# Patient Record
Sex: FEMALE | Race: WHITE | NOT HISPANIC OR LATINO | Employment: OTHER | URBAN - METROPOLITAN AREA
[De-identification: names, ages, dates, MRNs, and addresses within clinical notes are randomized per-mention and may not be internally consistent; named-entity substitution may affect disease eponyms.]

---

## 2017-11-15 ENCOUNTER — OFFICE VISIT (OUTPATIENT)
Dept: URGENT CARE | Facility: CLINIC | Age: 72
End: 2017-11-15
Payer: MEDICARE

## 2017-11-15 VITALS
TEMPERATURE: 99 F | HEIGHT: 63 IN | RESPIRATION RATE: 18 BRPM | SYSTOLIC BLOOD PRESSURE: 174 MMHG | WEIGHT: 190 LBS | HEART RATE: 74 BPM | OXYGEN SATURATION: 96 % | BODY MASS INDEX: 33.66 KG/M2 | DIASTOLIC BLOOD PRESSURE: 89 MMHG

## 2017-11-15 DIAGNOSIS — H00.025 HORDEOLUM INTERNUM OF LEFT LOWER EYELID: Primary | ICD-10-CM

## 2017-11-15 PROCEDURE — 99203 OFFICE O/P NEW LOW 30 MIN: CPT | Mod: S$GLB,,, | Performed by: PHYSICIAN ASSISTANT

## 2017-11-15 RX ORDER — NEOMYCIN SULFATE, POLYMYXIN B SULFATE AND DEXAMETHASONE 3.5; 10000; 1 MG/ML; [USP'U]/ML; MG/ML
1 SUSPENSION/ DROPS OPHTHALMIC EVERY 6 HOURS
Qty: 5 ML | Refills: 0 | Status: SHIPPED | OUTPATIENT
Start: 2017-11-15 | End: 2017-11-20

## 2017-11-15 RX ORDER — HYDROCHLOROTHIAZIDE 25 MG/1
25 TABLET ORAL DAILY
COMMUNITY
End: 2021-01-04

## 2017-11-15 RX ORDER — AMLODIPINE BESYLATE 5 MG/1
5 TABLET ORAL DAILY
COMMUNITY
End: 2019-12-07 | Stop reason: ALTCHOICE

## 2017-11-15 RX ORDER — CHOLECALCIFEROL (VITAMIN D3) 25 MCG
1000 TABLET ORAL DAILY
COMMUNITY

## 2017-11-15 RX ORDER — CALCIUM CARBONATE 600 MG
600 TABLET ORAL ONCE
COMMUNITY

## 2017-11-15 RX ORDER — ASPIRIN 81 MG/1
81 TABLET ORAL EVERY OTHER DAY
COMMUNITY

## 2017-11-15 RX ORDER — NEOMYCIN SULFATE, POLYMYXIN B SULFATE AND DEXAMETHASONE 3.5; 10000; 1 MG/ML; [USP'U]/ML; MG/ML
1 SUSPENSION/ DROPS OPHTHALMIC EVERY 6 HOURS
Qty: 5 ML | Refills: 0 | Status: SHIPPED | OUTPATIENT
Start: 2017-11-15 | End: 2017-11-15 | Stop reason: SDUPTHER

## 2017-11-15 RX ORDER — LISINOPRIL 20 MG/1
20 TABLET ORAL DAILY
COMMUNITY
End: 2019-12-07 | Stop reason: ALTCHOICE

## 2017-11-15 NOTE — PATIENT INSTRUCTIONS
Sty (or Stye)  A sty is an infection of the oil gland of the eyelid. It may develop into a small pocket of pus (abscess). This can cause pain, redness, and swelling. In early stages, styes are treated with antibiotic cream, eye drops, or warm packs (small towels soaked in warm water). More severe cases may need to be opened and drained by a health care provider.  Home care  · Eye drops or ointment are usually prescribed to treat the infection. Use these as directed.   ¨ Artificial tears may also be used to lubricate the eye and make it more comfortable. These may be purchased without a prescription.   ¨ Talk to your health care provider before using any over-the-counter treatment for a sty.  · Apply a warm, damp towel to the affected eye for at least 5 minutes, 3 to 4 times a day for a week. Warm compresses open the pores and speed the healing. If the compresses are too hot, they may burn your eyelid.  · Sometimes the sty will drain with this treatment alone. If this happens, continue the antibiotic until all the redness and swelling are gone.  · Wash your hands before and after touching the infected eye to avoid spreading the infection.  · Do not squeeze or try to puncture the sty.  Follow-up care  Follow up with your health care provider, or as advised.   When to seek medical advice  Call your health care provider right away if you have:  · Increase in swelling or redness around the eyelid after 48 to 72 hours  · Increase in eye pain or the eyelid blisters  · Increase in warmth--the eyelid feels hot  · Drainage of blood or thick pus from the sty  · Blister on the eyelid  · Inability to open the eyelid due to swelling  · Fever  ¨ 1 degree above your normal temperature lasting for 24 to 48 hours, or  ¨ Whatever your health care provider told you to report based on your medical condition  · Vision changes  · Headache or stiff neck  · Recurrence of the sty  Date Last Reviewed: 6/14/2015  © 9526-3449 [employer]. [address] [place] [address], [place] [address]. All rights reserved. This information is not intended as a substitute for professional medical care. Always follow your healthcare professional's instructions.      Please follow up with your Primary care provider within 2-5 days if your signs and symptoms have not resolved or worsen.     If your condition worsens or fails to improve we recommend that you receive another evaluation at the emergency room immediately or contact your primary medical clinic to discuss your concerns.   You must understand that you have received an Urgent Care treatment only and that you may be released before all of your medical problems are known or treated. You, the patient, will arrange for follow up care as instructed.

## 2017-11-15 NOTE — PROGRESS NOTES
"Subjective:       Patient ID: Melani Holloway is a 72 y.o. female.    Vitals:  height is 5' 3" (1.6 m) and weight is 86.2 kg (190 lb). Her oral temperature is 98.7 °F (37.1 °C). Her blood pressure is 174/89 (abnormal) and her pulse is 74. Her respiration is 18 and oxygen saturation is 96%.     Chief Complaint: Eye Problem    Eye Problem    The left eye is affected. This is a new problem. The current episode started in the past 7 days. The problem occurs constantly. The problem has been gradually worsening. There was no injury mechanism. The pain is at a severity of 0/10. The patient is experiencing no pain. There is no known exposure to pink eye. She does not wear contacts. Associated symptoms include eye redness and itching. Pertinent negatives include no blurred vision, fever, nausea, photophobia or vomiting. She has tried water for the symptoms. The treatment provided no relief.     Review of Systems   Constitution: Negative for chills and fever.   HENT: Negative for congestion.    Eyes: Positive for itching and redness. Negative for blurred vision, pain and photophobia.   Gastrointestinal: Negative for nausea and vomiting.   Neurological: Negative for headaches.       Objective:      Physical Exam   Constitutional: She is oriented to person, place, and time. She appears well-developed and well-nourished.   HENT:   Head: Normocephalic and atraumatic.   Right Ear: External ear normal.   Left Ear: External ear normal.   Nose: Nose normal.   Mouth/Throat: Oropharynx is clear and moist.   Eyes: Conjunctivae, EOM and lids are normal. Pupils are equal, round, and reactive to light. Right eye exhibits no chemosis, no discharge, no exudate and no hordeolum. No foreign body present in the right eye. Left eye exhibits discharge, exudate and hordeolum. Left eye exhibits no chemosis. No foreign body present in the left eye.       Neck: Trachea normal, full passive range of motion without pain and phonation normal. Neck " supple.   Musculoskeletal: Normal range of motion.   Neurological: She is alert and oriented to person, place, and time.   Skin: Skin is warm, dry and intact.   Psychiatric: She has a normal mood and affect. Her speech is normal and behavior is normal. Judgment and thought content normal. Cognition and memory are normal.   Nursing note and vitals reviewed.      Assessment:       1. Hordeolum internum of left lower eyelid        Plan:         Hordeolum internum of left lower eyelid  -     neomycin-polymyxin-dexamethasone (MAXITROL) 3.5mg/mL-10,000 unit/mL-0.1 % DrpS; Place 1 drop into the left eye every 6 (six) hours.  Dispense: 5 mL; Refill: 0        Sty (or Stye)  A sty is an infection of the oil gland of the eyelid. It may develop into a small pocket of pus (abscess). This can cause pain, redness, and swelling. In early stages, styes are treated with antibiotic cream, eye drops, or warm packs (small towels soaked in warm water). More severe cases may need to be opened and drained by a health care provider.  Home care  · Eye drops or ointment are usually prescribed to treat the infection. Use these as directed.   ¨ Artificial tears may also be used to lubricate the eye and make it more comfortable. These may be purchased without a prescription.   ¨ Talk to your health care provider before using any over-the-counter treatment for a sty.  · Apply a warm, damp towel to the affected eye for at least 5 minutes, 3 to 4 times a day for a week. Warm compresses open the pores and speed the healing. If the compresses are too hot, they may burn your eyelid.  · Sometimes the sty will drain with this treatment alone. If this happens, continue the antibiotic until all the redness and swelling are gone.  · Wash your hands before and after touching the infected eye to avoid spreading the infection.  · Do not squeeze or try to puncture the sty.  Follow-up care  Follow up with your health care provider, or as advised.   When to seek  medical advice  Call your health care provider right away if you have:  · Increase in swelling or redness around the eyelid after 48 to 72 hours  · Increase in eye pain or the eyelid blisters  · Increase in warmth--the eyelid feels hot  · Drainage of blood or thick pus from the sty  · Blister on the eyelid  · Inability to open the eyelid due to swelling  · Fever  ¨ 1 degree above your normal temperature lasting for 24 to 48 hours, or  ¨ Whatever your health care provider told you to report based on your medical condition  · Vision changes  · Headache or stiff neck  · Recurrence of the sty  Date Last Reviewed: 6/14/2015  © 9580-0579 [employer]. [address] [place] [address], [place] [address]. All rights reserved. This information is not intended as a substitute for professional medical care. Always follow your healthcare professional's instructions.      Please follow up with your Primary care provider within 2-5 days if your signs and symptoms have not resolved or worsen.     If your condition worsens or fails to improve we recommend that you receive another evaluation at the emergency room immediately or contact your primary medical clinic to discuss your concerns.   You must understand that you have received an Urgent Care treatment only and that you may be released before all of your medical problems are known or treated. You, the patient, will arrange for follow up care as instructed.

## 2019-01-03 ENCOUNTER — OFFICE VISIT (OUTPATIENT)
Dept: URGENT CARE | Facility: CLINIC | Age: 74
End: 2019-01-03
Payer: MEDICARE

## 2019-01-03 VITALS
OXYGEN SATURATION: 94 % | WEIGHT: 190 LBS | DIASTOLIC BLOOD PRESSURE: 84 MMHG | HEIGHT: 63 IN | SYSTOLIC BLOOD PRESSURE: 168 MMHG | BODY MASS INDEX: 33.66 KG/M2 | HEART RATE: 92 BPM | RESPIRATION RATE: 18 BRPM | TEMPERATURE: 101 F

## 2019-01-03 DIAGNOSIS — J40 BRONCHITIS: Primary | ICD-10-CM

## 2019-01-03 DIAGNOSIS — R06.2 WHEEZING: ICD-10-CM

## 2019-01-03 DIAGNOSIS — R50.9 FEVER, UNSPECIFIED FEVER CAUSE: ICD-10-CM

## 2019-01-03 PROCEDURE — 99214 OFFICE O/P EST MOD 30 MIN: CPT | Mod: 25,S$GLB,, | Performed by: PHYSICIAN ASSISTANT

## 2019-01-03 PROCEDURE — 94640 PR INHAL RX, AIRWAY OBST/DX SPUTUM INDUCT: ICD-10-PCS | Mod: 59,S$GLB,, | Performed by: PHYSICIAN ASSISTANT

## 2019-01-03 PROCEDURE — 99214 PR OFFICE/OUTPT VISIT, EST, LEVL IV, 30-39 MIN: ICD-10-PCS | Mod: 25,S$GLB,, | Performed by: PHYSICIAN ASSISTANT

## 2019-01-03 PROCEDURE — 94640 AIRWAY INHALATION TREATMENT: CPT | Mod: 59,S$GLB,, | Performed by: PHYSICIAN ASSISTANT

## 2019-01-03 RX ORDER — PREDNISONE 20 MG/1
40 TABLET ORAL DAILY
Qty: 10 TABLET | Refills: 0 | Status: SHIPPED | OUTPATIENT
Start: 2019-01-03 | End: 2019-01-08

## 2019-01-03 RX ORDER — ALBUTEROL SULFATE 0.83 MG/ML
2.5 SOLUTION RESPIRATORY (INHALATION)
Status: COMPLETED | OUTPATIENT
Start: 2019-01-03 | End: 2019-01-03

## 2019-01-03 RX ORDER — ALBUTEROL SULFATE 90 UG/1
1-2 AEROSOL, METERED RESPIRATORY (INHALATION) EVERY 4 HOURS PRN
Qty: 1 INHALER | Refills: 1 | Status: SHIPPED | OUTPATIENT
Start: 2019-01-03 | End: 2020-09-28

## 2019-01-03 RX ORDER — ACETAMINOPHEN 500 MG
1000 TABLET ORAL
Status: COMPLETED | OUTPATIENT
Start: 2019-01-03 | End: 2019-01-03

## 2019-01-03 RX ORDER — IPRATROPIUM BROMIDE 0.5 MG/2.5ML
0.5 SOLUTION RESPIRATORY (INHALATION)
Status: COMPLETED | OUTPATIENT
Start: 2019-01-03 | End: 2019-01-03

## 2019-01-03 RX ADMIN — IPRATROPIUM BROMIDE 0.5 MG: 0.5 SOLUTION RESPIRATORY (INHALATION) at 07:01

## 2019-01-03 RX ADMIN — ALBUTEROL SULFATE 2.5 MG: 0.83 SOLUTION RESPIRATORY (INHALATION) at 07:01

## 2019-01-03 RX ADMIN — Medication 1000 MG: at 07:01

## 2019-01-04 NOTE — PATIENT INSTRUCTIONS
- Rest.    - Drink plenty of fluids.    - Tylenol or Ibuprofen as directed as needed for fever/pain.    - Take over-the-counter claritin, zyrtec, allegra, or xyzal as directed.  You may use a decongestant form (D) of this medication if you DO NOT have a history of hypertension or heart disease.   - Follow up with your PCP or specialty clinic as directed in the next 1-2 weeks if not improved or as needed.  You can call [phone] to schedule an appointment with the appropriate provider.    - Go to the ED if your symptoms worsen.  - You must understand that you have received an Urgent Care treatment only and that you may be released before all of your medical problems are known or treated.   - You, the patient, will arrange for follow up care as instructed.   - If your condition worsens or fails to improve we recommend that you receive another evaluation at the ER immediately or contact your PCP to discuss your concerns or return here.       Bronchitis with Wheezing (Viral or Bacterial: Adult)    Bronchitis is an infection of the air passages. It often occurs during a cold and is usually caused by a virus. Symptoms include cough with mucus (phlegm) and low-grade fever. This illness is contagious during the first few days and is spread through the air by coughing and sneezing, or by direct contact (touching the sick person and then touching your own eyes, nose, or mouth).  If there is a lot of inflammation, air flow is restricted. The air passages may also go into spasm, especially if you have asthma. This causes wheezing and difficulty breathing even in people who do not have asthma.  Bronchitis usually lasts 7 to 14 days. The wheezing should improve with treatment during the first week. An inhaler is often prescribed to relax the air passages and stop wheezing. Antibiotics will be prescribed if your doctor thinks there is also a secondary bacterial infection.  Home care  · If symptoms are severe, rest at home for  the first 2 to 3 days. When you go back to your usual activities, don't let yourself get too tired.  · Do not smoke. Also avoid being exposed to secondhand smoke.  · You may use over-the-counter medicine to control fever or pain, unless another medicine was prescribed. Note: If you have chronic liver or kidney disease or have ever had a stomach ulcer or gastrointestinal bleeding, talk with your healthcare provider before using these medicines. Also talk to your provider if you are taking medicine to prevent blood clots.) Aspirin should never be given to anyone younger than 18 years of age who is ill with a viral infection or fever. It may cause severe liver or brain damage.  · Your appetite may be poor, so a light diet is fine. Avoid dehydration by drinking 6 to 8 glasses of fluids per day (such as water, soft drinks, sports drinks, juices, tea, or soup). Extra fluids will help loosen secretions in the nose and lungs.  · Over-the-counter cough, cold, and sore-throat medicines will not shorten the length of the illness, but they may be helpful to reduce symptoms. (Note: Do not use decongestants if you have high blood pressure.)  · If you were given an inhaler, use it exactly as directed. If you need to use it more often than prescribed, your condition may be worsening. If this happens, contact your healthcare provider.  · If prescribed, finish all antibiotic medicine, even if you are feeling better after only a few days.  Follow-up care  Follow up with your healthcare provider, or as advised. If you had an X-ray or ECG (electrocardiogram), a specialist will review it. You will be notified of any new findings that may affect your care.  Note: If you are age 65 or older, or if you have a chronic lung disease or condition that affects your immune system, or you smoke, talk to your healthcare provider about having a pneumococcal vaccinations and a yearly influenza vaccination (flu shot).  When to seek medical  advice  Call your healthcare provider right away if any of these occur:  · Fever of 100.4°F (38°C) or higher  · Coughing up increasing amounts of colored sputum  · Weakness, drowsiness, headache, facial pain, ear pain, or a stiff neck  Call 911, or get immediate medical care  Contact emergency services right away if any of these occur.  · Coughing up blood  · Worsening weakness, drowsiness, headache, or stiff neck  · Increased wheezing not helped with medication, shortness of breath, or pain with breathing  Date Last Reviewed: 9/13/2015 © 2000-2017 [employer]. [address] [place] [address]. All rights reserved. This information is not intended as a substitute for professional medical care. Always follow your healthcare professional's instructions.        Bronchitis, Viral (Adult)    You have a viral bronchitis. Bronchitis is inflammation and swelling of the lining of the lungs. This is often caused by an infection. Symptoms include a dry, hacking cough that is worse at night. The cough may bring up yellow-green mucus. You may also feel short of breath or wheeze. Other symptoms may include tiredness, chest discomfort, and chills.  Bronchitis that is caused by a virus is not treated with antibiotics. Instead, medicines may be given to help relieve symptoms. Symptoms can last up to 2 weeks, although the cough may last much longer.  This illness is contagious during the first few days and is spread through the air by coughing and sneezing, or by direct contact (touching the sick person and then touching your own eyes, nose, or mouth).  Most viral illnesses resolve within 10 to 14 days with rest and simple home remedies, although they may sometimes last for several weeks.  Home care  · If symptoms are severe, rest at home for the first 2 to 3 days. When you go back to your usual activities, don't let yourself get too tired.  · Do not smoke. Also avoid being exposed to secondhand smoke.  · You  may use over-the-counter medicine to control fever or pain, unless another pain medicine was prescribed. (Note: If you have chronic liver or kidney disease or have ever had a stomach ulcer or gastrointestinal bleeding, talk with your healthcare provider before using these medicines. Also talk to your provider if you are taking medicine to prevent blood clots.) Aspirin should never be given to anyone younger than 18 years of age who is ill with a viral infection or fever. It may cause severe liver or brain damage.  · Your appetite may be poor, so a light diet is fine. Avoid dehydration by drinking 6 to 8 glasses of fluids per day (such as water, soft drinks, sports drinks, juices, tea, or soup). Extra fluids will help loosen secretions in the nose and lungs.  · Over-the-counter cough, cold, and sore-throat medicines will not shorten the length of the illness, but they may help to reduce symptoms. (Note: Do not use decongestants if you have high blood pressure.)  Follow-up care  Follow up with your healthcare provider, or as advised. If you had an X-ray or ECG (electrocardiogram), a specialist will review it. You will be notified of any new findings that may affect your care.  Note: If you are age 65 or older, or if you have a chronic lung disease or condition that affects your immune system, or you smoke, talk to your healthcare provider about having pneumococcal vaccinations and a yearly influenza vaccination (flu shot).  When to seek medical advice  Call your healthcare provider right away if any of these occur:  · Fever of 100.4°F (38°C) or higher  · Coughing up increased amounts of colored sputum  · Weakness, drowsiness, headache, facial pain, ear pain, or a stiff neck  Call 911, or get immediate medical care  Contact emergency services right away if any of these occur:  · Coughing up blood  · Worsening weakness, drowsiness, headache, or stiff neck  · Trouble breathing, wheezing, or pain with breathing  Date Last  Reviewed: 9/13/2015  © 5177-0610 [employer]. [address] [place] [address], [place] [address]. All rights reserved. This information is not intended as a substitute for professional medical care. Always follow your healthcare professional's instructions.

## 2019-01-04 NOTE — PROGRESS NOTES
"Subjective:       Patient ID: Melani Holloway is a 73 y.o. female.    Vitals:  height is 5' 3" (1.6 m) and weight is 86.2 kg (190 lb). Her oral temperature is 101 °F (38.3 °C) (abnormal). Her blood pressure is 168/84 (abnormal) and her pulse is 92. Her respiration is 18 and oxygen saturation is 94 (abnormal)%.     Chief Complaint: URI    This is a 73 y.o. female who presents today with a chief complaint of URI which started on Tuesday.  Patient states she started having SOB today.  Pt has been taking Gillian Accokeek Day Tabs without any relief.         URI    This is a new problem. The current episode started in the past 7 days (Tuesday). The problem has been gradually worsening. The maximum temperature recorded prior to her arrival was 101 - 101.9 F. Associated symptoms include coughing. Pertinent negatives include no congestion, ear pain, nausea, rash, sinus pain, sore throat, vomiting or wheezing. Treatments tried: Gillian Selzter Day. The treatment provided no relief.       Constitution: Positive for fever. Negative for chills, sweating and fatigue.   HENT: Positive for postnasal drip. Negative for ear pain, congestion, sinus pain, sinus pressure, sore throat and voice change.    Neck: Negative for painful lymph nodes.   Eyes: Negative for eye redness.   Respiratory: Positive for cough and shortness of breath. Negative for chest tightness, sputum production, bloody sputum, COPD, stridor, wheezing and asthma.    Gastrointestinal: Negative for nausea and vomiting.   Musculoskeletal: Negative for muscle ache.   Skin: Negative for rash and erythema.   Allergic/Immunologic: Negative for seasonal allergies and asthma.   Hematologic/Lymphatic: Negative for swollen lymph nodes.       Objective:      Physical Exam   Constitutional: She is oriented to person, place, and time. She appears well-developed and well-nourished.   HENT:   Head: Normocephalic and atraumatic.   Right Ear: Hearing, tympanic membrane, external ear and " ear canal normal.   Left Ear: Hearing, tympanic membrane, external ear and ear canal normal.   Nose: Nose normal. Right sinus exhibits no maxillary sinus tenderness and no frontal sinus tenderness. Left sinus exhibits no maxillary sinus tenderness and no frontal sinus tenderness.   Mouth/Throat: Uvula is midline and oropharynx is clear and moist.   Eyes: Conjunctivae are normal.   Neck: Normal range of motion. Neck supple. No thyromegaly present.   Cardiovascular: Normal rate and regular rhythm. Exam reveals no gallop and no friction rub.   No murmur heard.  Pulmonary/Chest: Effort normal. She has wheezes (inspiratory and expiratory, all lung fields). She has no rales.   Musculoskeletal: Normal range of motion.   Lymphadenopathy:     She has no cervical adenopathy.   Neurological: She is alert and oriented to person, place, and time.   Skin: Skin is warm and dry. No rash noted. No erythema.   Psychiatric: She has a normal mood and affect. Her behavior is normal. Judgment and thought content normal.   Nursing note and vitals reviewed.      8:12 PM - patient does feel improved after the breathing treatment.  She does sound better with no wheezing noted. Her oxygen saturation has come up to 94%.    Assessment:       1. Bronchitis    2. Wheezing    3. Fever, unspecified fever cause        Plan:         Bronchitis  -     predniSONE (DELTASONE) 20 MG tablet; Take 2 tablets (40 mg total) by mouth once daily. for 5 days  Dispense: 10 tablet; Refill: 0  -     albuterol (PROVENTIL/VENTOLIN HFA) 90 mcg/actuation inhaler; Inhale 1-2 puffs into the lungs every 4 (four) hours as needed for Wheezing.  Dispense: 1 Inhaler; Refill: 1    Wheezing  -     albuterol nebulizer solution 2.5 mg  -     ipratropium 0.02 % nebulizer solution 0.5 mg    Fever, unspecified fever cause  -     acetaminophen tablet 1,000 mg        Melani was seen today for uri.    Diagnoses and all orders for this visit:    Bronchitis  -     predniSONE (DELTASONE)  20 MG tablet; Take 2 tablets (40 mg total) by mouth once daily. for 5 days  -     albuterol (PROVENTIL/VENTOLIN HFA) 90 mcg/actuation inhaler; Inhale 1-2 puffs into the lungs every 4 (four) hours as needed for Wheezing.    Wheezing  -     albuterol nebulizer solution 2.5 mg  -     ipratropium 0.02 % nebulizer solution 0.5 mg    Fever, unspecified fever cause  -     acetaminophen tablet 1,000 mg      Patient Instructions   - Rest.    - Drink plenty of fluids.    - Tylenol or Ibuprofen as directed as needed for fever/pain.    - Take over-the-counter claritin, zyrtec, allegra, or xyzal as directed.  You may use a decongestant form (D) of this medication if you DO NOT have a history of hypertension or heart disease.   - Follow up with your PCP or specialty clinic as directed in the next 1-2 weeks if not improved or as needed.  You can call [phone] to schedule an appointment with the appropriate provider.    - Go to the ED if your symptoms worsen.  - You must understand that you have received an Urgent Care treatment only and that you may be released before all of your medical problems are known or treated.   - You, the patient, will arrange for follow up care as instructed.   - If your condition worsens or fails to improve we recommend that you receive another evaluation at the ER immediately or contact your PCP to discuss your concerns or return here.       Bronchitis with Wheezing (Viral or Bacterial: Adult)    Bronchitis is an infection of the air passages. It often occurs during a cold and is usually caused by a virus. Symptoms include cough with mucus (phlegm) and low-grade fever. This illness is contagious during the first few days and is spread through the air by coughing and sneezing, or by direct contact (touching the sick person and then touching your own eyes, nose, or mouth).  If there is a lot of inflammation, air flow is restricted. The air passages may also go into spasm, especially if you have  asthma. This causes wheezing and difficulty breathing even in people who do not have asthma.  Bronchitis usually lasts 7 to 14 days. The wheezing should improve with treatment during the first week. An inhaler is often prescribed to relax the air passages and stop wheezing. Antibiotics will be prescribed if your doctor thinks there is also a secondary bacterial infection.  Home care  · If symptoms are severe, rest at home for the first 2 to 3 days. When you go back to your usual activities, don't let yourself get too tired.  · Do not smoke. Also avoid being exposed to secondhand smoke.  · You may use over-the-counter medicine to control fever or pain, unless another medicine was prescribed. Note: If you have chronic liver or kidney disease or have ever had a stomach ulcer or gastrointestinal bleeding, talk with your healthcare provider before using these medicines. Also talk to your provider if you are taking medicine to prevent blood clots.) Aspirin should never be given to anyone younger than 18 years of age who is ill with a viral infection or fever. It may cause severe liver or brain damage.  · Your appetite may be poor, so a light diet is fine. Avoid dehydration by drinking 6 to 8 glasses of fluids per day (such as water, soft drinks, sports drinks, juices, tea, or soup). Extra fluids will help loosen secretions in the nose and lungs.  · Over-the-counter cough, cold, and sore-throat medicines will not shorten the length of the illness, but they may be helpful to reduce symptoms. (Note: Do not use decongestants if you have high blood pressure.)  · If you were given an inhaler, use it exactly as directed. If you need to use it more often than prescribed, your condition may be worsening. If this happens, contact your healthcare provider.  · If prescribed, finish all antibiotic medicine, even if you are feeling better after only a few days.  Follow-up care  Follow up with your healthcare provider, or as advised. If  you had an X-ray or ECG (electrocardiogram), a specialist will review it. You will be notified of any new findings that may affect your care.  Note: If you are age 65 or older, or if you have a chronic lung disease or condition that affects your immune system, or you smoke, talk to your healthcare provider about having a pneumococcal vaccinations and a yearly influenza vaccination (flu shot).  When to seek medical advice  Call your healthcare provider right away if any of these occur:  · Fever of 100.4°F (38°C) or higher  · Coughing up increasing amounts of colored sputum  · Weakness, drowsiness, headache, facial pain, ear pain, or a stiff neck  Call 911, or get immediate medical care  Contact emergency services right away if any of these occur.  · Coughing up blood  · Worsening weakness, drowsiness, headache, or stiff neck  · Increased wheezing not helped with medication, shortness of breath, or pain with breathing  Date Last Reviewed: 9/13/2015  © 1042-0018 [employer]. [address] [place] [address], [place] [address]. All rights reserved. This information is not intended as a substitute for professional medical care. Always follow your healthcare professional's instructions.        Bronchitis, Viral (Adult)    You have a viral bronchitis. Bronchitis is inflammation and swelling of the lining of the lungs. This is often caused by an infection. Symptoms include a dry, hacking cough that is worse at night. The cough may bring up yellow-green mucus. You may also feel short of breath or wheeze. Other symptoms may include tiredness, chest discomfort, and chills.  Bronchitis that is caused by a virus is not treated with antibiotics. Instead, medicines may be given to help relieve symptoms. Symptoms can last up to 2 weeks, although the cough may last much longer.  This illness is contagious during the first few days and is spread through the air by coughing and sneezing, or by direct contact (touching the sick  person and then touching your own eyes, nose, or mouth).  Most viral illnesses resolve within 10 to 14 days with rest and simple home remedies, although they may sometimes last for several weeks.  Home care  · If symptoms are severe, rest at home for the first 2 to 3 days. When you go back to your usual activities, don't let yourself get too tired.  · Do not smoke. Also avoid being exposed to secondhand smoke.  · You may use over-the-counter medicine to control fever or pain, unless another pain medicine was prescribed. (Note: If you have chronic liver or kidney disease or have ever had a stomach ulcer or gastrointestinal bleeding, talk with your healthcare provider before using these medicines. Also talk to your provider if you are taking medicine to prevent blood clots.) Aspirin should never be given to anyone younger than 18 years of age who is ill with a viral infection or fever. It may cause severe liver or brain damage.  · Your appetite may be poor, so a light diet is fine. Avoid dehydration by drinking 6 to 8 glasses of fluids per day (such as water, soft drinks, sports drinks, juices, tea, or soup). Extra fluids will help loosen secretions in the nose and lungs.  · Over-the-counter cough, cold, and sore-throat medicines will not shorten the length of the illness, but they may help to reduce symptoms. (Note: Do not use decongestants if you have high blood pressure.)  Follow-up care  Follow up with your healthcare provider, or as advised. If you had an X-ray or ECG (electrocardiogram), a specialist will review it. You will be notified of any new findings that may affect your care.  Note: If you are age 65 or older, or if you have a chronic lung disease or condition that affects your immune system, or you smoke, talk to your healthcare provider about having pneumococcal vaccinations and a yearly influenza vaccination (flu shot).  When to seek medical advice  Call your healthcare provider right away if any of  these occur:  · Fever of 100.4°F (38°C) or higher  · Coughing up increased amounts of colored sputum  · Weakness, drowsiness, headache, facial pain, ear pain, or a stiff neck  Call 911, or get immediate medical care  Contact emergency services right away if any of these occur:  · Coughing up blood  · Worsening weakness, drowsiness, headache, or stiff neck  · Trouble breathing, wheezing, or pain with breathing  Date Last Reviewed: 9/13/2015 © 2000-2017 [employer]. [address] [place] [address]. All rights reserved. This information is not intended as a substitute for professional medical care. Always follow your healthcare professional's instructions.

## 2019-10-11 DIAGNOSIS — Z12.31 VISIT FOR SCREENING MAMMOGRAM: Primary | ICD-10-CM

## 2019-10-22 ENCOUNTER — HOSPITAL ENCOUNTER (OUTPATIENT)
Dept: RADIOLOGY | Facility: HOSPITAL | Age: 74
Discharge: HOME | End: 2019-10-22
Attending: FAMILY MEDICINE
Payer: MEDICARE

## 2019-10-22 DIAGNOSIS — Z12.31 VISIT FOR SCREENING MAMMOGRAM: ICD-10-CM

## 2019-10-22 PROCEDURE — 77063 BREAST TOMOSYNTHESIS BI: CPT | Mod: 26,,, | Performed by: RADIOLOGY

## 2019-10-22 PROCEDURE — 77067 MAMMO DIGITAL SCREENING BILAT WITH TOMOSYNTHESIS_CAD: ICD-10-PCS | Mod: 26,,, | Performed by: RADIOLOGY

## 2019-10-22 PROCEDURE — 77067 SCR MAMMO BI INCL CAD: CPT | Mod: 26,,, | Performed by: RADIOLOGY

## 2019-10-22 PROCEDURE — 77063 MAMMO DIGITAL SCREENING BILAT WITH TOMOSYNTHESIS_CAD: ICD-10-PCS | Mod: 26,,, | Performed by: RADIOLOGY

## 2019-10-22 PROCEDURE — 77067 SCR MAMMO BI INCL CAD: CPT | Mod: TC

## 2019-12-07 ENCOUNTER — OFFICE VISIT (OUTPATIENT)
Dept: URGENT CARE | Facility: CLINIC | Age: 74
End: 2019-12-07
Payer: MEDICARE

## 2019-12-07 VITALS
TEMPERATURE: 98 F | SYSTOLIC BLOOD PRESSURE: 154 MMHG | HEIGHT: 63 IN | HEART RATE: 89 BPM | DIASTOLIC BLOOD PRESSURE: 82 MMHG | WEIGHT: 190 LBS | BODY MASS INDEX: 33.66 KG/M2 | OXYGEN SATURATION: 96 % | RESPIRATION RATE: 18 BRPM

## 2019-12-07 DIAGNOSIS — R05.9 COUGH: ICD-10-CM

## 2019-12-07 DIAGNOSIS — J01.90 ACUTE BACTERIAL SINUSITIS: Primary | ICD-10-CM

## 2019-12-07 DIAGNOSIS — R09.82 POST-NASAL DRIP: ICD-10-CM

## 2019-12-07 DIAGNOSIS — B96.89 ACUTE BACTERIAL SINUSITIS: Primary | ICD-10-CM

## 2019-12-07 PROCEDURE — 99214 OFFICE O/P EST MOD 30 MIN: CPT | Mod: S$GLB,,, | Performed by: NURSE PRACTITIONER

## 2019-12-07 PROCEDURE — 99214 PR OFFICE/OUTPT VISIT, EST, LEVL IV, 30-39 MIN: ICD-10-PCS | Mod: S$GLB,,, | Performed by: NURSE PRACTITIONER

## 2019-12-07 RX ORDER — AMOXICILLIN AND CLAVULANATE POTASSIUM 875; 125 MG/1; MG/1
1 TABLET, FILM COATED ORAL 2 TIMES DAILY
Qty: 14 TABLET | Refills: 0 | Status: SHIPPED | OUTPATIENT
Start: 2019-12-07 | End: 2019-12-14

## 2019-12-07 RX ORDER — AMLODIPINE BESYLATE 10 MG/1
1 TABLET ORAL DAILY
COMMUNITY
End: 2020-11-23

## 2019-12-07 RX ORDER — LISINOPRIL 40 MG/1
1 TABLET ORAL DAILY
COMMUNITY
End: 2020-11-23

## 2019-12-07 RX ORDER — FLUTICASONE PROPIONATE 50 MCG
1 SPRAY, SUSPENSION (ML) NASAL DAILY
Qty: 1 BOTTLE | Refills: 0 | Status: SHIPPED | OUTPATIENT
Start: 2019-12-07

## 2019-12-07 RX ORDER — BIOTIN 10 MG
TABLET ORAL
COMMUNITY
End: 2020-09-28

## 2019-12-07 NOTE — PATIENT INSTRUCTIONS
Return to Urgent Care or go to ER if symptoms worsen or fail to improve.  Follow up with PCP as recommended for further management.     Over the counter medications that are also available by prescription (depending on insurance coverage):    Use Flonase or Nasacort (script sent for flonase ) 1-2 sprays/nostril per day. It is a local acting steroid nasal spray, if you develop a bloody nose, stop using the medication immediately.    Use Nasal Saline (script sent) to mechanically move any post nasal drip from your eustachian tube or from the back of your throat. Use the nasal saline prior to using any topical nasal sprays to help clear the mucus so the medication is able to get to the mucus membranes.      Over the counter medications that may be helpful:    Use Afrin in each nare for no longer than 5 days, as it is addictive. It can also dry out your mucous membranes and cause elevated blood pressure.    Use pseudoephedrine (behind the counter) to decongest-- (the little red pills).  Pseudoephedrine 30 mg up to 240 mg /day. It can raise your blood pressure and give you palpitations.  Do not buy any oral medications with phenylephrine as it has not been proven effective as a decongestant at the OTC dose.     Take Ibuprofen or Acetaminophen according to label instructions for fever, throat pain, headache, and body aches    Use warm salt water gargles to ease your throat pain. Warm salt water gargles as needed for sore throat-  1/2 tsp salt to 1 cup warm water, gargle as desired.    Sometimes Nyquil at night is beneficial to help you get some rest, however it is sedating and does have an antihistamine, as well as tylenol.  The Nyquil behind the pharmacy counter also has the decongestant, pseudoephedrine as an additional ingredient.         Acute Bacterial Rhinosinusitis (ABRS)    Acute bacterial rhinosinusitis (ABRS) is an infection of your nasal cavity and sinuses. Its caused by bacteria. Acute means that youve had  symptoms for less than 12 weeks.  Understanding your sinuses  The nasal cavity is the large air-filled space behind your nose. The sinuses are a group of spaces formed by the bones of your face. They connect with your nasal cavity. ABRS causes the tissue lining these spaces to become inflamed. Mucus may not drain normally. This leads to facial pain and other symptoms.  What causes ABRS?  ABRS most often follows an upper respiratory infection caused by a virus. Bacteria then infect the lining of your nasal cavity and sinuses. But you can also get ABRS if you have:  · Nasal allergies  · Long-term nasal swelling and congestion not caused by allergies  · Blockage in the nose  Symptoms of ABRS  The symptoms of ABRS may be different for each person, and can include:  · Nasal congestion  · Runny nose  · Fluid draining from the nose down the throat (postnasal drip)  · Headache  · Cough  · Pain in the sinuses  · Thick, colored fluid from the nose (mucus)  · Fever  Diagnosing ABRS  ABRS may be diagnosed if youve had an upper respiratory infection like a cold and cough for longer than 10 to 14 days. Your health care provider will ask about your symptoms and your medical history. The provider will check your vital signs, including your temperature. Youll have a physical exam. The health care provider will check your ears, nose, and throat. You likely wont need any tests. If ABRS comes back, you may have a culture or other tests.  Treatment for ABRS  Treatment may include:  · Antibiotic medicine. This is for symptoms that last for at least 10 to 14 days.  · Nasal corticosteroid medicine. Drops or spray used in the nose can lessen swelling and congestion.  · Over-the-counter pain medicine. This is to lessen sinus pain and pressure.  · Nasal decongestant medicine. Spray or drops may help to lessen congestion. Do not use them for more than a few days.  · Salt wash (saline irrigation). This can help to loosen mucus.  Possible  complications of ABRS  ABRS may come back or become long-term (chronic).  In rare cases, ABRS may cause complications such as:   · Inflamed tissue around the brain and spinal cord (meningitis)  · Inflamed tissue around the eyes (orbital cellulitis)  · Inflamed bones around the sinuses (osteitis)  These problems may need to be treated in a hospital with intravenous (IV) antibiotic medicine or surgery.  When to call the health care provider  Call your health care provider if you have any of the following:  · Symptoms that dont get better, or get worse  · Symptoms that dont get better after 3 to 5 days on antibiotics  · Trouble seeing  · Swelling around your eyes  · Confusion or trouble staying awake   Date Last Reviewed: 3/3/2015  © 7500-3289 [employer]. [address] [place] [address], [place] [address]. All rights reserved. This information is not intended as a substitute for professional medical care. Always follow your healthcare professional's instructions.

## 2019-12-07 NOTE — PROGRESS NOTES
"Subjective:       Patient ID: Melani Holloway is a 74 y.o. female.    Vitals:  height is 5' 3" (1.6 m) and weight is 86.2 kg (190 lb). Her oral temperature is 98.4 °F (36.9 °C). Her blood pressure is 154/82 (abnormal) and her pulse is 89. Her respiration is 18 and oxygen saturation is 96%.     Chief Complaint: Sinus Problem    This is a 74 y.o. female who presents today with a chief complaint of   Cough, congestion, sinus pressure x 10 day. Pt stated started around 11/28/2019. Pt had the flu and after that went away she never seemed to get rid of cough. Voice is hoarse     Sinus Problem   This is a new problem. The current episode started 1 to 4 weeks ago. The problem has been gradually worsening since onset. There has been no fever. She is experiencing no pain (pressure). Associated symptoms include congestion, coughing and headaches. Pertinent negatives include no chills, diaphoresis, ear pain, shortness of breath, sinus pressure or sore throat.       Constitution: Positive for fatigue. Negative for chills, sweating and fever.   HENT: Positive for congestion and sinus pain. Negative for ear pain, sinus pressure, sore throat and voice change.    Neck: Negative for painful lymph nodes.   Eyes: Negative for eye redness.   Respiratory: Positive for cough and wheezing. Negative for chest tightness, sputum production, bloody sputum, COPD, shortness of breath, stridor and asthma.    Gastrointestinal: Negative for nausea and vomiting.   Musculoskeletal: Negative for muscle ache.   Skin: Negative for rash.   Allergic/Immunologic: Negative for seasonal allergies and asthma.   Neurological: Positive for headaches.   Hematologic/Lymphatic: Negative for swollen lymph nodes.       Objective:      Physical Exam   Constitutional: She is oriented to person, place, and time. She appears well-developed and well-nourished. She is cooperative.  Non-toxic appearance. She does not have a sickly appearance. She does not appear ill. No " distress.   HENT:   Head: Normocephalic and atraumatic.   Right Ear: Hearing, external ear and ear canal normal. Tympanic membrane is bulging.   Left Ear: Hearing, external ear and ear canal normal. Tympanic membrane is bulging.   Nose: Mucosal edema, rhinorrhea and purulent discharge present. No nasal deformity. No epistaxis. Right sinus exhibits no maxillary sinus tenderness and no frontal sinus tenderness. Left sinus exhibits no maxillary sinus tenderness and no frontal sinus tenderness.   Mouth/Throat: Uvula is midline. Mucous membranes are dry. No trismus in the jaw. Normal dentition. No uvula swelling. Posterior oropharyngeal erythema present. No oropharyngeal exudate or posterior oropharyngeal edema.   Eyes: Conjunctivae and lids are normal. No scleral icterus.   Neck: Trachea normal, full passive range of motion without pain and phonation normal. Neck supple. No neck rigidity. No edema and no erythema present.   Cardiovascular: Normal rate, regular rhythm, normal heart sounds, intact distal pulses and normal pulses.   Pulmonary/Chest: Effort normal and breath sounds normal. No accessory muscle usage or stridor. No respiratory distress. She has no decreased breath sounds. She has no wheezes. She has no rhonchi. She has no rales.   Abdominal: Normal appearance.   Musculoskeletal: Normal range of motion. She exhibits no edema or deformity.   Neurological: She is alert and oriented to person, place, and time. She exhibits normal muscle tone. Coordination normal.   Skin: Skin is warm, dry, intact, not diaphoretic, not pale and no rash.   Psychiatric: She has a normal mood and affect. Her speech is normal and behavior is normal. Judgment and thought content normal. Cognition and memory are normal.   Nursing note and vitals reviewed.        Assessment:       1. Acute bacterial sinusitis    2. Cough    3. Post-nasal drip        Plan:         Acute bacterial sinusitis  -     amoxicillin-clavulanate 875-125mg  (AUGMENTIN) 875-125 mg per tablet; Take 1 tablet by mouth 2 (two) times daily. for 7 days  Dispense: 14 tablet; Refill: 0  -     sodium chloride (OCEAN NASAL) 0.65 % nasal spray; 1 spray by Nasal route as needed for Congestion.  Dispense: 60 mL; Refill: 0  -     fluticasone propionate (FLONASE) 50 mcg/actuation nasal spray; 1 spray (50 mcg total) by Each Nostril route once daily.  Dispense: 1 Bottle; Refill: 0    Cough  -     sodium chloride (OCEAN NASAL) 0.65 % nasal spray; 1 spray by Nasal route as needed for Congestion.  Dispense: 60 mL; Refill: 0  -     fluticasone propionate (FLONASE) 50 mcg/actuation nasal spray; 1 spray (50 mcg total) by Each Nostril route once daily.  Dispense: 1 Bottle; Refill: 0    Post-nasal drip  -     sodium chloride (OCEAN NASAL) 0.65 % nasal spray; 1 spray by Nasal route as needed for Congestion.  Dispense: 60 mL; Refill: 0  -     fluticasone propionate (FLONASE) 50 mcg/actuation nasal spray; 1 spray (50 mcg total) by Each Nostril route once daily.  Dispense: 1 Bottle; Refill: 0

## 2020-09-28 PROBLEM — M85.80 OSTEOPENIA: Status: ACTIVE | Noted: 2017-07-17

## 2020-09-28 PROBLEM — E66.9 OBESITY: Status: ACTIVE | Noted: 2017-07-17

## 2020-09-28 PROBLEM — E55.9 VITAMIN D DEFICIENCY: Status: ACTIVE | Noted: 2017-09-19

## 2020-09-28 PROBLEM — E66.9 OBESITY: Chronic | Status: ACTIVE | Noted: 2017-07-17

## 2020-09-28 PROBLEM — D05.11 DUCTAL CARCINOMA IN SITU (DCIS) OF RIGHT BREAST: Chronic | Status: ACTIVE | Noted: 2017-09-19

## 2020-09-28 PROBLEM — D05.90 BREAST CANCER IN SITU: Status: ACTIVE | Noted: 2017-09-19

## 2020-09-28 PROBLEM — R19.5 POSITIVE FECAL OCCULT BLOOD TEST: Status: ACTIVE | Noted: 2018-09-24

## 2020-09-29 PROBLEM — D69.2 OTHER NONTHROMBOCYTOPENIC PURPURA: Status: ACTIVE | Noted: 2020-09-29

## 2021-10-04 PROBLEM — I21.4 NSTEMI (NON-ST ELEVATED MYOCARDIAL INFARCTION): Status: ACTIVE | Noted: 2020-03-11

## 2021-10-04 PROBLEM — I21.4 NSTEMI (NON-ST ELEVATED MYOCARDIAL INFARCTION): Status: RESOLVED | Noted: 2020-03-11 | Resolved: 2021-10-04

## 2021-10-04 PROBLEM — I25.10 CORONARY ARTERY DISEASE INVOLVING NATIVE CORONARY ARTERY OF NATIVE HEART WITHOUT ANGINA PECTORIS: Status: ACTIVE | Noted: 2021-10-04

## 2021-10-04 PROBLEM — I25.10 CORONARY ARTERY DISEASE INVOLVING NATIVE CORONARY ARTERY OF NATIVE HEART WITHOUT ANGINA PECTORIS: Chronic | Status: ACTIVE | Noted: 2021-10-04

## 2021-10-27 ENCOUNTER — HOSPITAL ENCOUNTER (OUTPATIENT)
Dept: RADIOLOGY | Facility: HOSPITAL | Age: 76
Discharge: HOME | End: 2021-10-27
Attending: FAMILY MEDICINE
Payer: MEDICARE

## 2021-10-27 DIAGNOSIS — Z12.31 ENCOUNTER FOR SCREENING MAMMOGRAM FOR BREAST CANCER: ICD-10-CM

## 2021-10-27 PROCEDURE — 77067 MAMMO DIGITAL SCREENING BILAT WITH TOMO: ICD-10-PCS | Mod: 26,,, | Performed by: RADIOLOGY

## 2021-10-27 PROCEDURE — 77067 SCR MAMMO BI INCL CAD: CPT | Mod: 26,,, | Performed by: RADIOLOGY

## 2021-10-27 PROCEDURE — 77063 MAMMO DIGITAL SCREENING BILAT WITH TOMO: ICD-10-PCS | Mod: 26,,, | Performed by: RADIOLOGY

## 2021-10-27 PROCEDURE — 77067 SCR MAMMO BI INCL CAD: CPT | Mod: TC

## 2021-10-27 PROCEDURE — 77063 BREAST TOMOSYNTHESIS BI: CPT | Mod: 26,,, | Performed by: RADIOLOGY

## 2022-01-04 ENCOUNTER — LAB VISIT (OUTPATIENT)
Dept: PRIMARY CARE CLINIC | Facility: OTHER | Age: 77
End: 2022-01-04
Payer: MEDICARE

## 2022-01-04 DIAGNOSIS — Z20.822 ENCOUNTER FOR LABORATORY TESTING FOR COVID-19 VIRUS: ICD-10-CM

## 2022-01-04 PROCEDURE — U0003 INFECTIOUS AGENT DETECTION BY NUCLEIC ACID (DNA OR RNA); SEVERE ACUTE RESPIRATORY SYNDROME CORONAVIRUS 2 (SARS-COV-2) (CORONAVIRUS DISEASE [COVID-19]), AMPLIFIED PROBE TECHNIQUE, MAKING USE OF HIGH THROUGHPUT TECHNOLOGIES AS DESCRIBED BY CMS-2020-01-R: HCPCS | Performed by: INTERNAL MEDICINE

## 2022-01-07 DIAGNOSIS — U07.1 COVID-19 VIRUS DETECTED: ICD-10-CM

## 2022-01-07 LAB
SARS-COV-2 RNA RESP QL NAA+PROBE: DETECTED
SARS-COV-2- CYCLE NUMBER: 36

## 2022-03-09 ENCOUNTER — OFFICE VISIT (OUTPATIENT)
Dept: URGENT CARE | Facility: CLINIC | Age: 77
End: 2022-03-09
Payer: MEDICARE

## 2022-03-09 VITALS
HEIGHT: 63 IN | RESPIRATION RATE: 18 BRPM | DIASTOLIC BLOOD PRESSURE: 59 MMHG | BODY MASS INDEX: 34.55 KG/M2 | HEART RATE: 74 BPM | SYSTOLIC BLOOD PRESSURE: 133 MMHG | TEMPERATURE: 98 F | OXYGEN SATURATION: 93 % | WEIGHT: 195 LBS

## 2022-03-09 DIAGNOSIS — J20.9 ACUTE BRONCHITIS, UNSPECIFIED ORGANISM: Primary | ICD-10-CM

## 2022-03-09 LAB
CTP QC/QA: YES
POC MOLECULAR INFLUENZA A AGN: NEGATIVE
POC MOLECULAR INFLUENZA B AGN: NEGATIVE

## 2022-03-09 PROCEDURE — 1160F PR REVIEW ALL MEDS BY PRESCRIBER/CLIN PHARMACIST DOCUMENTED: ICD-10-PCS | Mod: CPTII,S$GLB,, | Performed by: FAMILY MEDICINE

## 2022-03-09 PROCEDURE — 1159F MED LIST DOCD IN RCRD: CPT | Mod: CPTII,S$GLB,, | Performed by: FAMILY MEDICINE

## 2022-03-09 PROCEDURE — 87502 POCT INFLUENZA A/B MOLECULAR: ICD-10-PCS | Mod: QW,S$GLB,, | Performed by: FAMILY MEDICINE

## 2022-03-09 PROCEDURE — 1160F RVW MEDS BY RX/DR IN RCRD: CPT | Mod: CPTII,S$GLB,, | Performed by: FAMILY MEDICINE

## 2022-03-09 PROCEDURE — 99214 PR OFFICE/OUTPT VISIT, EST, LEVL IV, 30-39 MIN: ICD-10-PCS | Mod: 25,S$GLB,, | Performed by: FAMILY MEDICINE

## 2022-03-09 PROCEDURE — 71046 XR CHEST PA AND LATERAL: ICD-10-PCS | Mod: FY,S$GLB,, | Performed by: RADIOLOGY

## 2022-03-09 PROCEDURE — 3075F PR MOST RECENT SYSTOLIC BLOOD PRESS GE 130-139MM HG: ICD-10-PCS | Mod: CPTII,S$GLB,, | Performed by: FAMILY MEDICINE

## 2022-03-09 PROCEDURE — 94640 AIRWAY INHALATION TREATMENT: CPT | Mod: S$GLB,,, | Performed by: FAMILY MEDICINE

## 2022-03-09 PROCEDURE — 99214 OFFICE O/P EST MOD 30 MIN: CPT | Mod: 25,S$GLB,, | Performed by: FAMILY MEDICINE

## 2022-03-09 PROCEDURE — 1125F PR PAIN SEVERITY QUANTIFIED, PAIN PRESENT: ICD-10-PCS | Mod: CPTII,S$GLB,, | Performed by: FAMILY MEDICINE

## 2022-03-09 PROCEDURE — 1159F PR MEDICATION LIST DOCUMENTED IN MEDICAL RECORD: ICD-10-PCS | Mod: CPTII,S$GLB,, | Performed by: FAMILY MEDICINE

## 2022-03-09 PROCEDURE — 71046 X-RAY EXAM CHEST 2 VIEWS: CPT | Mod: FY,S$GLB,, | Performed by: RADIOLOGY

## 2022-03-09 PROCEDURE — 1125F AMNT PAIN NOTED PAIN PRSNT: CPT | Mod: CPTII,S$GLB,, | Performed by: FAMILY MEDICINE

## 2022-03-09 PROCEDURE — 94640 PR INHAL RX, AIRWAY OBST/DX SPUTUM INDUCT: ICD-10-PCS | Mod: S$GLB,,, | Performed by: FAMILY MEDICINE

## 2022-03-09 PROCEDURE — 93010 EKG 12-LEAD: ICD-10-PCS | Mod: S$GLB,,, | Performed by: INTERNAL MEDICINE

## 2022-03-09 PROCEDURE — 87502 INFLUENZA DNA AMP PROBE: CPT | Mod: QW,S$GLB,, | Performed by: FAMILY MEDICINE

## 2022-03-09 PROCEDURE — 3075F SYST BP GE 130 - 139MM HG: CPT | Mod: CPTII,S$GLB,, | Performed by: FAMILY MEDICINE

## 2022-03-09 PROCEDURE — 93005 ELECTROCARDIOGRAM TRACING: CPT | Mod: S$GLB,,, | Performed by: FAMILY MEDICINE

## 2022-03-09 PROCEDURE — 3078F DIAST BP <80 MM HG: CPT | Mod: CPTII,S$GLB,, | Performed by: FAMILY MEDICINE

## 2022-03-09 PROCEDURE — 93005 EKG 12-LEAD: ICD-10-PCS | Mod: S$GLB,,, | Performed by: FAMILY MEDICINE

## 2022-03-09 PROCEDURE — 93010 ELECTROCARDIOGRAM REPORT: CPT | Mod: S$GLB,,, | Performed by: INTERNAL MEDICINE

## 2022-03-09 PROCEDURE — 96372 PR INJECTION,THERAP/PROPH/DIAG2ST, IM OR SUBCUT: ICD-10-PCS | Mod: 59,S$GLB,, | Performed by: FAMILY MEDICINE

## 2022-03-09 PROCEDURE — 96372 THER/PROPH/DIAG INJ SC/IM: CPT | Mod: 59,S$GLB,, | Performed by: FAMILY MEDICINE

## 2022-03-09 PROCEDURE — 3078F PR MOST RECENT DIASTOLIC BLOOD PRESSURE < 80 MM HG: ICD-10-PCS | Mod: CPTII,S$GLB,, | Performed by: FAMILY MEDICINE

## 2022-03-09 RX ORDER — ALBUTEROL SULFATE 0.83 MG/ML
2.5 SOLUTION RESPIRATORY (INHALATION)
Status: COMPLETED | OUTPATIENT
Start: 2022-03-09 | End: 2022-03-09

## 2022-03-09 RX ORDER — DOXYCYCLINE 100 MG/1
100 CAPSULE ORAL 2 TIMES DAILY
Qty: 14 CAPSULE | Refills: 0 | Status: SHIPPED | OUTPATIENT
Start: 2022-03-09 | End: 2022-03-16

## 2022-03-09 RX ORDER — IPRATROPIUM BROMIDE 0.5 MG/2.5ML
0.5 SOLUTION RESPIRATORY (INHALATION)
Status: COMPLETED | OUTPATIENT
Start: 2022-03-09 | End: 2022-03-09

## 2022-03-09 RX ORDER — BENZONATATE 100 MG/1
100 CAPSULE ORAL EVERY 6 HOURS PRN
Qty: 30 CAPSULE | Refills: 1 | Status: SHIPPED | OUTPATIENT
Start: 2022-03-09 | End: 2023-03-09

## 2022-03-09 RX ORDER — BETAMETHASONE SODIUM PHOSPHATE AND BETAMETHASONE ACETATE 3; 3 MG/ML; MG/ML
6 INJECTION, SUSPENSION INTRA-ARTICULAR; INTRALESIONAL; INTRAMUSCULAR; SOFT TISSUE
Status: COMPLETED | OUTPATIENT
Start: 2022-03-09 | End: 2022-03-09

## 2022-03-09 RX ORDER — ALBUTEROL SULFATE 90 UG/1
2 AEROSOL, METERED RESPIRATORY (INHALATION) EVERY 6 HOURS PRN
Qty: 6.7 G | Refills: 0 | Status: SHIPPED | OUTPATIENT
Start: 2022-03-09 | End: 2022-10-04 | Stop reason: SDUPTHER

## 2022-03-09 RX ADMIN — IPRATROPIUM BROMIDE 0.5 MG: 0.5 SOLUTION RESPIRATORY (INHALATION) at 11:03

## 2022-03-09 RX ADMIN — ALBUTEROL SULFATE 2.5 MG: 0.83 SOLUTION RESPIRATORY (INHALATION) at 11:03

## 2022-03-09 RX ADMIN — BETAMETHASONE SODIUM PHOSPHATE AND BETAMETHASONE ACETATE 6 MG: 3; 3 INJECTION, SUSPENSION INTRA-ARTICULAR; INTRALESIONAL; INTRAMUSCULAR; SOFT TISSUE at 11:03

## 2022-03-09 NOTE — PROGRESS NOTES
"Subjective:       Patient ID: Melani Holloway is a 76 y.o. female.    Vitals:  height is 5' 3" (1.6 m) and weight is 88.5 kg (195 lb). Her oral temperature is 98.2 °F (36.8 °C). Her blood pressure is 133/59 (abnormal) and her pulse is 74. Her respiration is 18 and oxygen saturation is 93% (abnormal).     Chief Complaint: Cough    Covid Positive January 2022  Pt presents to urgent care for coughing, chest tightness, wheezing & shortness of breath since this past Monday.  Pt has been taking Robitusson & Cough drops    Cough  This is a new problem. The current episode started in the past 7 days. The problem has been gradually worsening. The cough is productive of sputum. Associated symptoms include chills, ear pain, headaches, nasal congestion, postnasal drip, rhinorrhea, shortness of breath and wheezing. Nothing aggravates the symptoms. She has tried OTC cough suppressant for the symptoms. Her past medical history is significant for bronchiectasis.       Constitution: Positive for chills.   HENT: Positive for ear pain and postnasal drip.    Respiratory: Positive for chest tightness, cough, sputum production, shortness of breath and wheezing.    Neurological: Positive for headaches.       Objective:      Physical Exam   Constitutional: She does not appear ill. No distress. obesity  HENT:   Head: Normocephalic and atraumatic.   Nose: Nose normal.   Mouth/Throat: Mucous membranes are moist. No posterior oropharyngeal erythema.   Eyes: Pupils are equal, round, and reactive to light.      extraocular movement intact   Neck: Neck supple.   Cardiovascular: Normal rate, regular rhythm, normal heart sounds and normal pulses.   Pulmonary/Chest: Effort normal. No respiratory distress. She has wheezes (scant).   Abdominal: Normal appearance. Soft.   Neurological: She is alert.   Nursing note and vitals reviewed.    Results for orders placed or performed in visit on 03/09/22   POCT Influenza A/B MOLECULAR   Result Value Ref " Range    POC Molecular Influenza A Ag Negative Negative, Not Reported    POC Molecular Influenza B Ag Negative Negative, Not Reported     Acceptable Yes          Assessment:       1. Acute bronchitis, unspecified organism          Plan:         Acute bronchitis, unspecified organism  -     POCT Influenza A/B MOLECULAR  -     IN OFFICE EKG 12-LEAD (to [facility])  -     X-Ray Chest PA And Lateral  -     betamethasone acetate-betamethasone sodium phosphate injection 6 mg  -     albuterol nebulizer solution 2.5 mg  -     ipratropium 0.02 % nebulizer solution 0.5 mg  -     albuterol (PROAIR HFA) 90 mcg/actuation inhaler; Inhale 2 puffs into the lungs every 6 (six) hours as needed for Wheezing. Rescue  Dispense: 6.7 g; Refill: 0  -     benzonatate (TESSALON PERLES) 100 MG capsule; Take 1 capsule (100 mg total) by mouth every 6 (six) hours as needed for Cough.  Dispense: 30 capsule; Refill: 1  -     doxycycline (VIBRAMYCIN) 100 MG Cap; Take 1 capsule (100 mg total) by mouth 2 (two) times daily. for 7 days  Dispense: 14 capsule; Refill: 0    RTC as needed.

## 2022-04-01 ENCOUNTER — OFFICE VISIT (OUTPATIENT)
Dept: URGENT CARE | Facility: CLINIC | Age: 77
End: 2022-04-01
Payer: MEDICARE

## 2022-04-01 VITALS
WEIGHT: 195 LBS | OXYGEN SATURATION: 95 % | BODY MASS INDEX: 34.55 KG/M2 | TEMPERATURE: 98 F | HEIGHT: 63 IN | HEART RATE: 75 BPM | DIASTOLIC BLOOD PRESSURE: 73 MMHG | SYSTOLIC BLOOD PRESSURE: 148 MMHG | RESPIRATION RATE: 18 BRPM

## 2022-04-01 DIAGNOSIS — J98.01 BRONCHOSPASM: Primary | ICD-10-CM

## 2022-04-01 PROCEDURE — 3078F PR MOST RECENT DIASTOLIC BLOOD PRESSURE < 80 MM HG: ICD-10-PCS | Mod: CPTII,S$GLB,, | Performed by: FAMILY MEDICINE

## 2022-04-01 PROCEDURE — 3077F SYST BP >= 140 MM HG: CPT | Mod: CPTII,S$GLB,, | Performed by: FAMILY MEDICINE

## 2022-04-01 PROCEDURE — 1159F MED LIST DOCD IN RCRD: CPT | Mod: CPTII,S$GLB,, | Performed by: FAMILY MEDICINE

## 2022-04-01 PROCEDURE — 1159F PR MEDICATION LIST DOCUMENTED IN MEDICAL RECORD: ICD-10-PCS | Mod: CPTII,S$GLB,, | Performed by: FAMILY MEDICINE

## 2022-04-01 PROCEDURE — 99214 PR OFFICE/OUTPT VISIT, EST, LEVL IV, 30-39 MIN: ICD-10-PCS | Mod: 25,S$GLB,, | Performed by: FAMILY MEDICINE

## 2022-04-01 PROCEDURE — 1126F PR PAIN SEVERITY QUANTIFIED, NO PAIN PRESENT: ICD-10-PCS | Mod: CPTII,S$GLB,, | Performed by: FAMILY MEDICINE

## 2022-04-01 PROCEDURE — 1160F PR REVIEW ALL MEDS BY PRESCRIBER/CLIN PHARMACIST DOCUMENTED: ICD-10-PCS | Mod: CPTII,S$GLB,, | Performed by: FAMILY MEDICINE

## 2022-04-01 PROCEDURE — 3077F PR MOST RECENT SYSTOLIC BLOOD PRESSURE >= 140 MM HG: ICD-10-PCS | Mod: CPTII,S$GLB,, | Performed by: FAMILY MEDICINE

## 2022-04-01 PROCEDURE — 1160F RVW MEDS BY RX/DR IN RCRD: CPT | Mod: CPTII,S$GLB,, | Performed by: FAMILY MEDICINE

## 2022-04-01 PROCEDURE — 99214 OFFICE O/P EST MOD 30 MIN: CPT | Mod: 25,S$GLB,, | Performed by: FAMILY MEDICINE

## 2022-04-01 PROCEDURE — 94640 AIRWAY INHALATION TREATMENT: CPT | Mod: S$GLB,,, | Performed by: FAMILY MEDICINE

## 2022-04-01 PROCEDURE — 94640 PR INHAL RX, AIRWAY OBST/DX SPUTUM INDUCT: ICD-10-PCS | Mod: S$GLB,,, | Performed by: FAMILY MEDICINE

## 2022-04-01 PROCEDURE — 1126F AMNT PAIN NOTED NONE PRSNT: CPT | Mod: CPTII,S$GLB,, | Performed by: FAMILY MEDICINE

## 2022-04-01 PROCEDURE — 3078F DIAST BP <80 MM HG: CPT | Mod: CPTII,S$GLB,, | Performed by: FAMILY MEDICINE

## 2022-04-01 RX ORDER — ALBUTEROL SULFATE 0.83 MG/ML
2.5 SOLUTION RESPIRATORY (INHALATION)
Status: COMPLETED | OUTPATIENT
Start: 2022-04-01 | End: 2022-04-01

## 2022-04-01 RX ORDER — IPRATROPIUM BROMIDE 0.5 MG/2.5ML
0.5 SOLUTION RESPIRATORY (INHALATION)
Status: COMPLETED | OUTPATIENT
Start: 2022-04-01 | End: 2022-04-01

## 2022-04-01 RX ORDER — LORATADINE 10 MG/1
10 TABLET ORAL DAILY
Qty: 30 TABLET | Refills: 2 | Status: SHIPPED | OUTPATIENT
Start: 2022-04-01 | End: 2022-10-04

## 2022-04-01 RX ORDER — ALBUTEROL SULFATE 90 UG/1
2 AEROSOL, METERED RESPIRATORY (INHALATION) EVERY 6 HOURS PRN
Qty: 6.7 G | Refills: 1 | Status: SHIPPED | OUTPATIENT
Start: 2022-04-01 | End: 2023-12-14 | Stop reason: SDUPTHER

## 2022-04-01 RX ADMIN — ALBUTEROL SULFATE 2.5 MG: 0.83 SOLUTION RESPIRATORY (INHALATION) at 10:04

## 2022-04-01 RX ADMIN — IPRATROPIUM BROMIDE 0.5 MG: 0.5 SOLUTION RESPIRATORY (INHALATION) at 10:04

## 2022-04-01 NOTE — PROGRESS NOTES
"Subjective:       Patient ID: Melani Holloway is a 76 y.o. female.    Vitals:  height is 5' 3" (1.6 m) and weight is 88.5 kg (195 lb). Her oral temperature is 98.1 °F (36.7 °C). Her blood pressure is 148/73 (abnormal) and her pulse is 75. Her respiration is 18 and oxygen saturation is 95%.     Chief Complaint: Shortness of Breath    Pt woke up on yesterday coughing a lot and unable to take in a deep breath, pt has tried pt has used proair in-haler w/o any relief but believes the cannister may be out.      Shortness of Breath  This is a new problem. The problem occurs constantly. The problem has been gradually worsening. Associated symptoms include neck pain and sputum production. Pertinent negatives include no fever or wheezing. The symptoms are aggravated by pollens. She has tried steroid inhalers for the symptoms. The treatment provided no relief. Her past medical history is significant for bronchiolitis.       Constitution: Negative for fatigue and fever.   Neck: Positive for neck pain.   Respiratory: Positive for sputum production and shortness of breath. Negative for wheezing and asthma.    Allergic/Immunologic: Negative for asthma.       Objective:      Physical Exam   Constitutional: She does not appear ill. No distress. obesity  HENT:   Head: Normocephalic and atraumatic.   Nose: Congestion present.   Mouth/Throat: Mucous membranes are moist. Posterior oropharyngeal erythema present.   Eyes: Pupils are equal, round, and reactive to light.      extraocular movement intact   Neck: Neck supple.   Cardiovascular: Normal rate, regular rhythm, normal heart sounds and normal pulses.   Pulmonary/Chest: Effort normal and breath sounds normal. No respiratory distress. She has no wheezes.   Abdominal: Normal appearance. Soft.   Neurological: She is alert.   Nursing note and vitals reviewed.        Assessment:       1. Bronchospasm        Mild COPD exacerbation suspected due to seasonal allergies. Patient reports " symptoms have improved after nebulizer X 1  Plan:         Bronchospasm  -     albuterol nebulizer solution 2.5 mg  -     ipratropium 0.02 % nebulizer solution 0.5 mg  -     albuterol (PROAIR HFA) 90 mcg/actuation inhaler; Inhale 2 puffs into the lungs every 6 (six) hours as needed for Wheezing. Rescue  Dispense: 6.7 g; Refill: 1  -     loratadine (CLARITIN) 10 mg tablet; Take 1 tablet (10 mg total) by mouth once daily.  Dispense: 30 tablet; Refill: 2    f/u as scheduled with PCP. RTC prn worsening symptoms

## 2022-10-31 ENCOUNTER — HOSPITAL ENCOUNTER (OUTPATIENT)
Dept: RADIOLOGY | Facility: HOSPITAL | Age: 77
Discharge: HOME | End: 2022-10-31
Attending: FAMILY MEDICINE
Payer: MEDICARE

## 2022-10-31 DIAGNOSIS — Z12.31 ENCOUNTER FOR SCREENING MAMMOGRAM FOR BREAST CANCER: ICD-10-CM

## 2022-10-31 PROCEDURE — 77063 BREAST TOMOSYNTHESIS BI: CPT | Mod: 26,,, | Performed by: RADIOLOGY

## 2022-10-31 PROCEDURE — 77063 MAMMO DIGITAL SCREENING BILAT WITH TOMO: ICD-10-PCS | Mod: 26,,, | Performed by: RADIOLOGY

## 2022-10-31 PROCEDURE — 77067 SCR MAMMO BI INCL CAD: CPT | Mod: 26,,, | Performed by: RADIOLOGY

## 2022-10-31 PROCEDURE — 77067 SCR MAMMO BI INCL CAD: CPT | Mod: TC

## 2022-10-31 PROCEDURE — 77063 BREAST TOMOSYNTHESIS BI: CPT | Mod: TC

## 2022-10-31 PROCEDURE — 77067 MAMMO DIGITAL SCREENING BILAT WITH TOMO: ICD-10-PCS | Mod: 26,,, | Performed by: RADIOLOGY

## 2023-05-04 ENCOUNTER — LAB VISIT (OUTPATIENT)
Dept: LAB | Facility: HOSPITAL | Age: 78
End: 2023-05-04
Attending: FAMILY MEDICINE
Payer: MEDICARE

## 2023-05-04 DIAGNOSIS — I25.10 CORONARY ARTERY DISEASE INVOLVING NATIVE CORONARY ARTERY OF NATIVE HEART WITHOUT ANGINA PECTORIS: Chronic | ICD-10-CM

## 2023-05-04 DIAGNOSIS — D69.2 OTHER NONTHROMBOCYTOPENIC PURPURA: ICD-10-CM

## 2023-05-04 DIAGNOSIS — I10 ESSENTIAL HYPERTENSION: Chronic | ICD-10-CM

## 2023-05-04 DIAGNOSIS — R73.01 IMPAIRED FASTING GLUCOSE: Chronic | ICD-10-CM

## 2023-05-04 LAB
ALBUMIN SERPL BCP-MCNC: 3.8 G/DL (ref 3.5–5.2)
ALP SERPL-CCNC: 74 U/L (ref 55–135)
ALT SERPL W/O P-5'-P-CCNC: 22 U/L (ref 10–44)
ANION GAP SERPL CALC-SCNC: 6 MMOL/L (ref 8–16)
AST SERPL-CCNC: 19 U/L (ref 10–40)
BASOPHILS # BLD AUTO: 0.02 K/UL (ref 0–0.2)
BASOPHILS NFR BLD: 0.4 % (ref 0–1.9)
BILIRUB SERPL-MCNC: 0.3 MG/DL (ref 0.1–1)
BUN SERPL-MCNC: 14 MG/DL (ref 8–23)
CALCIUM SERPL-MCNC: 9.2 MG/DL (ref 8.7–10.5)
CHLORIDE SERPL-SCNC: 103 MMOL/L (ref 95–110)
CHOLEST SERPL-MCNC: 176 MG/DL (ref 120–199)
CHOLEST/HDLC SERPL: 4.4 {RATIO} (ref 2–5)
CO2 SERPL-SCNC: 31 MMOL/L (ref 23–29)
CREAT SERPL-MCNC: 0.8 MG/DL (ref 0.5–1.4)
DIFFERENTIAL METHOD: NORMAL
EOSINOPHIL # BLD AUTO: 0.1 K/UL (ref 0–0.5)
EOSINOPHIL NFR BLD: 1 % (ref 0–8)
ERYTHROCYTE [DISTWIDTH] IN BLOOD BY AUTOMATED COUNT: 13.7 % (ref 11.5–14.5)
EST. GFR  (NO RACE VARIABLE): >60 ML/MIN/1.73 M^2
ESTIMATED AVG GLUCOSE: 123 MG/DL (ref 68–131)
GLUCOSE SERPL-MCNC: 117 MG/DL (ref 70–110)
HBA1C MFR BLD: 5.9 % (ref 4–5.6)
HCT VFR BLD AUTO: 42.4 % (ref 37–48.5)
HDLC SERPL-MCNC: 40 MG/DL (ref 40–75)
HDLC SERPL: 22.7 % (ref 20–50)
HGB BLD-MCNC: 13.7 G/DL (ref 12–16)
IMM GRANULOCYTES # BLD AUTO: 0.02 K/UL (ref 0–0.04)
IMM GRANULOCYTES NFR BLD AUTO: 0.4 % (ref 0–0.5)
LDLC SERPL CALC-MCNC: 114.4 MG/DL (ref 63–159)
LYMPHOCYTES # BLD AUTO: 2.3 K/UL (ref 1–4.8)
LYMPHOCYTES NFR BLD: 46.4 % (ref 18–48)
MCH RBC QN AUTO: 27.7 PG (ref 27–31)
MCHC RBC AUTO-ENTMCNC: 32.3 G/DL (ref 32–36)
MCV RBC AUTO: 86 FL (ref 82–98)
MONOCYTES # BLD AUTO: 0.4 K/UL (ref 0.3–1)
MONOCYTES NFR BLD: 8.3 % (ref 4–15)
NEUTROPHILS # BLD AUTO: 2.2 K/UL (ref 1.8–7.7)
NEUTROPHILS NFR BLD: 43.5 % (ref 38–73)
NONHDLC SERPL-MCNC: 136 MG/DL
NRBC BLD-RTO: 0 /100 WBC
PLATELET # BLD AUTO: 210 K/UL (ref 150–450)
PMV BLD AUTO: 11.8 FL (ref 9.2–12.9)
POTASSIUM SERPL-SCNC: 3.3 MMOL/L (ref 3.5–5.1)
PROT SERPL-MCNC: 7.2 G/DL (ref 6–8.4)
RBC # BLD AUTO: 4.95 M/UL (ref 4–5.4)
SODIUM SERPL-SCNC: 140 MMOL/L (ref 136–145)
TRIGL SERPL-MCNC: 108 MG/DL (ref 30–150)
WBC # BLD AUTO: 5.04 K/UL (ref 3.9–12.7)

## 2023-05-04 PROCEDURE — 80053 COMPREHEN METABOLIC PANEL: CPT | Performed by: FAMILY MEDICINE

## 2023-05-04 PROCEDURE — 83036 HEMOGLOBIN GLYCOSYLATED A1C: CPT | Performed by: FAMILY MEDICINE

## 2023-05-04 PROCEDURE — 36415 COLL VENOUS BLD VENIPUNCTURE: CPT | Performed by: FAMILY MEDICINE

## 2023-05-04 PROCEDURE — 80061 LIPID PANEL: CPT | Performed by: FAMILY MEDICINE

## 2023-05-04 PROCEDURE — 85025 COMPLETE CBC W/AUTO DIFF WBC: CPT | Performed by: FAMILY MEDICINE

## 2023-09-29 ENCOUNTER — OFFICE VISIT (OUTPATIENT)
Dept: URGENT CARE | Facility: CLINIC | Age: 78
End: 2023-09-29
Payer: MEDICARE

## 2023-09-29 VITALS
OXYGEN SATURATION: 96 % | SYSTOLIC BLOOD PRESSURE: 150 MMHG | TEMPERATURE: 98 F | HEIGHT: 63 IN | RESPIRATION RATE: 18 BRPM | BODY MASS INDEX: 33.13 KG/M2 | WEIGHT: 187 LBS | DIASTOLIC BLOOD PRESSURE: 72 MMHG | HEART RATE: 76 BPM

## 2023-09-29 DIAGNOSIS — J06.9 UPPER RESPIRATORY TRACT INFECTION, UNSPECIFIED TYPE: Primary | ICD-10-CM

## 2023-09-29 LAB
CTP QC/QA: YES
SARS-COV-2 AG RESP QL IA.RAPID: NEGATIVE

## 2023-09-29 PROCEDURE — 87811 SARS-COV-2 COVID19 W/OPTIC: CPT | Mod: QW,S$GLB,, | Performed by: FAMILY MEDICINE

## 2023-09-29 PROCEDURE — 99213 PR OFFICE/OUTPT VISIT, EST, LEVL III, 20-29 MIN: ICD-10-PCS | Mod: S$GLB,,, | Performed by: FAMILY MEDICINE

## 2023-09-29 PROCEDURE — 99213 OFFICE O/P EST LOW 20 MIN: CPT | Mod: S$GLB,,, | Performed by: FAMILY MEDICINE

## 2023-09-29 PROCEDURE — 87811 SARS CORONAVIRUS 2 ANTIGEN POCT, MANUAL READ: ICD-10-PCS | Mod: QW,S$GLB,, | Performed by: FAMILY MEDICINE

## 2023-09-29 RX ORDER — BENZONATATE 100 MG/1
100 CAPSULE ORAL EVERY 6 HOURS PRN
Qty: 30 CAPSULE | Refills: 1 | Status: SHIPPED | OUTPATIENT
Start: 2023-09-29 | End: 2024-09-28

## 2023-09-29 RX ORDER — PROMETHAZINE HYDROCHLORIDE AND DEXTROMETHORPHAN HYDROBROMIDE 6.25; 15 MG/5ML; MG/5ML
5 SYRUP ORAL NIGHTLY PRN
Qty: 118 ML | Refills: 0 | Status: SHIPPED | OUTPATIENT
Start: 2023-09-29 | End: 2023-10-09

## 2023-09-29 RX ORDER — FLUTICASONE PROPIONATE 50 MCG
1 SPRAY, SUSPENSION (ML) NASAL DAILY
Qty: 9.9 ML | Refills: 0 | Status: SHIPPED | OUTPATIENT
Start: 2023-09-29 | End: 2023-11-08 | Stop reason: SDUPTHER

## 2023-09-29 NOTE — PROGRESS NOTES
"Subjective:      Patient ID: Melani Holloway is a 78 y.o. female.    Vitals:  height is 5' 3" (1.6 m) and weight is 84.8 kg (187 lb). Her oral temperature is 98.4 °F (36.9 °C). Her blood pressure is 150/72 (abnormal) and her pulse is 76. Her respiration is 18 and oxygen saturation is 96%.     Chief Complaint: Cough    This is a 78 y.o. female who presents today with a chief complaint of  productive cough, sinus drip. No complains of ear pain, fever or sore throat.    Home treatment: Dayquil    PMH: None    Cough  This is a new problem. The current episode started 1 to 4 weeks ago. The problem has been unchanged. The problem occurs constantly. The cough is Productive of sputum. Associated symptoms include nasal congestion. Pertinent negatives include no chest pain, chills, ear congestion, ear pain, fever, headaches, sore throat or shortness of breath.       Constitution: Negative for chills and fever.   HENT:  Negative for ear pain and sore throat.    Cardiovascular:  Negative for chest pain.   Respiratory:  Positive for cough. Negative for shortness of breath.    Neurological:  Negative for headaches.      Objective:     Physical Exam   Constitutional: She does not appear ill. No distress. normal  HENT:   Head: Normocephalic and atraumatic.   Nose: Congestion present.   Mouth/Throat: Mucous membranes are moist. Posterior oropharyngeal erythema present.   Eyes: Pupils are equal, round, and reactive to light. Extraocular movement intact   Neck: Neck supple.   Cardiovascular: Normal rate, regular rhythm, normal heart sounds and normal pulses.   Pulmonary/Chest: Effort normal and breath sounds normal.   Abdominal: Normal appearance.   Neurological: She is alert.   Nursing note and vitals reviewed.    Results for orders placed or performed in visit on 09/29/23   SARS Coronavirus 2 Antigen, POCT Manual Read   Result Value Ref Range    SARS Coronavirus 2 Antigen Negative Negative     Acceptable Yes     "     Assessment:     1. Upper respiratory tract infection, unspecified type        Plan:       Upper respiratory tract infection, unspecified type  -     SARS Coronavirus 2 Antigen, POCT Manual Read  -     benzonatate (TESSALON PERLES) 100 MG capsule; Take 1 capsule (100 mg total) by mouth every 6 (six) hours as needed for Cough.  Dispense: 30 capsule; Refill: 1  -     promethazine-dextromethorphan (PROMETHAZINE-DM) 6.25-15 mg/5 mL Syrp; Take 5 mLs by mouth nightly as needed (cough).  Dispense: 118 mL; Refill: 0  -     fluticasone propionate (FLONASE) 50 mcg/actuation nasal spray; 1 spray (50 mcg total) by Each Nostril route once daily.  Dispense: 9.9 mL; Refill: 0

## 2023-10-14 ENCOUNTER — HOSPITAL ENCOUNTER (INPATIENT)
Facility: HOSPITAL | Age: 78
LOS: 3 days | Discharge: HOME | DRG: 195 | End: 2023-10-17
Attending: EMERGENCY MEDICINE | Admitting: HOSPITALIST
Payer: MEDICARE

## 2023-10-14 DIAGNOSIS — J18.9 PNEUMONIA OF LEFT LUNG DUE TO INFECTIOUS ORGANISM, UNSPECIFIED PART OF LUNG: Primary | ICD-10-CM

## 2023-10-14 DIAGNOSIS — J96.01 ACUTE RESPIRATORY FAILURE WITH HYPOXIA: ICD-10-CM

## 2023-10-14 DIAGNOSIS — R06.02 SHORTNESS OF BREATH: ICD-10-CM

## 2023-10-14 PROBLEM — A41.9 SEPSIS DUE TO PNEUMONIA: Status: ACTIVE | Noted: 2023-10-14

## 2023-10-14 PROBLEM — I25.10 CORONARY ARTERY DISEASE INVOLVING NATIVE CORONARY ARTERY OF NATIVE HEART WITHOUT ANGINA PECTORIS: Chronic | Status: RESOLVED | Noted: 2021-10-04 | Resolved: 2023-10-14

## 2023-10-14 PROBLEM — E87.6 HYPOKALEMIA: Status: ACTIVE | Noted: 2023-10-14

## 2023-10-14 PROBLEM — E66.9 OBESITY: Chronic | Status: RESOLVED | Noted: 2017-07-17 | Resolved: 2023-10-14

## 2023-10-14 LAB
ALBUMIN SERPL BCP-MCNC: 3.4 G/DL (ref 3.5–5.2)
ALLENS TEST: NORMAL
ALP SERPL-CCNC: 101 U/L (ref 55–135)
ALT SERPL W/O P-5'-P-CCNC: 16 U/L (ref 10–44)
ANION GAP SERPL CALC-SCNC: 10 MMOL/L (ref 8–16)
AST SERPL-CCNC: 15 U/L (ref 10–40)
BACTERIA #/AREA URNS AUTO: NORMAL /HPF
BASOPHILS # BLD AUTO: 0.13 K/UL (ref 0–0.2)
BASOPHILS NFR BLD: 0.9 % (ref 0–1.9)
BILIRUB SERPL-MCNC: 0.5 MG/DL (ref 0.1–1)
BILIRUB UR QL STRIP: NEGATIVE
BNP SERPL-MCNC: 32 PG/ML (ref 0–99)
BUN SERPL-MCNC: 10 MG/DL (ref 8–23)
CALCIUM SERPL-MCNC: 9.5 MG/DL (ref 8.7–10.5)
CHLORIDE SERPL-SCNC: 99 MMOL/L (ref 95–110)
CLARITY UR REFRACT.AUTO: CLEAR
CO2 SERPL-SCNC: 28 MMOL/L (ref 23–29)
COLOR UR AUTO: YELLOW
CREAT SERPL-MCNC: 0.7 MG/DL (ref 0.5–1.4)
D DIMER PPP IA.FEU-MCNC: 0.48 MG/L FEU
DIFFERENTIAL METHOD: ABNORMAL
EOSINOPHIL # BLD AUTO: 0 K/UL (ref 0–0.5)
EOSINOPHIL NFR BLD: 0.3 % (ref 0–8)
ERYTHROCYTE [DISTWIDTH] IN BLOOD BY AUTOMATED COUNT: 14.9 % (ref 11.5–14.5)
EST. GFR  (NO RACE VARIABLE): >60 ML/MIN/1.73 M^2
GLUCOSE SERPL-MCNC: 142 MG/DL (ref 70–110)
GLUCOSE UR QL STRIP: NEGATIVE
HCT VFR BLD AUTO: 39 % (ref 37–48.5)
HCV AB SERPL QL IA: NORMAL
HGB BLD-MCNC: 12.2 G/DL (ref 12–16)
HGB UR QL STRIP: ABNORMAL
HIV 1+2 AB+HIV1 P24 AG SERPL QL IA: NORMAL
IMM GRANULOCYTES # BLD AUTO: 0.26 K/UL (ref 0–0.04)
IMM GRANULOCYTES NFR BLD AUTO: 1.7 % (ref 0–0.5)
INFLUENZA A, MOLECULAR: NEGATIVE
INFLUENZA B, MOLECULAR: NEGATIVE
KETONES UR QL STRIP: NEGATIVE
LDH SERPL L TO P-CCNC: 0.99 MMOL/L (ref 0.5–2.2)
LEUKOCYTE ESTERASE UR QL STRIP: NEGATIVE
LYMPHOCYTES # BLD AUTO: 1.8 K/UL (ref 1–4.8)
LYMPHOCYTES NFR BLD: 11.9 % (ref 18–48)
MCH RBC QN AUTO: 26.6 PG (ref 27–31)
MCHC RBC AUTO-ENTMCNC: 31.3 G/DL (ref 32–36)
MCV RBC AUTO: 85 FL (ref 82–98)
MICROSCOPIC COMMENT: NORMAL
MONOCYTES # BLD AUTO: 1.5 K/UL (ref 0.3–1)
MONOCYTES NFR BLD: 9.5 % (ref 4–15)
NEUTROPHILS # BLD AUTO: 11.6 K/UL (ref 1.8–7.7)
NEUTROPHILS NFR BLD: 75.7 % (ref 38–73)
NITRITE UR QL STRIP: NEGATIVE
NRBC BLD-RTO: 0 /100 WBC
PH UR STRIP: 6 [PH] (ref 5–8)
PLATELET # BLD AUTO: 268 K/UL (ref 150–450)
PMV BLD AUTO: 12 FL (ref 9.2–12.9)
POTASSIUM SERPL-SCNC: 3 MMOL/L (ref 3.5–5.1)
PROT SERPL-MCNC: 6.9 G/DL (ref 6–8.4)
PROT UR QL STRIP: ABNORMAL
RBC # BLD AUTO: 4.58 M/UL (ref 4–5.4)
RBC #/AREA URNS AUTO: 1 /HPF (ref 0–4)
SAMPLE: NORMAL
SARS-COV-2 RDRP RESP QL NAA+PROBE: NEGATIVE
SITE: NORMAL
SODIUM SERPL-SCNC: 137 MMOL/L (ref 136–145)
SP GR UR STRIP: 1.01 (ref 1–1.03)
SPECIMEN SOURCE: NORMAL
TROPONIN I SERPL DL<=0.01 NG/ML-MCNC: <0.006 NG/ML (ref 0–0.03)
URN SPEC COLLECT METH UR: ABNORMAL
WBC # BLD AUTO: 15.26 K/UL (ref 3.9–12.7)
WBC #/AREA URNS AUTO: 1 /HPF (ref 0–5)

## 2023-10-14 PROCEDURE — 25000242 PHARM REV CODE 250 ALT 637 W/ HCPCS: Performed by: INTERNAL MEDICINE

## 2023-10-14 PROCEDURE — 87389 HIV-1 AG W/HIV-1&-2 AB AG IA: CPT | Performed by: EMERGENCY MEDICINE

## 2023-10-14 PROCEDURE — 27000221 HC OXYGEN, UP TO 24 HOURS

## 2023-10-14 PROCEDURE — 83880 ASSAY OF NATRIURETIC PEPTIDE: CPT | Performed by: EMERGENCY MEDICINE

## 2023-10-14 PROCEDURE — 12000002 HC ACUTE/MED SURGE SEMI-PRIVATE ROOM

## 2023-10-14 PROCEDURE — 96367 TX/PROPH/DG ADDL SEQ IV INF: CPT

## 2023-10-14 PROCEDURE — 81001 URINALYSIS AUTO W/SCOPE: CPT | Performed by: EMERGENCY MEDICINE

## 2023-10-14 PROCEDURE — 25000242 PHARM REV CODE 250 ALT 637 W/ HCPCS: Performed by: EMERGENCY MEDICINE

## 2023-10-14 PROCEDURE — 25000003 PHARM REV CODE 250: Performed by: INTERNAL MEDICINE

## 2023-10-14 PROCEDURE — 93010 ELECTROCARDIOGRAM REPORT: CPT | Mod: ,,, | Performed by: INTERNAL MEDICINE

## 2023-10-14 PROCEDURE — 94640 AIRWAY INHALATION TREATMENT: CPT

## 2023-10-14 PROCEDURE — 87040 BLOOD CULTURE FOR BACTERIA: CPT | Mod: 59 | Performed by: EMERGENCY MEDICINE

## 2023-10-14 PROCEDURE — 93005 ELECTROCARDIOGRAM TRACING: CPT

## 2023-10-14 PROCEDURE — 93010 EKG 12-LEAD: ICD-10-PCS | Mod: ,,, | Performed by: INTERNAL MEDICINE

## 2023-10-14 PROCEDURE — 86803 HEPATITIS C AB TEST: CPT | Performed by: EMERGENCY MEDICINE

## 2023-10-14 PROCEDURE — 63600175 PHARM REV CODE 636 W HCPCS: Performed by: EMERGENCY MEDICINE

## 2023-10-14 PROCEDURE — 83605 ASSAY OF LACTIC ACID: CPT

## 2023-10-14 PROCEDURE — 85025 COMPLETE CBC W/AUTO DIFF WBC: CPT | Performed by: EMERGENCY MEDICINE

## 2023-10-14 PROCEDURE — 25000003 PHARM REV CODE 250: Performed by: EMERGENCY MEDICINE

## 2023-10-14 PROCEDURE — 96365 THER/PROPH/DIAG IV INF INIT: CPT

## 2023-10-14 PROCEDURE — U0002 COVID-19 LAB TEST NON-CDC: HCPCS | Performed by: EMERGENCY MEDICINE

## 2023-10-14 PROCEDURE — 80053 COMPREHEN METABOLIC PANEL: CPT | Performed by: EMERGENCY MEDICINE

## 2023-10-14 PROCEDURE — 84484 ASSAY OF TROPONIN QUANT: CPT | Performed by: EMERGENCY MEDICINE

## 2023-10-14 PROCEDURE — 87502 INFLUENZA DNA AMP PROBE: CPT | Performed by: EMERGENCY MEDICINE

## 2023-10-14 PROCEDURE — 85379 FIBRIN DEGRADATION QUANT: CPT | Performed by: EMERGENCY MEDICINE

## 2023-10-14 PROCEDURE — 94761 N-INVAS EAR/PLS OXIMETRY MLT: CPT

## 2023-10-14 PROCEDURE — 99900035 HC TECH TIME PER 15 MIN (STAT)

## 2023-10-14 RX ORDER — ALBUTEROL SULFATE 2.5 MG/.5ML
5 SOLUTION RESPIRATORY (INHALATION)
Status: COMPLETED | OUTPATIENT
Start: 2023-10-14 | End: 2023-10-14

## 2023-10-14 RX ORDER — SODIUM CHLORIDE, SODIUM LACTATE, POTASSIUM CHLORIDE, CALCIUM CHLORIDE 600; 310; 30; 20 MG/100ML; MG/100ML; MG/100ML; MG/100ML
INJECTION, SOLUTION INTRAVENOUS CONTINUOUS
Status: DISCONTINUED | OUTPATIENT
Start: 2023-10-14 | End: 2023-10-15

## 2023-10-14 RX ORDER — ASPIRIN 81 MG/1
81 TABLET ORAL EVERY OTHER DAY
Status: DISCONTINUED | OUTPATIENT
Start: 2023-10-15 | End: 2023-10-17 | Stop reason: HOSPADM

## 2023-10-14 RX ORDER — IPRATROPIUM BROMIDE AND ALBUTEROL SULFATE 2.5; .5 MG/3ML; MG/3ML
3 SOLUTION RESPIRATORY (INHALATION) EVERY 8 HOURS
Status: DISCONTINUED | OUTPATIENT
Start: 2023-10-14 | End: 2023-10-17 | Stop reason: HOSPADM

## 2023-10-14 RX ORDER — AMLODIPINE BESYLATE 10 MG/1
10 TABLET ORAL DAILY
Status: DISCONTINUED | OUTPATIENT
Start: 2023-10-15 | End: 2023-10-17 | Stop reason: HOSPADM

## 2023-10-14 RX ORDER — GUAIFENESIN/DEXTROMETHORPHAN 100-10MG/5
5 SYRUP ORAL EVERY 4 HOURS PRN
Status: DISCONTINUED | OUTPATIENT
Start: 2023-10-14 | End: 2023-10-17 | Stop reason: HOSPADM

## 2023-10-14 RX ORDER — BENZONATATE 100 MG/1
100 CAPSULE ORAL 3 TIMES DAILY PRN
Status: DISCONTINUED | OUTPATIENT
Start: 2023-10-14 | End: 2023-10-17 | Stop reason: HOSPADM

## 2023-10-14 RX ADMIN — ALBUTEROL SULFATE 5 MG: 2.5 SOLUTION RESPIRATORY (INHALATION) at 08:10

## 2023-10-14 RX ADMIN — SODIUM CHLORIDE 1000 ML: 9 INJECTION, SOLUTION INTRAVENOUS at 10:10

## 2023-10-14 RX ADMIN — AZITHROMYCIN 500 MG: 500 INJECTION, POWDER, LYOPHILIZED, FOR SOLUTION INTRAVENOUS at 09:10

## 2023-10-14 RX ADMIN — POTASSIUM BICARBONATE 50 MEQ: 978 TABLET, EFFERVESCENT ORAL at 10:10

## 2023-10-14 RX ADMIN — IPRATROPIUM BROMIDE AND ALBUTEROL SULFATE 3 ML: .5; 3 SOLUTION RESPIRATORY (INHALATION) at 11:10

## 2023-10-14 RX ADMIN — CEFTRIAXONE SODIUM 2 G: 2 INJECTION, POWDER, FOR SOLUTION INTRAMUSCULAR; INTRAVENOUS at 09:10

## 2023-10-14 RX ADMIN — BENZONATATE 100 MG: 100 CAPSULE ORAL at 11:10

## 2023-10-15 LAB
ANION GAP SERPL CALC-SCNC: 9 MMOL/L (ref 8–16)
BASOPHILS # BLD AUTO: 0.09 K/UL (ref 0–0.2)
BASOPHILS NFR BLD: 0.9 % (ref 0–1.9)
BUN SERPL-MCNC: 7 MG/DL (ref 8–23)
CALCIUM SERPL-MCNC: 8.9 MG/DL (ref 8.7–10.5)
CHLORIDE SERPL-SCNC: 103 MMOL/L (ref 95–110)
CO2 SERPL-SCNC: 28 MMOL/L (ref 23–29)
CREAT SERPL-MCNC: 0.6 MG/DL (ref 0.5–1.4)
DIFFERENTIAL METHOD: ABNORMAL
EOSINOPHIL # BLD AUTO: 0.1 K/UL (ref 0–0.5)
EOSINOPHIL NFR BLD: 0.6 % (ref 0–8)
ERYTHROCYTE [DISTWIDTH] IN BLOOD BY AUTOMATED COUNT: 15.2 % (ref 11.5–14.5)
EST. GFR  (NO RACE VARIABLE): >60 ML/MIN/1.73 M^2
GLUCOSE SERPL-MCNC: 103 MG/DL (ref 70–110)
HCT VFR BLD AUTO: 34.7 % (ref 37–48.5)
HGB BLD-MCNC: 10.8 G/DL (ref 12–16)
IMM GRANULOCYTES # BLD AUTO: 0.09 K/UL (ref 0–0.04)
IMM GRANULOCYTES NFR BLD AUTO: 0.9 % (ref 0–0.5)
LYMPHOCYTES # BLD AUTO: 2.2 K/UL (ref 1–4.8)
LYMPHOCYTES NFR BLD: 22.6 % (ref 18–48)
MCH RBC QN AUTO: 26.5 PG (ref 27–31)
MCHC RBC AUTO-ENTMCNC: 31.1 G/DL (ref 32–36)
MCV RBC AUTO: 85 FL (ref 82–98)
MONOCYTES # BLD AUTO: 1.1 K/UL (ref 0.3–1)
MONOCYTES NFR BLD: 11.5 % (ref 4–15)
NEUTROPHILS # BLD AUTO: 6.2 K/UL (ref 1.8–7.7)
NEUTROPHILS NFR BLD: 63.5 % (ref 38–73)
NRBC BLD-RTO: 0 /100 WBC
PLATELET # BLD AUTO: 187 K/UL (ref 150–450)
PMV BLD AUTO: 12.9 FL (ref 9.2–12.9)
POTASSIUM SERPL-SCNC: 3.9 MMOL/L (ref 3.5–5.1)
RBC # BLD AUTO: 4.07 M/UL (ref 4–5.4)
SODIUM SERPL-SCNC: 140 MMOL/L (ref 136–145)
WBC # BLD AUTO: 9.79 K/UL (ref 3.9–12.7)

## 2023-10-15 PROCEDURE — 94640 AIRWAY INHALATION TREATMENT: CPT

## 2023-10-15 PROCEDURE — 94761 N-INVAS EAR/PLS OXIMETRY MLT: CPT

## 2023-10-15 PROCEDURE — 27000221 HC OXYGEN, UP TO 24 HOURS

## 2023-10-15 PROCEDURE — 99233 SBSQ HOSP IP/OBS HIGH 50: CPT | Mod: ,,, | Performed by: STUDENT IN AN ORGANIZED HEALTH CARE EDUCATION/TRAINING PROGRAM

## 2023-10-15 PROCEDURE — 20600001 HC STEP DOWN PRIVATE ROOM

## 2023-10-15 PROCEDURE — 85025 COMPLETE CBC W/AUTO DIFF WBC: CPT | Performed by: STUDENT IN AN ORGANIZED HEALTH CARE EDUCATION/TRAINING PROGRAM

## 2023-10-15 PROCEDURE — 87070 CULTURE OTHR SPECIMN AEROBIC: CPT | Performed by: INTERNAL MEDICINE

## 2023-10-15 PROCEDURE — 36415 COLL VENOUS BLD VENIPUNCTURE: CPT | Performed by: STUDENT IN AN ORGANIZED HEALTH CARE EDUCATION/TRAINING PROGRAM

## 2023-10-15 PROCEDURE — 63600175 PHARM REV CODE 636 W HCPCS: Performed by: INTERNAL MEDICINE

## 2023-10-15 PROCEDURE — 25000242 PHARM REV CODE 250 ALT 637 W/ HCPCS: Performed by: INTERNAL MEDICINE

## 2023-10-15 PROCEDURE — 99900035 HC TECH TIME PER 15 MIN (STAT)

## 2023-10-15 PROCEDURE — 99285 EMERGENCY DEPT VISIT HI MDM: CPT

## 2023-10-15 PROCEDURE — 99233 PR SUBSEQUENT HOSPITAL CARE,LEVL III: ICD-10-PCS | Mod: ,,, | Performed by: STUDENT IN AN ORGANIZED HEALTH CARE EDUCATION/TRAINING PROGRAM

## 2023-10-15 PROCEDURE — 87205 SMEAR GRAM STAIN: CPT | Performed by: INTERNAL MEDICINE

## 2023-10-15 PROCEDURE — 25000003 PHARM REV CODE 250: Performed by: EMERGENCY MEDICINE

## 2023-10-15 PROCEDURE — 25000003 PHARM REV CODE 250: Performed by: INTERNAL MEDICINE

## 2023-10-15 PROCEDURE — 94799 UNLISTED PULMONARY SVC/PX: CPT

## 2023-10-15 PROCEDURE — 25000003 PHARM REV CODE 250: Performed by: STUDENT IN AN ORGANIZED HEALTH CARE EDUCATION/TRAINING PROGRAM

## 2023-10-15 PROCEDURE — 80048 BASIC METABOLIC PNL TOTAL CA: CPT | Performed by: STUDENT IN AN ORGANIZED HEALTH CARE EDUCATION/TRAINING PROGRAM

## 2023-10-15 RX ORDER — SODIUM CHLORIDE 0.9 % (FLUSH) 0.9 %
10 SYRINGE (ML) INJECTION
Status: DISCONTINUED | OUTPATIENT
Start: 2023-10-15 | End: 2023-10-17 | Stop reason: HOSPADM

## 2023-10-15 RX ORDER — TALC
6 POWDER (GRAM) TOPICAL NIGHTLY PRN
Status: DISCONTINUED | OUTPATIENT
Start: 2023-10-15 | End: 2023-10-17 | Stop reason: HOSPADM

## 2023-10-15 RX ORDER — LISINOPRIL 20 MG/1
40 TABLET ORAL DAILY
Status: DISCONTINUED | OUTPATIENT
Start: 2023-10-15 | End: 2023-10-17 | Stop reason: HOSPADM

## 2023-10-15 RX ADMIN — IPRATROPIUM BROMIDE AND ALBUTEROL SULFATE 3 ML: .5; 3 SOLUTION RESPIRATORY (INHALATION) at 08:10

## 2023-10-15 RX ADMIN — GUAIFENESIN AND DEXTROMETHORPHAN 5 ML: 100; 10 SYRUP ORAL at 08:10

## 2023-10-15 RX ADMIN — SODIUM CHLORIDE, POTASSIUM CHLORIDE, SODIUM LACTATE AND CALCIUM CHLORIDE: 600; 310; 30; 20 INJECTION, SOLUTION INTRAVENOUS at 12:10

## 2023-10-15 RX ADMIN — CEFTRIAXONE 2 G: 2 INJECTION, POWDER, FOR SOLUTION INTRAMUSCULAR; INTRAVENOUS at 05:10

## 2023-10-15 RX ADMIN — IPRATROPIUM BROMIDE AND ALBUTEROL SULFATE 3 ML: .5; 3 SOLUTION RESPIRATORY (INHALATION) at 03:10

## 2023-10-15 RX ADMIN — Medication 6 MG: at 09:10

## 2023-10-15 RX ADMIN — ASPIRIN 81 MG: 81 TABLET, COATED ORAL at 08:10

## 2023-10-15 RX ADMIN — GUAIFENESIN AND DEXTROMETHORPHAN 5 ML: 100; 10 SYRUP ORAL at 03:10

## 2023-10-15 RX ADMIN — AMLODIPINE BESYLATE 10 MG: 10 TABLET ORAL at 08:10

## 2023-10-15 RX ADMIN — LISINOPRIL 40 MG: 20 TABLET ORAL at 11:10

## 2023-10-15 RX ADMIN — GUAIFENESIN AND DEXTROMETHORPHAN 5 ML: 100; 10 SYRUP ORAL at 09:10

## 2023-10-15 RX ADMIN — POTASSIUM BICARBONATE 25 MEQ: 978 TABLET, EFFERVESCENT ORAL at 12:10

## 2023-10-15 RX ADMIN — AZITHROMYCIN 500 MG: 500 INJECTION, POWDER, LYOPHILIZED, FOR SOLUTION INTRAVENOUS at 05:10

## 2023-10-15 NOTE — PROVIDER PROGRESS NOTES - EMERGENCY DEPT.
Encounter Date: 10/14/2023    ED Physician Progress Notes          Physician Attestation Statement for Resident:  As the supervising MD   Physician Attestation Statement: I have personally seen and examined this patient.       ***I agree with the history unless otherwise noted.     ***As the supervising MD I agree with the PE unless otherwise noted.      ***I have reviewed and agree with the residents interpretation of the following unless otherwise noted:   ***I have personally reviewed and interpreted the patients laboratory studies:  ***I have personally reviewed and interpreted imaging studies:  ***I have personally reviewed and interpreted the patient's EKG: NSR with baseline wander, no ischemic ST/TW changes      ***I have also reviewed the following:   ***external records:  COVID negative 9/29    ***As the supervising MD I agree with the treatment, course, plan, and disposition unless otherwise noted.

## 2023-10-15 NOTE — ED NOTES
Telemetry Verification   Patient placed on Telemetry Box  Verified with War Room  Box # [id number]   Monitor Tech Matthew   Rate 88   Rhythm NSR

## 2023-10-15 NOTE — ASSESSMENT & PLAN NOTE
This patient does have evidence of infective focus  My overall impression is sepsis.  Source: Respiratory  Hypoxia 81% on arrival on room air, now on 3 lpm oxygen  Lactic acid is normal  WBC 15, initial RR 22  Antibiotics given-   Antibiotics (72h ago, onward)    Start     Stop Route Frequency Ordered    10/15/23 1800  cefTRIAXone (ROCEPHIN) 2 g in dextrose 5 % in water (D5W) 100 mL IVPB (MB+)         -- IV Every 24 hours (non-standard times) 10/14/23 2237    10/15/23 1800  azithromycin (ZITHROMAX) 500 mg in dextrose 5 % (D5W) 250 mL IVPB (Vial-Mate)         -- IV Every 24 hours (non-standard times) 10/14/23 2237        Plan  -IV rocephin and zithromax  -IVF 1 L bolus in the ER, 75 cc/h overnight.  Blood pressure normalized plan to hold IV fluids  -duonebs  -blood and sputum cultures pending  -prn robitussin

## 2023-10-15 NOTE — HPI
This is a pleasant 78 years old female with known history of hypertension, smoking 1 PPD times 30 years and quit 20 years ago, obesity with BMI 33 who presented to the emergency department with shortness of breath, cough and generalized weakness.  She started with upper respiratory tract symptoms about 3 weeks ago and later started to have lower symptoms as well.  She is producing green to yellow sputum.  Reports increased coughing, productive of sputum, shortness of breath on exertion, but no chest pain or orthopnea.  Denies fever, chill, nausea, vomiting, abdominal pain, dysuria, or diarrhea. She had hypoxia to 81% on room air, right now on 3 L nasal cannula. Found to have elevated white count, chest x-ray concern for pneumonia on her left side.

## 2023-10-15 NOTE — ASSESSMENT & PLAN NOTE
This patient does have evidence of infective focus  My overall impression is sepsis.  Source: Respiratory  Hypoxia 81% on arrival on room air, now on 3 lpm oxygen  Lactic acid is normal  WBC 15, initial RR 22  Antibiotics given-   Antibiotics (72h ago, onward)    Start     Stop Route Frequency Ordered    10/15/23 1800  cefTRIAXone (ROCEPHIN) 2 g in dextrose 5 % in water (D5W) 100 mL IVPB (MB+)         -- IV Every 24 hours (non-standard times) 10/14/23 2237    10/15/23 1800  azithromycin (ZITHROMAX) 500 mg in dextrose 5 % (D5W) 250 mL IVPB (Vial-Mate)         -- IV Every 24 hours (non-standard times) 10/14/23 2237        Plan  -IV rocephin and zithromax  -IVF 1 L bolus in the ER, 75 cc/h from now  -barbara  -blood and sputum cultures  -prn robitussin

## 2023-10-15 NOTE — PROGRESS NOTES
[facility] ([address] Medicine  Progress Note    Patient Name: Melani Holloway  MRN: [id number]  Patient Class: IP- Inpatient   Admission Date: 10/14/2023  Length of Stay: 1 days  Attending Physician: Marina Perry MD  Primary Care Provider: Nathaniel Greenfield MD        Subjective:     Principal Problem:Sepsis due to pneumonia        HPI:  This is a pleasant 78 years old female with known history of hypertension, smoking 1 PPD times 30 years and quit 20 years ago, obesity with BMI 33 who presented to the emergency department with shortness of breath, cough and generalized weakness.  She started with upper respiratory tract symptoms about 3 weeks ago and later started to have lower symptoms as well.  She is producing green to yellow sputum.  Reports increased coughing, productive of sputum, shortness of breath on exertion, but no chest pain or orthopnea.  Denies fever, chill, nausea, vomiting, abdominal pain, dysuria, or diarrhea. She had hypoxia to 81% on room air, right now on 3 L nasal cannula. Found to have elevated white count, chest x-ray concern for pneumonia on her left side.      Overview/Hospital Course:  Patient is a 78 years old female with known history of hypertension, smoking 1 PPD times 30 years and quit 20 years ago, obesity with BMI 33 who presented to the emergency department with shortness of breath, productive cough with yellow sputum and generalized weakness. She had hypoxia to 81% on room air, right now on 3 L nasal cannula. Found to have elevated white count, chest x-ray concern for pneumonia on her left side.  Patient was started on ceftriaxone and azithromycin and admitted for pneumonia.        Interval history:  No overnight events.  Patient is saturating well on 3 L of oxygen.  Blood pressure was slightly elevated 148/69 mm of Hg.  As per chart review patient is on lisinopril 40 mg daily.  Added to the inpatient regimen.      Review of Systems    Constitutional:  Positive for activity change and fatigue. Negative for fever and unexpected weight change.   HENT:  Negative for dental problem.    Eyes:  Negative for redness.   Respiratory:  Positive for cough and shortness of breath. Negative for chest tightness.    Cardiovascular:  Negative for chest pain, palpitations and leg swelling.   Gastrointestinal:  Negative for abdominal pain.   Endocrine: Negative for polydipsia.   Genitourinary:  Negative for dysuria.   Musculoskeletal:  Negative for back pain.   Skin:  Negative for rash.   Allergic/Immunologic: Negative for food allergies.   Neurological:  Negative for dizziness and facial asymmetry.   Hematological:  Negative for adenopathy.   Psychiatric/Behavioral:  Negative for agitation, behavioral problems and dysphoric mood.      Objective:     Vital Signs (Most Recent):  Temp: 97.8 °F (36.6 °C) (10/15/23 0802)  Pulse: 79 (10/15/23 0849)  Resp: 19 (10/15/23 0849)  BP: (!) 148/69 (10/15/23 0802)  SpO2: 96 % (10/15/23 0849) Vital Signs (24h Range):  Temp:  [97.8 °F (36.6 °C)-99.2 °F (37.3 °C)] 97.8 °F (36.6 °C)  Pulse:  [9-95] 79  Resp:  [16-22] 19  SpO2:  [93 %-97 %] 96 %  BP: (126-160)/(57-96) 148/69        Body mass index is 33.13 kg/m².     Physical Exam  Constitutional:       General: She is not in acute distress.     Appearance: Normal appearance.   HENT:      Head: Normocephalic.   Cardiovascular:      Rate and Rhythm: Normal rate and regular rhythm.      Comments: No JVD  No leg edema  Pulmonary:      Effort: Pulmonary effort is normal.      Comments: On 3 lpm oxygen  Abdominal:      General: There is distension.      Palpations: Abdomen is soft.      Tenderness: There is no abdominal tenderness.   Musculoskeletal:      Right lower leg: No edema.      Left lower leg: No edema.   Neurological:      General: No focal deficit present.      Mental Status: She is alert.   Psychiatric:         Mood and Affect: Mood normal.                Significant Labs:  "All pertinent labs within the past 24 hours have been reviewed.  Blood Culture:   Recent Labs   Lab 10/14/23  1954 10/14/23  2006   LABBLOO No Growth to date No Growth to date     BMP:   Recent Labs   Lab 10/14/23  1949   *      K 3.0*   CL 99   CO2 28   BUN 10   CREATININE 0.7   CALCIUM 9.5       CBC:   Recent Labs   Lab 10/14/23  1949   WBC 15.26*   HGB 12.2   HCT 39.0          CMP:   Recent Labs   Lab 10/14/23  1949      K 3.0*   CL 99   CO2 28   *   BUN 10   CREATININE 0.7   CALCIUM 9.5   PROT 6.9   ALBUMIN 3.4*   BILITOT 0.5   ALKPHOS 101   AST 15   ALT 16   ANIONGAP 10       Cardiac Markers:   Recent Labs   Lab 10/14/23  1949   BNP 32       Coagulation: No results for input(s): "PT", "INR", "APTT" in the last 48 hours.  Imaging Results              X-Ray Chest AP Portable (Final result)  Result time 10/14/23 20:29:42      Final result by Freedom Long MD (10/14/23 20:29:42)                   Impression:      1. Coarse interstitial attenuation, accentuated by habitus and shallow inspiratory effort however edema or other nonspecific pneumonitis are of concern.  Correlation and follow-up advised.      Electronically signed by: Freedom Long MD  Date:    10/14/2023  Time:    20:29               Narrative:    EXAMINATION:  XR CHEST AP PORTABLE    CLINICAL HISTORY:  hypoxia;    TECHNIQUE:  Single frontal view of the chest was performed.    COMPARISON:  03/09/2022    FINDINGS:  The cardiomediastinal silhouette is not enlarged.  There is no pleural effusion.  The trachea is midline.  The lungs are symmetrically expanded bilaterally with coarse interstitial attenuation bilaterally.  There is bilateral basilar subsegmental atelectasis..  No large focal consolidation seen.  There is no pneumothorax.  The osseous structures are remarkable for degenerative change.  There is surgical change of the right axilla..                                          Assessment/Plan:      * Sepsis " due to pneumonia  This patient does have evidence of infective focus  My overall impression is sepsis.  Source: Respiratory  Hypoxia 81% on arrival on room air, now on 3 lpm oxygen  Lactic acid is normal  WBC 15, initial RR 22  Antibiotics given-   Antibiotics (72h ago, onward)    Start     Stop Route Frequency Ordered    10/15/23 1800  cefTRIAXone (ROCEPHIN) 2 g in dextrose 5 % in water (D5W) 100 mL IVPB (MB+)         -- IV Every 24 hours (non-standard times) 10/14/23 2237    10/15/23 1800  azithromycin (ZITHROMAX) 500 mg in dextrose 5 % (D5W) 250 mL IVPB (Vial-Mate)         -- IV Every 24 hours (non-standard times) 10/14/23 2237        Plan  -IV rocephin and zithromax  -IVF 1 L bolus in the ER, 75 cc/h overnight.  Blood pressure normalized plan to hold IV fluids  -duonebs  -blood and sputum cultures pending  -prn robitussin    Hypokalemia  K 3.0   Likely from HCTZ use  Repleted      Essential hypertension  Blood pressure was noted to be elevated, will add lisinopril 40 mg daily.        VTE Risk Mitigation (From admission, onward)         Ordered     IP VTE HIGH RISK PATIENT  Once         10/15/23 1045     Place sequential compression device  Until discontinued         10/15/23 1045                Discharge Planning   RODLOFO:      Code Status: Full Code   Is the patient medically ready for discharge?:     Reason for patient still in hospital (select all that apply): Treatment                     Marina Perry MD  Department of Hospital Medicine   [facility] - Intensive Care ([place]

## 2023-10-15 NOTE — SUBJECTIVE & OBJECTIVE
Interval history:  No overnight events.  Patient is saturating well on 3 L of oxygen.  Blood pressure was slightly elevated 148/69 mm of Hg.  As per chart review patient is on lisinopril 40 mg daily.  Added to the inpatient regimen.      Review of Systems   Constitutional:  Positive for activity change and fatigue. Negative for fever and unexpected weight change.   HENT:  Negative for dental problem.    Eyes:  Negative for redness.   Respiratory:  Positive for cough and shortness of breath. Negative for chest tightness.    Cardiovascular:  Negative for chest pain, palpitations and leg swelling.   Gastrointestinal:  Negative for abdominal pain.   Endocrine: Negative for polydipsia.   Genitourinary:  Negative for dysuria.   Musculoskeletal:  Negative for back pain.   Skin:  Negative for rash.   Allergic/Immunologic: Negative for food allergies.   Neurological:  Negative for dizziness and facial asymmetry.   Hematological:  Negative for adenopathy.   Psychiatric/Behavioral:  Negative for agitation, behavioral problems and dysphoric mood.      Objective:     Vital Signs (Most Recent):  Temp: 97.8 °F (36.6 °C) (10/15/23 0802)  Pulse: 79 (10/15/23 0849)  Resp: 19 (10/15/23 0849)  BP: (!) 148/69 (10/15/23 0802)  SpO2: 96 % (10/15/23 0849) Vital Signs (24h Range):  Temp:  [97.8 °F (36.6 °C)-99.2 °F (37.3 °C)] 97.8 °F (36.6 °C)  Pulse:  [9-95] 79  Resp:  [16-22] 19  SpO2:  [93 %-97 %] 96 %  BP: (126-160)/(57-96) 148/69        Body mass index is 33.13 kg/m².     Physical Exam  Constitutional:       General: She is not in acute distress.     Appearance: Normal appearance.   HENT:      Head: Normocephalic.   Cardiovascular:      Rate and Rhythm: Normal rate and regular rhythm.      Comments: No JVD  No leg edema  Pulmonary:      Effort: Pulmonary effort is normal.      Comments: On 3 lpm oxygen  Abdominal:      General: There is distension.      Palpations: Abdomen is soft.      Tenderness: There is no abdominal tenderness.  "  Musculoskeletal:      Right lower leg: No edema.      Left lower leg: No edema.   Neurological:      General: No focal deficit present.      Mental Status: She is alert.   Psychiatric:         Mood and Affect: Mood normal.                Significant Labs: All pertinent labs within the past 24 hours have been reviewed.  Blood Culture:   Recent Labs   Lab 10/14/23  1954 10/14/23  2006   LABBLOO No Growth to date No Growth to date     BMP:   Recent Labs   Lab 10/14/23  1949   *      K 3.0*   CL 99   CO2 28   BUN 10   CREATININE 0.7   CALCIUM 9.5       CBC:   Recent Labs   Lab 10/14/23  1949   WBC 15.26*   HGB 12.2   HCT 39.0          CMP:   Recent Labs   Lab 10/14/23  1949      K 3.0*   CL 99   CO2 28   *   BUN 10   CREATININE 0.7   CALCIUM 9.5   PROT 6.9   ALBUMIN 3.4*   BILITOT 0.5   ALKPHOS 101   AST 15   ALT 16   ANIONGAP 10       Cardiac Markers:   Recent Labs   Lab 10/14/23  1949   BNP 32       Coagulation: No results for input(s): "PT", "INR", "APTT" in the last 48 hours.  Imaging Results              X-Ray Chest AP Portable (Final result)  Result time 10/14/23 20:29:42      Final result by Freedom Long MD (10/14/23 20:29:42)                   Impression:      1. Coarse interstitial attenuation, accentuated by habitus and shallow inspiratory effort however edema or other nonspecific pneumonitis are of concern.  Correlation and follow-up advised.      Electronically signed by: Freedom Long MD  Date:    10/14/2023  Time:    20:29               Narrative:    EXAMINATION:  XR CHEST AP PORTABLE    CLINICAL HISTORY:  hypoxia;    TECHNIQUE:  Single frontal view of the chest was performed.    COMPARISON:  03/09/2022    FINDINGS:  The cardiomediastinal silhouette is not enlarged.  There is no pleural effusion.  The trachea is midline.  The lungs are symmetrically expanded bilaterally with coarse interstitial attenuation bilaterally.  There is bilateral basilar subsegmental " atelectasis..  No large focal consolidation seen.  There is no pneumothorax.  The osseous structures are remarkable for degenerative change.  There is surgical change of the right axilla..

## 2023-10-15 NOTE — ED NOTES
Pt placed on cardiac monitor, continuous pulse ox, cycling blood pressures. Side rails up x2, call bell in reach, bed in low position with brake engaged. Family at bedside. \

## 2023-10-15 NOTE — HOSPITAL COURSE
Patient is a 78 years old female with known history of hypertension, smoking 1 PPD times 30 years and quit 20 years ago, obesity with BMI 33 who presented to the emergency department with shortness of breath, productive cough with yellow sputum and generalized weakness. She had hypoxia to 81% on room air, right now on 3 L nasal cannula. Found to have elevated white count, chest x-ray concern for pneumonia on her left side.  Patient was started on ceftriaxone and azithromycin and admitted for pneumonia.  Patient's shortness of breath and cough improved through the hospital course.  Patient was saturating well on room air.  6 minute walk test was done prior to discharge patient was requiring 2 L of oxygen with exercise.  We will discharge patient on Amoxil lab to complete 7 day course of antibiotics.  PCP can reassess the patient's oxygen requirement at follow up.    Melani Holloway was deemed appropriate for discharge.  At the time of discharge, the plan of care was discussed with patient/family, who were agreeable and amenable.  All medications were verbally reviewed and discussed with the patient/family.  They endorsed understanding and compliance.  Informed them that these changes will be available on their discharge paperwork, as well.  Outpatient follow-ups were scheduled, or if unable to be scheduled ambulatory referrals were placed, and ER return precautions were given.  All questions were answered to the patient/family's satisfaction.  she was subsequently discharged in stable condition.

## 2023-10-15 NOTE — ED NOTES
Melani Holloway, an 78 y.o. female presents to the ED    Chief Complaint   Patient presents with    Cough    Shortness of Breath     Patient complains of cough and SOB for 3-4 weeks, went to UC, 81% on RA, placed on O2 3 l/min  O2 sats 92%     Review of patient's allergies indicates:  No Known Allergies  Past Medical History:   Diagnosis Date    Coronary artery disease 10/04/2021    Ductal carcinoma in situ (DCIS) of right breast 09/19/2017    Hypertension     NSTEMI (non-ST elevated myocardial infarction) 03/11/2020

## 2023-10-15 NOTE — H&P
[facility] - Emergency Dept  [facility]  History & Physical    Patient Name: Melani Holloway  MRN: [id number]  Patient Class: IP- Inpatient  Admission Date: 10/14/2023  Attending Physician: Sumit Cisneros MD   Primary Care Provider: Nathaniel Greenfield MD         Patient information was obtained from patient and ER records.     Subjective:     Principal Problem:Sepsis due to pneumonia    Chief Complaint:   Chief Complaint   Patient presents with    Cough    Shortness of Breath     Patient complains of cough and SOB for 3-4 weeks, went to , 81% on RA, placed on O2 3 l/min  O2 sats 92%        HPI: This is a pleasant 78 years old female with known history of hypertension, smoking 1 PPD times 30 years and quit 20 years ago, obesity with BMI 33 who presented to the emergency department with shortness of breath, cough and generalized weakness.  She started with upper respiratory tract symptoms about 3 weeks ago and later started to have lower symptoms as well.  She is producing green to yellow sputum.  Reports increased coughing, productive of sputum, shortness of breath on exertion, but no chest pain or orthopnea.  Denies fever, chill, nausea, vomiting, abdominal pain, dysuria, or diarrhea. She had hypoxia to 81% on room air, right now on 3 L nasal cannula. Found to have elevated white count, chest x-ray concern for pneumonia on her left side.      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     Current Outpatient  Medications on File Prior to Encounter   Medication Sig    albuterol (PROAIR HFA) 90 mcg/actuation inhaler Inhale 2 puffs into the lungs every 6 (six) hours as needed for Wheezing. Rescue    amLODIPine (NORVASC) 10 MG tablet TAKE 1 TABLET BY MOUTH EVERY DAY    aspirin (ECOTRIN) 81 MG EC tablet Take 81 mg by mouth every other day.     benzonatate (TESSALON PERLES) 100 MG capsule Take 1 capsule (100 mg total) by mouth every 6 (six) hours as needed for Cough.    calcium carbonate (OS-JONAS) 600 mg calcium (1,500 mg) Tab Take 600 mg by mouth once.    fluticasone propionate (FLONASE) 50 mcg/actuation nasal spray 1 spray (50 mcg total) by Each Nostril route once daily.    fluticasone propionate (FLONASE) 50 mcg/actuation nasal spray 1 spray (50 mcg total) by Each Nostril route once daily.    hydroCHLOROthiazide (HYDRODIURIL) 25 MG tablet TAKE 1 TABLET BY MOUTH EVERY DAY IN THE MORNING    lisinopriL (PRINIVIL,ZESTRIL) 40 MG tablet TAKE 1 TABLET BY MOUTH EVERY DAY    vitamin D 1000 units Tab Take 1,000 Units by mouth once daily.     Family History       Problem Relation (Age of Onset)    Breast cancer Paternal Aunt    Heart attack Father    No Known Problems Mother          Tobacco Use    Smoking status: Former     Current packs/day: 0.00     Average packs/day: 1 pack/day for 38.0 years (38.0 ttl pk-yrs)     Types: Cigarettes     Start date:      Quit date:      Years since quittin.8    Smokeless tobacco: Never   Substance and Sexual Activity    Alcohol use: Yes     Comment: Occasionally    Drug use: No    Sexual activity: Never     Review of Systems   Constitutional:  Positive for activity change and fatigue. Negative for fever and unexpected weight change.   HENT:  Negative for dental problem.    Eyes:  Negative for redness.   Respiratory:  Positive for cough and shortness of breath. Negative for chest tightness.    Cardiovascular:  Negative for chest pain, palpitations and leg swelling.  "  Gastrointestinal:  Negative for abdominal pain.   Endocrine: Negative for polydipsia.   Genitourinary:  Negative for dysuria.   Musculoskeletal:  Negative for back pain.   Skin:  Negative for rash.   Allergic/Immunologic: Negative for food allergies.   Neurological:  Negative for dizziness and facial asymmetry.   Hematological:  Negative for adenopathy.   Psychiatric/Behavioral:  Negative for agitation, behavioral problems and dysphoric mood.      Objective:     Vital Signs (Most Recent):  Temp: 99.2 °F (37.3 °C) (10/14/23 1910)  Pulse: 84 (10/14/23 2301)  Resp: (!) 22 (10/14/23 2301)  BP: (!) 143/65 (10/14/23 2233)  SpO2: (!) 93 % (10/14/23 2301) Vital Signs (24h Range):  Temp:  [99.2 °F (37.3 °C)] 99.2 °F (37.3 °C)  Pulse:  [9-95] 84  Resp:  [16-22] 22  SpO2:  [93 %-97 %] 93 %  BP: (126-160)/(57-96) 143/65        There is no height or weight on file to calculate BMI.     Physical Exam  Constitutional:       General: She is not in acute distress.     Appearance: Normal appearance.   HENT:      Head: Normocephalic.   Cardiovascular:      Rate and Rhythm: Normal rate and regular rhythm.      Comments: No JVD  No leg edema  Pulmonary:      Effort: Pulmonary effort is normal.      Comments: On 3 lpm oxygen  Abdominal:      General: There is distension.      Palpations: Abdomen is soft.      Tenderness: There is no abdominal tenderness.   Musculoskeletal:      Right lower leg: No edema.      Left lower leg: No edema.   Neurological:      General: No focal deficit present.      Mental Status: She is alert.   Psychiatric:         Mood and Affect: Mood normal.                Significant Labs: All pertinent labs within the past 24 hours have been reviewed.  Blood Culture: No results for input(s): "LABBLOO" in the last 48 hours.  BMP:   Recent Labs   Lab 10/14/23  1949   *      K 3.0*   CL 99   CO2 28   BUN 10   CREATININE 0.7   CALCIUM 9.5     CBC:   Recent Labs   Lab 10/14/23  1949   WBC 15.26*   HGB 12.2 " "  HCT 39.0        CMP:   Recent Labs   Lab 10/14/23  1949      K 3.0*   CL 99   CO2 28   *   BUN 10   CREATININE 0.7   CALCIUM 9.5   PROT 6.9   ALBUMIN 3.4*   BILITOT 0.5   ALKPHOS 101   AST 15   ALT 16   ANIONGAP 10     Cardiac Markers:   Recent Labs   Lab 10/14/23  1949   BNP 32     Coagulation: No results for input(s): "PT", "INR", "APTT" in the last 48 hours.  Imaging Results              X-Ray Chest AP Portable (Final result)  Result time 10/14/23 20:29:42      Final result by Freedom Long MD (10/14/23 20:29:42)                   Impression:      1. Coarse interstitial attenuation, accentuated by habitus and shallow inspiratory effort however edema or other nonspecific pneumonitis are of concern.  Correlation and follow-up advised.      Electronically signed by: Freedom Long MD  Date:    10/14/2023  Time:    20:29               Narrative:    EXAMINATION:  XR CHEST AP PORTABLE    CLINICAL HISTORY:  hypoxia;    TECHNIQUE:  Single frontal view of the chest was performed.    COMPARISON:  03/09/2022    FINDINGS:  The cardiomediastinal silhouette is not enlarged.  There is no pleural effusion.  The trachea is midline.  The lungs are symmetrically expanded bilaterally with coarse interstitial attenuation bilaterally.  There is bilateral basilar subsegmental atelectasis..  No large focal consolidation seen.  There is no pneumothorax.  The osseous structures are remarkable for degenerative change.  There is surgical change of the right axilla..                                        Assessment/Plan:     * Sepsis due to pneumonia  This patient does have evidence of infective focus  My overall impression is sepsis.  Source: Respiratory  Hypoxia 81% on arrival on room air, now on 3 lpm oxygen  Lactic acid is normal  WBC 15, initial RR 22  Antibiotics given-   Antibiotics (72h ago, onward)    Start     Stop Route Frequency Ordered    10/15/23 1800  cefTRIAXone (ROCEPHIN) 2 g in dextrose 5 % in " water (D5W) 100 mL IVPB (MB+)         -- IV Every 24 hours (non-standard times) 10/14/23 2237    10/15/23 1800  azithromycin (ZITHROMAX) 500 mg in dextrose 5 % (D5W) 250 mL IVPB (Vial-Mate)         -- IV Every 24 hours (non-standard times) 10/14/23 2237        Plan  -IV rocephin and zithromax  -IVF 1 L bolus in the ER, 75 cc/h from now  -duonebs  -blood and sputum cultures  -prn robitussin    Hypokalemia  K 3.0   Likely from HCTZ use  Will give 50 mEq K followed by another 25 mEq in two hours      Essential hypertension  Resume amlodipine   Resume more BP meds as tolerated       VTE Risk Mitigation (From admission, onward)    None                     Yuriy Nielsen MD  Department of Hospital Medicine  [facility] - Emergency Dept

## 2023-10-15 NOTE — ED PROVIDER NOTES
Encounter Date: 10/14/2023       History     Chief Complaint   Patient presents with    Cough    Shortness of Breath     Patient complains of cough and SOB for 3-4 weeks, went to , 81% on RA, placed on O2 3 l/min  O2 sats 92%     HPI  78-year-old female, with history of CAD, hypertension, presents to ED for shortness of breath.  Patient reports that she has cough and congestion for almost 4 weeks now, she was seen at an urgent care 2 weeks ago, she was prescribed Tessalon Perles, promethazine, and Flonase.  Patient states that the symptoms seemed to improve, however, after her trip to [place], her symptoms getting worse again.  Reports increased coughing, productive of sputum, shortness of breath on exertion, but no chest pain or orthopnea.  Denies fever, chill, nausea, vomiting, abdominal pain, dysuria, or diarrhea.  Review of patient's allergies indicates:  No Known Allergies  Past Medical History:   Diagnosis Date    Coronary artery disease 10/04/2021    Ductal carcinoma in situ (DCIS) of right breast 2017    Hypertension     NSTEMI (non-ST elevated myocardial infarction) 2020     Past Surgical History:   Procedure Laterality Date    BREAST BIOPSY Right 2010    BREAST LUMPECTOMY Right 2010    BREAST SURGERY      cataract surgery      EYE SURGERY       Family History   Problem Relation Age of Onset    No Known Problems Mother     Heart attack Father     Breast cancer Paternal Aunt      Social History     Tobacco Use    Smoking status: Former     Current packs/day: 0.00     Average packs/day: 1 pack/day for 38.0 years (38.0 ttl pk-yrs)     Types: Cigarettes     Start date:      Quit date:      Years since quittin.8    Smokeless tobacco: Never   Substance Use Topics    Alcohol use: Yes     Comment: Occasionally    Drug use: No     Review of Systems    Physical Exam     Initial Vitals [10/14/23 1910]   BP Pulse Resp Temp SpO2   (!) 160/96 95 16 99.2 °F (37.3 °C) (!) 93 %      MAP       --          Physical Exam    Nursing note and vitals reviewed.  Constitutional: She appears well-developed. She appears ill. No distress.   HENT:   Head: Normocephalic and atraumatic.   Mouth/Throat: Oropharynx is clear and moist.   Eyes: Conjunctivae and EOM are normal.   Neck: No JVD present.   Normal range of motion.  Cardiovascular:  Normal rate, regular rhythm, normal heart sounds and intact distal pulses.           Pulmonary/Chest: No respiratory distress. She has wheezes.   Abdominal: Abdomen is soft. She exhibits no distension. There is no abdominal tenderness.   Musculoskeletal:         General: No tenderness or edema. Normal range of motion.      Cervical back: Normal range of motion.     Neurological: She is alert and oriented to person, place, and time. She has normal strength.   Skin: Skin is warm and dry. Capillary refill takes less than 2 seconds.         ED Course   Procedures  Labs Reviewed   COMPREHENSIVE METABOLIC PANEL - Abnormal; Notable for the following components:       Result Value    Potassium 3.0 (*)     Glucose 142 (*)     Albumin 3.4 (*)     All other components within normal limits    Narrative:     Release to patient->Immediate   CBC W/ AUTO DIFFERENTIAL - Abnormal; Notable for the following components:    WBC 15.26 (*)     MCH 26.6 (*)     MCHC 31.3 (*)     RDW 14.9 (*)     Immature Granulocytes 1.7 (*)     Gran # (ANC) 11.6 (*)     Immature Grans (Abs) 0.26 (*)     Mono # 1.5 (*)     Gran % 75.7 (*)     Lymph % 11.9 (*)     All other components within normal limits    Narrative:     Release to patient->Immediate   URINALYSIS, REFLEX TO URINE CULTURE - Abnormal; Notable for the following components:    Protein, UA Trace (*)     Occult Blood UA 1+ (*)     All other components within normal limits    Narrative:     Specimen Source->Urine   INFLUENZA A & B BY MOLECULAR   CULTURE, BLOOD   CULTURE, BLOOD   CULTURE, RESPIRATORY   TROPONIN I    Narrative:     Release to patient->Immediate   B-TYPE  NATRIURETIC PEPTIDE    Narrative:     Release to patient->Immediate   SARS-COV-2 RNA AMPLIFICATION, QUAL   HIV 1 / 2 ANTIBODY    Narrative:     Release to patient->Immediate   HEPATITIS C ANTIBODY    Narrative:     Release to patient->Immediate   D DIMER, QUANTITATIVE   URINALYSIS MICROSCOPIC    Narrative:     Specimen Source->Urine   ISTAT LACTATE     EKG Readings: (Independently Interpreted)   Initial Reading: No STEMI. Rhythm: Normal Sinus Rhythm. Heart Rate: 92. Ectopy: No Ectopy. Conduction: Normal. ST Segments: Normal ST Segments. T Waves: Normal. Axis: Normal. Clinical Impression: Normal Sinus Rhythm       Imaging Results              X-Ray Chest AP Portable (Final result)  Result time 10/14/23 20:29:42      Final result by Freedom Long MD (10/14/23 20:29:42)                   Impression:      1. Coarse interstitial attenuation, accentuated by habitus and shallow inspiratory effort however edema or other nonspecific pneumonitis are of concern.  Correlation and follow-up advised.      Electronically signed by: Freedom Long MD  Date:    10/14/2023  Time:    20:29               Narrative:    EXAMINATION:  XR CHEST AP PORTABLE    CLINICAL HISTORY:  hypoxia;    TECHNIQUE:  Single frontal view of the chest was performed.    COMPARISON:  03/09/2022    FINDINGS:  The cardiomediastinal silhouette is not enlarged.  There is no pleural effusion.  The trachea is midline.  The lungs are symmetrically expanded bilaterally with coarse interstitial attenuation bilaterally.  There is bilateral basilar subsegmental atelectasis..  No large focal consolidation seen.  There is no pneumothorax.  The osseous structures are remarkable for degenerative change.  There is surgical change of the right axilla..                                       Medications   cefTRIAXone (ROCEPHIN) 2 g in dextrose 5 % in water (D5W) 100 mL IVPB (MB+) (has no administration in time range)   azithromycin (ZITHROMAX) 500 mg in dextrose 5 %  (D5W) 250 mL IVPB (Vial-Mate) (has no administration in time range)   lactated ringers infusion ( Intravenous New Bag 10/15/23 0011)   albuterol-ipratropium 2.5 mg-0.5 mg/3 mL nebulizer solution 3 mL (3 mLs Nebulization Given 10/14/23 2301)   benzonatate capsule 100 mg (100 mg Oral Given 10/14/23 2326)   aspirin EC tablet 81 mg (has no administration in time range)   amLODIPine tablet 10 mg (has no administration in time range)   dextromethorphan-guaiFENesin  mg/5 ml liquid 5 mL (has no administration in time range)   albuterol sulfate nebulizer solution 5 mg (5 mg Nebulization Given 10/14/23 2002)   cefTRIAXone (ROCEPHIN) 2 g in dextrose 5 % in water (D5W) 100 mL IVPB (MB+) (0 g Intravenous Stopped 10/14/23 2145)   azithromycin (ZITHROMAX) 500 mg in dextrose 5 % (D5W) 250 mL IVPB (Vial-Mate) (0 mg Intravenous Stopped 10/14/23 2322)   sodium chloride 0.9% bolus 1,000 mL 1,000 mL (1,000 mLs Intravenous New Bag 10/14/23 2231)   potassium bicarbonate disintegrating tablet 50 mEq (50 mEq Oral Given 10/14/23 2235)   potassium bicarbonate disintegrating tablet 25 mEq (25 mEq Oral Given 10/15/23 0013)     Medical Decision Making  78-year-old female, with history of CAD, hypertension, presents to ED for shortness of breath.  Patient is ill-appearing, no respiratory distress, afebrile, hemodynamically stable.  Vital signs significant for hypoxia 81% on room air, now on 3 L nasal cannula saturating at 93%      Differential including but not limited to COVID-19, influenza, pneumonia, ACS, congestive heart failure, PE, asthma    Amount and/or Complexity of Data Reviewed  External Data Reviewed: notes.  Labs: ordered.  Radiology: ordered and independent interpretation performed. Decision-making details documented in ED Course.  ECG/medicine tests: ordered and independent interpretation performed. Decision-making details documented in ED Course.    Risk  Prescription drug management.  Decision regarding  hospitalization.               ED Course as of 10/15/23 0028   Sun Oct 15, 2023   0025 CBC auto differential(!)  Leukocytosis, no acute anemia.  Concerning for infection etiology of her shortness of breath [NC]   0025 Comprehensive metabolic panel(!)  Mild hypokalemia, normal renal function, will replace with oral potassium [NC]   0026 BNP: 32 [NC]   0026 Troponin I: <0.006 [NC]   0026 D-Dimer: 0.48  Less concerning for PE [NC]   0026 Urinalysis, Reflex to Urine Culture Urine, Clean Catch(!)  No UTI [NC]   0026 X-Ray Chest AP Portable  Independent interpretation, increased interstitial infiltration on her left lung, concerning for pneumonia given her clinical presentation.  We will start with Rocephin and azithromycin. [NC]   0027 Discussed with Hospital Medicine, will admit patient for pneumonia.  Patient reports symptom improved after albuterol inhaler, patient will need follow-up with PCP for potential asthma versus COPD evaluation [NC]      ED Course User Index  [NC] Dmitry Hernández MD                    Clinical Impression:   Final diagnoses:  [R06.02] Shortness of breath  [J18.9] Pneumonia of left lung due to infectious organism, unspecified part of lung (Primary)  [J96.01] Acute respiratory failure with hypoxia        ED Disposition Condition    Admit Stable                Dmitry Hernández MD  Resident  10/15/23 0028

## 2023-10-15 NOTE — PLAN OF CARE
Problem: Adult Inpatient Plan of Care  Goal: Plan of Care Review  Outcome: Ongoing, Progressing  Goal: Patient-Specific Goal (Individualized)  Outcome: Ongoing, Progressing  Goal: Absence of Hospital-Acquired Illness or Injury  Outcome: Ongoing, Progressing  Goal: Optimal Comfort and Wellbeing  Outcome: Ongoing, Progressing  Goal: Readiness for Transition of Care  Outcome: Ongoing, Progressing     Problem: Adjustment to Illness (Sepsis/Septic Shock)  Goal: Optimal Coping  Outcome: Ongoing, Progressing     Problem: Bleeding (Sepsis/Septic Shock)  Goal: Absence of Bleeding  Outcome: Ongoing, Progressing     Problem: Glycemic Control Impaired (Sepsis/Septic Shock)  Goal: Blood Glucose Level Within Desired Range  Outcome: Ongoing, Progressing     Problem: Infection Progression (Sepsis/Septic Shock)  Goal: Absence of Infection Signs and Symptoms  Outcome: Ongoing, Progressing     Problem: Nutrition Impaired (Sepsis/Septic Shock)  Goal: Optimal Nutrition Intake  Outcome: Ongoing, Progressing     Problem: Fluid Imbalance (Pneumonia)  Goal: Fluid Balance  Outcome: Ongoing, Progressing     Problem: Infection (Pneumonia)  Goal: Resolution of Infection Signs and Symptoms  Outcome: Ongoing, Progressing     Problem: Respiratory Compromise (Pneumonia)  Goal: Effective Oxygenation and Ventilation  Outcome: Ongoing, Progressing     Patient rounded on through shift as per policy, Patient in stable condition with no complaints. Safety measures in place: bed in lowest position, three rails up, call light in reach, personal belonging in reach. No acute event during shift. Ongoing plan of care.

## 2023-10-15 NOTE — NURSING
Patient alert, oriented x 4 , and ambulatory. Patient on 3L NC. NSR on cardiac monitor. PRN robitussin given to help with cough. No complaints of pain this shift. Patient in bed with bed in lowest position, side rails up x3 , call light in reach, and bed alarm set.

## 2023-10-15 NOTE — SUBJECTIVE & OBJECTIVE
Past Medical History:   Diagnosis Date    Coronary artery disease 10/04/2021    Ductal carcinoma in situ (DCIS) of right breast 09/19/2017    Hypertension     NSTEMI (non-ST elevated myocardial infarction) 03/11/2020       Past Surgical History:   Procedure Laterality Date    BREAST BIOPSY Right 2010    BREAST LUMPECTOMY Right 2010    BREAST SURGERY      cataract surgery      EYE SURGERY         Review of patient's allergies indicates:  No Known Allergies    No current facility-administered medications on file prior to encounter.     Current Outpatient Medications on File Prior to Encounter   Medication Sig    albuterol (PROAIR HFA) 90 mcg/actuation inhaler Inhale 2 puffs into the lungs every 6 (six) hours as needed for Wheezing. Rescue    amLODIPine (NORVASC) 10 MG tablet TAKE 1 TABLET BY MOUTH EVERY DAY    aspirin (ECOTRIN) 81 MG EC tablet Take 81 mg by mouth every other day.     benzonatate (TESSALON PERLES) 100 MG capsule Take 1 capsule (100 mg total) by mouth every 6 (six) hours as needed for Cough.    calcium carbonate (OS-JONAS) 600 mg calcium (1,500 mg) Tab Take 600 mg by mouth once.    fluticasone propionate (FLONASE) 50 mcg/actuation nasal spray 1 spray (50 mcg total) by Each Nostril route once daily.    fluticasone propionate (FLONASE) 50 mcg/actuation nasal spray 1 spray (50 mcg total) by Each Nostril route once daily.    hydroCHLOROthiazide (HYDRODIURIL) 25 MG tablet TAKE 1 TABLET BY MOUTH EVERY DAY IN THE MORNING    lisinopriL (PRINIVIL,ZESTRIL) 40 MG tablet TAKE 1 TABLET BY MOUTH EVERY DAY    vitamin D 1000 units Tab Take 1,000 Units by mouth once daily.     Family History       Problem Relation (Age of Onset)    Breast cancer Paternal Aunt    Heart attack Father    No Known Problems Mother          Tobacco Use    Smoking status: Former     Current packs/day: 0.00     Average packs/day: 1 pack/day for 38.0 years (38.0 ttl pk-yrs)     Types: Cigarettes     Start date: 1960     Quit date: 1998     Years  since quittin.8    Smokeless tobacco: Never   Substance and Sexual Activity    Alcohol use: Yes     Comment: Occasionally    Drug use: No    Sexual activity: Never     Review of Systems   Constitutional:  Positive for activity change and fatigue. Negative for fever and unexpected weight change.   HENT:  Negative for dental problem.    Eyes:  Negative for redness.   Respiratory:  Positive for cough and shortness of breath. Negative for chest tightness.    Cardiovascular:  Negative for chest pain, palpitations and leg swelling.   Gastrointestinal:  Negative for abdominal pain.   Endocrine: Negative for polydipsia.   Genitourinary:  Negative for dysuria.   Musculoskeletal:  Negative for back pain.   Skin:  Negative for rash.   Allergic/Immunologic: Negative for food allergies.   Neurological:  Negative for dizziness and facial asymmetry.   Hematological:  Negative for adenopathy.   Psychiatric/Behavioral:  Negative for agitation, behavioral problems and dysphoric mood.      Objective:     Vital Signs (Most Recent):  Temp: 99.2 °F (37.3 °C) (10/14/23 1910)  Pulse: 84 (10/14/23 2301)  Resp: (!) 22 (10/14/23 2301)  BP: (!) 143/65 (10/14/23 2233)  SpO2: (!) 93 % (10/14/23 2301) Vital Signs (24h Range):  Temp:  [99.2 °F (37.3 °C)] 99.2 °F (37.3 °C)  Pulse:  [9-95] 84  Resp:  [16-22] 22  SpO2:  [93 %-97 %] 93 %  BP: (126-160)/(57-96) 143/65        There is no height or weight on file to calculate BMI.     Physical Exam  Constitutional:       General: She is not in acute distress.     Appearance: Normal appearance.   HENT:      Head: Normocephalic.   Cardiovascular:      Rate and Rhythm: Normal rate and regular rhythm.      Comments: No JVD  No leg edema  Pulmonary:      Effort: Pulmonary effort is normal.      Comments: On 3 lpm oxygen  Abdominal:      General: There is distension.      Palpations: Abdomen is soft.      Tenderness: There is no abdominal tenderness.   Musculoskeletal:      Right lower leg: No edema.       "Left lower leg: No edema.   Neurological:      General: No focal deficit present.      Mental Status: She is alert.   Psychiatric:         Mood and Affect: Mood normal.                Significant Labs: All pertinent labs within the past 24 hours have been reviewed.  Blood Culture: No results for input(s): "LABBLOO" in the last 48 hours.  BMP:   Recent Labs   Lab 10/14/23  1949   *      K 3.0*   CL 99   CO2 28   BUN 10   CREATININE 0.7   CALCIUM 9.5     CBC:   Recent Labs   Lab 10/14/23  1949   WBC 15.26*   HGB 12.2   HCT 39.0        CMP:   Recent Labs   Lab 10/14/23  1949      K 3.0*   CL 99   CO2 28   *   BUN 10   CREATININE 0.7   CALCIUM 9.5   PROT 6.9   ALBUMIN 3.4*   BILITOT 0.5   ALKPHOS 101   AST 15   ALT 16   ANIONGAP 10     Cardiac Markers:   Recent Labs   Lab 10/14/23  1949   BNP 32     Coagulation: No results for input(s): "PT", "INR", "APTT" in the last 48 hours.  Imaging Results              X-Ray Chest AP Portable (Final result)  Result time 10/14/23 20:29:42      Final result by Freedom Long MD (10/14/23 20:29:42)                   Impression:      1. Coarse interstitial attenuation, accentuated by habitus and shallow inspiratory effort however edema or other nonspecific pneumonitis are of concern.  Correlation and follow-up advised.      Electronically signed by: Freedom Long MD  Date:    10/14/2023  Time:    20:29               Narrative:    EXAMINATION:  XR CHEST AP PORTABLE    CLINICAL HISTORY:  hypoxia;    TECHNIQUE:  Single frontal view of the chest was performed.    COMPARISON:  03/09/2022    FINDINGS:  The cardiomediastinal silhouette is not enlarged.  There is no pleural effusion.  The trachea is midline.  The lungs are symmetrically expanded bilaterally with coarse interstitial attenuation bilaterally.  There is bilateral basilar subsegmental atelectasis..  No large focal consolidation seen.  There is no pneumothorax.  The osseous structures are " remarkable for degenerative change.  There is surgical change of the right axilla..

## 2023-10-16 LAB
ANION GAP SERPL CALC-SCNC: 7 MMOL/L (ref 8–16)
BASOPHILS # BLD AUTO: 0.05 K/UL (ref 0–0.2)
BASOPHILS NFR BLD: 0.7 % (ref 0–1.9)
BUN SERPL-MCNC: 7 MG/DL (ref 8–23)
CALCIUM SERPL-MCNC: 8.9 MG/DL (ref 8.7–10.5)
CHLORIDE SERPL-SCNC: 104 MMOL/L (ref 95–110)
CO2 SERPL-SCNC: 30 MMOL/L (ref 23–29)
CREAT SERPL-MCNC: 0.6 MG/DL (ref 0.5–1.4)
DIFFERENTIAL METHOD: ABNORMAL
EOSINOPHIL # BLD AUTO: 0.1 K/UL (ref 0–0.5)
EOSINOPHIL NFR BLD: 1.8 % (ref 0–8)
ERYTHROCYTE [DISTWIDTH] IN BLOOD BY AUTOMATED COUNT: 15.3 % (ref 11.5–14.5)
EST. GFR  (NO RACE VARIABLE): >60 ML/MIN/1.73 M^2
GLUCOSE SERPL-MCNC: 111 MG/DL (ref 70–110)
HCT VFR BLD AUTO: 35.2 % (ref 37–48.5)
HGB BLD-MCNC: 10.7 G/DL (ref 12–16)
IMM GRANULOCYTES # BLD AUTO: 0.06 K/UL (ref 0–0.04)
IMM GRANULOCYTES NFR BLD AUTO: 0.8 % (ref 0–0.5)
LYMPHOCYTES # BLD AUTO: 1.9 K/UL (ref 1–4.8)
LYMPHOCYTES NFR BLD: 26.7 % (ref 18–48)
MCH RBC QN AUTO: 26.1 PG (ref 27–31)
MCHC RBC AUTO-ENTMCNC: 30.4 G/DL (ref 32–36)
MCV RBC AUTO: 86 FL (ref 82–98)
MONOCYTES # BLD AUTO: 0.9 K/UL (ref 0.3–1)
MONOCYTES NFR BLD: 12.9 % (ref 4–15)
NEUTROPHILS # BLD AUTO: 4 K/UL (ref 1.8–7.7)
NEUTROPHILS NFR BLD: 57.1 % (ref 38–73)
NRBC BLD-RTO: 0 /100 WBC
PLATELET # BLD AUTO: 251 K/UL (ref 150–450)
PMV BLD AUTO: 12.9 FL (ref 9.2–12.9)
POTASSIUM SERPL-SCNC: 3.4 MMOL/L (ref 3.5–5.1)
RBC # BLD AUTO: 4.1 M/UL (ref 4–5.4)
SODIUM SERPL-SCNC: 141 MMOL/L (ref 136–145)
WBC # BLD AUTO: 7.07 K/UL (ref 3.9–12.7)

## 2023-10-16 PROCEDURE — 20600001 HC STEP DOWN PRIVATE ROOM

## 2023-10-16 PROCEDURE — 80048 BASIC METABOLIC PNL TOTAL CA: CPT | Performed by: STUDENT IN AN ORGANIZED HEALTH CARE EDUCATION/TRAINING PROGRAM

## 2023-10-16 PROCEDURE — 25000003 PHARM REV CODE 250: Performed by: STUDENT IN AN ORGANIZED HEALTH CARE EDUCATION/TRAINING PROGRAM

## 2023-10-16 PROCEDURE — 99232 PR SUBSEQUENT HOSPITAL CARE,LEVL II: ICD-10-PCS | Mod: ,,, | Performed by: STUDENT IN AN ORGANIZED HEALTH CARE EDUCATION/TRAINING PROGRAM

## 2023-10-16 PROCEDURE — 94640 AIRWAY INHALATION TREATMENT: CPT

## 2023-10-16 PROCEDURE — 36415 COLL VENOUS BLD VENIPUNCTURE: CPT | Performed by: STUDENT IN AN ORGANIZED HEALTH CARE EDUCATION/TRAINING PROGRAM

## 2023-10-16 PROCEDURE — 25000003 PHARM REV CODE 250: Performed by: INTERNAL MEDICINE

## 2023-10-16 PROCEDURE — 85025 COMPLETE CBC W/AUTO DIFF WBC: CPT | Performed by: STUDENT IN AN ORGANIZED HEALTH CARE EDUCATION/TRAINING PROGRAM

## 2023-10-16 PROCEDURE — 63600175 PHARM REV CODE 636 W HCPCS: Performed by: INTERNAL MEDICINE

## 2023-10-16 PROCEDURE — 27000221 HC OXYGEN, UP TO 24 HOURS

## 2023-10-16 PROCEDURE — 25000242 PHARM REV CODE 250 ALT 637 W/ HCPCS: Performed by: INTERNAL MEDICINE

## 2023-10-16 PROCEDURE — 94761 N-INVAS EAR/PLS OXIMETRY MLT: CPT

## 2023-10-16 PROCEDURE — 99900035 HC TECH TIME PER 15 MIN (STAT)

## 2023-10-16 PROCEDURE — 99232 SBSQ HOSP IP/OBS MODERATE 35: CPT | Mod: ,,, | Performed by: STUDENT IN AN ORGANIZED HEALTH CARE EDUCATION/TRAINING PROGRAM

## 2023-10-16 RX ORDER — POTASSIUM CHLORIDE 20 MEQ/1
40 TABLET, EXTENDED RELEASE ORAL ONCE
Status: COMPLETED | OUTPATIENT
Start: 2023-10-16 | End: 2023-10-16

## 2023-10-16 RX ADMIN — CEFTRIAXONE 2 G: 2 INJECTION, POWDER, FOR SOLUTION INTRAMUSCULAR; INTRAVENOUS at 06:10

## 2023-10-16 RX ADMIN — POTASSIUM CHLORIDE 40 MEQ: 1500 TABLET, EXTENDED RELEASE ORAL at 11:10

## 2023-10-16 RX ADMIN — IPRATROPIUM BROMIDE AND ALBUTEROL SULFATE 3 ML: .5; 3 SOLUTION RESPIRATORY (INHALATION) at 03:10

## 2023-10-16 RX ADMIN — AMLODIPINE BESYLATE 10 MG: 10 TABLET ORAL at 08:10

## 2023-10-16 RX ADMIN — GUAIFENESIN AND DEXTROMETHORPHAN 5 ML: 100; 10 SYRUP ORAL at 04:10

## 2023-10-16 RX ADMIN — GUAIFENESIN AND DEXTROMETHORPHAN 5 ML: 100; 10 SYRUP ORAL at 01:10

## 2023-10-16 RX ADMIN — AZITHROMYCIN 500 MG: 500 INJECTION, POWDER, LYOPHILIZED, FOR SOLUTION INTRAVENOUS at 06:10

## 2023-10-16 RX ADMIN — IPRATROPIUM BROMIDE AND ALBUTEROL SULFATE 3 ML: .5; 3 SOLUTION RESPIRATORY (INHALATION) at 07:10

## 2023-10-16 RX ADMIN — LISINOPRIL 40 MG: 20 TABLET ORAL at 08:10

## 2023-10-16 RX ADMIN — IPRATROPIUM BROMIDE AND ALBUTEROL SULFATE 3 ML: .5; 3 SOLUTION RESPIRATORY (INHALATION) at 01:10

## 2023-10-16 RX ADMIN — GUAIFENESIN AND DEXTROMETHORPHAN 5 ML: 100; 10 SYRUP ORAL at 08:10

## 2023-10-16 NOTE — PROGRESS NOTES
[facility] ([address] Medicine  Progress Note    Patient Name: Melani Holloway  MRN: [id number]  Patient Class: IP- Inpatient   Admission Date: 10/14/2023  Length of Stay: 2 days  Attending Physician: Marina Perry MD  Primary Care Provider: Nathaniel Greenfield MD        Subjective:     Principal Problem:Sepsis due to pneumonia        HPI:  This is a pleasant 78 years old female with known history of hypertension, smoking 1 PPD times 30 years and quit 20 years ago, obesity with BMI 33 who presented to the emergency department with shortness of breath, cough and generalized weakness.  She started with upper respiratory tract symptoms about 3 weeks ago and later started to have lower symptoms as well.  She is producing green to yellow sputum.  Reports increased coughing, productive of sputum, shortness of breath on exertion, but no chest pain or orthopnea.  Denies fever, chill, nausea, vomiting, abdominal pain, dysuria, or diarrhea. She had hypoxia to 81% on room air, right now on 3 L nasal cannula. Found to have elevated white count, chest x-ray concern for pneumonia on her left side.      Overview/Hospital Course:  Patient is a 78 years old female with known history of hypertension, smoking 1 PPD times 30 years and quit 20 years ago, obesity with BMI 33 who presented to the emergency department with shortness of breath, productive cough with yellow sputum and generalized weakness. She had hypoxia to 81% on room air, right now on 3 L nasal cannula. Found to have elevated white count, chest x-ray concern for pneumonia on her left side.  Patient was started on ceftriaxone and azithromycin and admitted for pneumonia.        Interval history:  No overnight events.  Patient is saturating well on 3 L of oxygen.  No cough or shortness of breath.    Review of Systems   Constitutional:  Positive for activity change and fatigue. Negative for fever and unexpected weight change.   HENT:   Negative for dental problem.    Eyes:  Negative for redness.   Respiratory:  Positive for cough and shortness of breath. Negative for chest tightness.    Cardiovascular:  Negative for chest pain, palpitations and leg swelling.   Gastrointestinal:  Negative for abdominal pain.   Endocrine: Negative for polydipsia.   Genitourinary:  Negative for dysuria.   Musculoskeletal:  Negative for back pain.   Skin:  Negative for rash.   Allergic/Immunologic: Negative for food allergies.   Neurological:  Negative for dizziness and facial asymmetry.   Hematological:  Negative for adenopathy.   Psychiatric/Behavioral:  Negative for agitation, behavioral problems and dysphoric mood.      Objective:     Vital Signs (Most Recent):  Temp: 98.1 °F (36.7 °C) (10/16/23 0430)  Pulse: 76 (10/16/23 1545)  Resp: 20 (10/16/23 1528)  BP: 123/83 (10/16/23 0821)  SpO2: (!) 94 % (10/16/23 1528) Vital Signs (24h Range):  Temp:  [97.7 °F (36.5 °C)-98.4 °F (36.9 °C)] 98.1 °F (36.7 °C)  Pulse:  [63-77] 76  Resp:  [16-20] 20  SpO2:  [90 %-96 %] 94 %  BP: (123-153)/(59-83) 123/83        Body mass index is 33.13 kg/m².     Physical Exam  Constitutional:       General: She is not in acute distress.     Appearance: Normal appearance.   HENT:      Head: Normocephalic.   Cardiovascular:      Rate and Rhythm: Normal rate and regular rhythm.      Comments: No JVD  No leg edema  Pulmonary:      Effort: Pulmonary effort is normal.      Comments: On 3 lpm oxygen  Abdominal:      General: There is distension.      Palpations: Abdomen is soft.      Tenderness: There is no abdominal tenderness.   Musculoskeletal:      Right lower leg: No edema.      Left lower leg: No edema.   Neurological:      General: No focal deficit present.      Mental Status: She is alert.   Psychiatric:         Mood and Affect: Mood normal.                Significant Labs: All pertinent labs within the past 24 hours have been reviewed.  Blood Culture:   Recent Labs   Lab 10/14/23  1954  "10/14/23  2006   LABBLOO No Growth to date  No Growth to date No Growth to date  No Growth to date       BMP:   Recent Labs   Lab 10/16/23  0408   *      K 3.4*      CO2 30*   BUN 7*   CREATININE 0.6   CALCIUM 8.9       CBC:   Recent Labs   Lab 10/14/23  1949 10/15/23  1123 10/16/23  0408   WBC 15.26* 9.79 7.07   HGB 12.2 10.8* 10.7*   HCT 39.0 34.7* 35.2*    187 251       CMP:   Recent Labs   Lab 10/14/23  1949 10/15/23  1123 10/16/23  0408    140 141   K 3.0* 3.9 3.4*   CL 99 103 104   CO2 28 28 30*   * 103 111*   BUN 10 7* 7*   CREATININE 0.7 0.6 0.6   CALCIUM 9.5 8.9 8.9   PROT 6.9  --   --    ALBUMIN 3.4*  --   --    BILITOT 0.5  --   --    ALKPHOS 101  --   --    AST 15  --   --    ALT 16  --   --    ANIONGAP 10 9 7*       Cardiac Markers:   Recent Labs   Lab 10/14/23  1949   BNP 32       Coagulation: No results for input(s): "PT", "INR", "APTT" in the last 48 hours.  Imaging Results              X-Ray Chest AP Portable (Final result)  Result time 10/14/23 20:29:42      Final result by Freedom Long MD (10/14/23 20:29:42)                   Impression:      1. Coarse interstitial attenuation, accentuated by habitus and shallow inspiratory effort however edema or other nonspecific pneumonitis are of concern.  Correlation and follow-up advised.      Electronically signed by: Freedom Long MD  Date:    10/14/2023  Time:    20:29               Narrative:    EXAMINATION:  XR CHEST AP PORTABLE    CLINICAL HISTORY:  hypoxia;    TECHNIQUE:  Single frontal view of the chest was performed.    COMPARISON:  03/09/2022    FINDINGS:  The cardiomediastinal silhouette is not enlarged.  There is no pleural effusion.  The trachea is midline.  The lungs are symmetrically expanded bilaterally with coarse interstitial attenuation bilaterally.  There is bilateral basilar subsegmental atelectasis..  No large focal consolidation seen.  There is no pneumothorax.  The osseous structures " are remarkable for degenerative change.  There is surgical change of the right axilla..                                          Assessment/Plan:      * Sepsis due to pneumonia  This patient does have evidence of infective focus  My overall impression is sepsis.  Source: Respiratory  Hypoxia 81% on arrival on room air, now on 3 lpm oxygen  Lactic acid is normal  WBC 15, initial RR 22  Antibiotics given-   Antibiotics (72h ago, onward)    Start     Stop Route Frequency Ordered    10/15/23 1800  cefTRIAXone (ROCEPHIN) 2 g in dextrose 5 % in water (D5W) 100 mL IVPB (MB+)         -- IV Every 24 hours (non-standard times) 10/14/23 2237    10/15/23 1800  azithromycin (ZITHROMAX) 500 mg in dextrose 5 % (D5W) 250 mL IVPB (Vial-Mate)         -- IV Every 24 hours (non-standard times) 10/14/23 2237        Plan  -IV rocephin and zithromax  -IVF 1 L bolus in the ER, 75 cc/h overnight.  Blood pressure normalized plan to hold IV fluids  -duonebs  -blood and sputum cultures negative  -prn robitussin    Hypokalemia  K 3.0   Likely from HCTZ use  Repleted      Essential hypertension  Continue lisinopril        VTE Risk Mitigation (From admission, onward)         Ordered     IP VTE HIGH RISK PATIENT  Once         10/15/23 1045     Place sequential compression device  Until discontinued         10/15/23 1045                Discharge Planning   RODOLFO: 10/17/2023     Code Status: Full Code   Is the patient medically ready for discharge?: No    Reason for patient still in hospital (select all that apply): Patient trending condition and Treatment  Discharge Plan A: Home                  Marina Perry MD  Department of Hospital Medicine   [facility] - Intensive Care ([facility]-)

## 2023-10-16 NOTE — NURSING
...Nurses Note -- 4 Eyes      10/15/2023   6:00 AM      Skin assessed during: Admit      [x] No Altered Skin Integrity Present    [x]Prevention Measures Documented      [] Yes- Altered Skin Integrity Present or Discovered   [] LDA Added if Not in Epic (Describe Wound)   [] New Altered Skin Integrity was Present on Admit and Documented in LDA   [] Wound Image Taken    Wound Care Consulted? No    Attending Nurse:  Sarah Biggs RN/Staff Member:   Ramesh TAVAREZ

## 2023-10-16 NOTE — ASSESSMENT & PLAN NOTE
This patient does have evidence of infective focus  My overall impression is sepsis.  Source: Respiratory  Hypoxia 81% on arrival on room air, now on 3 lpm oxygen  Lactic acid is normal  WBC 15, initial RR 22  Antibiotics given-   Antibiotics (72h ago, onward)    Start     Stop Route Frequency Ordered    10/15/23 1800  cefTRIAXone (ROCEPHIN) 2 g in dextrose 5 % in water (D5W) 100 mL IVPB (MB+)         -- IV Every 24 hours (non-standard times) 10/14/23 2237    10/15/23 1800  azithromycin (ZITHROMAX) 500 mg in dextrose 5 % (D5W) 250 mL IVPB (Vial-Mate)         -- IV Every 24 hours (non-standard times) 10/14/23 2237        Plan  -IV rocephin and zithromax  -IVF 1 L bolus in the ER, 75 cc/h overnight.  Blood pressure normalized plan to hold IV fluids  -duonebs  -blood and sputum cultures negative  -prn robitussin

## 2023-10-16 NOTE — PLAN OF CARE
[facility] ([place] [address])  Initial Discharge Assessment       Primary Care Provider: Nathaniel Greenfield MD    Admission Diagnosis: Shortness of breath [R06.02]  Acute respiratory failure with hypoxia [J96.01]  Pneumonia of left lung due to infectious organism, unspecified part of lung [J18.9]    Admission Date: 10/14/2023  Expected Discharge Date: 10/17/2023    Transition of Care Barriers: None    Payor: [employer] / Plan: [employer] CHOICES 65 / Product Type: Medicare Advantage /     Extended Emergency Contact Information  Primary Emergency Contact: Donovan Bran  Mobile Phone: [phone]  Relation: Daughter  Preferred language: English   needed? No    Discharge Plan A: Home  Discharge Plan B: Home with family      [id number] [address] [facility]  [address] [facility] [address]  Phone: [phone] Fax: [phone]    SW met with patient at bedside to complete discharge planning assessment.  Patient alert and oriented xs 4.  Patient verified all demographic information on facesheet is correct.  Patient verified PCP is Dr. Greenfield.  Patient verified primary health insurance is [employer].  Patient with NO home health or DME.  Patient with NO POA or Living Will.  Patient not on dialysis or medication coumadin.  Patient with no 30 day admission.  Patient with no financial issues at this time.  Patient family will provide transportation upon discharge from facility.  Patient independent with ADLs, live alone, drives self.      Initial Assessment (most recent)       Adult Discharge Assessment - 10/16/23 1621          Discharge Assessment    Assessment Type Discharge Planning Assessment     Confirmed/corrected address, phone number and insurance Yes     Confirmed Demographics Correct on Facesheet     Source of Information patient     Communicated RODOLFO with patient/caregiver Yes     People in Home alone      Facility Arrived From: home     Do you expect to return to your current living situation? Yes     Do you have help at home or someone to help you manage your care at home? Yes     Who are your caregiver(s) and their phone number(s)? daughter     Prior to hospitilization cognitive status: Alert/Oriented     Current cognitive status: Alert/Oriented     Equipment Currently Used at Home none     Readmission within 30 days? No     Patient currently being followed by outpatient case management? No     Do you currently have service(s) that help you manage your care at home? No     Do you take prescription medications? Yes     Do you have prescription coverage? Yes     Do you have any problems affording any of your prescribed medications? No     Is the patient taking medications as prescribed? yes     Who is going to help you get home at discharge? daughter     How do you get to doctors appointments? car, drives self     Are you on dialysis? No     Do you take coumadin? No     DME Needed Upon Discharge  none     Discharge Plan discussed with: Patient     Transition of Care Barriers None     Discharge Plan A Home     Discharge Plan B Home with family        Physical Activity    On average, how many days per week do you engage in moderate to strenuous exercise (like a brisk walk)? 7 days     On average, how many minutes do you engage in exercise at this level? 150+ min        Financial Resource Strain    How hard is it for you to pay for the very basics like food, housing, medical care, and heating? Not hard at all        Housing Stability    In the last 12 months, was there a time when you were not able to pay the mortgage or rent on time? No     In the last 12 months, was there a time when you did not have a steady place to sleep or slept in a shelter (including now)? No        Transportation Needs    In the past 12 months, has lack of transportation kept you from medical appointments or from getting medications? No     In  the past 12 months, has lack of transportation kept you from meetings, work, or from getting things needed for daily living? No        Food Insecurity    Within the past 12 months, you worried that your food would run out before you got the money to buy more. Never true     Within the past 12 months, the food you bought just didn't last and you didn't have money to get more. Never true        Stress    Do you feel stress - tense, restless, nervous, or anxious, or unable to sleep at night because your mind is troubled all the time - these days? Not at all        Social Connections    In a typical week, how many times do you talk on the phone with family, friends, or neighbors? More than three times a week     How often do you get together with friends or relatives? More than three times a week     How often do you attend Rastafarian or Orthodox services? More than 4 times per year     Do you belong to any clubs or organizations such as Rastafarian groups, unions, fraternal or athletic groups, or school groups? Yes     How often do you attend meetings of the clubs or organizations you belong to? More than 4 times per year     Are you , , , , never , or living with a partner?         Alcohol Use    Q1: How often do you have a drink containing alcohol? 2-4 times a month     Q2: How many drinks containing alcohol do you have on a typical day when you are drinking? 1 or 2     Q3: How often do you have six or more drinks on one occasion? Never

## 2023-10-16 NOTE — SUBJECTIVE & OBJECTIVE
Interval history:  No overnight events.  Patient is saturating well on 3 L of oxygen.  No cough or shortness of breath.    Review of Systems   Constitutional:  Positive for activity change and fatigue. Negative for fever and unexpected weight change.   HENT:  Negative for dental problem.    Eyes:  Negative for redness.   Respiratory:  Positive for cough and shortness of breath. Negative for chest tightness.    Cardiovascular:  Negative for chest pain, palpitations and leg swelling.   Gastrointestinal:  Negative for abdominal pain.   Endocrine: Negative for polydipsia.   Genitourinary:  Negative for dysuria.   Musculoskeletal:  Negative for back pain.   Skin:  Negative for rash.   Allergic/Immunologic: Negative for food allergies.   Neurological:  Negative for dizziness and facial asymmetry.   Hematological:  Negative for adenopathy.   Psychiatric/Behavioral:  Negative for agitation, behavioral problems and dysphoric mood.      Objective:     Vital Signs (Most Recent):  Temp: 98.1 °F (36.7 °C) (10/16/23 0430)  Pulse: 76 (10/16/23 1545)  Resp: 20 (10/16/23 1528)  BP: 123/83 (10/16/23 0821)  SpO2: (!) 94 % (10/16/23 1528) Vital Signs (24h Range):  Temp:  [97.7 °F (36.5 °C)-98.4 °F (36.9 °C)] 98.1 °F (36.7 °C)  Pulse:  [63-77] 76  Resp:  [16-20] 20  SpO2:  [90 %-96 %] 94 %  BP: (123-153)/(59-83) 123/83        Body mass index is 33.13 kg/m².     Physical Exam  Constitutional:       General: She is not in acute distress.     Appearance: Normal appearance.   HENT:      Head: Normocephalic.   Cardiovascular:      Rate and Rhythm: Normal rate and regular rhythm.      Comments: No JVD  No leg edema  Pulmonary:      Effort: Pulmonary effort is normal.      Comments: On 3 lpm oxygen  Abdominal:      General: There is distension.      Palpations: Abdomen is soft.      Tenderness: There is no abdominal tenderness.   Musculoskeletal:      Right lower leg: No edema.      Left lower leg: No edema.   Neurological:      General: No  "focal deficit present.      Mental Status: She is alert.   Psychiatric:         Mood and Affect: Mood normal.                Significant Labs: All pertinent labs within the past 24 hours have been reviewed.  Blood Culture:   Recent Labs   Lab 10/14/23  1954 10/14/23  2006   LABBLOO No Growth to date  No Growth to date No Growth to date  No Growth to date       BMP:   Recent Labs   Lab 10/16/23  0408   *      K 3.4*      CO2 30*   BUN 7*   CREATININE 0.6   CALCIUM 8.9       CBC:   Recent Labs   Lab 10/14/23  1949 10/15/23  1123 10/16/23  0408   WBC 15.26* 9.79 7.07   HGB 12.2 10.8* 10.7*   HCT 39.0 34.7* 35.2*    187 251       CMP:   Recent Labs   Lab 10/14/23  1949 10/15/23  1123 10/16/23  0408    140 141   K 3.0* 3.9 3.4*   CL 99 103 104   CO2 28 28 30*   * 103 111*   BUN 10 7* 7*   CREATININE 0.7 0.6 0.6   CALCIUM 9.5 8.9 8.9   PROT 6.9  --   --    ALBUMIN 3.4*  --   --    BILITOT 0.5  --   --    ALKPHOS 101  --   --    AST 15  --   --    ALT 16  --   --    ANIONGAP 10 9 7*       Cardiac Markers:   Recent Labs   Lab 10/14/23  1949   BNP 32       Coagulation: No results for input(s): "PT", "INR", "APTT" in the last 48 hours.  Imaging Results              X-Ray Chest AP Portable (Final result)  Result time 10/14/23 20:29:42      Final result by Freedom Long MD (10/14/23 20:29:42)                   Impression:      1. Coarse interstitial attenuation, accentuated by habitus and shallow inspiratory effort however edema or other nonspecific pneumonitis are of concern.  Correlation and follow-up advised.      Electronically signed by: Freedom Long MD  Date:    10/14/2023  Time:    20:29               Narrative:    EXAMINATION:  XR CHEST AP PORTABLE    CLINICAL HISTORY:  hypoxia;    TECHNIQUE:  Single frontal view of the chest was performed.    COMPARISON:  03/09/2022    FINDINGS:  The cardiomediastinal silhouette is not enlarged.  There is no pleural effusion.  The " trachea is midline.  The lungs are symmetrically expanded bilaterally with coarse interstitial attenuation bilaterally.  There is bilateral basilar subsegmental atelectasis..  No large focal consolidation seen.  There is no pneumothorax.  The osseous structures are remarkable for degenerative change.  There is surgical change of the right axilla..

## 2023-10-16 NOTE — NURSING
Patient alert, oriented x 4 , and ambulatory to the restroom. Patient on 2 L NC. NSR on cardiac monitor. Vitals stable. PRN robitussin given to help with cough. No complaints of pain this shift. Patient in bed with bed in lowest position, side rails up x3 , call light in reach, and bed alarm set.

## 2023-10-16 NOTE — PLAN OF CARE
Patient rounded on through shift as per policy, Patient in stable condition with no complaints. Safety measures in place: bed in lowest position, three rails up, call light in reach, personal belonging in reach. No acute events during shift. Plan of care ongoing.       Problem: Adult Inpatient Plan of Care  Goal: Plan of Care Review  Outcome: Adequate for Care Transition  Goal: Patient-Specific Goal (Individualized)  Outcome: Adequate for Care Transition  Goal: Absence of Hospital-Acquired Illness or Injury  Outcome: Adequate for Care Transition  Goal: Optimal Comfort and Wellbeing  Outcome: Adequate for Care Transition  Goal: Readiness for Transition of Care  Outcome: Adequate for Care Transition     Problem: Adjustment to Illness (Sepsis/Septic Shock)  Goal: Optimal Coping  Outcome: Adequate for Care Transition     Problem: Bleeding (Sepsis/Septic Shock)  Goal: Absence of Bleeding  Outcome: Adequate for Care Transition     Problem: Glycemic Control Impaired (Sepsis/Septic Shock)  Goal: Blood Glucose Level Within Desired Range  Outcome: Adequate for Care Transition     Problem: Infection Progression (Sepsis/Septic Shock)  Goal: Absence of Infection Signs and Symptoms  Outcome: Adequate for Care Transition     Problem: Nutrition Impaired (Sepsis/Septic Shock)  Goal: Optimal Nutrition Intake  Outcome: Adequate for Care Transition     Problem: Fluid Imbalance (Pneumonia)  Goal: Fluid Balance  Outcome: Adequate for Care Transition     Problem: Infection (Pneumonia)  Goal: Resolution of Infection Signs and Symptoms  Outcome: Adequate for Care Transition     Problem: Respiratory Compromise (Pneumonia)  Goal: Effective Oxygenation and Ventilation  Outcome: Adequate for Care Transition

## 2023-10-17 VITALS
RESPIRATION RATE: 20 BRPM | OXYGEN SATURATION: 93 % | BODY MASS INDEX: 33.13 KG/M2 | HEART RATE: 67 BPM | TEMPERATURE: 98 F | DIASTOLIC BLOOD PRESSURE: 60 MMHG | HEIGHT: 63 IN | SYSTOLIC BLOOD PRESSURE: 130 MMHG

## 2023-10-17 LAB
ANION GAP SERPL CALC-SCNC: 11 MMOL/L (ref 8–16)
BACTERIA SPEC AEROBE CULT: NORMAL
BACTERIA SPEC AEROBE CULT: NORMAL
BASOPHILS # BLD AUTO: 0.04 K/UL (ref 0–0.2)
BASOPHILS NFR BLD: 0.7 % (ref 0–1.9)
BUN SERPL-MCNC: 8 MG/DL (ref 8–23)
CALCIUM SERPL-MCNC: 9.2 MG/DL (ref 8.7–10.5)
CHLORIDE SERPL-SCNC: 102 MMOL/L (ref 95–110)
CO2 SERPL-SCNC: 28 MMOL/L (ref 23–29)
CREAT SERPL-MCNC: 0.7 MG/DL (ref 0.5–1.4)
DIFFERENTIAL METHOD: ABNORMAL
EOSINOPHIL # BLD AUTO: 0.1 K/UL (ref 0–0.5)
EOSINOPHIL NFR BLD: 2.3 % (ref 0–8)
ERYTHROCYTE [DISTWIDTH] IN BLOOD BY AUTOMATED COUNT: 14.7 % (ref 11.5–14.5)
EST. GFR  (NO RACE VARIABLE): >60 ML/MIN/1.73 M^2
GLUCOSE SERPL-MCNC: 138 MG/DL (ref 70–110)
GRAM STN SPEC: NORMAL
HCT VFR BLD AUTO: 37.1 % (ref 37–48.5)
HGB BLD-MCNC: 11.4 G/DL (ref 12–16)
IMM GRANULOCYTES # BLD AUTO: 0.05 K/UL (ref 0–0.04)
IMM GRANULOCYTES NFR BLD AUTO: 0.8 % (ref 0–0.5)
LYMPHOCYTES # BLD AUTO: 1.9 K/UL (ref 1–4.8)
LYMPHOCYTES NFR BLD: 31 % (ref 18–48)
MCH RBC QN AUTO: 26.5 PG (ref 27–31)
MCHC RBC AUTO-ENTMCNC: 30.7 G/DL (ref 32–36)
MCV RBC AUTO: 86 FL (ref 82–98)
MONOCYTES # BLD AUTO: 0.7 K/UL (ref 0.3–1)
MONOCYTES NFR BLD: 11.6 % (ref 4–15)
NEUTROPHILS # BLD AUTO: 3.3 K/UL (ref 1.8–7.7)
NEUTROPHILS NFR BLD: 53.6 % (ref 38–73)
NRBC BLD-RTO: 0 /100 WBC
PLATELET # BLD AUTO: 272 K/UL (ref 150–450)
PMV BLD AUTO: 12.5 FL (ref 9.2–12.9)
POTASSIUM SERPL-SCNC: 3.8 MMOL/L (ref 3.5–5.1)
RBC # BLD AUTO: 4.31 M/UL (ref 4–5.4)
SODIUM SERPL-SCNC: 141 MMOL/L (ref 136–145)
WBC # BLD AUTO: 6.1 K/UL (ref 3.9–12.7)

## 2023-10-17 PROCEDURE — 94761 N-INVAS EAR/PLS OXIMETRY MLT: CPT

## 2023-10-17 PROCEDURE — 99239 HOSP IP/OBS DSCHRG MGMT >30: CPT | Mod: ,,, | Performed by: STUDENT IN AN ORGANIZED HEALTH CARE EDUCATION/TRAINING PROGRAM

## 2023-10-17 PROCEDURE — 25000242 PHARM REV CODE 250 ALT 637 W/ HCPCS: Performed by: INTERNAL MEDICINE

## 2023-10-17 PROCEDURE — 63600175 PHARM REV CODE 636 W HCPCS: Performed by: STUDENT IN AN ORGANIZED HEALTH CARE EDUCATION/TRAINING PROGRAM

## 2023-10-17 PROCEDURE — 80048 BASIC METABOLIC PNL TOTAL CA: CPT | Performed by: STUDENT IN AN ORGANIZED HEALTH CARE EDUCATION/TRAINING PROGRAM

## 2023-10-17 PROCEDURE — 36415 COLL VENOUS BLD VENIPUNCTURE: CPT | Performed by: STUDENT IN AN ORGANIZED HEALTH CARE EDUCATION/TRAINING PROGRAM

## 2023-10-17 PROCEDURE — 25000003 PHARM REV CODE 250: Performed by: INTERNAL MEDICINE

## 2023-10-17 PROCEDURE — 94640 AIRWAY INHALATION TREATMENT: CPT

## 2023-10-17 PROCEDURE — 25000003 PHARM REV CODE 250: Performed by: STUDENT IN AN ORGANIZED HEALTH CARE EDUCATION/TRAINING PROGRAM

## 2023-10-17 PROCEDURE — 99239 PR HOSPITAL DISCHARGE DAY,>30 MIN: ICD-10-PCS | Mod: ,,, | Performed by: STUDENT IN AN ORGANIZED HEALTH CARE EDUCATION/TRAINING PROGRAM

## 2023-10-17 PROCEDURE — 85025 COMPLETE CBC W/AUTO DIFF WBC: CPT | Performed by: STUDENT IN AN ORGANIZED HEALTH CARE EDUCATION/TRAINING PROGRAM

## 2023-10-17 RX ORDER — AMOXICILLIN 875 MG/1
875 TABLET, FILM COATED ORAL 2 TIMES DAILY
Qty: 6 TABLET | Refills: 0 | Status: SHIPPED | OUTPATIENT
Start: 2023-10-18 | End: 2023-10-21

## 2023-10-17 RX ADMIN — CEFTRIAXONE 2 G: 2 INJECTION, POWDER, FOR SOLUTION INTRAMUSCULAR; INTRAVENOUS at 10:10

## 2023-10-17 RX ADMIN — AMLODIPINE BESYLATE 10 MG: 10 TABLET ORAL at 08:10

## 2023-10-17 RX ADMIN — IPRATROPIUM BROMIDE AND ALBUTEROL SULFATE 3 ML: .5; 3 SOLUTION RESPIRATORY (INHALATION) at 07:10

## 2023-10-17 RX ADMIN — ASPIRIN 81 MG: 81 TABLET, COATED ORAL at 08:10

## 2023-10-17 RX ADMIN — LISINOPRIL 40 MG: 20 TABLET ORAL at 08:10

## 2023-10-17 RX ADMIN — IPRATROPIUM BROMIDE AND ALBUTEROL SULFATE 3 ML: .5; 3 SOLUTION RESPIRATORY (INHALATION) at 12:10

## 2023-10-17 NOTE — PHYSICIAN QUERY
PT Name: Melani Holloway  MR #: [id number]     DOCUMENTATION CLARIFICATION     CDS/: Isha Bergeron RN BSN             Contact information: [email or web address]   This form is a permanent document in the medical record.     Query Date: October 17, 2023    By submitting this query, we are merely seeking further clarification of documentation.  Please utilize your independent clinical judgment when addressing the question(s) below.  The Medical Record contains the following:  Indicators Supporting Clinical Findings Location in Medical Record   x HR         RR          BP        Temp Temp:  [99.2 °F (37.3 °C)] 99.2 °F (37.3 °C)  Pulse:  [9-95] 84  Resp:  [16-22] 22  SpO2:  [93 %-97 %] 93 %  BP: (126-160)/(57-96) 143/65    Temp:  [97.8 °F (36.6 °C)-99.2 °F (37.3 °C)] 97.8 °F (36.6 °C)  Pulse:  [9-95] 79  Resp:  [16-22] 19  SpO2:  [93 %-97 %] 96 %  BP: (126-160)/(57-96) 148/69    Temp:  [97.7 °F (36.5 °C)-98.4 °F (36.9 °C)] 98.1 °F (36.7 °C)  Pulse:  [63-77] 76  Resp:  [16-20] 20  SpO2:  [90 %-96 %] 94 %  BP: (123-153)/(59-83) 123/83     H&P, 10/14            HM, PN, 10/15            HM, PN, 10/16    Lactic Acid          Procalcitonin      WBC           Bands          CRP       Culture(s)      AMS, Confusion, LOC, etc.      Organ Dysfunction/Failure     x Bacteremia or Sepsis / Septic Sepsis due to pneumonia     H&P, 10/14   x Known or Suspected Source of Infection documented started with upper respiratory tract symptoms about 3 weeks ago and later started to have lower symptoms as well.       H&P, 10/14   x Treatment -IV rocephin and zithromax  -IVF 1 L bolus in the ER, 75 cc/h from now    azithromycin (ZITHROMAX) 500 mg in dextrose 5 % (D5W) 250 mL IVPB    cefTRIAXone (ROCEPHIN) 2 g in dextrose 5 % in water (D5W) 100 mL IVPB (MB+)          H&P, 10/14      MAR 10/14-10/16    MAR, 10/14, 10/16        Other          Due to the conflicting clinical picture, please clinically validate the diagnosis of Sepsis.     If validated, please provide additional clinical support for the diagnosis.   [ x  ] SIRS with infection but without Sepsis     [   ] Other Infectious Disease (please specify): __________     [   ] Sepsis Ruled Out     [   ] Sepsis is confirmed. Please specify clinical support (signs, symptoms, and treatment) for  the confirmed diagnosis: ____________________       [  ] Clinically Undetermined             Please document in your progress notes daily for the duration of treatment until resolved and include in your discharge summary.

## 2023-10-17 NOTE — NURSING
Home Oxygen Evaluation    Date Performed: 10/17/2023    1) Patient's Home O2 Sat on room air, while at rest: 92%        If O2 sats on room air at rest are 88% or below, patient qualifies. No additional testing needed. Document N/A in steps 2 and 3. If 89% or above, complete steps 2.      2) Patient's O2 Sat on room air while exercisin%        If O2 sats on room air while exercising remain 89% or above patient does not qualify, no further testing needed Document N/A in step 3. If O2 sats on room air while exercising are 88% or below, continue to step 3.      3) Patient's O2 Sat while exercising on O2: 93% at 2 LPM         (Must show improvement from #2 for patients to qualify)    If O2 sats improve on oxygen, patient qualifies for portable oxygen. If not, the patient does not qualify.

## 2023-10-17 NOTE — PLAN OF CARE
[facility] ([facility])  Discharge Final Note    Primary Care Provider: Nathaniel Greenfield MD    Expected Discharge Date: 10/17/2023    SW notified by [facility] that O2 was delivered to bedside.       Final Discharge Note (most recent)       Final Note - 10/17/23 1522          Final Note    Assessment Type Final Discharge Note (P)      Anticipated Discharge Disposition Home or Self Care (P)         Post-Acute Status    Post-Acute Authorization Other (P)    home O2    Discharge Delays None known at this time (P)                      Important Message from Medicare  Important Message from Medicare regarding Discharge Appeal Rights: Explained to patient/caregiver, Signed/date by patient/caregiver     Date IMM was signed: 10/17/23  Time IMM was signed: 0849    Contact Info       Nathaniel Greenfield MD   Specialty: Family Medicine   Relationship: PCP - General    [address]  [address]  [facility] [address]   Phone: [phone]       Next Steps: Follow up in 1 week(s)            Future Appointments   Date Time Provider Department Center   10/20/2023  9:00 AM Nathaniel Greenfield MD [facility]   11/8/2023  9:00 AM Nathaniel Greenfield MD [facility]        scheduled post-discharge follow-up appointment and information added to AVS.

## 2023-10-17 NOTE — NURSING
"Resting in bed, AAOx4. O2 at 1L via NC. Telemetry monitoring in use. Voices no c/o pain, states "I've been coughing a good bit." RT in to see patient. No acute distress noted, continue to monitor.  "

## 2023-10-17 NOTE — PLAN OF CARE
CHW met with patient/family at bedside. Patient experience rounding completed and reviewed the following.     Do you know your discharge plan? Yes, Home    Have you discussed your needs and preferences with your SW/CM? Yes     If you are discharging home, do you have help at home? Yes, Family     Do you think you will need help additional at home at discharge? No     Do you currently have difficulty keeping up with bills, affording medicine or buying food? No    Assigned SW/CM notified of any patient/family needs or concerns. Appropriate resources provided to address patient's needs.

## 2023-10-17 NOTE — NURSING
Medication delivered to room by Bedside Delivery. Removed IV site, tip intact. Removed telemetry. Written and oral discharge instructions given to patient, verbalized understanding. Awaiting home oxygen delivery.

## 2023-10-18 ENCOUNTER — PATIENT OUTREACH (OUTPATIENT)
Dept: ADMINISTRATIVE | Facility: CLINIC | Age: 78
End: 2023-10-18
Payer: MEDICARE

## 2023-10-18 NOTE — ASSESSMENT & PLAN NOTE
This patient does have evidence of infective focus  My overall impression is sepsis.  Source: Respiratory  Hypoxia 81% on arrival on room air, now on 3 lpm oxygen  Lactic acid is normal  WBC 15, initial RR 22  Antibiotics given-   Antibiotics (72h ago, onward)    None        Plan  -discharging patient on Amoxil lab.-received ceftriaxone and azithromycin inpatient.

## 2023-10-18 NOTE — DISCHARGE SUMMARY
[facility] ([place] [address])  [facility] Medicine  Discharge Summary      Patient Name: Melani Holloway  MRN: [id number]  [employer] [facility]: [id number]  Patient Class: IP- Inpatient  Admission Date: 10/14/2023  Hospital Length of Stay: 3 days  Discharge Date and Time: 10/17/2023  5:24 PM  Attending Physician: Amy att. providers found   Discharging Provider: Marina Perry MD  Primary Care Provider: Nathaniel Greenfield MD  [facility] Medicine Team: [facility] D Marina Perry MD  Primary Care Team: [facility]    HPI:   This is a pleasant 78 years old female with known history of hypertension, smoking 1 PPD times 30 years and quit 20 years ago, obesity with BMI 33 who presented to the emergency department with shortness of breath, cough and generalized weakness.  She started with upper respiratory tract symptoms about 3 weeks ago and later started to have lower symptoms as well.  She is producing green to yellow sputum.  Reports increased coughing, productive of sputum, shortness of breath on exertion, but no chest pain or orthopnea.  Denies fever, chill, nausea, vomiting, abdominal pain, dysuria, or diarrhea. She had hypoxia to 81% on room air, right now on 3 L nasal cannula. Found to have elevated white count, chest x-ray concern for pneumonia on her left side.      * No surgery found *      Hospital Course:   Patient is a 78 years old female with known history of hypertension, smoking 1 PPD times 30 years and quit 20 years ago, obesity with BMI 33 who presented to the emergency department with shortness of breath, productive cough with yellow sputum and generalized weakness. She had hypoxia to 81% on room air, right now on 3 L nasal cannula. Found to have elevated white count, chest x-ray concern for pneumonia on her left side.  Patient was started on ceftriaxone and azithromycin and admitted for pneumonia.  Patient's shortness of breath and cough improved through the hospital course.  Patient  was saturating well on room air.  6 minute walk test was done prior to discharge patient was requiring 2 L of oxygen with exercise.  We will discharge patient on Amoxil lab to complete 7 day course of antibiotics.  PCP can reassess the patient's oxygen requirement at follow up.    Melani Holloway was deemed appropriate for discharge.  At the time of discharge, the plan of care was discussed with patient/family, who were agreeable and amenable.  All medications were verbally reviewed and discussed with the patient/family.  They endorsed understanding and compliance.  Informed them that these changes will be available on their discharge paperwork, as well.  Outpatient follow-ups were scheduled, or if unable to be scheduled ambulatory referrals were placed, and ER return precautions were given.  All questions were answered to the patient/family's satisfaction.  she was subsequently discharged in stable condition.         Goals of Care Treatment Preferences:  Code Status: Full Code      Consults:     Cardiac/Vascular  Essential hypertension  Continue lisinopril      Renal/  Hypokalemia  K 3.0   Likely from HCTZ use  Repleted      ID  * Sepsis due to pneumonia  This patient does have evidence of infective focus  My overall impression is sepsis.  Source: Respiratory  Hypoxia 81% on arrival on room air, now on 3 lpm oxygen  Lactic acid is normal  WBC 15, initial RR 22  Antibiotics given-   Antibiotics (72h ago, onward)    None        Plan  -discharging patient on Amoxil lab.-received ceftriaxone and azithromycin inpatient.      Final Active Diagnoses:    Diagnosis Date Noted POA    PRINCIPAL PROBLEM:  Sepsis due to pneumonia [J18.9, A41.9] 10/14/2023 Unknown    Hypokalemia [E87.6] 10/14/2023 Unknown    Essential hypertension [I10] 08/11/2016 Yes      Problems Resolved During this Admission:    Diagnosis Date Noted Date Resolved POA    Obesity [E66.9] 07/17/2017 10/14/2023 Yes     Chronic       Discharged  "Condition: good    Disposition: Home or Self Care    Follow Up:   Follow-up Information     Nathaniel Greenfield MD Follow up in 1 week(s).    Specialty: Family Medicine  Contact information:  [address]  [address]  [place] [address] [phone]                       Patient Instructions:      OXYGEN FOR HOME USE     Order Specific Question Answer Comments   Liter Flow 2    Duration With activity    Qualifying Test Performed at: Activity    Oxygen saturation at rest 92    Oxygen saturation with activity 87    Oxygen saturation with activity on oxygen 93    Portable mode: continuous    Route nasal cannula    Device: home concentrator with portable tanks    Length of need (in months): 3 mos    Patient condition with qualifying saturation Other - List qualifying diagnosis and code    Select a diagnosis & list the code in the comments Pneumonia [831219]    Height: 5' 3" (1.6 m)    Weight: 187    Alternative treatment measures have been tried or considered and deemed clinically ineffective. Yes        Significant Diagnostic Studies: Microbiology:   Blood Culture   Lab Results   Component Value Date    LABBLOO No Growth to date 10/14/2023    LABBLOO No Growth to date 10/14/2023    LABBLOO No Growth to date 10/14/2023    and Sputum Culture   Lab Results   Component Value Date    GSRESP <10 epithelial cells per low power field. 10/15/2023    GSRESP Moderate WBC's 10/15/2023    GSRESP Rare Gram positive cocci 10/15/2023    GSRESP Rare Gram negative rods 10/15/2023    RESPIRATORYC Normal respiratory noah 10/15/2023    RESPIRATORYC No S aureus or Pseudomonas isolated. 10/15/2023       Pending Diagnostic Studies:     None         Medications:  Reconciled Home Medications:      Medication List      START taking these medications    amoxicillin 875 MG tablet  Commonly known as: AMOXIL  Take 1 tablet (875 mg total) by mouth 2 (two) times daily. for 3 days  Start taking on: October 18, 2023        CONTINUE taking these " medications    albuterol 90 mcg/actuation inhaler  Commonly known as: PROAIR HFA  Inhale 2 puffs into the lungs every 6 (six) hours as needed for Wheezing. Rescue     amLODIPine 10 MG tablet  Commonly known as: NORVASC  TAKE 1 TABLET BY MOUTH EVERY DAY     aspirin 81 MG EC tablet  Commonly known as: ECOTRIN  Take 81 mg by mouth every other day.     benzonatate 100 MG capsule  Commonly known as: TESSALON PERLES  Take 1 capsule (100 mg total) by mouth every 6 (six) hours as needed for Cough.     calcium carbonate 600 mg calcium (1,500 mg) Tab  Commonly known as: OS-JONAS  Take 600 mg by mouth once.     * fluticasone propionate 50 mcg/actuation nasal spray  Commonly known as: FLONASE  1 spray (50 mcg total) by Each Nostril route once daily.     * fluticasone propionate 50 mcg/actuation nasal spray  Commonly known as: FLONASE  1 spray (50 mcg total) by Each Nostril route once daily.     hydroCHLOROthiazide 25 MG tablet  Commonly known as: HYDRODIURIL  TAKE 1 TABLET BY MOUTH EVERY DAY IN THE MORNING     lisinopriL 40 MG tablet  Commonly known as: PRINIVIL,ZESTRIL  TAKE 1 TABLET BY MOUTH EVERY DAY     vitamin D 1000 units Tab  Commonly known as: VITAMIN D3  Take 1,000 Units by mouth once daily.         * This list has 2 medication(s) that are the same as other medications prescribed for you. Read the directions carefully, and ask your doctor or other care provider to review them with you.                Indwelling Lines/Drains at time of discharge:   Lines/Drains/Airways     None                 Time spent on the discharge of patient: 39 minutes         Marina Perry MD  Department of Hospital Medicine  [facility] - Intensive Care ([place]

## 2023-10-18 NOTE — PROGRESS NOTES
C3 nurse spoke with Melani Holloway for a TCC post hospital discharge follow up call. The patient has a scheduled HOSFU appointment with Nathaniel Greenfield MD on 10/20/2023 @ 0900 ( Per Patient , not in epic) .    Message sent to PCP staff.

## 2023-10-19 LAB
BACTERIA BLD CULT: NORMAL
BACTERIA BLD CULT: NORMAL

## 2023-10-20 PROBLEM — J31.0 CHRONIC RHINITIS: Status: ACTIVE | Noted: 2023-10-20

## 2023-11-11 PROBLEM — E87.6 HYPOKALEMIA: Status: RESOLVED | Noted: 2023-10-14 | Resolved: 2023-11-11

## 2023-11-11 PROBLEM — A41.9 SEPSIS DUE TO PNEUMONIA: Status: RESOLVED | Noted: 2023-10-14 | Resolved: 2023-11-11

## 2023-11-11 PROBLEM — J18.9 SEPSIS DUE TO PNEUMONIA: Status: RESOLVED | Noted: 2023-10-14 | Resolved: 2023-11-11

## 2023-11-30 NOTE — PROGRESS NOTES
Subjective:      Melani Holloway is a 78 y.o. female who was referred to me by Dr. Nathaniel Jauregui* in consultation for chronic rhinitis. Reports her sinus symptoms have worsen and began 4 years ago. Wakes every morning with runny clear nasal mucous. Reports bilateral nasal congestion/ obstruction which alternates throughout the day and having to breathe from her nose. Reports sinus head pressure. Had an episode of small amount nosebleed from left nostril a couple weeks ago. Denies any blood disorder. Uses daily aspirin. Denies any discolored mucopurulence, fever or chills, or changes in vision/eye pressure. Denies any allergy testing or asthma.  Hx of left lower lung pneumonia 10/14/2023 (hospital admitted ). Denies any cough or SOB today. Currently using Flonase.    She does not regularly use nasal decongestant sprays.  She denies a history of reflux symptoms.  She denies have a diagnosis of obstructive sleep apnea. Reports snoring.   She has not had sinonasal surgery  She does not recall a prior history of nasal trauma  Hx of smoking     She has a past medical history of Ductal carcinoma in situ (DCIS) of right breast and Hypertension.    Past Surgical History  She has a past surgical history that includes cataract surgery; Breast biopsy (Right, 2010); and Breast lumpectomy (Right, 2010).    Family History  Her family history includes Breast cancer in her paternal aunt; Heart attack in her father; No Known Problems in her mother.    Social History  She reports that she quit smoking about 25 years ago. Her smoking use included cigarettes. She started smoking about 63 years ago. She has a 38.0 pack-year smoking history. She has never used smokeless tobacco. She reports current alcohol use. She reports that she does not use drugs.    Allergies  She has No Known Allergies.    Medications   She has a current medication list which includes the following prescription(s): albuterol, amlodipine, aspirin,  azelastine, benzonatate, calcium carbonate, fluticasone propionate, hydrochlorothiazide, lisinopril, vit a/vit c/vit e/zinc/copper, and vitamin d.  Review of Systems   HENT: Positive for runny nose, sinus pressure and stuffy nose.  Negative for ear discharge, ear infection, ear pain, hearing loss, postnasal drip, ringing in the ears, sinus infection and sore throat.      Neurological: Negative for dizziness and headaches.          Objective:   LMP  (LMP Unknown)      Constitutional:   She is oriented to person, place, and time. She appears well-developed and well-nourished. She appears alert. She is cooperative.  Non-toxic appearance. She does not have a sickly appearance. She does not appear ill. Normal speech.      Head:  Normocephalic and atraumatic. Head is without right periorbital erythema, without left periorbital erythema and without TMJ tenderness. Salivary glands normal.  Facial strength is normal.      Ears:    Right Ear: No lacerations. No drainage, swelling or tenderness. No foreign bodies. No mastoid tenderness. Tympanic membrane is not injected, not scarred, not perforated, not erythematous, not retracted and not bulging. Tympanic membrane mobility is normal. No middle ear effusion. No PE tube. No hemotympanum. No decreased hearing is noted.   Left Ear: No lacerations. No drainage, swelling or tenderness. No foreign bodies. No mastoid tenderness. Tympanic membrane is not injected, not scarred, not perforated, not erythematous, not retracted and not bulging. Tympanic membrane mobility is normal.  No middle ear effusion.  No PE tube. No hemotympanum. No decreased hearing is noted.     Nose:  Mucosal edema and rhinorrhea (clear) present. No sinus tenderness or polyps. No epistaxis. Turbinate hypertrophy (2+ bilaterally).  Turbinates normal and no turbinate masses.  Right sinus exhibits no maxillary sinus tenderness and no frontal sinus tenderness. Left sinus exhibits no maxillary sinus tenderness and no  "frontal sinus tenderness.     Mouth/Throat  Oropharynx clear and moist without lesions or asymmetry and normal uvula midline. Normal dentition. No uvula swelling, oral lesions, trismus or mucous membrane lesions. Posterior oropharyngeal erythema present. No oropharyngeal exudate or posterior oropharyngeal edema.     Neck:  Trachea normal, phonation normal and no adenopathy. Thyroid tenderness is present. No edema, no erythema and no neck rigidity present. No thyroid mass and no thyromegaly present.     She has no cervical adenopathy.     Pulmonary/Chest:   Effort normal.     Psychiatric:   She has a normal mood and affect. Her speech is normal and behavior is normal.     Neurological:   She is alert and oriented to person, place, and time. She has neurological normal, alert and oriented.         Data Reviewed  WBC (K/uL)   Date Value   10/17/2023 6.10     Eosinophil % (%)   Date Value   10/17/2023 2.3     Eos # (K/uL)   Date Value   10/17/2023 0.1     Platelets (K/uL)   Date Value   10/17/2023 272     Glucose (mg/dL)   Date Value   10/17/2023 138 (H)     No results found for: "IGE"      Assessment:     1. Chronic rhinitis      Plan:   There are no diagnoses linked to this encounter.  Diagnoses and all orders for this visit:    Chronic rhinitis  -     Ambulatory referral/consult to ENT  -     azelastine (ASTELIN) 137 mcg (0.1 %) nasal spray; 1 spray (137 mcg total) by Nasal route 2 (two) times daily.    Patient deferred nasal endoscopy at this time. I recommended if sinus symptoms/nasal congestion continues or worsens to RTC to further evaluate 1-2 months if refractory to medical management.  Continue Flonase. Stop if nose bleeding persist. No blowing for a week/apply pressure. Discussed how to properly uses nasal spray.   Recommended nasal saline rinses daily.   Nasal saline spray TID.  Antihistamine of choice (allegra) daily.     Questions answered.      No follow-ups on file.  "

## 2023-12-01 ENCOUNTER — OFFICE VISIT (OUTPATIENT)
Dept: OTOLARYNGOLOGY | Facility: CLINIC | Age: 78
End: 2023-12-01
Payer: MEDICARE

## 2023-12-01 DIAGNOSIS — J31.0 CHRONIC RHINITIS: ICD-10-CM

## 2023-12-01 PROCEDURE — 1160F RVW MEDS BY RX/DR IN RCRD: CPT | Mod: CPTII,S$GLB,,

## 2023-12-01 PROCEDURE — 1160F PR REVIEW ALL MEDS BY PRESCRIBER/CLIN PHARMACIST DOCUMENTED: ICD-10-PCS | Mod: CPTII,S$GLB,,

## 2023-12-01 PROCEDURE — 99203 PR OFFICE/OUTPT VISIT, NEW, LEVL III, 30-44 MIN: ICD-10-PCS | Mod: S$GLB,,,

## 2023-12-01 PROCEDURE — 1159F MED LIST DOCD IN RCRD: CPT | Mod: CPTII,S$GLB,,

## 2023-12-01 PROCEDURE — 99999 PR PBB SHADOW E&M-EST. PATIENT-LVL III: CPT | Mod: PBBFAC,,,

## 2023-12-01 PROCEDURE — 1159F PR MEDICATION LIST DOCUMENTED IN MEDICAL RECORD: ICD-10-PCS | Mod: CPTII,S$GLB,,

## 2023-12-01 PROCEDURE — 99203 OFFICE O/P NEW LOW 30 MIN: CPT | Mod: S$GLB,,,

## 2023-12-01 PROCEDURE — 99999 PR PBB SHADOW E&M-EST. PATIENT-LVL III: ICD-10-PCS | Mod: PBBFAC,,,

## 2023-12-01 RX ORDER — AZELASTINE 1 MG/ML
1 SPRAY, METERED NASAL 2 TIMES DAILY
Qty: 30 ML | Refills: 3 | Status: SHIPPED | OUTPATIENT
Start: 2023-12-01 | End: 2024-11-30

## 2023-12-12 DIAGNOSIS — J18.9 PNEUMONIA DUE TO INFECTIOUS ORGANISM, UNSPECIFIED LATERALITY, UNSPECIFIED PART OF LUNG: Primary | ICD-10-CM

## 2023-12-14 ENCOUNTER — OFFICE VISIT (OUTPATIENT)
Dept: PULMONOLOGY | Facility: CLINIC | Age: 78
End: 2023-12-14
Payer: MEDICARE

## 2023-12-14 ENCOUNTER — TELEPHONE (OUTPATIENT)
Dept: PULMONOLOGY | Facility: CLINIC | Age: 78
End: 2023-12-14

## 2023-12-14 VITALS
OXYGEN SATURATION: 97 % | DIASTOLIC BLOOD PRESSURE: 78 MMHG | WEIGHT: 179.25 LBS | SYSTOLIC BLOOD PRESSURE: 138 MMHG | HEIGHT: 63 IN | BODY MASS INDEX: 31.76 KG/M2 | HEART RATE: 72 BPM

## 2023-12-14 DIAGNOSIS — J18.9 PNEUMONIA OF LEFT LOWER LOBE DUE TO INFECTIOUS ORGANISM: ICD-10-CM

## 2023-12-14 DIAGNOSIS — J98.01 BRONCHOSPASM: ICD-10-CM

## 2023-12-14 PROCEDURE — 1159F PR MEDICATION LIST DOCUMENTED IN MEDICAL RECORD: ICD-10-PCS | Mod: CPTII,S$GLB,, | Performed by: INTERNAL MEDICINE

## 2023-12-14 PROCEDURE — 1160F PR REVIEW ALL MEDS BY PRESCRIBER/CLIN PHARMACIST DOCUMENTED: ICD-10-PCS | Mod: CPTII,S$GLB,, | Performed by: INTERNAL MEDICINE

## 2023-12-14 PROCEDURE — 1101F PR PT FALLS ASSESS DOC 0-1 FALLS W/OUT INJ PAST YR: ICD-10-PCS | Mod: CPTII,S$GLB,, | Performed by: INTERNAL MEDICINE

## 2023-12-14 PROCEDURE — 3075F SYST BP GE 130 - 139MM HG: CPT | Mod: CPTII,S$GLB,, | Performed by: INTERNAL MEDICINE

## 2023-12-14 PROCEDURE — 1159F MED LIST DOCD IN RCRD: CPT | Mod: CPTII,S$GLB,, | Performed by: INTERNAL MEDICINE

## 2023-12-14 PROCEDURE — 3288F FALL RISK ASSESSMENT DOCD: CPT | Mod: CPTII,S$GLB,, | Performed by: INTERNAL MEDICINE

## 2023-12-14 PROCEDURE — 99203 OFFICE O/P NEW LOW 30 MIN: CPT | Mod: S$GLB,,, | Performed by: INTERNAL MEDICINE

## 2023-12-14 PROCEDURE — 3078F DIAST BP <80 MM HG: CPT | Mod: CPTII,S$GLB,, | Performed by: INTERNAL MEDICINE

## 2023-12-14 PROCEDURE — 1101F PT FALLS ASSESS-DOCD LE1/YR: CPT | Mod: CPTII,S$GLB,, | Performed by: INTERNAL MEDICINE

## 2023-12-14 PROCEDURE — 99999 PR PBB SHADOW E&M-EST. PATIENT-LVL IV: ICD-10-PCS | Mod: PBBFAC,,, | Performed by: INTERNAL MEDICINE

## 2023-12-14 PROCEDURE — 3288F PR FALLS RISK ASSESSMENT DOCUMENTED: ICD-10-PCS | Mod: CPTII,S$GLB,, | Performed by: INTERNAL MEDICINE

## 2023-12-14 PROCEDURE — 3075F PR MOST RECENT SYSTOLIC BLOOD PRESS GE 130-139MM HG: ICD-10-PCS | Mod: CPTII,S$GLB,, | Performed by: INTERNAL MEDICINE

## 2023-12-14 PROCEDURE — 99999 PR PBB SHADOW E&M-EST. PATIENT-LVL IV: CPT | Mod: PBBFAC,,, | Performed by: INTERNAL MEDICINE

## 2023-12-14 PROCEDURE — 99203 PR OFFICE/OUTPT VISIT, NEW, LEVL III, 30-44 MIN: ICD-10-PCS | Mod: S$GLB,,, | Performed by: INTERNAL MEDICINE

## 2023-12-14 PROCEDURE — 3078F PR MOST RECENT DIASTOLIC BLOOD PRESSURE < 80 MM HG: ICD-10-PCS | Mod: CPTII,S$GLB,, | Performed by: INTERNAL MEDICINE

## 2023-12-14 PROCEDURE — 1160F RVW MEDS BY RX/DR IN RCRD: CPT | Mod: CPTII,S$GLB,, | Performed by: INTERNAL MEDICINE

## 2023-12-14 RX ORDER — ALBUTEROL SULFATE 90 UG/1
2 AEROSOL, METERED RESPIRATORY (INHALATION) EVERY 6 HOURS PRN
Qty: 6.7 G | Refills: 1 | Status: SHIPPED | OUTPATIENT
Start: 2023-12-14

## 2023-12-14 NOTE — TELEPHONE ENCOUNTER
----- Message from Brayden Prater MD sent at 12/14/2023 11:50 AM CST -----  Can you please help to discontinue this patients home O2?  The documentation for her 6MWT is in my note from today.    Thanks,  Brayden Prater MD  [facility]

## 2023-12-14 NOTE — ASSESSMENT & PLAN NOTE
Diagnosed in October 2023 and required hospitalization.  She is now completed antibiotics and symptoms have resolved.  She is pending repeat x-ray in bed like to hold off on this until after the Christmas holiday.    - repeat chest x-ray ordered and pending.  - she was discharged from the hospital with a diagnosis of hypoxemic respiratory failure.  She was given 2 L of nasal cannula O2 use at home.  She has no longer using this and does not warrant this therapy.  - 6MWT today in clinic showed resting O2 of 97%,  with exertion she dropped to 91%, and with recovery she was saturating 94%.
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
## 2023-12-14 NOTE — ASSESSMENT & PLAN NOTE
Refilled albuterol for trial to see if this helps her intermittent shortness of breath associated with dry weather and gardening.  - PFTs at next visit.

## 2024-01-08 ENCOUNTER — TELEPHONE (OUTPATIENT)
Dept: PULMONOLOGY | Facility: CLINIC | Age: 79
End: 2024-01-08
Payer: MEDICARE

## 2024-01-08 DIAGNOSIS — J98.01 BRONCHOSPASM: Primary | ICD-10-CM

## 2024-01-08 RX ORDER — PREDNISONE 20 MG/1
40 TABLET ORAL DAILY
Qty: 10 TABLET | Refills: 0 | Status: ON HOLD | OUTPATIENT
Start: 2024-01-08 | End: 2024-01-16 | Stop reason: HOSPADM

## 2024-01-08 NOTE — TELEPHONE ENCOUNTER
Spoke with patient, informed her that I have received her message. Pt states that Dr Prater advised her that if medication(Proair) does not work for her, he will try another medication. Pt states that she is ready to try some thing different. I verbalized to pt that I understand and advised pt that I will forward her message to Dr Prater to review/advise. Pt verbalized that she understand.

## 2024-01-08 NOTE — TELEPHONE ENCOUNTER
----- Message from Jameel ALBERTINA Route sent at 1/8/2024 12:13 PM CST -----  Regarding: Inhaler replacement  Contact: pt.  [phone]  Pt is calling in ref to medication albuterol (PROAIR HFA) 90 mcg/actuation inhaler. She says she was told by provider if the inhaler did not work to call and something else can be tried. Patient Requesting Call Back @  [phone]      [facility] #[id number] - [place] - [address] [facility]  [address]  [facility] [address]  Phone: [phone] Fax: [phone]

## 2024-01-08 NOTE — TELEPHONE ENCOUNTER
Trial of 40mg Prednisone x 5 days for increased shortness of breath and wheezing.  Her symptoms are predominately due to rhinitis and PND.  She has no fevers at this time.  If her symptoms persist, it may be reasonable to trial advair.  Advised to continue her sinus rinse and flonase.   I encouraged her to reach out to me if she has any further problems.  She verbalizes understanding and agrees with the current plan.    Brayden Prater MD  [facility]

## 2024-01-14 ENCOUNTER — HOSPITAL ENCOUNTER (INPATIENT)
Facility: HOSPITAL | Age: 79
LOS: 2 days | Discharge: HOME | DRG: 193 | End: 2024-01-16
Attending: STUDENT IN AN ORGANIZED HEALTH CARE EDUCATION/TRAINING PROGRAM | Admitting: STUDENT IN AN ORGANIZED HEALTH CARE EDUCATION/TRAINING PROGRAM
Payer: MEDICARE

## 2024-01-14 DIAGNOSIS — J44.1 COPD WITH ACUTE EXACERBATION: Primary | ICD-10-CM

## 2024-01-14 DIAGNOSIS — R09.02 HYPOXIA: ICD-10-CM

## 2024-01-14 DIAGNOSIS — R07.9 CHEST PAIN: ICD-10-CM

## 2024-01-14 DIAGNOSIS — R06.02 SOB (SHORTNESS OF BREATH): ICD-10-CM

## 2024-01-14 DIAGNOSIS — J18.9 COMMUNITY ACQUIRED PNEUMONIA, UNSPECIFIED LATERALITY: ICD-10-CM

## 2024-01-14 PROBLEM — R91.1 PULMONARY NODULE: Status: ACTIVE | Noted: 2024-01-14

## 2024-01-14 LAB
ALBUMIN SERPL BCP-MCNC: 3.3 G/DL (ref 3.5–5.2)
ALLENS TEST: ABNORMAL
ALP SERPL-CCNC: 86 U/L (ref 55–135)
ALT SERPL W/O P-5'-P-CCNC: 18 U/L (ref 10–44)
ANION GAP SERPL CALC-SCNC: 8 MMOL/L (ref 8–16)
AST SERPL-CCNC: 17 U/L (ref 10–40)
BASOPHILS # BLD AUTO: 0.08 K/UL (ref 0–0.2)
BASOPHILS NFR BLD: 0.6 % (ref 0–1.9)
BILIRUB SERPL-MCNC: 0.3 MG/DL (ref 0.1–1)
BNP SERPL-MCNC: 25 PG/ML (ref 0–99)
BUN SERPL-MCNC: 21 MG/DL (ref 8–23)
CALCIUM SERPL-MCNC: 8.8 MG/DL (ref 8.7–10.5)
CHLORIDE SERPL-SCNC: 103 MMOL/L (ref 95–110)
CO2 SERPL-SCNC: 29 MMOL/L (ref 23–29)
CREAT SERPL-MCNC: 0.8 MG/DL (ref 0.5–1.4)
DIFFERENTIAL METHOD BLD: ABNORMAL
EOSINOPHIL # BLD AUTO: 0 K/UL (ref 0–0.5)
EOSINOPHIL NFR BLD: 0.2 % (ref 0–8)
ERYTHROCYTE [DISTWIDTH] IN BLOOD BY AUTOMATED COUNT: 15 % (ref 11.5–14.5)
EST. GFR  (NO RACE VARIABLE): >60 ML/MIN/1.73 M^2
GLUCOSE SERPL-MCNC: 108 MG/DL (ref 70–110)
HCO3 UR-SCNC: 30.6 MMOL/L (ref 24–28)
HCT VFR BLD AUTO: 39 % (ref 37–48.5)
HGB BLD-MCNC: 12.6 G/DL (ref 12–16)
IMM GRANULOCYTES # BLD AUTO: 0.25 K/UL (ref 0–0.04)
IMM GRANULOCYTES NFR BLD AUTO: 1.9 % (ref 0–0.5)
LYMPHOCYTES # BLD AUTO: 1.5 K/UL (ref 1–4.8)
LYMPHOCYTES NFR BLD: 11.4 % (ref 18–48)
MCH RBC QN AUTO: 27.2 PG (ref 27–31)
MCHC RBC AUTO-ENTMCNC: 32.3 G/DL (ref 32–36)
MCV RBC AUTO: 84 FL (ref 82–98)
MONOCYTES # BLD AUTO: 2.2 K/UL (ref 0.3–1)
MONOCYTES NFR BLD: 16.1 % (ref 4–15)
NEUTROPHILS # BLD AUTO: 9.4 K/UL (ref 1.8–7.7)
NEUTROPHILS NFR BLD: 69.8 % (ref 38–73)
NRBC BLD-RTO: 0 /100 WBC
PCO2 BLDA: 54.4 MMHG (ref 35–45)
PH SMN: 7.36 [PH] (ref 7.35–7.45)
PLATELET # BLD AUTO: 279 K/UL (ref 150–450)
PMV BLD AUTO: 11.4 FL (ref 9.2–12.9)
PO2 BLDA: 28 MMHG (ref 40–60)
POC BE: 5 MMOL/L
POC CARDIAC TROPONIN I: 0 NG/ML (ref 0–0.08)
POC SATURATED O2: 49 % (ref 95–100)
POC TCO2: 32 MMOL/L (ref 24–29)
POTASSIUM SERPL-SCNC: 3.7 MMOL/L (ref 3.5–5.1)
PROT SERPL-MCNC: 6.4 G/DL (ref 6–8.4)
RBC # BLD AUTO: 4.64 M/UL (ref 4–5.4)
SAMPLE: ABNORMAL
SAMPLE: NORMAL
SITE: ABNORMAL
SODIUM SERPL-SCNC: 140 MMOL/L (ref 136–145)
TROPONIN I SERPL DL<=0.01 NG/ML-MCNC: <0.006 NG/ML (ref 0–0.03)
TROPONIN I SERPL DL<=0.01 NG/ML-MCNC: <0.006 NG/ML (ref 0–0.03)
WBC # BLD AUTO: 13.42 K/UL (ref 3.9–12.7)

## 2024-01-14 PROCEDURE — 21400001 HC TELEMETRY ROOM

## 2024-01-14 PROCEDURE — 94761 N-INVAS EAR/PLS OXIMETRY MLT: CPT

## 2024-01-14 PROCEDURE — 82803 BLOOD GASES ANY COMBINATION: CPT

## 2024-01-14 PROCEDURE — 85025 COMPLETE CBC W/AUTO DIFF WBC: CPT

## 2024-01-14 PROCEDURE — 93010 ELECTROCARDIOGRAM REPORT: CPT | Mod: ,,, | Performed by: INTERNAL MEDICINE

## 2024-01-14 PROCEDURE — 25000003 PHARM REV CODE 250: Performed by: HOSPITALIST

## 2024-01-14 PROCEDURE — 80048 BASIC METABOLIC PNL TOTAL CA: CPT | Mod: XB

## 2024-01-14 PROCEDURE — 25000242 PHARM REV CODE 250 ALT 637 W/ HCPCS

## 2024-01-14 PROCEDURE — 25000003 PHARM REV CODE 250

## 2024-01-14 PROCEDURE — 83880 ASSAY OF NATRIURETIC PEPTIDE: CPT

## 2024-01-14 PROCEDURE — 63600175 PHARM REV CODE 636 W HCPCS

## 2024-01-14 PROCEDURE — 99900035 HC TECH TIME PER 15 MIN (STAT)

## 2024-01-14 PROCEDURE — 99285 EMERGENCY DEPT VISIT HI MDM: CPT | Mod: 25

## 2024-01-14 PROCEDURE — 84484 ASSAY OF TROPONIN QUANT: CPT

## 2024-01-14 PROCEDURE — 94640 AIRWAY INHALATION TREATMENT: CPT

## 2024-01-14 PROCEDURE — 11000001 HC ACUTE MED/SURG PRIVATE ROOM

## 2024-01-14 PROCEDURE — 80053 COMPREHEN METABOLIC PANEL: CPT

## 2024-01-14 PROCEDURE — 27000221 HC OXYGEN, UP TO 24 HOURS

## 2024-01-14 PROCEDURE — 93005 ELECTROCARDIOGRAM TRACING: CPT

## 2024-01-14 PROCEDURE — 96374 THER/PROPH/DIAG INJ IV PUSH: CPT

## 2024-01-14 PROCEDURE — 0241U SARS-COV2 (COVID) WITH FLU/RSV BY PCR: CPT | Performed by: STUDENT IN AN ORGANIZED HEALTH CARE EDUCATION/TRAINING PROGRAM

## 2024-01-14 PROCEDURE — 25500020 PHARM REV CODE 255: Performed by: STUDENT IN AN ORGANIZED HEALTH CARE EDUCATION/TRAINING PROGRAM

## 2024-01-14 RX ORDER — AZELASTINE 1 MG/ML
1 SPRAY, METERED NASAL 2 TIMES DAILY
Status: DISCONTINUED | OUTPATIENT
Start: 2024-01-14 | End: 2024-01-16 | Stop reason: HOSPADM

## 2024-01-14 RX ORDER — CHOLECALCIFEROL (VITAMIN D3) 25 MCG
1000 TABLET ORAL DAILY
Status: DISCONTINUED | OUTPATIENT
Start: 2024-01-14 | End: 2024-01-16 | Stop reason: HOSPADM

## 2024-01-14 RX ORDER — FLUTICASONE PROPIONATE 50 MCG
1 SPRAY, SUSPENSION (ML) NASAL DAILY
Status: DISCONTINUED | OUTPATIENT
Start: 2024-01-14 | End: 2024-01-16 | Stop reason: HOSPADM

## 2024-01-14 RX ORDER — METHYLPREDNISOLONE SOD SUCC 125 MG
125 VIAL (EA) INJECTION
Status: COMPLETED | OUTPATIENT
Start: 2024-01-14 | End: 2024-01-14

## 2024-01-14 RX ORDER — AMLODIPINE BESYLATE 10 MG/1
10 TABLET ORAL DAILY
Status: DISCONTINUED | OUTPATIENT
Start: 2024-01-15 | End: 2024-01-16 | Stop reason: HOSPADM

## 2024-01-14 RX ORDER — IBUPROFEN 200 MG
24 TABLET ORAL
Status: DISCONTINUED | OUTPATIENT
Start: 2024-01-14 | End: 2024-01-16 | Stop reason: HOSPADM

## 2024-01-14 RX ORDER — IPRATROPIUM BROMIDE AND ALBUTEROL SULFATE 2.5; .5 MG/3ML; MG/3ML
SOLUTION RESPIRATORY (INHALATION)
Status: COMPLETED
Start: 2024-01-14 | End: 2024-01-14

## 2024-01-14 RX ORDER — HYDROCHLOROTHIAZIDE 12.5 MG/1
25 TABLET ORAL DAILY
Status: DISCONTINUED | OUTPATIENT
Start: 2024-01-15 | End: 2024-01-16 | Stop reason: HOSPADM

## 2024-01-14 RX ORDER — ASPIRIN 81 MG/1
81 TABLET ORAL EVERY OTHER DAY
Status: DISCONTINUED | OUTPATIENT
Start: 2024-01-15 | End: 2024-01-16 | Stop reason: HOSPADM

## 2024-01-14 RX ORDER — ALBUTEROL SULFATE 90 UG/1
2 AEROSOL, METERED RESPIRATORY (INHALATION) EVERY 6 HOURS PRN
Status: DISCONTINUED | OUTPATIENT
Start: 2024-01-14 | End: 2024-01-16 | Stop reason: HOSPADM

## 2024-01-14 RX ORDER — IPRATROPIUM BROMIDE AND ALBUTEROL SULFATE 2.5; .5 MG/3ML; MG/3ML
3 SOLUTION RESPIRATORY (INHALATION)
Status: COMPLETED | OUTPATIENT
Start: 2024-01-14 | End: 2024-01-14

## 2024-01-14 RX ORDER — IBUPROFEN 200 MG
16 TABLET ORAL
Status: DISCONTINUED | OUTPATIENT
Start: 2024-01-14 | End: 2024-01-16 | Stop reason: HOSPADM

## 2024-01-14 RX ORDER — BENZONATATE 100 MG/1
100 CAPSULE ORAL EVERY 6 HOURS PRN
Status: DISCONTINUED | OUTPATIENT
Start: 2024-01-14 | End: 2024-01-16 | Stop reason: HOSPADM

## 2024-01-14 RX ORDER — LISINOPRIL 20 MG/1
40 TABLET ORAL DAILY
Status: DISCONTINUED | OUTPATIENT
Start: 2024-01-15 | End: 2024-01-16 | Stop reason: HOSPADM

## 2024-01-14 RX ORDER — NALOXONE HCL 0.4 MG/ML
0.02 VIAL (ML) INJECTION
Status: DISCONTINUED | OUTPATIENT
Start: 2024-01-14 | End: 2024-01-16 | Stop reason: HOSPADM

## 2024-01-14 RX ORDER — GLUCAGON 1 MG
1 KIT INJECTION
Status: DISCONTINUED | OUTPATIENT
Start: 2024-01-14 | End: 2024-01-16 | Stop reason: HOSPADM

## 2024-01-14 RX ADMIN — IPRATROPIUM BROMIDE AND ALBUTEROL SULFATE 3 ML: .5; 3 SOLUTION RESPIRATORY (INHALATION) at 08:01

## 2024-01-14 RX ADMIN — METHYLPREDNISOLONE SODIUM SUCCINATE 125 MG: 125 INJECTION, POWDER, FOR SOLUTION INTRAMUSCULAR; INTRAVENOUS at 09:01

## 2024-01-14 RX ADMIN — IOHEXOL 100 ML: 350 INJECTION, SOLUTION INTRAVENOUS at 11:01

## 2024-01-14 RX ADMIN — CEFTRIAXONE 1 G: 1 INJECTION, POWDER, FOR SOLUTION INTRAMUSCULAR; INTRAVENOUS at 02:01

## 2024-01-14 RX ADMIN — CHOLECALCIFEROL TAB 25 MCG (1000 UNIT) 1000 UNITS: 25 TAB at 04:01

## 2024-01-14 NOTE — ASSESSMENT & PLAN NOTE
Chronic, controlled. Latest blood pressure and vitals reviewed-     Temp:  [98.4 °F (36.9 °C)-98.5 °F (36.9 °C)]   Pulse:  [77-86]   Resp:  [18-32]   BP: (130-164)/(58-73)   SpO2:  [88 %-100 %] .   Home meds for hypertension were reviewed and noted below.   Hypertension Medications               amLODIPine (NORVASC) 10 MG tablet TAKE 1 TABLET BY MOUTH EVERY DAY    hydroCHLOROthiazide (HYDRODIURIL) 25 MG tablet TAKE 1 TABLET BY MOUTH EVERY DAY IN THE MORNING    lisinopriL (PRINIVIL,ZESTRIL) 40 MG tablet TAKE 1 TABLET BY MOUTH EVERY DAY

## 2024-01-14 NOTE — ASSESSMENT & PLAN NOTE
seen by Dr. Prater in December .  6MWT done and  recommeneded albuterol PRN intermittent shortness of breath and PFTs. continue duonebs PRN. completed prednisone 40mg 1/8 - 1/13

## 2024-01-14 NOTE — ASSESSMENT & PLAN NOTE
? CX ray - No convincing evidence of acute cardiopulmonary disease.   . CTA chest -No evidence of pulmonary thromboembolism to the segmental level. 2.2 cm solid, lobular lesion in the left upper lobe.  Neoplasm must be a leading consideration..  There are additional solid and sub solid nodules measuring up to 0.5 cm. PET scan could further evaluate while histologic evaluation may be necessary for definitive diagnosis.    Focal ground-glass opacity in the left upper lobe, may represent infectious/inflammatory etiology. Mediastinal and hilar lymph node. started on ceftriaxone and azithromycin

## 2024-01-14 NOTE — ED NOTES
LOC: The patient is awake and alert; oriented x 3 and speaking appropriately.  APPEARANCE: Patient resting comfortably, patient is clean and well groomed  SKIN: warm and dry, normal skin turgor & moist mucus membranes, skin intact, no breakdown noted.  MUSCULOSKELETAL: Patient moving all extremities well, no obvious swelling or deformities noted  RESPIRATORY: Airway is open and patent, audible wheezing and cough, c/o sob especially w/ exertion,; respirations are spontaneous, normal effort and rate  CARDIAC: Patient has a normal rate, no peripheral edema noted, capillary refill < 3 seconds; No complaints of chest pain   ABDOMEN: Soft and non tender to palpation, no distention noted.

## 2024-01-14 NOTE — Clinical Note
Diagnosis: COPD with acute exacerbation [129174]   Future Attending Provider: LETICIA GALINDO [[id number]]   Reason for IP Medical Treatment  (Clinical interventions that can only be accomplished in the IP setting? ) :: hypoxia, new O2 requirement   I certify that Inpatient services for greater than or equal to 2 midnights are medically necessary:: Yes   Plans for Post-Acute care--if anticipated (pick the single best option):: A. No post acute care anticipated at this time

## 2024-01-14 NOTE — ED NOTES
LOC: The patient is awake, alert and aware of environment with an appropriate affect, the patient is oriented x 3 and speaking appropriately.  APPEARANCE: Patient resting comfortably and in no acute distress, patient is clean and well groomed, patient's clothing is properly fastened.  SKIN: The skin is warm and dry, color consistent with ethnicity, patient has normal skin turgor and moist mucus membranes, skin intact, no breakdown or bruising noted.  MUSCULOSKELETAL: Patient moving all extremities spontaneously, no obvious swelling or deformities noted.  RESPIRATORY: Airway is open and patent, respirations are spontaneous, patient tachypeinic, 3 L NC no accessory muscle use noted, bilateral breath sounds **.  CARDIAC: Patient has a normal rate and regular rhythm, no periphreal edema noted, capillary refill < 3 seconds.  ABDOMEN: Soft and non tender to palpation, no distention noted, normoactive bowel sounds present in all four quadrants.  NEUROLOGIC:  facial expression is symmetrical, patient moving all extremities spontaneously, normal sensation in all extremities when touched with a finger.  Follows all commands appropriately.

## 2024-01-14 NOTE — ED PROVIDER NOTES
Encounter Date: 2024       History     Chief Complaint   Patient presents with    Shortness of Breath     78-year-old female with a history of coronary artery disease and hypertension presents to the emergency department with hypoxia and shortness of breath.  Patient has had a recent hospital admission for pneumonia which was last October.  She was discharged at that time with oxygen but has since stopped using it.  Since October she has never had full resolution of her breathing symptoms.  Between December and January she had been seen by Dr. Chambers in pulmonology.  He diagnosed her with rhinitis and paroxysmal nocturnal dyspnea.  For which she was given a trial of prednisone.  The prednisone has not seemed to have helped and she is back into the emergency department with a hypoxia and shortness of breath.  She measured her pulse oximetry at home and reported that she was satting in the 80s.  She has been 91% in the emergency department on room air.  The patient has been afebrile complains of upper respiratory symptoms such as congestion and coughing but otherwise has no complaints.  She does have some diarrhea which she attributes to her steroid use.  The patient has no further concerns at this time.        Review of patient's allergies indicates:  No Known Allergies  Past Medical History:   Diagnosis Date    Ductal carcinoma in situ (DCIS) of right breast 2017    Hypertension      Past Surgical History:   Procedure Laterality Date    BREAST BIOPSY Right 2010    BREAST LUMPECTOMY Right 2010    cataract surgery       Family History   Problem Relation Age of Onset    No Known Problems Mother     Heart attack Father     Breast cancer Paternal Aunt      Social History     Tobacco Use    Smoking status: Former     Current packs/day: 0.00     Average packs/day: 1 pack/day for 38.0 years (38.0 ttl pk-yrs)     Types: Cigarettes     Start date:      Quit date:      Years since quittin.0    Smokeless  tobacco: Never   Substance Use Topics    Alcohol use: Yes     Comment: Occasionally    Drug use: No     Review of Systems    Physical Exam     Initial Vitals [01/14/24 0811]   BP Pulse Resp Temp SpO2   (!) 164/73 86 (!) 32 98.5 °F (36.9 °C) (!) 91 %      MAP       --         Physical Exam    Constitutional: She appears well-developed and well-nourished. She is not diaphoretic. No distress.   HENT:   Head: Normocephalic and atraumatic.   Eyes: EOM are normal. Pupils are equal, round, and reactive to light.   Neck: Neck supple.   Normal range of motion.  Cardiovascular:  Normal rate, normal heart sounds and intact distal pulses.           Pulmonary/Chest: No respiratory distress. She has wheezes.   Mild wheezing noted in the left chest.   Abdominal: Abdomen is soft. She exhibits no distension. There is no abdominal tenderness. There is no rebound and no guarding.   Musculoskeletal:         General: No tenderness or edema. Normal range of motion.      Cervical back: Normal range of motion and neck supple.     Neurological: She is alert and oriented to person, place, and time. She has normal strength. GCS score is 15. GCS eye subscore is 4. GCS verbal subscore is 5. GCS motor subscore is 6.   Skin: Skin is warm and dry.   Psychiatric: She has a normal mood and affect. Her behavior is normal. Judgment and thought content normal.         ED Course   Procedures  Labs Reviewed   CBC W/ AUTO DIFFERENTIAL - Abnormal; Notable for the following components:       Result Value    WBC 13.42 (*)     RDW 15.0 (*)     Immature Granulocytes 1.9 (*)     Gran # (ANC) 9.4 (*)     Immature Grans (Abs) 0.25 (*)     Mono # 2.2 (*)     Lymph % 11.4 (*)     Mono % 16.1 (*)     All other components within normal limits   COMPREHENSIVE METABOLIC PANEL - Abnormal; Notable for the following components:    Albumin 3.3 (*)     All other components within normal limits   ISTAT PROCEDURE - Abnormal; Notable for the following components:    POC PCO2  54.4 (*)     POC PO2 28 (*)     POC HCO3 30.6 (*)     POC BE 5 (*)     POC TCO2 32 (*)     All other components within normal limits   TROPONIN I   B-TYPE NATRIURETIC PEPTIDE   TROPONIN ISTAT   TROPONIN I   SARS-COV2 (COVID) WITH FLU/RSV BY PCR   POCT TROPONIN        ECG Results              EKG 12-lead (Final result)  Result time 01/14/24 10:02:41      Final result by Interface, Lab In [facility] (01/14/24 10:02:41)                   Narrative:    Test Reason : R06.02,    Vent. Rate : 078 BPM     Atrial Rate : 078 BPM     P-R Int : 166 ms          QRS Dur : 082 ms      QT Int : 390 ms       P-R-T Axes : 080 053 076 degrees     QTc Int : 444 ms    Normal sinus rhythm  Normal ECG  When compared with ECG of 14-OCT-2023 19:38,  No significant change was found  Confirmed by Nando OVALLES MD ([id number]) on 1/14/2024 10:02:34 AM    Referred By: [email or web address]   SELF           Confirmed By:Nando OVALLES MD                                         Medications   cefTRIAXone (ROCEPHIN) 1 g in dextrose 5 % in water (D5W) 100 mL IVPB (MB+) (has no administration in time range)   azithromycin (ZITHROMAX) 500 mg in dextrose 5 % (D5W) 250 mL IVPB (Vial-Mate) (has no administration in time range)   albuterol-ipratropium 2.5 mg-0.5 mg/3 mL nebulizer solution 3 mL (3 mLs Nebulization Given 1/14/24 0843)   methylPREDNISolone sodium succinate injection 125 mg (125 mg Intravenous Given 1/14/24 0907)   iohexoL (OMNIPAQUE 350) injection 100 mL (100 mLs Intravenous Given 1/14/24 1159)     Medical Decision Making  78-year-old female with chief complaint of hypoxia and shortness of breath.  The patient is hemodynamically stable on 3 L of O2 via nasal cannula.  Differential diagnosis includes but is not limited to pneumonia, pneumothorax, PE, and/or ew onset COPD.  The patient has a  history of chronic shortness of breath and hypoxia.  The patient has extensive smoking history of approximately 50 pack years.  She quit smoking approximately 25 years ago.  The  "patient has been afebrile and has experienced no signs or symptoms of systemic illness other than her shortness of breath.  Pneumonia is low in the differential we will further evaluate for pneumonia and pneumothorax with chest x-ray.  We will further evaluate with CT PE study.  Patient's CT scan does not show evidence of PE.  However, there is a possible neoplasm that was evident on the image.  Additionally there was a ground-glass opacity in the right upper lobe which might be the result of an infectious process.  Therefore we will provide ceftriaxone to treat empirically for pneumonia.  Additionally, we will admit the patient for hypoxia and a new onset oxygen requirement.    Amount and/or Complexity of Data Reviewed  External Data Reviewed: notes.     Details: I reviewed Dr. Prater"s pulmonology notes regarding this patient's.  She was diagnosed with shortness of breath as a result of rhinitis and paroxysmal nocturnal dyspnea.  Dr. Prater provided a trial of prednisone and recommended Advair that the patient is still experiencing symptoms of shortness of breath.  Labs: ordered. Decision-making details documented in ED Course.  Radiology: ordered and independent interpretation performed. Decision-making details documented in ED Course.  ECG/medicine tests: ordered and independent interpretation performed. Decision-making details documented in ED Course.    Risk  Prescription drug management.  Decision regarding hospitalization.               ED Course as of 01/14/24 1326   Sun Jan 14, 2024   0847 Patient has responded well to oxygen via nasal cannula.  Satting 98% on 3 L via nasal cannula. [VC]   0853 EKGs significant for your normal sinus rhythm.  Normal axis.  Normal intervals.  No ischemic changes.  No signs of acute STEMI. [VC]   0914 WBC(!): 13.42 [AC]   0922 POC PH: 7.358 [AC]   0922 POC PCO2(!): 54.4 [AC]   0942 Troponin I: <0.006 [AC]   0943 BNP: 25 [AC]   1005 CMP unremarkable.  CBC remarkable for " leukocytosis.  BNP and troponin within normal limits. [VC]   1011 My independent interpretation of the chest x-ray is that there was an opacity in the right lower lobe. [VC]   1309 We can not rule out infectious cause of her hypoxia.  Therefore we will treat with ceftriaxone. [VC]   1310 My independent interpretation of the CT PE study is that there was no evidence of filling defect.  However, [VC]      ED Course User Index  [AC] Nehemias Rogers, DO  [VC] Duong Acosta MD                             Clinical Impression:  Final diagnoses:  [R06.02] SOB (shortness of breath)  [R07.9] Chest pain  [R09.02] Hypoxia  [J44.1] COPD with acute exacerbation (Primary)  [J18.9] Community acquired pneumonia, unspecified laterality          ED Disposition Condition    Admit Stable                Duong Acosta MD  Resident  01/14/24 1326       Duong Acosta MD  Resident  01/14/24 1322

## 2024-01-14 NOTE — H&P
[facility]  [facility]  History & Physical    Patient Name: Melani Holloway  MRN: [id number]  Patient Class: IP- Inpatient  Admission Date: 1/14/2024  Attending Physician: Walker Rouse MD   Primary Care Provider: Nathaniel Greenfield MD         Patient information was obtained from patient and ER records.     Subjective:     Principal Problem:Hypoxia    Chief Complaint:   Chief Complaint   Patient presents with    Shortness of Breath        HPI:  Melani Tirado  is a 78-year-old lady with a prior medical history of coronary artery disease and hypertension,  rhinitis, hypertension, non thrombocytopenic purpura, DCIS of right breast, impaired fasting glucose, vitamin-D deficiency, positive fecal occult blood test declining colonoscopy, osteopenia presents to the emergency department with shortness of breath.      AAO x3.  last admission 10/14- 10/17 -Pneumonia on her left side.  Patient was started on ceftriaxone and azithromycin and admitted for pneumonia.  6 minute walk test was done prior to discharge patient was requiring 2 L of oxygen with exercise.  discharged with amoxicillin with plan to have PCP reassess the patient's oxygen requirement at follow up.  Since October she has never had full resolution of her breathing symptoms. seen by Dr. Prater in December .  6MWT done and  recommeneded albuterol PRN intermittent shortness of breath and PFTs. s/p ENT eval for chronic rhinitis -  Flonase and nasal saline rinses. She has recently been seeing Dr. Prater who has diagnosed her with breathing problems associated with rhinitis and paroxysmal nocturnal dyspnea.  She measured her pulse oximetry at home and reported that she was satting in the 80s.  She has been 91% in the emergency department on room air.  The patient has been afebrile complains of upper respiratory symptoms such as congestion and coughing but otherwise has no complaints.  She does have some diarrhea which she  attributes to her steroid use.      ER course -  Hypoxia 89% on RA. started on 3LNC. CTA chest -No evidence of pulmonary thromboembolism to the segmental level. 2.2 cm solid, lobular lesion in the left upper lobe.  Neoplasm must be a leading consideration..  There are additional solid and sub solid nodules measuring up to 0.5 cm. PET scan could further evaluate while histologic evaluation may be necessary for definitive diagnosis. Pulmonology consultation recommended. Focal ground-glass opacity in the left upper lobe, may represent infectious/inflammatory etiology. Mediastinal and hilar lymph node. started on ceftriaxone and azithromycin       No Known Allergies       Past Medical History:   Diagnosis Date    Ductal carcinoma in situ (DCIS) of right breast 2017    Hypertension              Past Surgical History:   Procedure Laterality Date    BREAST BIOPSY Right 2010    BREAST LUMPECTOMY Right 2010    cataract surgery                Family History   Problem Relation Age of Onset    No Known Problems Mother      Heart attack Father      Breast cancer Paternal Aunt        Social History            Tobacco Use    Smoking status: Former       Current packs/day: 0.00       Average packs/day: 1 pack/day for 38.0 years (38.0 ttl pk-yrs)       Types: Cigarettes       Start date:        Quit date:        Years since quittin.0    Smokeless tobacco: Never   Substance Use Topics    Alcohol use: Yes       Comment: Occasionally    Drug use: No          Review of Systems:   Pain scale:   Constitutional:  fever,  chills, headache, vision loss, hearing loss, weight loss, Generalized weakness, falls, loss of smell, loss of taste, poor appetite,  sore throat  Respiratory: cough, shortness of breath.   Cardiovascular: chest pain, dizziness, palpitations, orthopnea, swelling of feet, syncope  Gastrointestinal: nausea, vomiting, abdominal pain, diarrhea, black stool,  blood in stool, change in bowel habits,  "constipation  Genitourinary: hematuria, dysuria, urgency, frequency  Integument/Breast: rash,  pruritis  Hematologic/Lymphatic: easy bruising, lymphadenopathy  Musculoskeletal: arthralgias , myalgias, back pain, neck pain, knee pain  Neurological: confusion, seizures, tremors, slurred speech  Behavioral/Psych:  depression, anxiety, auditory or visual hallucinations     OBJECTIVE:     Physical Exam:  Body mass index is 31.71 kg/m².    Constitutional: Appears well-developed and well-nourished. obesity   Head: Normocephalic and atraumatic.   Neck: Normal range of motion. Neck supple.   Cardiovascular: Normal heart rate.  Regular heart rhythm.  Pulmonary/Chest: Effort normal.   Abdominal: No distension.  No tenderness  Musculoskeletal: Normal range of motion. No edema.   Neurological: Alert and oriented to person, place, and time.   Skin: Skin is warm and dry.   Psychiatric: Normal mood and affect. Behavior is normal.                  Vital Signs  Temp: 98.8 °F (37.1 °C) (01/14/24 1642)  Pulse: 74 (01/14/24 1642)  Resp: 16 (01/14/24 1559)  BP: (Abnormal) 129/56 (01/14/24 1642)  SpO2: (Abnormal) 92 % (01/14/24 1642)     24 Hour VS Range    Temp:  [98.4 °F (36.9 °C)-98.8 °F (37.1 °C)]   Pulse:  [74-86]   Resp:  [16-32]   BP: (126-164)/(56-73)   SpO2:  [88 %-100 %]     Intake/Output Summary (Last 24 hours) at 1/14/2024 1738  Last data filed at 1/14/2024 1443  Gross per 24 hour   Intake 100 ml   Output no documentation   Net 100 ml         I/O This Shift:  I/O this shift:  In: 100 [IV Piggyback:100]  Out: -     Wt Readings from Last 3 Encounters:   01/14/24 81.2 kg (179 lb)   12/14/23 81.3 kg (179 lb 3.7 oz)   11/08/23 79.8 kg (176 lb)       I have personally reviewed the vitals and recorded Intake/Output     Laboratory/Diagnostic Data:    CBC/Anemia Labs: Coags:    Recent Labs   Lab 01/14/24  0854   WBC 13.42*   HGB 12.6   HCT 39.0      MCV 84   RDW 15.0*    No results for input(s): "PT", "INR", "APTT" in the last " "168 hours.     Chemistries: ABG:   Recent Labs   Lab 01/14/24  0854      K 3.7      CO2 29   BUN 21   CREATININE 0.8   CALCIUM 8.8   PROT 6.4   BILITOT 0.3   ALKPHOS 86   ALT 18   AST 17    Recent Labs   Lab 01/14/24  0912   PH 7.358   PCO2 54.4*   PO2 28*   HCO3 30.6*   POCSATURATED 49   BE 5*        POCT Glucose: HbA1c:    No results for input(s): "POCTGLUCOSE" in the last 168 hours. Hemoglobin A1C   Date Value Ref Range Status   05/04/2023 5.9 (H) 4.0 - 5.6 % Final     Comment:     ADA Screening Guidelines:  5.7-6.4%  Consistent with prediabetes  >or=6.5%  Consistent with diabetes    High levels of fetal hemoglobin interfere with the HbA1C  assay. Heterozygous hemoglobin variants (HbS, HgC, etc)do  not significantly interfere with this assay.   However, presence of multiple variants may affect accuracy.     10/31/2022 5.9 (H) 4.0 - 5.6 % Final     Comment:     ADA Screening Guidelines:  5.7-6.4%  Consistent with prediabetes  >or=6.5%  Consistent with diabetes    High levels of fetal hemoglobin interfere with the HbA1C  assay. Heterozygous hemoglobin variants (HbS, HgC, etc)do  not significantly interfere with this assay.   However, presence of multiple variants may affect accuracy.          Cardiac Enzymes: Ejection Fractions:    Recent Labs     01/14/24  0854 01/14/24  1139   TROPONINI <0.006 <0.006    No results found for: "EF"       No results for input(s): "COLORU", "APPEARANCEUA", "PHUR", "SPECGRAV", "PROTEINUA", "GLUCUA", "KETONESU", "BILIRUBINUA", "OCCULTUA", "NITRITE", "UROBILINOGEN", "LEUKOCYTESUR", "RBCUA", "WBCUA", "BACTERIA", "SQUAMEPITHEL", "HYALINECASTS" in the last 48 hours.    Invalid input(s): "WRIGHTSUR"    No results found for: "PROCAL", "LACTATE"  BNP (pg/mL)   Date Value   01/14/2024 25   10/14/2023 32     No results found for: "CRP", "SEDRATE"  D-Dimer (mg/L FEU)   Date Value   10/14/2023 0.48     No results found for: "FERRITIN"  No results found for: "LDH"  Troponin I (ng/mL) "   Date Value   01/14/2024 <0.006   01/14/2024 <0.006   10/14/2023 <0.006     No results found for this or any previous visit.  SARS-CoV2 (COVID-19) Qualitative PCR (no units)   Date Value   01/04/2022 Detected (A)     SARS-CoV-2 RNA, Amplification, Qual (no units)   Date Value   10/14/2023 Negative       Microbiology labs for the last week  Microbiology Results (last 7 days)       ** No results found for the last 168 hours. **            Reviewed and noted in plan where applicable- Please see chart for full lab data.    Lines/Drains:       Peripheral IV - Single Lumen 01/14/24 0853 20 G Left Antecubital (Active)   Site Assessment Clean;Dry;Intact 01/14/24 0853   Line Status Saline locked;Blood return noted 01/14/24 0853   Dressing Status Clean;Dry;Intact 01/14/24 0853   Dressing Intervention First dressing 01/14/24 0853   Number of days: 0       Imaging  ECG Results              EKG 12-lead (Final result)  Result time 01/14/24 10:02:41      Final result by Interface, Lab In [facility] (01/14/24 10:02:41)               Narrative:    Test Reason : R06.02,    Vent. Rate : 078 BPM     Atrial Rate : 078 BPM     P-R Int : 166 ms          QRS Dur : 082 ms      QT Int : 390 ms       P-R-T Axes : 080 053 076 degrees     QTc Int : 444 ms    Normal sinus rhythm  Normal ECG  When compared with ECG of 14-OCT-2023 19:38,  No significant change was found  Confirmed by Nando OVALLES MD ([id number]) on 1/14/2024 10:02:34 AM    Referred By: [email or web address]   SELF           Confirmed By:Nando OVALLES MD                                  No results found for this or any previous visit.      CTA Chest Non-Coronary (PE Studies)  Narrative: EXAMINATION:  CTA CHEST NON CORONARY (PE STUDIES)    CLINICAL HISTORY:  Pulmonary embolism (PE) suspected, unknown D-dimer;    TECHNIQUE:  Low dose axial images, sagittal and coronal reformations were obtained from the thoracic inlet to the lung bases following the IV administration of 75 mL of Omnipaque 350.  MIP images  were performed.    COMPARISON:  None    FINDINGS:  Base of Neck: No significant abnormality.    Thoracic soft tissues: Calcifications in the right breast.    Aorta/vasculature: Left-sided aortic arch.  No aneurysm.  Mild calcific atherosclerosis.    Heart: Normal size. No effusion.  Multivessel coronary artery calcifications.    Pulmonary vasculature: This study is satisfactory for the evaluation of the pulmonary arteries. There is no filling defect of the pulmonary arteries to the segmental level to suggest pulmonary thromboembolism.    Debi/Mediastinum: There is a 1.3 cm right hilar lymph node (series 2, image 220).  1.2 cm subcarinal lymph node (series 2, image 242).    Airways: Patent.    Lungs/Pleura: There is a solid, lobular lesion in the left upper lobe measuring 1.5 x 2.2 cm (series 3, image 130), abutting the aortic arch.  There is a focal ground-glass opacity in the left upper lobe (series 3, image 150).  0.5 cm nodule in the right middle lobe (series 3, image 257).  Sub solid 0.5 cm nodule in the right lower lobe (series 3, image 267).  0.5 cm left lower lobe perifissural nodule (series 3, image 223), likely an intrapulmonary lymph node.  There are a few additional scattered micronodules.  No pleural effusion or pneumothorax.    Esophagus: Normal.    Upper Abdomen: Left renal cyst.  Cholelithiasis without evidence of acute cholecystitis.    Bones: No acute fracture. No suspicious lytic or sclerotic lesions.  Degenerative changes of the spine.  Impression: No evidence of pulmonary thromboembolism to the segmental level.    2.2 cm solid, lobular lesion in the left upper lobe.  Neoplasm must be a leading consideration..  There are additional solid and sub solid nodules measuring up to 0.5 cm.    PET scan could further evaluate while histologic evaluation may be necessary for definitive diagnosis. Pulmonology consultation recommended.      Focal ground-glass opacity in the left upper lobe, may represent  infectious/inflammatory etiology.    Mediastinal and hilar lymph nodes as above.    Electronically signed by resident: Christina Caballero  Date:    01/14/2024  Time:    12:33    Electronically signed by: Timmy Long MD  Date:    01/14/2024  Time:    12:57  X-Ray Chest AP Portable  Narrative: EXAMINATION:  XR CHEST AP PORTABLE    CLINICAL HISTORY:  Hypoxemia    TECHNIQUE:  Single frontal view of the chest was performed.    COMPARISON:  Chest radiograph performed 10/14/2023, 20:12 hours.    FINDINGS:  Monitoring leads overlie the chest.  Grossly unchanged cardiomediastinal contours.    Lungs grossly clear.    No definite pneumothorax or large volume pleural effusion.    No acute findings in the visualized abdomen.    Osseous and soft tissue structures appear without definite acute change.  Impression: No convincing evidence of acute cardiopulmonary disease.    Electronically signed by: Ozzie Sharma  Date:    01/14/2024  Time:    10:10    EKG - NSR at  78bpm    Labs, Imaging, EKG and Diagnostic results from 1/14/2024 were reviewed.    Medications:  Medication list was reviewed and changes noted under Assessment/Plan.  No current facility-administered medications on file prior to encounter.     Current Outpatient Medications on File Prior to Encounter   Medication Sig Dispense Refill    albuterol (PROAIR HFA) 90 mcg/actuation inhaler Inhale 2 puffs into the lungs every 6 (six) hours as needed for Wheezing. Rescue 6.7 g 1    amLODIPine (NORVASC) 10 MG tablet TAKE 1 TABLET BY MOUTH EVERY DAY 90 tablet 3    aspirin (ECOTRIN) 81 MG EC tablet Take 81 mg by mouth every other day.       azelastine (ASTELIN) 137 mcg (0.1 %) nasal spray 1 spray (137 mcg total) by Nasal route 2 (two) times daily. 30 mL 3    benzonatate (TESSALON PERLES) 100 MG capsule Take 1 capsule (100 mg total) by mouth every 6 (six) hours as needed for Cough. 30 capsule 1    calcium carbonate (OS-JONAS) 600 mg calcium (1,500 mg) Tab Take 600 mg by mouth  once.      fluticasone propionate (FLONASE) 50 mcg/actuation nasal spray 1 spray (50 mcg total) by Each Nostril route once daily. 1 Bottle 0    hydroCHLOROthiazide (HYDRODIURIL) 25 MG tablet TAKE 1 TABLET BY MOUTH EVERY DAY IN THE MORNING 90 tablet 3    lisinopriL (PRINIVIL,ZESTRIL) 40 MG tablet TAKE 1 TABLET BY MOUTH EVERY DAY 90 tablet 3    vit A/vit C/vit E/zinc/copper (ICAPS AREDS ORAL) Take 1 tablet by mouth once daily.      vitamin D 1000 units Tab Take 1,000 Units by mouth once daily.      [] predniSONE (DELTASONE) 20 MG tablet Take 2 tablets (40 mg total) by mouth once daily. for 5 days 10 tablet 0     Scheduled Medications:  [START ON 1/15/2024] amLODIPine, 10 mg, Oral, Daily  [START ON 1/15/2024] aspirin, 81 mg, Oral, Every other day  azelastine, 1 spray, Nasal, BID  azithromycin, 500 mg, Intravenous, ED 1 Time  fluticasone propionate, 1 spray, Each Nostril, Daily  [START ON 1/15/2024] hydroCHLOROthiazide, 25 mg, Oral, Daily  [START ON 1/15/2024] lisinopriL, 40 mg, Oral, Daily  vitamin D, 1,000 Units, Oral, Daily      PRN:   Infusions:   Estimated Creatinine Clearance: 56.6 mL/min (based on SCr of 0.8 mg/dL).           Assessment/Plan:     * Hypoxia  seen by Dr. Prater in December .  6MWT done and  recommeneded albuterol PRN intermittent shortness of breath and PFTs. continue duonebs PRN. completed prednisone 40mg  -  secondary to above. Hypoxia 89% on RA. started on 3LNC.    pulmonology consulted     Pulmonary nodule  CTA chest -No evidence of pulmonary thromboembolism to the segmental level. 2.2 cm solid, lobular lesion in the left upper lobe.  Neoplasm must be a leading consideration..  There are additional solid and sub solid nodules measuring up to 0.5 cm. PET scan could further evaluate while histologic evaluation may be necessary for definitive diagnosis. Pulmonology consulted    Bronchospasm  seen by Dr. Prater in December .  6MWT done and  recommeneded albuterol PRN intermittent  shortness of breath and PFTs. continue duonebs PRN. completed prednisone 40mg 1/8 - 1/13     Pneumonia  ? CX ray - No convincing evidence of acute cardiopulmonary disease.   . CTA chest -No evidence of pulmonary thromboembolism to the segmental level. 2.2 cm solid, lobular lesion in the left upper lobe.  Neoplasm must be a leading consideration..  There are additional solid and sub solid nodules measuring up to 0.5 cm. PET scan could further evaluate while histologic evaluation may be necessary for definitive diagnosis.    Focal ground-glass opacity in the left upper lobe, may represent infectious/inflammatory etiology. Mediastinal and hilar lymph node. started on ceftriaxone and azithromycin       Chronic rhinitis   s/p ENT eval for chronic rhinitis -  Flonase and nasal saline rinses.    Essential hypertension  Chronic, controlled. Latest blood pressure and vitals reviewed-     Temp:  [98.4 °F (36.9 °C)-98.5 °F (36.9 °C)]   Pulse:  [77-86]   Resp:  [18-32]   BP: (130-164)/(58-73)   SpO2:  [88 %-100 %] .   Home meds for hypertension were reviewed and noted below.   Hypertension Medications               amLODIPine (NORVASC) 10 MG tablet TAKE 1 TABLET BY MOUTH EVERY DAY    hydroCHLOROthiazide (HYDRODIURIL) 25 MG tablet TAKE 1 TABLET BY MOUTH EVERY DAY IN THE MORNING    lisinopriL (PRINIVIL,ZESTRIL) 40 MG tablet TAKE 1 TABLET BY MOUTH EVERY DAY              Ductal carcinoma in situ (DCIS) of right breast  s/p right breat lumpectomy        VTE Risk Mitigation (From admission, onward)           Ordered     IP VTE HIGH RISK PATIENT  Once         01/14/24 1422     Place sequential compression device  Until discontinued         01/14/24 1422                                    Walker Rouse MD  Department of Hospital Medicine  [facility] - Emergency Dept

## 2024-01-14 NOTE — HPI
Melani Tirado  is a 78-year-old lady with a prior medical history of coronary artery disease and hypertension,  rhinitis, hypertension, non thrombocytopenic purpura, DCIS of right breast, impaired fasting glucose, vitamin-D deficiency, positive fecal occult blood test declining colonoscopy, osteopenia presents to the emergency department with shortness of breath.      AAO x3.  last admission 10/14- 10/17 -Pneumonia on her left side.  Patient was started on ceftriaxone and azithromycin and admitted for pneumonia.  6 minute walk test was done prior to discharge patient was requiring 2 L of oxygen with exercise.  discharged with amoxicillin with plan to have PCP reassess the patient's oxygen requirement at follow up.  Since October she has never had full resolution of her breathing symptoms. seen by Dr. Prater in December .  6MWT done and  recommeneded albuterol PRN intermittent shortness of breath and PFTs. s/p ENT eval for chronic rhinitis -  Flonase and nasal saline rinses. She has recently been seeing Dr. Prater who has diagnosed her with breathing problems associated with rhinitis and paroxysmal nocturnal dyspnea.  She measured her pulse oximetry at home and reported that she was satting in the 80s.  She has been 91% in the emergency department on room air.  The patient has been afebrile complains of upper respiratory symptoms such as congestion and coughing but otherwise has no complaints.  She does have some diarrhea which she attributes to her steroid use.      ER course -  Hypoxia 89% on RA. started on 3LNC. CTA chest -No evidence of pulmonary thromboembolism to the segmental level. 2.2 cm solid, lobular lesion in the left upper lobe.  Neoplasm must be a leading consideration..  There are additional solid and sub solid nodules measuring up to 0.5 cm. PET scan could further evaluate while histologic evaluation may be necessary for definitive diagnosis. Pulmonology consultation recommended. Focal  ground-glass opacity in the left upper lobe, may represent infectious/inflammatory etiology. Mediastinal and hilar lymph node. started on ceftriaxone and azithromycin

## 2024-01-14 NOTE — ASSESSMENT & PLAN NOTE
seen by Dr. Prater in December .  6MWT done and  recommeneded albuterol PRN intermittent shortness of breath and PFTs. continue duonebs PRN. completed prednisone 40mg 1/8 - 1/13 secondary to above. Hypoxia 89% on RA. started on 3LNC.    pulmonology consulted

## 2024-01-14 NOTE — ASSESSMENT & PLAN NOTE
CTA chest -No evidence of pulmonary thromboembolism to the segmental level. 2.2 cm solid, lobular lesion in the left upper lobe.  Neoplasm must be a leading consideration..  There are additional solid and sub solid nodules measuring up to 0.5 cm. PET scan could further evaluate while histologic evaluation may be necessary for definitive diagnosis. Pulmonology consulted

## 2024-01-15 PROBLEM — J96.01 ACUTE HYPOXEMIC RESPIRATORY FAILURE: Status: RESOLVED | Noted: 2024-01-15 | Resolved: 2024-01-15

## 2024-01-15 PROBLEM — J96.01 ACUTE HYPOXEMIC RESPIRATORY FAILURE: Status: ACTIVE | Noted: 2024-01-15

## 2024-01-15 LAB
ALBUMIN SERPL BCP-MCNC: 3.2 G/DL (ref 3.5–5.2)
ALP SERPL-CCNC: 82 U/L (ref 55–135)
ALT SERPL W/O P-5'-P-CCNC: 17 U/L (ref 10–44)
ANION GAP SERPL CALC-SCNC: 9 MMOL/L (ref 8–16)
AST SERPL-CCNC: 13 U/L (ref 10–40)
BASOPHILS # BLD AUTO: 0.06 K/UL (ref 0–0.2)
BASOPHILS NFR BLD: 0.6 % (ref 0–1.9)
BILIRUB SERPL-MCNC: 0.3 MG/DL (ref 0.1–1)
BUN SERPL-MCNC: 16 MG/DL (ref 8–23)
CALCIUM SERPL-MCNC: 8.7 MG/DL (ref 8.7–10.5)
CHLORIDE SERPL-SCNC: 107 MMOL/L (ref 95–110)
CO2 SERPL-SCNC: 27 MMOL/L (ref 23–29)
CREAT SERPL-MCNC: 0.6 MG/DL (ref 0.5–1.4)
DIFFERENTIAL METHOD BLD: ABNORMAL
EOSINOPHIL # BLD AUTO: 0 K/UL (ref 0–0.5)
EOSINOPHIL NFR BLD: 0 % (ref 0–8)
ERYTHROCYTE [DISTWIDTH] IN BLOOD BY AUTOMATED COUNT: 15.3 % (ref 11.5–14.5)
EST. GFR  (NO RACE VARIABLE): >60 ML/MIN/1.73 M^2
GLUCOSE SERPL-MCNC: 92 MG/DL (ref 70–110)
HCT VFR BLD AUTO: 37.4 % (ref 37–48.5)
HGB BLD-MCNC: 11.9 G/DL (ref 12–16)
IMM GRANULOCYTES # BLD AUTO: 0.15 K/UL (ref 0–0.04)
IMM GRANULOCYTES NFR BLD AUTO: 1.6 % (ref 0–0.5)
LYMPHOCYTES # BLD AUTO: 1.5 K/UL (ref 1–4.8)
LYMPHOCYTES NFR BLD: 15.5 % (ref 18–48)
MAGNESIUM SERPL-MCNC: 2.1 MG/DL (ref 1.6–2.6)
MCH RBC QN AUTO: 27 PG (ref 27–31)
MCHC RBC AUTO-ENTMCNC: 31.8 G/DL (ref 32–36)
MCV RBC AUTO: 85 FL (ref 82–98)
MONOCYTES # BLD AUTO: 1.8 K/UL (ref 0.3–1)
MONOCYTES NFR BLD: 18.2 % (ref 4–15)
NEUTROPHILS # BLD AUTO: 6.2 K/UL (ref 1.8–7.7)
NEUTROPHILS NFR BLD: 64.1 % (ref 38–73)
NRBC BLD-RTO: 0 /100 WBC
PHOSPHATE SERPL-MCNC: 3.2 MG/DL (ref 2.7–4.5)
PLATELET # BLD AUTO: 243 K/UL (ref 150–450)
PMV BLD AUTO: 12.4 FL (ref 9.2–12.9)
POTASSIUM SERPL-SCNC: 3.5 MMOL/L (ref 3.5–5.1)
PROCALCITONIN SERPL IA-MCNC: 0.03 NG/ML
PROT SERPL-MCNC: 6.4 G/DL (ref 6–8.4)
RBC # BLD AUTO: 4.4 M/UL (ref 4–5.4)
SODIUM SERPL-SCNC: 143 MMOL/L (ref 136–145)
TROPONIN I SERPL DL<=0.01 NG/ML-MCNC: 0.01 NG/ML (ref 0–0.03)
WBC # BLD AUTO: 9.6 K/UL (ref 3.9–12.7)

## 2024-01-15 PROCEDURE — 93005 ELECTROCARDIOGRAM TRACING: CPT

## 2024-01-15 PROCEDURE — 94761 N-INVAS EAR/PLS OXIMETRY MLT: CPT

## 2024-01-15 PROCEDURE — 21400001 HC TELEMETRY ROOM

## 2024-01-15 PROCEDURE — 83735 ASSAY OF MAGNESIUM: CPT | Performed by: HOSPITALIST

## 2024-01-15 PROCEDURE — 84145 PROCALCITONIN (PCT): CPT | Performed by: HOSPITALIST

## 2024-01-15 PROCEDURE — 84100 ASSAY OF PHOSPHORUS: CPT | Performed by: HOSPITALIST

## 2024-01-15 PROCEDURE — 36415 COLL VENOUS BLD VENIPUNCTURE: CPT | Mod: XB | Performed by: HOSPITALIST

## 2024-01-15 PROCEDURE — 63600175 PHARM REV CODE 636 W HCPCS: Performed by: HOSPITALIST

## 2024-01-15 PROCEDURE — 99223 1ST HOSP IP/OBS HIGH 75: CPT | Mod: GC,,, | Performed by: INTERNAL MEDICINE

## 2024-01-15 PROCEDURE — 25000242 PHARM REV CODE 250 ALT 637 W/ HCPCS: Performed by: HOSPITALIST

## 2024-01-15 PROCEDURE — 84484 ASSAY OF TROPONIN QUANT: CPT | Performed by: CLINICAL NURSE SPECIALIST

## 2024-01-15 PROCEDURE — 94640 AIRWAY INHALATION TREATMENT: CPT

## 2024-01-15 PROCEDURE — 27000221 HC OXYGEN, UP TO 24 HOURS

## 2024-01-15 PROCEDURE — 93010 ELECTROCARDIOGRAM REPORT: CPT | Mod: ,,, | Performed by: INTERNAL MEDICINE

## 2024-01-15 PROCEDURE — 25000003 PHARM REV CODE 250: Performed by: HOSPITALIST

## 2024-01-15 PROCEDURE — 36415 COLL VENOUS BLD VENIPUNCTURE: CPT | Performed by: CLINICAL NURSE SPECIALIST

## 2024-01-15 PROCEDURE — 85025 COMPLETE CBC W/AUTO DIFF WBC: CPT | Performed by: HOSPITALIST

## 2024-01-15 PROCEDURE — 80053 COMPREHEN METABOLIC PANEL: CPT | Performed by: HOSPITALIST

## 2024-01-15 RX ORDER — PREDNISONE 5 MG/1
10 TABLET ORAL DAILY
Status: DISCONTINUED | OUTPATIENT
Start: 2024-01-25 | End: 2024-01-16 | Stop reason: HOSPADM

## 2024-01-15 RX ORDER — PREDNISONE 20 MG/1
40 TABLET ORAL DAILY
Status: DISCONTINUED | OUTPATIENT
Start: 2024-01-16 | End: 2024-01-16 | Stop reason: HOSPADM

## 2024-01-15 RX ORDER — PREDNISONE 20 MG/1
40 TABLET ORAL DAILY
Status: DISCONTINUED | OUTPATIENT
Start: 2024-01-15 | End: 2024-01-15

## 2024-01-15 RX ORDER — FLUTICASONE FUROATE AND VILANTEROL 100; 25 UG/1; UG/1
1 POWDER RESPIRATORY (INHALATION) DAILY
Status: DISCONTINUED | OUTPATIENT
Start: 2024-01-15 | End: 2024-01-16 | Stop reason: HOSPADM

## 2024-01-15 RX ORDER — PREDNISONE 20 MG/1
20 TABLET ORAL DAILY
Status: DISCONTINUED | OUTPATIENT
Start: 2024-01-20 | End: 2024-01-16 | Stop reason: HOSPADM

## 2024-01-15 RX ORDER — PREDNISONE 20 MG/1
20 TABLET ORAL DAILY
Status: DISCONTINUED | OUTPATIENT
Start: 2024-01-25 | End: 2024-01-15

## 2024-01-15 RX ADMIN — FLUTICASONE FUROATE AND VILANTEROL TRIFENATATE 1 PUFF: 100; 25 POWDER RESPIRATORY (INHALATION) at 03:01

## 2024-01-15 RX ADMIN — AZELASTINE 137 MCG: 1 SPRAY, METERED NASAL at 09:01

## 2024-01-15 RX ADMIN — AZITHROMYCIN MONOHYDRATE 500 MG: 500 INJECTION, POWDER, LYOPHILIZED, FOR SOLUTION INTRAVENOUS at 10:01

## 2024-01-15 RX ADMIN — AMLODIPINE BESYLATE 10 MG: 10 TABLET ORAL at 09:01

## 2024-01-15 RX ADMIN — CHOLECALCIFEROL TAB 25 MCG (1000 UNIT) 1000 UNITS: 25 TAB at 09:01

## 2024-01-15 RX ADMIN — LISINOPRIL 40 MG: 20 TABLET ORAL at 09:01

## 2024-01-15 RX ADMIN — HYDROCHLOROTHIAZIDE 25 MG: 12.5 TABLET ORAL at 09:01

## 2024-01-15 RX ADMIN — FLUTICASONE PROPIONATE 50 MCG: 50 SPRAY, METERED NASAL at 09:01

## 2024-01-15 RX ADMIN — CEFTRIAXONE 1 G: 1 INJECTION, POWDER, FOR SOLUTION INTRAMUSCULAR; INTRAVENOUS at 09:01

## 2024-01-15 RX ADMIN — PREDNISONE 40 MG: 20 TABLET ORAL at 04:01

## 2024-01-15 RX ADMIN — AZELASTINE 137 MCG: 1 SPRAY, METERED NASAL at 08:01

## 2024-01-15 RX ADMIN — ASPIRIN 81 MG: 81 TABLET, COATED ORAL at 09:01

## 2024-01-15 NOTE — PROGRESS NOTES
[facility] ([address] Medicine  Progress Note    Patient Name: Melani Holloway  MRN: [id number]  Patient Class: IP- Inpatient   Admission Date: 1/14/2024  Length of Stay: 1 days  Attending Physician: Walker Rouse MD  Primary Care Provider: Nathaniel Greenfield MD        Subjective:     Principal Problem:Hypoxia        HPI:   Melani Tirado  is a 78-year-old lady with a prior medical history of coronary artery disease and hypertension,  rhinitis, hypertension, non thrombocytopenic purpura, DCIS of right breast, impaired fasting glucose, vitamin-D deficiency, positive fecal occult blood test declining colonoscopy, osteopenia presents to the emergency department with shortness of breath.      AAO x3.  last admission 10/14- 10/17 -Pneumonia on her left side.  Patient was started on ceftriaxone and azithromycin and admitted for pneumonia.  6 minute walk test was done prior to discharge patient was requiring 2 L of oxygen with exercise.  discharged with amoxicillin with plan to have PCP reassess the patient's oxygen requirement at follow up.  Since October she has never had full resolution of her breathing symptoms. seen by Dr. Prater in December .  6MWT done and  recommeneded albuterol PRN intermittent shortness of breath and PFTs. s/p ENT eval for chronic rhinitis -  Flonase and nasal saline rinses. She has recently been seeing Dr. Prater who has diagnosed her with breathing problems associated with rhinitis and paroxysmal nocturnal dyspnea.  She measured her pulse oximetry at home and reported that she was satting in the 80s.  She has been 91% in the emergency department on room air.  The patient has been afebrile complains of upper respiratory symptoms such as congestion and coughing but otherwise has no complaints.  She does have some diarrhea which she attributes to her steroid use.      ER course -  Hypoxia 89% on RA. started on 3LNC. CTA chest -No evidence of pulmonary  thromboembolism to the segmental level. 2.2 cm solid, lobular lesion in the left upper lobe.  Neoplasm must be a leading consideration..  There are additional solid and sub solid nodules measuring up to 0.5 cm. PET scan could further evaluate while histologic evaluation may be necessary for definitive diagnosis. Pulmonology consultation recommended. Focal ground-glass opacity in the left upper lobe, may represent infectious/inflammatory etiology. Mediastinal and hilar lymph node. started on ceftriaxone and azithromycin     Overview/Hospital Course:  1/15 sats 96% on 4LNC. Taper oxygen as tolerated to Spo2 goal of low 90s Pulmonology consult -likely  undiagnosed COPD with lung nodule.  needs ics+laba inhaler and steroids. recommending continuing azithromycin  and  ceftriaxone discontinued  as low suspicion for infectious PNA. started on breo. prednisone taper - 40 mg  x  5 days, 30 mg x  5 days, 20 mg x 5 days then stop.  follow up with  Dr. Larsen/  Aldo for outpatient EBUS        Review of Systems:   Pain scale:  Pain scale:   Constitutional:  fever,  chills, headache, vision loss, hearing loss, weight loss, Generalized weakness, falls, loss of smell, loss of taste, poor appetite,  sore throat  Respiratory: cough, shortness of breath.   Cardiovascular: chest pain, dizziness, palpitations, orthopnea, swelling of feet, syncope  Gastrointestinal: nausea, vomiting, abdominal pain, diarrhea, black stool,  blood in stool, change in bowel habits, constipation  Genitourinary: hematuria, dysuria, urgency, frequency  Integument/Breast: rash,  pruritis  Hematologic/Lymphatic: easy bruising, lymphadenopathy  Musculoskeletal: arthralgias , myalgias, back pain, neck pain, knee pain  Neurological: confusion, seizures, tremors, slurred speech  Behavioral/Psych:  depression, anxiety, auditory or visual hallucinations   OBJECTIVE:     Physical Exam:  Body mass index is 31.71 kg/m².  Constitutional: Appears well-developed and  "well-nourished. obesity   Head: Normocephalic and atraumatic.   Neck: Normal range of motion. Neck supple.   Cardiovascular: Normal heart rate.  Regular heart rhythm.  Pulmonary/Chest: Effort normal.   Abdominal: No distension.  No tenderness  Musculoskeletal: Normal range of motion. No edema.   Neurological: Alert and oriented to person, place, and time.   Skin: Skin is warm and dry.   Psychiatric: Normal mood and affect. Behavior is normal.                                Vital Signs  Temp: 98.3 °F (36.8 °C) (01/15/24 1639)  Pulse: 74 (01/15/24 1639)  Resp: 18 (01/15/24 1639)  BP: (Abnormal) 144/67 (01/15/24 1639)  SpO2: (Abnormal) 90 % (01/15/24 1639)     24 Hour VS Range    Temp:  [97.6 °F (36.4 °C)-98.8 °F (37.1 °C)]   Pulse:  [61-79]   Resp:  [16-22]   BP: (134-191)/(63-91)   SpO2:  [90 %-96 %]     Intake/Output Summary (Last 24 hours) at 1/15/2024 1708  Last data filed at 1/15/2024 1400  Gross per 24 hour   Intake 460 ml   Output no documentation   Net 460 ml         I/O This Shift:  I/O this shift:  In: 340 [P.O.:340]  Out: -     Wt Readings from Last 3 Encounters:   01/14/24 81.2 kg (179 lb)   12/14/23 81.3 kg (179 lb 3.7 oz)   11/08/23 79.8 kg (176 lb)       I have personally reviewed the vitals and recorded Intake/Output     Laboratory/Diagnostic Data:    CBC/Anemia Labs: Coags:    Recent Labs   Lab 01/14/24  0854 01/15/24  0634   WBC 13.42* 9.60   HGB 12.6 11.9*   HCT 39.0 37.4    243   MCV 84 85   RDW 15.0* 15.3*    No results for input(s): "PT", "INR", "APTT" in the last 168 hours.     Chemistries: ABG:   Recent Labs   Lab 01/14/24  0854 01/15/24  0634    143   K 3.7 3.5    107   CO2 29 27   BUN 21 16   CREATININE 0.8 0.6   CALCIUM 8.8 8.7   PROT 6.4 6.4   BILITOT 0.3 0.3   ALKPHOS 86 82   ALT 18 17   AST 17 13   MG  --  2.1   PHOS  --  3.2    Recent Labs   Lab 01/14/24  0912   PH 7.358   PCO2 54.4*   PO2 28*   HCO3 30.6*   POCSATURATED 49   BE 5*        POCT Glucose: HbA1c:    No " "results for input(s): "POCTGLUCOSE" in the last 168 hours. Hemoglobin A1C   Date Value Ref Range Status   05/04/2023 5.9 (H) 4.0 - 5.6 % Final     Comment:     ADA Screening Guidelines:  5.7-6.4%  Consistent with prediabetes  >or=6.5%  Consistent with diabetes    High levels of fetal hemoglobin interfere with the HbA1C  assay. Heterozygous hemoglobin variants (HbS, HgC, etc)do  not significantly interfere with this assay.   However, presence of multiple variants may affect accuracy.     10/31/2022 5.9 (H) 4.0 - 5.6 % Final     Comment:     ADA Screening Guidelines:  5.7-6.4%  Consistent with prediabetes  >or=6.5%  Consistent with diabetes    High levels of fetal hemoglobin interfere with the HbA1C  assay. Heterozygous hemoglobin variants (HbS, HgC, etc)do  not significantly interfere with this assay.   However, presence of multiple variants may affect accuracy.          Cardiac Enzymes: Ejection Fractions:    Recent Labs     01/14/24  0854 01/14/24  1139 01/15/24  0907   TROPONINI <0.006 <0.006 0.006    No results found for: "EF"       No results for input(s): "COLORU", "APPEARANCEUA", "PHUR", "SPECGRAV", "PROTEINUA", "GLUCUA", "KETONESU", "BILIRUBINUA", "OCCULTUA", "NITRITE", "UROBILINOGEN", "LEUKOCYTESUR", "RBCUA", "WBCUA", "BACTERIA", "SQUAMEPITHEL", "HYALINECASTS" in the last 48 hours.    Invalid input(s): "WRIGHTSUR"    Procalcitonin (ng/mL)   Date Value   01/15/2024 0.03     BNP (pg/mL)   Date Value   01/14/2024 25   10/14/2023 32     No results found for: "CRP", "SEDRATE"  D-Dimer (mg/L FEU)   Date Value   10/14/2023 0.48     No results found for: "FERRITIN"  No results found for: "LDH"  Troponin I (ng/mL)   Date Value   01/15/2024 0.006   01/14/2024 <0.006   01/14/2024 <0.006   10/14/2023 <0.006     No results found for this or any previous visit.  SARS-CoV2 (COVID-19) Qualitative PCR (no units)   Date Value   01/04/2022 Detected (A)     SARS-CoV-2 RNA, Amplification, Qual (no units)   Date Value "   10/14/2023 Negative       Microbiology labs for the last week  Microbiology Results (last 7 days)       ** No results found for the last 168 hours. **            Reviewed and noted in plan where applicable- Please see chart for full lab data.    Lines/Drains:       Peripheral IV - Single Lumen 01/14/24 0853 20 G Left Antecubital (Active)   Site Assessment Clean;Dry;Intact 01/15/24 0400   Line Status Saline locked 01/15/24 0400   Dressing Status Clean;Dry;Intact 01/15/24 0400   Dressing Intervention Integrity maintained 01/15/24 0400   Number of days: 0       Imaging  ECG Results              EKG 12-lead (Final result)  Result time 01/14/24 10:02:41      Final result by Interface, Lab In [facility] (01/14/24 10:02:41)               Narrative:    Test Reason : R06.02,    Vent. Rate : 078 BPM     Atrial Rate : 078 BPM     P-R Int : 166 ms          QRS Dur : 082 ms      QT Int : 390 ms       P-R-T Axes : 080 053 076 degrees     QTc Int : 444 ms    Normal sinus rhythm  Normal ECG  When compared with ECG of 14-OCT-2023 19:38,  No significant change was found  Confirmed by Nando OVALLES MD ([id number]) on 1/14/2024 10:02:34 AM    Referred By: [email or web address]   SELF           Confirmed By:Nando OVALLES MD                                  No results found for this or any previous visit.      X-Ray Chest 1 View  Narrative: EXAMINATION:  XR CHEST 1 VIEW    CLINICAL HISTORY:  chest pain;    TECHNIQUE:  Single frontal view of the chest was performed.    FINDINGS:  The lungs are well expanded and unremarkable.  There is no pneumothorax or pleural fluid.  The cardiac silhouette is not enlarged.  The osseous structures are unremarkable.  Impression: As above.    Electronically signed by: Best Lemon MD  Date:    01/15/2024  Time:    09:25      Labs, Imaging, EKG and Diagnostic results from 1/15/2024 were reviewed.    Medications:  Medication list was reviewed and changes noted under Assessment/Plan.  No current facility-administered medications on  file prior to encounter.     Current Outpatient Medications on File Prior to Encounter   Medication Sig Dispense Refill    albuterol (PROAIR HFA) 90 mcg/actuation inhaler Inhale 2 puffs into the lungs every 6 (six) hours as needed for Wheezing. Rescue 6.7 g 1    amLODIPine (NORVASC) 10 MG tablet TAKE 1 TABLET BY MOUTH EVERY DAY 90 tablet 3    aspirin (ECOTRIN) 81 MG EC tablet Take 81 mg by mouth every other day.       azelastine (ASTELIN) 137 mcg (0.1 %) nasal spray 1 spray (137 mcg total) by Nasal route 2 (two) times daily. 30 mL 3    benzonatate (TESSALON PERLES) 100 MG capsule Take 1 capsule (100 mg total) by mouth every 6 (six) hours as needed for Cough. 30 capsule 1    calcium carbonate (OS-JONAS) 600 mg calcium (1,500 mg) Tab Take 600 mg by mouth once.      fluticasone propionate (FLONASE) 50 mcg/actuation nasal spray 1 spray (50 mcg total) by Each Nostril route once daily. 1 Bottle 0    hydroCHLOROthiazide (HYDRODIURIL) 25 MG tablet TAKE 1 TABLET BY MOUTH EVERY DAY IN THE MORNING 90 tablet 3    lisinopriL (PRINIVIL,ZESTRIL) 40 MG tablet TAKE 1 TABLET BY MOUTH EVERY DAY 90 tablet 3    vit A/vit C/vit E/zinc/copper (ICAPS AREDS ORAL) Take 1 tablet by mouth once daily.      vitamin D 1000 units Tab Take 1,000 Units by mouth once daily.       Scheduled Medications:  amLODIPine, 10 mg, Oral, Daily  aspirin, 81 mg, Oral, Every other day  azelastine, 1 spray, Nasal, BID  azithromycin, 500 mg, Intravenous, Q24H  fluticasone furoate-vilanteroL, 1 puff, Inhalation, Daily  fluticasone propionate, 1 spray, Each Nostril, Daily  hydroCHLOROthiazide, 25 mg, Oral, Daily  lisinopriL, 40 mg, Oral, Daily  predniSONE, 40 mg, Oral, Daily   Followed by  [START ON 1/20/2024] predniSONE, 30 mg, Oral, Daily   Followed by  [START ON 1/25/2024] predniSONE, 20 mg, Oral, Daily  vitamin D, 1,000 Units, Oral, Daily      PRN: albuterol, benzonatate, dextrose 10%, dextrose 10%, glucagon (human recombinant), glucose, glucose, naloxone, sodium  chloride  Infusions:   Estimated Creatinine Clearance: 75.4 mL/min (based on SCr of 0.6 mg/dL).           Assessment/Plan:      * Hypoxia  seen by Dr. Prater in December .  6MWT done and  recommeneded albuterol PRN intermittent shortness of breath and PFTs. continue duonebs PRN. completed prednisone 40mg 1/8 - 1/13 secondary to above. Hypoxia 89% on RA. started on 3LNC.    pulmonology consulted   1/15 sats 96% on 4LNC. Taper oxygen as tolerated to Spo2 goal of low 90s Pulmonology consult -likely  undiagnosed COPD with lung nodule.  needs ics+laba inhaler and steroids. recommending continuing azithromycin  and  ceftriaxone discontinued  as low suspicion for infectious PNA. started on breo. prednisone taper - 40 mg  x  5 days, 30 mg x  5 days, 20 mg x 5 days then stop.      Pulmonary nodule  CTA chest -No evidence of pulmonary thromboembolism to the segmental level. 2.2 cm solid, lobular lesion in the left upper lobe.  Neoplasm must be a leading consideration..  There are additional solid and sub solid nodules measuring up to 0.5 cm. PET scan could further evaluate while histologic evaluation may be necessary for definitive diagnosis. Pulmonology consulted  1/15    follow up with  Dr. Larsen/  Aldo for outpatient EBUS    Bronchospasm  seen by Dr. Prater in December .  6MWT done and  recommeneded albuterol PRN intermittent shortness of breath and PFTs. continue duonebs PRN. completed prednisone 40mg 1/8 - 1/13     Pneumonia  ? CX ray - No convincing evidence of acute cardiopulmonary disease.   . CTA chest -No evidence of pulmonary thromboembolism to the segmental level. 2.2 cm solid, lobular lesion in the left upper lobe.  Neoplasm must be a leading consideration..  There are additional solid and sub solid nodules measuring up to 0.5 cm. PET scan could further evaluate while histologic evaluation may be necessary for definitive diagnosis.    Focal ground-glass opacity in the left upper lobe, may represent  infectious/inflammatory etiology. Mediastinal and hilar lymph node. started on ceftriaxone and azithromycin       Chronic rhinitis   s/p ENT eval for chronic rhinitis -  Flonase and nasal saline rinses.    Essential hypertension  Chronic, controlled. Latest blood pressure and vitals reviewed-     Temp:  [98.4 °F (36.9 °C)-98.5 °F (36.9 °C)]   Pulse:  [77-86]   Resp:  [18-32]   BP: (130-164)/(58-73)   SpO2:  [88 %-100 %] .   Home meds for hypertension were reviewed and noted below.   Hypertension Medications               amLODIPine (NORVASC) 10 MG tablet TAKE 1 TABLET BY MOUTH EVERY DAY    hydroCHLOROthiazide (HYDRODIURIL) 25 MG tablet TAKE 1 TABLET BY MOUTH EVERY DAY IN THE MORNING    lisinopriL (PRINIVIL,ZESTRIL) 40 MG tablet TAKE 1 TABLET BY MOUTH EVERY DAY              Ductal carcinoma in situ (DCIS) of right breast  s/p right breat lumpectomy        VTE Risk Mitigation (From admission, onward)           Ordered     IP VTE HIGH RISK PATIENT  Once         01/14/24 1422     Place sequential compression device  Until discontinued         01/14/24 1422                    Discharge Planning   RODOLFO: 1/16/2024     Code Status: Full Code   Is the patient medically ready for discharge?: (No Documentation)    Reason for patient still in hospital (select all that apply): Treatment  Discharge Plan A: Home                  Walker Rouse MD  Department of Hospital Medicine   [facility] - Med Surg ([place])

## 2024-01-15 NOTE — ASSESSMENT & PLAN NOTE
seen by Dr. Prtaer in December .  6MWT done and  recommeneded albuterol PRN intermittent shortness of breath and PFTs. continue duonebs PRN. completed prednisone 40mg 1/8 - 1/13 secondary to above. Hypoxia 89% on RA. started on 3LNC.    pulmonology consulted   1/15 sats 96% on 4LNC. Taper oxygen as tolerated to Spo2 goal of low 90s Pulmonology consult -likely  undiagnosed COPD with lung nodule.  needs ics+laba inhaler and steroids. recommending continuing azithromycin  and  ceftriaxone discontinued  as low suspicion for infectious PNA. started on breo. prednisone taper - 40 mg  x  5 days, 30 mg x  5 days, 20 mg x 5 days then stop.

## 2024-01-15 NOTE — ED NOTES
"Melani Holloway, a 78 y.o. female presents to the ED w/ complaint of SOB. Pt states "I feel fine right now." Pt is currently on 3L of O2.      Adult Physical Assessment  LOC: Melani Holloway, 78 y.o. female verified via two identifiers.  The patient is awake, alert, oriented and speaking appropriately at this time.  APPEARANCE: Patient resting comfortably and appears to be in no acute distress at this time. Patient is clean and well groomed, patient's clothing is properly fastened.  SKIN:The skin is warm and dry, color consistent with ethnicity, patient has normal skin turgor and moist mucus membranes, skin intact, no breakdown or brusing noted.  MUSCULOSKELETAL: Patient moving all extremities well, no obvious swelling or deformities noted.  RESPIRATORY: Airway is open and patent, respirations are spontaneous, patient has a normal effort and rate, no accessory muscle use noted. Pt is on 3L of O2.  CARDIAC: Patient has a normal rate and rhythm, no periphreal edema noted in any extremity, capillary refill < 3 seconds in all extremities  ABDOMEN: Soft and non tender to palpation, no abdominal distention noted. Bowel sounds present in all four quadrants.  NEUROLOGIC: Eyes open spontaneously, behavior appropriate to situation, follows commands, facial expression symmetrical, bilateral hand grasp equal and even, purposeful motor response noted, normal sensation in all extremities when touched with a finger.      Triage note:  Chief Complaint   Patient presents with    Shortness of Breath     Review of patient's allergies indicates:  No Known Allergies  Past Medical History:   Diagnosis Date    Ductal carcinoma in situ (DCIS) of right breast 09/19/2017    Hypertension        "

## 2024-01-15 NOTE — ASSESSMENT & PLAN NOTE
CTA chest -No evidence of pulmonary thromboembolism to the segmental level. 2.2 cm solid, lobular lesion in the left upper lobe.  Neoplasm must be a leading consideration..  There are additional solid and sub solid nodules measuring up to 0.5 cm. PET scan could further evaluate while histologic evaluation may be necessary for definitive diagnosis. Pulmonology consulted  1/15    follow up with  Dr. Larsen/  Aldo for outpatient EBUS

## 2024-01-15 NOTE — HOSPITAL COURSE
1/15 sats 96% on 4LNC. Taper oxygen as tolerated to Spo2 goal of low 90s Pulmonology consult -likely  undiagnosed COPD with lung nodule.  needs ics+laba inhaler and steroids. recommending continuing azithromycin  and  ceftriaxone discontinued  as low suspicion for infectious PNA. started on breo. prednisone taper - 40 mg  x  5 days, 30 mg x  5 days, 20 mg x 5 days then stop.  follow up with  Dr. Larsen/  Aldo for outpatient EBUS  1/16 contnue with duonebs q 4h. sats 94% on 2LNC.  discharge home with oxygen and pulm follow up

## 2024-01-15 NOTE — ASSESSMENT & PLAN NOTE
1/15 sats 96% on 4LNC. Taper oxygen as tolerated to Spo2 goal of low 90s Pulmonology consult -likely  undiagnosed COPD with lung nodule.  needs ics+laba inhaler and steroids. recommending continuing azithromycin  and  ceftriaxone discontinued  as low suspicion for infectious PNA. started on breo. prednisone taper - 40 mg  x  5 days, 30 mg x  5 days, 20 mg x 5 days then stop.

## 2024-01-15 NOTE — PLAN OF CARE
[facility] - [facility] ([place] [address])  Initial Discharge Assessment       Primary Care Provider: Nathaniel Greenfield MD    Admission Diagnosis: SOB (shortness of breath) [R06.02]  Hypoxia [R09.02]  Chest pain [R07.9]  COPD with acute exacerbation [J44.1]  Community acquired pneumonia, unspecified laterality [J18.9]    Admission Date: 1/14/2024  Expected Discharge Date: 1/16/2024    Transition of Care Barriers: None    Payor: [employer] [facility] / Plan: [employer] CHOICES / Product Type: Medicare Advantage /     Extended Emergency Contact Information  Primary Emergency Contact: Donovan Bran  Mobile Phone: [phone]  Relation: Daughter  Preferred language: English   needed? No    Discharge Plan A: Home  Discharge Plan B: Home      [id number] [place], [place] - [address] [facility]  [address] [facility] [address]  Phone: [phone] Fax: [phone]      Initial Assessment (most recent)       Adult Discharge Assessment - 01/15/24 1325          Discharge Assessment    Assessment Type Discharge Planning Assessment     Confirmed/corrected address, phone number and insurance Yes     Confirmed Demographics Correct on Facesheet     Source of Information family   DaughterAbdulaziz Bran- [phone]    If unable to respond/provide information was family/caregiver contacted? Yes     Communicated RODOLFO with patient/caregiver Yes     Reason For Admission Hypoxia     People in Home alone     Facility Arrived From: Home     Do you expect to return to your current living situation? Yes     Do you have help at home or someone to help you manage your care at home? Yes     Who are your caregiver(s) and their phone number(s)? Tri Bran- [phone]     Prior to hospitilization cognitive status: Alert/Oriented     Current cognitive status: Alert/Oriented     Walking or Climbing Stairs Difficulty no     Dressing/Bathing Difficulty no     Do you have any  problems with: --   Denies    Home Accessibility stairs to enter home     Number of Stairs, Main Entrance none     Surface of Stairs, Main Entrance tile     Stair Railings, Main Entrance none     Landing, Stairs, Main Entrance no railings     Stairs Comment, Main Entrance none     Home Layout Able to live on 1st floor     Equipment Currently Used at Home none     Readmission within 30 days? No     Patient currently being followed by outpatient case management? No     Do you currently have service(s) that help you manage your care at home? No     Do you take prescription medications? Yes     Do you have prescription coverage? Yes     Coverage [employer] [facility] -     Do you have any problems affording any of your prescribed medications? No     Is the patient taking medications as prescribed? yes     Who is going to help you get home at discharge? Tri Gil [phone]     How do you get to doctors appointments? family or friend will provide     Are you on dialysis? No     Do you take coumadin? No     Discharge Plan A Home     Discharge Plan B Home     DME Needed Upon Discharge  other (see comments)   TBD    Discharge Plan discussed with: Adult children   DaughterAbdulaziz Bran- [phone]    Transition of Care Barriers None                        SW called and spoke with patient's daughter in [id number] for Discharge Planning Assessment. Per daughter, Pt lives alone in a single family home on a slab foundation with zero steps to porch and point of entry.  Patient was independent with ADLS and DID NOT use DME or in-home assistive equipment. Pt is not on dialysis  but takes Asprin daily,  takes medications as prescribed / keeps refilled / has resources for all daily and prescriptive needs. Preferred pharmacy is [facility] - Agreeable to bedside delivery. Will have help from DaughterAbdulaziz Bran- [phone]   and other immediate family upon discharge - daughter to  provide transportation home.  All questions addressed. Unit and SW direct numbers provided. Will continue to follow for course of hospitalization    1/14/2024  8:23 AM  SOB (shortness of breath) [R06.02]  Hypoxia [R09.02]  Chest pain [R07.9]  COPD with acute exacerbation [J44.1]  Community acquired pneumonia, unspecified laterality [J18.9]    PCP: Nathaniel Greenfield MD    PHARMACY:   [id number] - [place], [place] - [address] [facility]  [address] [facility] [address]  Phone: [phone] Fax: [phone]      Payor: [employer] MGD [employer] / Plan: [employer] CHOICES / Product Type: Medicare Advantage /       Jennifer Osullivan LMSW  PRN - [facility]  [id number]

## 2024-01-15 NOTE — SUBJECTIVE & OBJECTIVE
Past Medical History:   Diagnosis Date    Ductal carcinoma in situ (DCIS) of right breast 2017    Hypertension        Past Surgical History:   Procedure Laterality Date    BREAST BIOPSY Right 2010    BREAST LUMPECTOMY Right 2010    cataract surgery         Review of patient's allergies indicates:  No Known Allergies    Family History       Problem Relation (Age of Onset)    Breast cancer Paternal Aunt    Heart attack Father    No Known Problems Mother          Tobacco Use    Smoking status: Former     Current packs/day: 0.00     Average packs/day: 1 pack/day for 38.0 years (38.0 ttl pk-yrs)     Types: Cigarettes     Start date:      Quit date:      Years since quittin.0    Smokeless tobacco: Never   Substance and Sexual Activity    Alcohol use: Yes     Comment: Occasionally    Drug use: No    Sexual activity: Never         Review of Systems   Constitutional:  Negative for chills and fever.   HENT:  Positive for postnasal drip. Negative for sinus pain, sore throat and trouble swallowing.    Respiratory:  Positive for shortness of breath. Negative for cough, choking, chest tightness and wheezing.    Cardiovascular:  Negative for chest pain and leg swelling.   Gastrointestinal:  Negative for abdominal pain.     Objective:     Vital Signs (Most Recent):  Temp: 98.1 °F (36.7 °C) (01/15/24 1059)  Pulse: 69 (01/15/24 1150)  Resp: 19 (01/15/24 1059)  BP: (!) 156/74 (01/15/24 1059)  SpO2: (!) 94 % (01/15/24 1059) Vital Signs (24h Range):  Temp:  [97.6 °F (36.4 °C)-98.8 °F (37.1 °C)] 98.1 °F (36.7 °C)  Pulse:  [61-79] 69  Resp:  [16-22] 19  SpO2:  [91 %-96 %] 94 %  BP: (126-191)/(56-91) 156/74     Weight: 81.2 kg (179 lb)  Body mass index is 31.71 kg/m².      Intake/Output Summary (Last 24 hours) at 1/15/2024 1339  Last data filed at 1/15/2024 1042  Gross per 24 hour   Intake 320 ml   Output --   Net 320 ml        Physical Exam  HENT:      Head: Normocephalic and atraumatic.      Mouth/Throat:      Mouth:  Mucous membranes are moist.      Pharynx: Oropharynx is clear.   Eyes:      General:         Right eye: No discharge.         Left eye: No discharge.      Extraocular Movements: Extraocular movements intact.      Conjunctiva/sclera: Conjunctivae normal.   Cardiovascular:      Rate and Rhythm: Normal rate and regular rhythm.   Pulmonary:      Breath sounds: No stridor. Rales present. No wheezing or rhonchi.   Abdominal:      Palpations: Abdomen is soft.   Skin:     General: Skin is warm and dry.   Neurological:      General: No focal deficit present.      Mental Status: She is alert and oriented to person, place, and time. Mental status is at baseline.   Psychiatric:         Mood and Affect: Mood normal.         Behavior: Behavior normal.          Vents:       Lines/Drains/Airways       Peripheral Intravenous Line  Duration                  Peripheral IV - Single Lumen 01/14/24 0853 20 G Left Antecubital 1 day                    Significant Labs:    CBC/Anemia Profile:  Recent Labs   Lab 01/14/24  0854 01/15/24  0634   WBC 13.42* 9.60   HGB 12.6 11.9*   HCT 39.0 37.4    243   MCV 84 85   RDW 15.0* 15.3*        Chemistries:  Recent Labs   Lab 01/14/24  0854 01/15/24  0634    143   K 3.7 3.5    107   CO2 29 27   BUN 21 16   CREATININE 0.8 0.6   CALCIUM 8.8 8.7   ALBUMIN 3.3* 3.2*   PROT 6.4 6.4   BILITOT 0.3 0.3   ALKPHOS 86 82   ALT 18 17   AST 17 13   MG  --  2.1   PHOS  --  3.2       All pertinent labs within the past 24 hours have been reviewed.    Significant Imaging:   I have reviewed all pertinent imaging results/findings within the past 24 hours.

## 2024-01-15 NOTE — PLAN OF CARE
Problem: Adult Inpatient Plan of Care  Goal: Plan of Care Review  Outcome: Ongoing, Progressing  Goal: Patient-Specific Goal (Individualized)  Outcome: Ongoing, Progressing  Goal: Absence of Hospital-Acquired Illness or Injury  Outcome: Ongoing, Progressing  Goal: Optimal Comfort and Wellbeing  Outcome: Ongoing, Progressing     Problem: Respiratory Compromise (Pneumonia)  Goal: Effective Oxygenation and Ventilation  Outcome: Ongoing, Progressing

## 2024-01-15 NOTE — ASSESSMENT & PLAN NOTE
Pulmonary nodule 1.5 cm x 2.2 cm abutting the aortic arch. Has smaller nodules interspersed. Has never had a colonoscopy. Denies N/V, diarrhea, facial flushing.     - Will set up with outpatient visit for EBUS   Abbe Flap (Lower To Upper Lip) Text: The defect of the upper lip was assessed and measured.  Given the location and size of the defect, an Abbe flap was deemed most appropriate.  Using a sterile surgical marker, an appropriate Abbe flap was measured and drawn on the lower lip. Local anesthesia was then infiltrated. A scalpel was then used to incise the upper lip through and through the skin, vermilion, muscle and mucosa, leaving the flap pedicled on the opposite side.  The flap was then rotated and transferred to the lower lip defect.  The flap was then sutured into place with a three layer technique, closing the orbicularis oris muscle layer with subcutaneous buried sutures, followed by a mucosal layer and an epidermal layer.

## 2024-01-15 NOTE — ASSESSMENT & PLAN NOTE
Patient with hypoxia and increased oxygen requirement. She has never had PFTs. States that albuterol offers mild relief. Finished 5 days of steroids and was unsure if that helped improve her breathing. ABG with evidence of obstruction, pH 7.358 and pCO2 54.4. With her smoking history and ABG, suspect that she has COPD.    Recommendations  - Treat as COPD with scheduled nebs, scheduled ICS/LABA inhaler, finish course of azithromycin, and steroids described below  - From pulmonology perspective, can d/c ceftriaxone  - Steroid course: prednisone 40 mg for 5 days, 30 mg for 5 days, 20 mg for 5 days, then d/c steroids  - Will set up outpatient pulm follow up    Recent Labs     01/14/24  0912   PH 7.358   PCO2 54.4*   PO2 28*   HCO3 30.6*   POCSATURATED 49   BE 5*

## 2024-01-15 NOTE — CARE UPDATE
RAPID RESPONSE NURSE PROACTIVE ROUNDING NOTE       Time of Visit: 0900      Admit Date: 2024  LOS: 1  Code Status: Full Code   Date of Visit: 01/15/2024  : 1945  Age: 78 y.o.  Sex: female  Race: White  Bed: 90100/95391 A:   MRN: [id number]  Was the patient discharged from an ICU this admission? No   Was the patient discharged from a PACU within last 24 hours? No   Did the patient receive conscious sedation/general anesthesia in last 24 hours? No  Was the patient in the ED within the past 24 hours? Yes  Was the patient on NIPPV within the past 24 hours? No   Attending Physician: Walker Rouse MD  Primary Service: [facility] MED D   Time spent at the bedside: < 15 min    SITUATION    Notified by charge RN during rounding.  Reason for alert: Chest Pain   Called to evaluate the patient for Respiratory    BACKGROUND     Why is the patient in the hospital?: Hypoxia    Patient has a past medical history of Ductal carcinoma in situ (DCIS) of right breast and Hypertension.    Last Vitals:  Temp: 98.1 °F (36.7 °C) (01/15 1059)  Pulse: 72 (01/15 105)  Resp: 19 (01/15 105)  BP: 156/74 (01/15 105)  SpO2: 94 % (01/15 1059)    24 Hours Vitals Range:  Temp:  [97.6 °F (36.4 °C)-98.8 °F (37.1 °C)]   Pulse:  [61-80]   Resp:  [16-22]   BP: (126-191)/(56-91)   SpO2:  [90 %-96 %]     Labs:  Recent Labs     24  0854 01/15/24  0634   WBC 13.42* 9.60   HGB 12.6 11.9*   HCT 39.0 37.4    243       Recent Labs     24  0854 01/15/24  0634    143   K 3.7 3.5    107   CO2 29 27   BUN 21 16   CREATININE 0.8 0.6    92   PHOS  --  3.2   MG  --  2.1        Recent Labs     24  0912   PH 7.358   PCO2 54.4*   PO2 28*   HCO3 30.6*   POCSATURATED 49   BE 5*        ASSESSMENT    Physical Exam  Vitals and nursing note reviewed.   Cardiovascular:      Rate and Rhythm: Normal rate.   Pulmonary:      Effort: Tachypnea and accessory muscle usage present.      Breath sounds: Wheezing present.       Comments: Increased Chest pain with inhalation   Skin:     General: Skin is warm and dry.   Neurological:      Mental Status: She is alert and oriented to person, place, and time.      GCS: GCS eye subscore is 4. GCS verbal subscore is 5. GCS motor subscore is 6.         INTERVENTIONS    The patient was seen for Cardiac problem. Staff concerns included chest pain. The following interventions were performed: Troponin, portable chest x-ray, EKG, continuous cardiac monitoring continued, and continuous pulse ox monitoring continued.    RECOMMENDATIONS    Cont Care per primary.  Maintain IV access  Cont tele and pulse Ox monitoring   Titrate O2 to maintain Sats >90%.  May benefit from breathing treatments.   Negative EKG, Troponin, and Chest Xray.      PROVIDER ESCALATION    Yes/No  No    Orders received and case discussed with NA.    Disposition: Remain in room 95912.    FOLLOW-UP    Charge Marga TAVAREZ  updated on plan of care. Instructed to call the Rapid Response Nurse, Kev Olivia RN at [phone] for additional questions or concerns.

## 2024-01-15 NOTE — HPI
Ms. Holloway is an 78 year old female with PMH of CAD, HTN, chronic rhinitis, DCIS of the R breast, and positive fecal occult blood testing declining colonoscopy who presented to [facility] ED for SOB. In October she was hospitalized for pneumonia and was discharged with 2L O2. Since then she has intermittently required oxygen at home. She was seen by Dr. Prater in December and started on PRN albuterol with mild improvement. Her home SaO2 readings have been in the high 80s. She felt worsening SOB and was started on a 5 day course of steroids which was recently finished on 1/13. No history of PFTs. Yearly mammograms with no evidence of recurrence. She has never had a colonoscopy. 35 pack-year smoking history; quit over 20 years ago.     In the ED, CTA with no evidence of pulmonary embolism but 2.2 cm lobular lesion was noted in the left upper lobe. Pulmonology was consulted for evaluation of lung nodule.

## 2024-01-15 NOTE — CONSULTS
[facility] Surg ([place] [address])  Pulmonology  Consult Note    Patient Name: Melani Holloway  MRN: [id number]  Admission Date: 1/14/2024  Hospital Length of Stay: 1 days  Code Status: Full Code  Attending Physician: Walker Rouse MD  Primary Care Provider: Nathaniel Greenfield MD   Principal Problem: Hypoxia    Inpatient consult to Pulmonology  Consult performed by: Janeen Ponce MD  Consult ordered by: Walker Rouse MD        Subjective:     HPI:  Ms. Holloway is an 78 year old female with PMH of CAD, HTN, chronic rhinitis, DCIS of the R breast, and positive fecal occult blood testing declining colonoscopy who presented to [facility] ED for SOB. In October she was hospitalized for pneumonia and was discharged with 2L O2. Since then she has intermittently required oxygen at home. She was seen by Dr. Prater in December and started on PRN albuterol with mild improvement. Her home SaO2 readings have been in the high 80s. She felt worsening SOB and was started on a 5 day course of steroids which was recently finished on 1/13. No history of PFTs. Yearly mammograms with no evidence of recurrence. She has never had a colonoscopy. 35 pack-year smoking history; quit over 20 years ago.     In the ED, CTA with no evidence of pulmonary embolism but 2.2 cm lobular lesion was noted in the left upper lobe. Pulmonology was consulted for evaluation of lung nodule.    Past Medical History:   Diagnosis Date    Ductal carcinoma in situ (DCIS) of right breast 09/19/2017    Hypertension        Past Surgical History:   Procedure Laterality Date    BREAST BIOPSY Right 2010    BREAST LUMPECTOMY Right 2010    cataract surgery         Review of patient's allergies indicates:  No Known Allergies    Family History       Problem Relation (Age of Onset)    Breast cancer Paternal Aunt    Heart attack Father    No Known Problems Mother          Tobacco Use    Smoking status: Former     Current packs/day: 0.00     Average packs/day: 1  pack/day for 38.0 years (38.0 ttl pk-yrs)     Types: Cigarettes     Start date:      Quit date:      Years since quittin.0    Smokeless tobacco: Never   Substance and Sexual Activity    Alcohol use: Yes     Comment: Occasionally    Drug use: No    Sexual activity: Never         Review of Systems   Constitutional:  Negative for chills and fever.   HENT:  Positive for postnasal drip. Negative for sinus pain, sore throat and trouble swallowing.    Respiratory:  Positive for shortness of breath. Negative for cough, choking, chest tightness and wheezing.    Cardiovascular:  Negative for chest pain and leg swelling.   Gastrointestinal:  Negative for abdominal pain.     Objective:     Vital Signs (Most Recent):  Temp: 98.1 °F (36.7 °C) (01/15/24 1059)  Pulse: 69 (01/15/24 1150)  Resp: 19 (01/15/24 1059)  BP: (!) 156/74 (01/15/24 1059)  SpO2: (!) 94 % (01/15/24 1059) Vital Signs (24h Range):  Temp:  [97.6 °F (36.4 °C)-98.8 °F (37.1 °C)] 98.1 °F (36.7 °C)  Pulse:  [61-79] 69  Resp:  [16-22] 19  SpO2:  [91 %-96 %] 94 %  BP: (126-191)/(56-91) 156/74     Weight: 81.2 kg (179 lb)  Body mass index is 31.71 kg/m².      Intake/Output Summary (Last 24 hours) at 1/15/2024 1339  Last data filed at 1/15/2024 1042  Gross per 24 hour   Intake 320 ml   Output --   Net 320 ml        Physical Exam  HENT:      Head: Normocephalic and atraumatic.      Mouth/Throat:      Mouth: Mucous membranes are moist.      Pharynx: Oropharynx is clear.   Eyes:      General:         Right eye: No discharge.         Left eye: No discharge.      Extraocular Movements: Extraocular movements intact.      Conjunctiva/sclera: Conjunctivae normal.   Cardiovascular:      Rate and Rhythm: Normal rate and regular rhythm.   Pulmonary:      Breath sounds: No stridor. Rales present. No wheezing or rhonchi.   Abdominal:      Palpations: Abdomen is soft.   Skin:     General: Skin is warm and dry.   Neurological:      General: No focal deficit present.       Mental Status: She is alert and oriented to person, place, and time. Mental status is at baseline.   Psychiatric:         Mood and Affect: Mood normal.         Behavior: Behavior normal.          Vents:       Lines/Drains/Airways       Peripheral Intravenous Line  Duration                  Peripheral IV - Single Lumen 01/14/24 0853 20 G Left Antecubital 1 day                    Significant Labs:    CBC/Anemia Profile:  Recent Labs   Lab 01/14/24  0854 01/15/24  0634   WBC 13.42* 9.60   HGB 12.6 11.9*   HCT 39.0 37.4    243   MCV 84 85   RDW 15.0* 15.3*        Chemistries:  Recent Labs   Lab 01/14/24  0854 01/15/24  0634    143   K 3.7 3.5    107   CO2 29 27   BUN 21 16   CREATININE 0.8 0.6   CALCIUM 8.8 8.7   ALBUMIN 3.3* 3.2*   PROT 6.4 6.4   BILITOT 0.3 0.3   ALKPHOS 86 82   ALT 18 17   AST 17 13   MG  --  2.1   PHOS  --  3.2       All pertinent labs within the past 24 hours have been reviewed.    Significant Imaging:   I have reviewed all pertinent imaging results/findings within the past 24 hours.  Assessment/Plan:     Pulmonary  Acute hypoxemic respiratory failure  Patient with hypoxia and increased oxygen requirement. She has never had PFTs. States that albuterol offers mild relief. Finished 5 days of steroids and was unsure if that helped improve her breathing. ABG with evidence of obstruction, pH 7.358 and pCO2 54.4. With her smoking history and ABG, suspect that she has COPD.    Recommendations  - Treat as COPD with scheduled nebs, scheduled ICS/LABA inhaler, finish course of azithromycin, and steroids described below  - From pulmonology perspective, can d/c ceftriaxone  - Steroid course: prednisone 40 mg for 5 days, 30 mg for 5 days, 20 mg for 5 days, then d/c steroids  - Will set up outpatient pulm follow up    Recent Labs     01/14/24  0912   PH 7.358   PCO2 54.4*   PO2 28*   HCO3 30.6*   POCSATURATED 49   BE 5*       Pulmonary nodule  Pulmonary nodule 1.5 cm x 2.2 cm abutting the  aortic arch. Has smaller nodules interspersed. Has never had a colonoscopy. Denies N/V, diarrhea, facial flushing.     - Will set up with outpatient visit for EBUS         Janeen Ponce MD  Pulmonology  [facility] - Med Surg ([place]-[facility])

## 2024-01-16 ENCOUNTER — TELEPHONE (OUTPATIENT)
Dept: PULMONOLOGY | Facility: CLINIC | Age: 79
End: 2024-01-16
Payer: MEDICARE

## 2024-01-16 VITALS
RESPIRATION RATE: 18 BRPM | TEMPERATURE: 98 F | OXYGEN SATURATION: 92 % | HEIGHT: 63 IN | SYSTOLIC BLOOD PRESSURE: 168 MMHG | BODY MASS INDEX: 31.71 KG/M2 | HEART RATE: 76 BPM | DIASTOLIC BLOOD PRESSURE: 71 MMHG | WEIGHT: 179 LBS

## 2024-01-16 LAB
ALBUMIN SERPL BCP-MCNC: 3.2 G/DL (ref 3.5–5.2)
ALP SERPL-CCNC: 74 U/L (ref 55–135)
ALT SERPL W/O P-5'-P-CCNC: 16 U/L (ref 10–44)
ANION GAP SERPL CALC-SCNC: 9 MMOL/L (ref 8–16)
AST SERPL-CCNC: 12 U/L (ref 10–40)
BASOPHILS # BLD AUTO: 0.04 K/UL (ref 0–0.2)
BASOPHILS NFR BLD: 0.6 % (ref 0–1.9)
BILIRUB SERPL-MCNC: 0.3 MG/DL (ref 0.1–1)
BUN SERPL-MCNC: 15 MG/DL (ref 8–23)
CALCIUM SERPL-MCNC: 8.9 MG/DL (ref 8.7–10.5)
CHLORIDE SERPL-SCNC: 102 MMOL/L (ref 95–110)
CO2 SERPL-SCNC: 29 MMOL/L (ref 23–29)
CREAT SERPL-MCNC: 0.7 MG/DL (ref 0.5–1.4)
DIFFERENTIAL METHOD BLD: ABNORMAL
EOSINOPHIL # BLD AUTO: 0 K/UL (ref 0–0.5)
EOSINOPHIL NFR BLD: 0 % (ref 0–8)
ERYTHROCYTE [DISTWIDTH] IN BLOOD BY AUTOMATED COUNT: 15 % (ref 11.5–14.5)
EST. GFR  (NO RACE VARIABLE): >60 ML/MIN/1.73 M^2
GLUCOSE SERPL-MCNC: 105 MG/DL (ref 70–110)
HCT VFR BLD AUTO: 38.6 % (ref 37–48.5)
HGB BLD-MCNC: 12 G/DL (ref 12–16)
IMM GRANULOCYTES # BLD AUTO: 0.14 K/UL (ref 0–0.04)
IMM GRANULOCYTES NFR BLD AUTO: 2 % (ref 0–0.5)
LYMPHOCYTES # BLD AUTO: 1.6 K/UL (ref 1–4.8)
LYMPHOCYTES NFR BLD: 23.2 % (ref 18–48)
MAGNESIUM SERPL-MCNC: 2.2 MG/DL (ref 1.6–2.6)
MCH RBC QN AUTO: 26.8 PG (ref 27–31)
MCHC RBC AUTO-ENTMCNC: 31.1 G/DL (ref 32–36)
MCV RBC AUTO: 86 FL (ref 82–98)
MONOCYTES # BLD AUTO: 0.7 K/UL (ref 0.3–1)
MONOCYTES NFR BLD: 10.1 % (ref 4–15)
NEUTROPHILS # BLD AUTO: 4.4 K/UL (ref 1.8–7.7)
NEUTROPHILS NFR BLD: 64.1 % (ref 38–73)
NRBC BLD-RTO: 0 /100 WBC
PHOSPHATE SERPL-MCNC: 4 MG/DL (ref 2.7–4.5)
PLATELET # BLD AUTO: 245 K/UL (ref 150–450)
PMV BLD AUTO: 11.5 FL (ref 9.2–12.9)
POTASSIUM SERPL-SCNC: 3.6 MMOL/L (ref 3.5–5.1)
PROT SERPL-MCNC: 6.4 G/DL (ref 6–8.4)
RBC # BLD AUTO: 4.47 M/UL (ref 4–5.4)
SODIUM SERPL-SCNC: 140 MMOL/L (ref 136–145)
WBC # BLD AUTO: 6.91 K/UL (ref 3.9–12.7)

## 2024-01-16 PROCEDURE — 94640 AIRWAY INHALATION TREATMENT: CPT

## 2024-01-16 PROCEDURE — 25000003 PHARM REV CODE 250: Performed by: HOSPITALIST

## 2024-01-16 PROCEDURE — 36415 COLL VENOUS BLD VENIPUNCTURE: CPT | Performed by: HOSPITALIST

## 2024-01-16 PROCEDURE — 63600175 PHARM REV CODE 636 W HCPCS: Performed by: HOSPITALIST

## 2024-01-16 PROCEDURE — 83735 ASSAY OF MAGNESIUM: CPT | Performed by: HOSPITALIST

## 2024-01-16 PROCEDURE — 85025 COMPLETE CBC W/AUTO DIFF WBC: CPT | Performed by: HOSPITALIST

## 2024-01-16 PROCEDURE — 84100 ASSAY OF PHOSPHORUS: CPT | Performed by: HOSPITALIST

## 2024-01-16 PROCEDURE — 25000242 PHARM REV CODE 250 ALT 637 W/ HCPCS: Performed by: HOSPITALIST

## 2024-01-16 PROCEDURE — 80053 COMPREHEN METABOLIC PANEL: CPT | Performed by: HOSPITALIST

## 2024-01-16 PROCEDURE — 94761 N-INVAS EAR/PLS OXIMETRY MLT: CPT

## 2024-01-16 PROCEDURE — 99233 SBSQ HOSP IP/OBS HIGH 50: CPT | Mod: GC,,, | Performed by: INTERNAL MEDICINE

## 2024-01-16 PROCEDURE — 99900035 HC TECH TIME PER 15 MIN (STAT)

## 2024-01-16 PROCEDURE — 27000221 HC OXYGEN, UP TO 24 HOURS

## 2024-01-16 PROCEDURE — 63600175 PHARM REV CODE 636 W HCPCS: Performed by: INTERNAL MEDICINE

## 2024-01-16 RX ORDER — AZITHROMYCIN 250 MG/1
TABLET, FILM COATED ORAL
Qty: 2 TABLET | Refills: 0 | Status: SHIPPED | OUTPATIENT
Start: 2024-01-17 | End: 2024-01-21

## 2024-01-16 RX ORDER — PREDNISONE 10 MG/1
10 TABLET ORAL DAILY
Qty: 5 TABLET | Refills: 0 | Status: SHIPPED | OUTPATIENT
Start: 2024-01-25 | End: 2024-01-30

## 2024-01-16 RX ORDER — PREDNISONE 20 MG/1
20 TABLET ORAL DAILY
Qty: 5 TABLET | Refills: 0 | Status: SHIPPED | OUTPATIENT
Start: 2024-01-20 | End: 2024-01-25

## 2024-01-16 RX ORDER — PREDNISONE 20 MG/1
40 TABLET ORAL DAILY
Qty: 4 TABLET | Refills: 0 | Status: SHIPPED | OUTPATIENT
Start: 2024-01-18 | End: 2024-01-16

## 2024-01-16 RX ORDER — ENOXAPARIN SODIUM 100 MG/ML
40 INJECTION SUBCUTANEOUS EVERY 24 HOURS
Status: DISCONTINUED | OUTPATIENT
Start: 2024-01-16 | End: 2024-01-16 | Stop reason: HOSPADM

## 2024-01-16 RX ORDER — IPRATROPIUM BROMIDE AND ALBUTEROL SULFATE 2.5; .5 MG/3ML; MG/3ML
3 SOLUTION RESPIRATORY (INHALATION) EVERY 4 HOURS
Qty: 75 ML | Refills: 0 | Status: SHIPPED | OUTPATIENT
Start: 2024-01-16 | End: 2025-01-15

## 2024-01-16 RX ORDER — PREDNISONE 20 MG/1
40 TABLET ORAL DAILY
Qty: 6 TABLET | Refills: 0 | Status: SHIPPED | OUTPATIENT
Start: 2024-01-18 | End: 2024-01-16

## 2024-01-16 RX ORDER — FLUTICASONE FUROATE AND VILANTEROL 100; 25 UG/1; UG/1
1 POWDER RESPIRATORY (INHALATION) DAILY
Qty: 60 EACH | Refills: 2 | Status: SHIPPED | OUTPATIENT
Start: 2024-01-16

## 2024-01-16 RX ORDER — IPRATROPIUM BROMIDE AND ALBUTEROL SULFATE 2.5; .5 MG/3ML; MG/3ML
3 SOLUTION RESPIRATORY (INHALATION) EVERY 4 HOURS
Status: DISCONTINUED | OUTPATIENT
Start: 2024-01-16 | End: 2024-01-16 | Stop reason: HOSPADM

## 2024-01-16 RX ORDER — PREDNISONE 20 MG/1
40 TABLET ORAL DAILY
Qty: 6 TABLET | Refills: 0 | Status: SHIPPED | OUTPATIENT
Start: 2024-01-17 | End: 2024-01-20

## 2024-01-16 RX ADMIN — PREDNISONE 40 MG: 20 TABLET ORAL at 09:01

## 2024-01-16 RX ADMIN — AZELASTINE 137 MCG: 1 SPRAY, METERED NASAL at 09:01

## 2024-01-16 RX ADMIN — AMLODIPINE BESYLATE 10 MG: 10 TABLET ORAL at 09:01

## 2024-01-16 RX ADMIN — AZITHROMYCIN MONOHYDRATE 500 MG: 500 INJECTION, POWDER, LYOPHILIZED, FOR SOLUTION INTRAVENOUS at 11:01

## 2024-01-16 RX ADMIN — FLUTICASONE PROPIONATE 50 MCG: 50 SPRAY, METERED NASAL at 09:01

## 2024-01-16 RX ADMIN — LISINOPRIL 40 MG: 20 TABLET ORAL at 09:01

## 2024-01-16 RX ADMIN — FLUTICASONE FUROATE AND VILANTEROL TRIFENATATE 1 PUFF: 100; 25 POWDER RESPIRATORY (INHALATION) at 11:01

## 2024-01-16 RX ADMIN — IPRATROPIUM BROMIDE AND ALBUTEROL SULFATE 3 ML: .5; 3 SOLUTION RESPIRATORY (INHALATION) at 11:01

## 2024-01-16 RX ADMIN — HYDROCHLOROTHIAZIDE 25 MG: 12.5 TABLET ORAL at 09:01

## 2024-01-16 RX ADMIN — CHOLECALCIFEROL TAB 25 MCG (1000 UNIT) 1000 UNITS: 25 TAB at 09:01

## 2024-01-16 NOTE — ASSESSMENT & PLAN NOTE
Pulmonary nodule 1.5 cm x 2.2 cm abutting the aortic arch. Has smaller nodules interspersed. Has never had a colonoscopy. Denies N/V, diarrhea, facial flushing.     - Will set up with outpatient visit for EBUS

## 2024-01-16 NOTE — ASSESSMENT & PLAN NOTE
Patient with hypoxia and increased oxygen requirement. She has never had PFTs. States that albuterol offers mild relief. Finished 5 days of steroids and was unsure if that helped improve her breathing. ABG with evidence of obstruction, pH 7.358 and pCO2 54.4. With her smoking history and ABG, suspect that she has COPD.     Recommendations  - Treat as COPD with scheduled nebs, scheduled ICS/LABA inhaler, finish course of azithromycin, and steroids described below  - From pulmonology perspective, can d/c ceftriaxone  - Steroid course: prednisone 40 mg for 5 days, 20 mg for 5 days, 10 mg for 5 days, then d/c steroids  - Will set up outpatient pulm follow up   Initial (On Arrival)

## 2024-01-16 NOTE — PROGRESS NOTES
[facility] ([place] [address])  Pulmonology  Progress Note    Patient Name: Melani Holloway  MRN: [id number]  Admission Date: 1/14/2024  Hospital Length of Stay: 2 days  Code Status: Full Code  Attending Provider: Walker Rouse MD  Primary Care Provider: Nathaniel Greenfield MD   Principal Problem: Hypoxia    Subjective:     Interval History: NAEON. On 2L NC. Patient states she is symptomatically feeling much better. Improved aeration on exam.      Objective:     Vital Signs (Most Recent):  Temp: 97.6 °F (36.4 °C) (01/16/24 0437)  Pulse: (!) 50 (01/16/24 0437)  Resp: 18 (01/16/24 0437)  BP: 132/61 (01/16/24 0437)  SpO2: (!) 94 % (01/16/24 0437) Vital Signs (24h Range):  Temp:  [97.6 °F (36.4 °C)-98.6 °F (37 °C)] 97.6 °F (36.4 °C)  Pulse:  [49-83] 50  Resp:  [18-19] 18  SpO2:  [90 %-96 %] 94 %  BP: (116-161)/(61-74) 132/61     Weight: 81.2 kg (179 lb)  Body mass index is 31.71 kg/m².      Intake/Output Summary (Last 24 hours) at 1/16/2024 1050  Last data filed at 1/15/2024 1825  Gross per 24 hour   Intake 480 ml   Output --   Net 480 ml        Physical Exam  Constitutional:       Appearance: Normal appearance.   HENT:      Head: Normocephalic and atraumatic.      Nose: Nose normal. No congestion.      Mouth/Throat:      Mouth: Mucous membranes are moist.      Pharynx: Oropharynx is clear.   Eyes:      Extraocular Movements: Extraocular movements intact.      Conjunctiva/sclera: Conjunctivae normal.   Cardiovascular:      Rate and Rhythm: Normal rate and regular rhythm.   Pulmonary:      Effort: Pulmonary effort is normal.      Breath sounds: Normal breath sounds. No wheezing, rhonchi or rales.   Abdominal:      General: Abdomen is flat.      Palpations: Abdomen is soft.   Musculoskeletal:      Cervical back: Normal range of motion and neck supple.      Right lower leg: No edema.      Left lower leg: No edema.   Skin:     General: Skin is warm and dry.   Neurological:      General: No focal deficit  present.      Mental Status: She is alert and oriented to person, place, and time. Mental status is at baseline.   Psychiatric:         Mood and Affect: Mood normal.         Behavior: Behavior normal.         Thought Content: Thought content normal.           Review of Systems   Constitutional:  Negative for chills and fever.   HENT:  Negative for sore throat and trouble swallowing.    Respiratory:  Negative for cough and shortness of breath.    Cardiovascular:  Negative for chest pain.   Neurological:  Negative for headaches.       Vents:       Lines/Drains/Airways       Peripheral Intravenous Line  Duration                  Peripheral IV - Single Lumen 01/14/24 0853 20 G Left Antecubital 2 days                    Significant Labs:    CBC/Anemia Profile:  Recent Labs   Lab 01/15/24  0634 01/16/24  0250   WBC 9.60 6.91   HGB 11.9* 12.0   HCT 37.4 38.6    245   MCV 85 86   RDW 15.3* 15.0*        Chemistries:  Recent Labs   Lab 01/15/24  0634 01/16/24  0250    140   K 3.5 3.6    102   CO2 27 29   BUN 16 15   CREATININE 0.6 0.7   CALCIUM 8.7 8.9   ALBUMIN 3.2* 3.2*   PROT 6.4 6.4   BILITOT 0.3 0.3   ALKPHOS 82 74   ALT 17 16   AST 13 12   MG 2.1 2.2   PHOS 3.2 4.0       All pertinent labs within the past 24 hours have been reviewed.    Significant Imaging:  I have reviewed all pertinent imaging results/findings within the past 24 hours.  Assessment/Plan:     Pulmonary  * Hypoxia  Patient with hypoxia and increased oxygen requirement. She has never had PFTs. States that albuterol offers mild relief. Finished 5 days of steroids and was unsure if that helped improve her breathing. ABG with evidence of obstruction, pH 7.358 and pCO2 54.4. With her smoking history and ABG, suspect that she has COPD.     Recommendations  - Treat as COPD with scheduled nebs, scheduled ICS/LABA inhaler, finish course of azithromycin, and steroids described below  - From pulmonology perspective, can d/c ceftriaxone  - Steroid  course: prednisone 40 mg for 5 days, 20 mg for 5 days, 10 mg for 5 days, then d/c steroids  - Will set up outpatient pulm follow up    Pulmonary nodule  Pulmonary nodule 1.5 cm x 2.2 cm abutting the aortic arch. Has smaller nodules interspersed. Has never had a colonoscopy. Denies N/V, diarrhea, facial flushing.     - Will set up with outpatient visit for EBUS        Janeen Ponce MD  Pulmonology  [facility] Surg ([place])

## 2024-01-16 NOTE — SUBJECTIVE & OBJECTIVE
Interval History: NAEON. On 2L NC. Patient states she is symptomatically feeling much better. Improved aeration on exam.      Objective:     Vital Signs (Most Recent):  Temp: 97.6 °F (36.4 °C) (01/16/24 0437)  Pulse: (!) 50 (01/16/24 0437)  Resp: 18 (01/16/24 0437)  BP: 132/61 (01/16/24 0437)  SpO2: (!) 94 % (01/16/24 0437) Vital Signs (24h Range):  Temp:  [97.6 °F (36.4 °C)-98.6 °F (37 °C)] 97.6 °F (36.4 °C)  Pulse:  [49-83] 50  Resp:  [18-19] 18  SpO2:  [90 %-96 %] 94 %  BP: (116-161)/(61-74) 132/61     Weight: 81.2 kg (179 lb)  Body mass index is 31.71 kg/m².      Intake/Output Summary (Last 24 hours) at 1/16/2024 1050  Last data filed at 1/15/2024 1825  Gross per 24 hour   Intake 480 ml   Output --   Net 480 ml        Physical Exam  Constitutional:       Appearance: Normal appearance.   HENT:      Head: Normocephalic and atraumatic.      Nose: Nose normal. No congestion.      Mouth/Throat:      Mouth: Mucous membranes are moist.      Pharynx: Oropharynx is clear.   Eyes:      Extraocular Movements: Extraocular movements intact.      Conjunctiva/sclera: Conjunctivae normal.   Cardiovascular:      Rate and Rhythm: Normal rate and regular rhythm.   Pulmonary:      Effort: Pulmonary effort is normal.      Breath sounds: Normal breath sounds. No wheezing, rhonchi or rales.   Abdominal:      General: Abdomen is flat.      Palpations: Abdomen is soft.   Musculoskeletal:      Cervical back: Normal range of motion and neck supple.      Right lower leg: No edema.      Left lower leg: No edema.   Skin:     General: Skin is warm and dry.   Neurological:      General: No focal deficit present.      Mental Status: She is alert and oriented to person, place, and time. Mental status is at baseline.   Psychiatric:         Mood and Affect: Mood normal.         Behavior: Behavior normal.         Thought Content: Thought content normal.           Review of Systems   Constitutional:  Negative for chills and fever.   HENT:  Negative  for sore throat and trouble swallowing.    Respiratory:  Negative for cough and shortness of breath.    Cardiovascular:  Negative for chest pain.   Neurological:  Negative for headaches.       Vents:       Lines/Drains/Airways       Peripheral Intravenous Line  Duration                  Peripheral IV - Single Lumen 01/14/24 0853 20 G Left Antecubital 2 days                    Significant Labs:    CBC/Anemia Profile:  Recent Labs   Lab 01/15/24  0634 01/16/24  0250   WBC 9.60 6.91   HGB 11.9* 12.0   HCT 37.4 38.6    245   MCV 85 86   RDW 15.3* 15.0*        Chemistries:  Recent Labs   Lab 01/15/24  0634 01/16/24  0250    140   K 3.5 3.6    102   CO2 27 29   BUN 16 15   CREATININE 0.6 0.7   CALCIUM 8.7 8.9   ALBUMIN 3.2* 3.2*   PROT 6.4 6.4   BILITOT 0.3 0.3   ALKPHOS 82 74   ALT 17 16   AST 13 12   MG 2.1 2.2   PHOS 3.2 4.0       All pertinent labs within the past 24 hours have been reviewed.    Significant Imaging:  I have reviewed all pertinent imaging results/findings within the past 24 hours.

## 2024-01-16 NOTE — NURSING
Home Oxygen Evaluation    Date Performed: 2024    1) Patient's Home O2 Sat on room air, while at rest: 93        If O2 sats on room air at rest are 88% or below, patient qualifies. No additional testing needed. Document N/A in steps 2 and 3. If 89% or above, complete steps 2.      2) Patient's O2 Sat on room air while exercisin        If O2 sats on room air while exercising remain 89% or above patient does not qualify, no further testing needed Document N/A in step 3. If O2 sats on room air while exercising are 88% or below, continue to step 3.      3) Patient's O2 Sat while exercising on O2: 91 at 2 LPM            (Must show improvement from #2 for patients to qualify)    If O2 sats improve on oxygen, patient qualifies for portable oxygen. If not, the patient does not qualify.

## 2024-01-16 NOTE — ASSESSMENT & PLAN NOTE
CTA chest -No evidence of pulmonary thromboembolism to the segmental level. 2.2 cm solid, lobular lesion in the left upper lobe.  Neoplasm must be a leading consideration..  There are additional solid and sub solid nodules measuring up to 0.5 cm. PET scan could further evaluate while histologic evaluation may be necessary for definitive diagnosis. Pulmonology consulted  1/15    follow up with  Dr. Larsen/  [facility] for outpatient EBUS. R/o carcinoid

## 2024-01-16 NOTE — TELEPHONE ENCOUNTER
Spoke with pt, informed her that I'm contacting her in regards to scheduling her appointment with one of our Providers for her nodule. Pt verbalized that she understand and states that she will like to visit with Dr Larsen. I advised pt that Dr Larsen next available appointment is 3/14/24 for 2:00pm. Pt verbalized that she understand and accepted the appointment.

## 2024-01-16 NOTE — ASSESSMENT & PLAN NOTE
seen by Dr. Prater in December .  6MWT done and  recommeneded albuterol PRN intermittent shortness of breath and PFTs. continue duonebs PRN. completed prednisone 40mg 1/8 - 1/13 secondary to above. Hypoxia 89% on RA. started on 3LNC.    pulmonology consulted   1/15 sats 96% on 4LNC. Taper oxygen as tolerated to Spo2 goal of low 90s Pulmonology consult -likely  undiagnosed COPD with lung nodule.  needs ics+laba inhaler and steroids. recommending continuing azithromycin  and  ceftriaxone discontinued  as low suspicion for infectious PNA. started on breo. prednisone taper - 40 mg  x  5 days, 30 mg x  5 days, 20 mg x 5 days then stop.    1/16 contnue with duonebs q 4h. sats 94% on 2LNC.

## 2024-01-16 NOTE — TELEPHONE ENCOUNTER
----- Message from Fern Lester MD sent at 1/15/2024  1:32 PM CST -----  Hi, we saw this patient on the Pulm consults service at [facility]. She has a 2.2 cm ECHO nodule as well as some smaller nodules bilaterally and some mediastinal lymphadenopathy. We would like her to be evaluated urgently by Dr. Carlson or Dr. Larsen for possible EBUS biopsies for further evaluation of these nodules.     Thank you

## 2024-01-16 NOTE — PROGRESS NOTES
[facility] ([address] Medicine  Progress Note    Patient Name: Melani Holloway  MRN: [id number]  Patient Class: IP- Inpatient   Admission Date: 1/14/2024  Length of Stay: 2 days  Attending Physician: Walker Rouse MD  Primary Care Provider: Nathaniel Greenfield MD        Subjective:     Principal Problem:Hypoxia        HPI:   Melani Tirado  is a 78-year-old lady with a prior medical history of coronary artery disease and hypertension,  rhinitis, hypertension, non thrombocytopenic purpura, DCIS of right breast, impaired fasting glucose, vitamin-D deficiency, positive fecal occult blood test declining colonoscopy, osteopenia presents to the emergency department with shortness of breath.      AAO x3.  last admission 10/14- 10/17 -Pneumonia on her left side.  Patient was started on ceftriaxone and azithromycin and admitted for pneumonia.  6 minute walk test was done prior to discharge patient was requiring 2 L of oxygen with exercise.  discharged with amoxicillin with plan to have PCP reassess the patient's oxygen requirement at follow up.  Since October she has never had full resolution of her breathing symptoms. seen by Dr. Prater in December .  6MWT done and  recommeneded albuterol PRN intermittent shortness of breath and PFTs. s/p ENT eval for chronic rhinitis -  Flonase and nasal saline rinses. She has recently been seeing Dr. Pratre who has diagnosed her with breathing problems associated with rhinitis and paroxysmal nocturnal dyspnea.  She measured her pulse oximetry at home and reported that she was satting in the 80s.  She has been 91% in the emergency department on room air.  The patient has been afebrile complains of upper respiratory symptoms such as congestion and coughing but otherwise has no complaints.  She does have some diarrhea which she attributes to her steroid use.      ER course -  Hypoxia 89% on RA. started on 3LNC. CTA chest -No evidence of pulmonary  thromboembolism to the segmental level. 2.2 cm solid, lobular lesion in the left upper lobe.  Neoplasm must be a leading consideration..  There are additional solid and sub solid nodules measuring up to 0.5 cm. PET scan could further evaluate while histologic evaluation may be necessary for definitive diagnosis. Pulmonology consultation recommended. Focal ground-glass opacity in the left upper lobe, may represent infectious/inflammatory etiology. Mediastinal and hilar lymph node. started on ceftriaxone and azithromycin     Overview/Hospital Course:  1/15 sats 96% on 4LNC. Taper oxygen as tolerated to Spo2 goal of low 90s Pulmonology consult -likely  undiagnosed COPD with lung nodule.  needs ics+laba inhaler and steroids. recommending continuing azithromycin  and  ceftriaxone discontinued  as low suspicion for infectious PNA. started on breo. prednisone taper - 40 mg  x  5 days, 30 mg x  5 days, 20 mg x 5 days then stop.  follow up with  Dr. Larsen/  [facility] for outpatient EBUS  1/16 contnue with duonebs q 4h. sats 94% on 2LNC.         Review of Systems:   Pain scale:  Pain scale:   Constitutional:  fever,  chills, headache, vision loss, hearing loss, weight loss, Generalized weakness, falls, loss of smell, loss of taste, poor appetite,  sore throat  Respiratory: cough, shortness of breath.   Cardiovascular: chest pain, dizziness, palpitations, orthopnea, swelling of feet, syncope  Gastrointestinal: nausea, vomiting, abdominal pain, diarrhea, black stool,  blood in stool, change in bowel habits, constipation  Genitourinary: hematuria, dysuria, urgency, frequency  Integument/Breast: rash,  pruritis  Hematologic/Lymphatic: easy bruising, lymphadenopathy  Musculoskeletal: arthralgias , myalgias, back pain, neck pain, knee pain  Neurological: confusion, seizures, tremors, slurred speech  Behavioral/Psych:  depression, anxiety, auditory or visual hallucinations   OBJECTIVE:     Physical Exam:  Body mass index is 31.71  "kg/m².  Constitutional: Appears well-developed and well-nourished. obesity   Head: Normocephalic and atraumatic.   Neck: Normal range of motion. Neck supple.   Cardiovascular: Normal heart rate.  Regular heart rhythm.  Pulmonary/Chest: Effort normal.   Abdominal: No distension.  No tenderness  Musculoskeletal: Normal range of motion. No edema.   Neurological: Alert and oriented to person, place, and time.   Skin: Skin is warm and dry.   Psychiatric: Normal mood and affect. Behavior is normal.                                Vital Signs  Temp: 97.6 °F (36.4 °C) (01/16/24 0437)  Pulse: (Abnormal) 50 (01/16/24 0437)  Resp: 18 (01/16/24 0437)  BP: 132/61 (01/16/24 0437)  SpO2: (Abnormal) 94 % (01/16/24 0437)     24 Hour VS Range    Temp:  [97.6 °F (36.4 °C)-98.6 °F (37 °C)]   Pulse:  [49-83]   Resp:  [18-22]   BP: (116-191)/(61-91)   SpO2:  [90 %-96 %]     Intake/Output Summary (Last 24 hours) at 1/16/2024 0830  Last data filed at 1/15/2024 1825  Gross per 24 hour   Intake 580 ml   Output no documentation   Net 580 ml         I/O This Shift:  No intake/output data recorded.    Wt Readings from Last 3 Encounters:   01/14/24 81.2 kg (179 lb)   12/14/23 81.3 kg (179 lb 3.7 oz)   11/08/23 79.8 kg (176 lb)       I have personally reviewed the vitals and recorded Intake/Output     Laboratory/Diagnostic Data:    CBC/Anemia Labs: Coags:    Recent Labs   Lab 01/14/24  0854 01/15/24  0634 01/16/24  0250   WBC 13.42* 9.60 6.91   HGB 12.6 11.9* 12.0   HCT 39.0 37.4 38.6    243 245   MCV 84 85 86   RDW 15.0* 15.3* 15.0*    No results for input(s): "PT", "INR", "APTT" in the last 168 hours.     Chemistries: ABG:   Recent Labs   Lab 01/14/24  0854 01/15/24  0634 01/16/24  0250    143 140   K 3.7 3.5 3.6    107 102   CO2 29 27 29   BUN 21 16 15   CREATININE 0.8 0.6 0.7   CALCIUM 8.8 8.7 8.9   PROT 6.4 6.4 6.4   BILITOT 0.3 0.3 0.3   ALKPHOS 86 82 74   ALT 18 17 16   AST 17 13 12   MG  --  2.1 2.2   PHOS  --  3.2 4.0 " "   Recent Labs   Lab 01/14/24  0912   PH 7.358   PCO2 54.4*   PO2 28*   HCO3 30.6*   POCSATURATED 49   BE 5*        POCT Glucose: HbA1c:    No results for input(s): "POCTGLUCOSE" in the last 168 hours. Hemoglobin A1C   Date Value Ref Range Status   05/04/2023 5.9 (H) 4.0 - 5.6 % Final     Comment:     ADA Screening Guidelines:  5.7-6.4%  Consistent with prediabetes  >or=6.5%  Consistent with diabetes    High levels of fetal hemoglobin interfere with the HbA1C  assay. Heterozygous hemoglobin variants (HbS, HgC, etc)do  not significantly interfere with this assay.   However, presence of multiple variants may affect accuracy.     10/31/2022 5.9 (H) 4.0 - 5.6 % Final     Comment:     ADA Screening Guidelines:  5.7-6.4%  Consistent with prediabetes  >or=6.5%  Consistent with diabetes    High levels of fetal hemoglobin interfere with the HbA1C  assay. Heterozygous hemoglobin variants (HbS, HgC, etc)do  not significantly interfere with this assay.   However, presence of multiple variants may affect accuracy.          Cardiac Enzymes: Ejection Fractions:    Recent Labs     01/14/24  0854 01/14/24  1139 01/15/24  0907   TROPONINI <0.006 <0.006 0.006    No results found for: "EF"       No results for input(s): "COLORU", "APPEARANCEUA", "PHUR", "SPECGRAV", "PROTEINUA", "GLUCUA", "KETONESU", "BILIRUBINUA", "OCCULTUA", "NITRITE", "UROBILINOGEN", "LEUKOCYTESUR", "RBCUA", "WBCUA", "BACTERIA", "SQUAMEPITHEL", "HYALINECASTS" in the last 48 hours.    Invalid input(s): "WRIGHTSUR"    Procalcitonin (ng/mL)   Date Value   01/15/2024 0.03     BNP (pg/mL)   Date Value   01/14/2024 25   10/14/2023 32     No results found for: "CRP", "SEDRATE"  D-Dimer (mg/L FEU)   Date Value   10/14/2023 0.48     No results found for: "FERRITIN"  No results found for: "LDH"  Troponin I (ng/mL)   Date Value   01/15/2024 0.006   01/14/2024 <0.006   01/14/2024 <0.006   10/14/2023 <0.006     No results found for this or any previous visit.  SARS-CoV2 " (COVID-19) Qualitative PCR (no units)   Date Value   01/04/2022 Detected (A)     SARS-CoV-2 RNA, Amplification, Qual (no units)   Date Value   10/14/2023 Negative       Microbiology labs for the last week  Microbiology Results (last 7 days)       ** No results found for the last 168 hours. **            Reviewed and noted in plan where applicable- Please see chart for full lab data.    Lines/Drains:       Peripheral IV - Single Lumen 01/14/24 0853 20 G Left Antecubital (Active)   Site Assessment Clean;Dry;Intact 01/15/24 0400   Line Status Saline locked 01/15/24 0400   Dressing Status Clean;Dry;Intact 01/15/24 0400   Dressing Intervention Integrity maintained 01/15/24 0400   Number of days: 0       Imaging  ECG Results              EKG 12-lead (Final result)  Result time 01/14/24 10:02:41      Final result by Interface, Lab In [facility] (01/14/24 10:02:41)               Narrative:    Test Reason : R06.02,    Vent. Rate : 078 BPM     Atrial Rate : 078 BPM     P-R Int : 166 ms          QRS Dur : 082 ms      QT Int : 390 ms       P-R-T Axes : 080 053 076 degrees     QTc Int : 444 ms    Normal sinus rhythm  Normal ECG  When compared with ECG of 14-OCT-2023 19:38,  No significant change was found  Confirmed by Nando OVALLES MD ([id number]) on 1/14/2024 10:02:34 AM    Referred By: [email or web address]   SELF           Confirmed By:Nando OVALLES MD                                  No results found for this or any previous visit.      X-Ray Chest 1 View  Narrative: EXAMINATION:  XR CHEST 1 VIEW    CLINICAL HISTORY:  chest pain;    TECHNIQUE:  Single frontal view of the chest was performed.    FINDINGS:  The lungs are well expanded and unremarkable.  There is no pneumothorax or pleural fluid.  The cardiac silhouette is not enlarged.  The osseous structures are unremarkable.  Impression: As above.    Electronically signed by: Best Lemon MD  Date:    01/15/2024  Time:    09:25      Labs, Imaging, EKG and Diagnostic results from 1/16/2024 were  reviewed.    Medications:  Medication list was reviewed and changes noted under Assessment/Plan.  No current facility-administered medications on file prior to encounter.     Current Outpatient Medications on File Prior to Encounter   Medication Sig Dispense Refill    albuterol (PROAIR HFA) 90 mcg/actuation inhaler Inhale 2 puffs into the lungs every 6 (six) hours as needed for Wheezing. Rescue 6.7 g 1    amLODIPine (NORVASC) 10 MG tablet TAKE 1 TABLET BY MOUTH EVERY DAY 90 tablet 3    aspirin (ECOTRIN) 81 MG EC tablet Take 81 mg by mouth every other day.       azelastine (ASTELIN) 137 mcg (0.1 %) nasal spray 1 spray (137 mcg total) by Nasal route 2 (two) times daily. 30 mL 3    benzonatate (TESSALON PERLES) 100 MG capsule Take 1 capsule (100 mg total) by mouth every 6 (six) hours as needed for Cough. 30 capsule 1    calcium carbonate (OS-JONAS) 600 mg calcium (1,500 mg) Tab Take 600 mg by mouth once.      fluticasone propionate (FLONASE) 50 mcg/actuation nasal spray 1 spray (50 mcg total) by Each Nostril route once daily. 1 Bottle 0    hydroCHLOROthiazide (HYDRODIURIL) 25 MG tablet TAKE 1 TABLET BY MOUTH EVERY DAY IN THE MORNING 90 tablet 3    lisinopriL (PRINIVIL,ZESTRIL) 40 MG tablet TAKE 1 TABLET BY MOUTH EVERY DAY 90 tablet 3    vit A/vit C/vit E/zinc/copper (ICAPS AREDS ORAL) Take 1 tablet by mouth once daily.      vitamin D 1000 units Tab Take 1,000 Units by mouth once daily.       Scheduled Medications:  albuterol-ipratropium, 3 mL, Nebulization, Q4H  amLODIPine, 10 mg, Oral, Daily  aspirin, 81 mg, Oral, Every other day  azelastine, 1 spray, Nasal, BID  azithromycin, 500 mg, Intravenous, Q24H  fluticasone furoate-vilanteroL, 1 puff, Inhalation, Daily  fluticasone propionate, 1 spray, Each Nostril, Daily  hydroCHLOROthiazide, 25 mg, Oral, Daily  lisinopriL, 40 mg, Oral, Daily  predniSONE, 40 mg, Oral, Daily   Followed by  [START ON 1/20/2024] predniSONE, 20 mg, Oral, Daily   Followed by  [START ON 1/25/2024]  predniSONE, 10 mg, Oral, Daily  vitamin D, 1,000 Units, Oral, Daily      PRN: albuterol, benzonatate, dextrose 10%, dextrose 10%, glucagon (human recombinant), glucose, glucose, naloxone, sodium chloride  Infusions:   Estimated Creatinine Clearance: 64.7 mL/min (based on SCr of 0.7 mg/dL).           Assessment/Plan:      * Hypoxia  seen by Dr. Prater in December .  6MWT done and  recommeneded albuterol PRN intermittent shortness of breath and PFTs. continue duonebs PRN. completed prednisone 40mg 1/8 - 1/13 secondary to above. Hypoxia 89% on RA. started on 3LNC.    pulmonology consulted   1/15 sats 96% on 4LNC. Taper oxygen as tolerated to Spo2 goal of low 90s Pulmonology consult -likely  undiagnosed COPD with lung nodule.  needs ics+laba inhaler and steroids. recommending continuing azithromycin  and  ceftriaxone discontinued  as low suspicion for infectious PNA. started on breo. prednisone taper - 40 mg  x  5 days, 30 mg x  5 days, 20 mg x 5 days then stop.    1/16 contnue with duonebs q 4h. sats 94% on 2LNC.     Pulmonary nodule  CTA chest -No evidence of pulmonary thromboembolism to the segmental level. 2.2 cm solid, lobular lesion in the left upper lobe.  Neoplasm must be a leading consideration..  There are additional solid and sub solid nodules measuring up to 0.5 cm. PET scan could further evaluate while histologic evaluation may be necessary for definitive diagnosis. Pulmonology consulted  1/15    follow up with  Dr. Larsen/  [facility] for outpatient EBUS. R/o carcinoid     Bronchospasm  seen by Dr. Prater in December .  6MWT done and  recommeneded albuterol PRN intermittent shortness of breath and PFTs. continue duonebs PRN. completed prednisone 40mg 1/8 - 1/13     Pneumonia  ? CX ray - No convincing evidence of acute cardiopulmonary disease.   . CTA chest -No evidence of pulmonary thromboembolism to the segmental level. 2.2 cm solid, lobular lesion in the left upper lobe.  Neoplasm must be a leading  consideration..  There are additional solid and sub solid nodules measuring up to 0.5 cm. PET scan could further evaluate while histologic evaluation may be necessary for definitive diagnosis.    Focal ground-glass opacity in the left upper lobe, may represent infectious/inflammatory etiology. Mediastinal and hilar lymph node. started on ceftriaxone and azithromycin       Chronic rhinitis   s/p ENT eval for chronic rhinitis -  Flonase and nasal saline rinses.    Essential hypertension  Chronic, controlled. Latest blood pressure and vitals reviewed-     Temp:  [98.4 °F (36.9 °C)-98.5 °F (36.9 °C)]   Pulse:  [77-86]   Resp:  [18-32]   BP: (130-164)/(58-73)   SpO2:  [88 %-100 %] .   Home meds for hypertension were reviewed and noted below.   Hypertension Medications               amLODIPine (NORVASC) 10 MG tablet TAKE 1 TABLET BY MOUTH EVERY DAY    hydroCHLOROthiazide (HYDRODIURIL) 25 MG tablet TAKE 1 TABLET BY MOUTH EVERY DAY IN THE MORNING    lisinopriL (PRINIVIL,ZESTRIL) 40 MG tablet TAKE 1 TABLET BY MOUTH EVERY DAY              Ductal carcinoma in situ (DCIS) of right breast  s/p right breat lumpectomy        VTE Risk Mitigation (From admission, onward)           Ordered     IP VTE HIGH RISK PATIENT  Once         01/14/24 1422     Place sequential compression device  Until discontinued         01/14/24 1422                    Discharge Planning   RODOLFO: 1/16/2024     Code Status: Full Code   Is the patient medically ready for discharge?: (No Documentation)    Reason for patient still in hospital (select all that apply): Treatment  Discharge Plan A: Home                  Walker Rouse MD  Department of Hospital Medicine   [facility] ([facility]

## 2024-01-16 NOTE — DISCHARGE SUMMARY
[facility] - Med Surg ([address] Medicine  Discharge Summary      Patient Name: Melani Holloway  MRN: [id number]  CESAR: [id number]  Patient Class: IP- Inpatient  Admission Date: 1/14/2024  Hospital Length of Stay: 2 days  Discharge Date and Time:  01/16/2024 8:34 AM  Attending Physician: Walker Rouse MD   Discharging Provider: Walker Rouse MD  Primary Care Provider: Nathaniel Greenfield MD  [facility] [place] [facility] [place]  [place] [facility] [place]    HPI:    Melani Tirado  is a 78-year-old lady with a prior medical history of coronary artery disease and hypertension,  rhinitis, hypertension, non thrombocytopenic purpura, DCIS of right breast, impaired fasting glucose, vitamin-D deficiency, positive fecal occult blood test declining colonoscopy, osteopenia presents to the emergency department with shortness of breath.      AAO x3.  last admission 10/14- 10/17 -Pneumonia on her left side.  Patient was started on ceftriaxone and azithromycin and admitted for pneumonia.  6 minute walk test was done prior to discharge patient was requiring 2 L of oxygen with exercise.  discharged with amoxicillin with plan to have PCP reassess the patient's oxygen requirement at follow up.  Since October she has never had full resolution of her breathing symptoms. seen by Dr. Prater in December .  6MWT done and  recommeneded albuterol PRN intermittent shortness of breath and PFTs. s/p ENT eval for chronic rhinitis -  Flonase and nasal saline rinses. She has recently been seeing Dr. Prater who has diagnosed her with breathing problems associated with rhinitis and paroxysmal nocturnal dyspnea.  She measured her pulse oximetry at home and reported that she was satting in the 80s.  She has been 91% in the emergency department on room air.  The patient has been afebrile complains of upper respiratory symptoms such as congestion and coughing but otherwise has no complaints.   She does have some diarrhea which she attributes to her steroid use.      ER course -  Hypoxia 89% on RA. started on 3LNC. CTA chest -No evidence of pulmonary thromboembolism to the segmental level. 2.2 cm solid, lobular lesion in the left upper lobe.  Neoplasm must be a leading consideration..  There are additional solid and sub solid nodules measuring up to 0.5 cm. PET scan could further evaluate while histologic evaluation may be necessary for definitive diagnosis. Pulmonology consultation recommended. Focal ground-glass opacity in the left upper lobe, may represent infectious/inflammatory etiology. Mediastinal and hilar lymph node. started on ceftriaxone and azithromycin     * No surgery found *      Hospital Course:   1/15 sats 96% on 4LNC. Taper oxygen as tolerated to Spo2 goal of low 90s Pulmonology consult -likely  undiagnosed COPD with lung nodule.  needs ics+laba inhaler and steroids. recommending continuing azithromycin  and  ceftriaxone discontinued  as low suspicion for infectious PNA. started on breo. prednisone taper - 40 mg  x  5 days, 30 mg x  5 days, 20 mg x 5 days then stop.  follow up with  Dr. Larsen/  [facility] for outpatient EBUS  1/16 contnue with duonebs q 4h. sats 94% on 2LNC.  discharge home with oxygen and pulm follow up      Goals of Care Treatment Preferences:  Code Status: Full Code      Consults:   Consults (From admission, onward)          Status Ordering Provider     Inpatient consult to Pulmonology  Once        Provider:  (Not yet assigned)    Completed LETICIA GALINDO               Assessment/Plan:      * Hypoxia  seen by Dr. Prater in December .  6MWT done and  recommeneded albuterol PRN intermittent shortness of breath and PFTs. continue duonebs PRN. completed prednisone 40mg 1/8 - 1/13 secondary to above. Hypoxia 89% on RA. started on 3LNC.     pulmonology consulted   1/15 sats 96% on 4LNC. Taper oxygen as tolerated to Spo2 goal of low 90s Pulmonology consult -likely   undiagnosed COPD with lung nodule.  needs ics+laba inhaler and steroids. recommending continuing azithromycin  and  ceftriaxone discontinued  as low suspicion for infectious PNA. started on breo. prednisone taper - 40 mg  x  5 days, 30 mg x  5 days, 20 mg x 5 days then stop.    1/16 contnue with duonebs q 4h. sats 94% on 2LNC.      Pulmonary nodule  CTA chest -No evidence of pulmonary thromboembolism to the segmental level. 2.2 cm solid, lobular lesion in the left upper lobe.  Neoplasm must be a leading consideration..  There are additional solid and sub solid nodules measuring up to 0.5 cm. PET scan could further evaluate while histologic evaluation may be necessary for definitive diagnosis. Pulmonology consulted  1/15    follow up with  Dr. Larsen/  [facility] for outpatient EBUS. R/o carcinoid      Bronchospasm  seen by Dr. Prater in December .  6MWT done and  recommeneded albuterol PRN intermittent shortness of breath and PFTs. continue duonebs PRN. completed prednisone 40mg 1/8 - 1/13      Pneumonia  ? CX ray - No convincing evidence of acute cardiopulmonary disease.   . CTA chest -No evidence of pulmonary thromboembolism to the segmental level. 2.2 cm solid, lobular lesion in the left upper lobe.  Neoplasm must be a leading consideration..  There are additional solid and sub solid nodules measuring up to 0.5 cm. PET scan could further evaluate while histologic evaluation may be necessary for definitive diagnosis.     Focal ground-glass opacity in the left upper lobe, may represent infectious/inflammatory etiology. Mediastinal and hilar lymph node. started on ceftriaxone and azithromycin         Chronic rhinitis   s/p ENT eval for chronic rhinitis -  Flonase and nasal saline rinses.     Essential hypertension  Chronic, controlled. Latest blood pressure and vitals reviewed-      Temp:  [98.4 °F (36.9 °C)-98.5 °F (36.9 °C)]   Pulse:  [77-86]   Resp:  [18-32]   BP: (130-164)/(58-73)   SpO2:  [88 %-100 %] .   Home meds  "for hypertension were reviewed and noted below.   Hypertension Medications                    amLODIPine (NORVASC) 10 MG tablet TAKE 1 TABLET BY MOUTH EVERY DAY     hydroCHLOROthiazide (HYDRODIURIL) 25 MG tablet TAKE 1 TABLET BY MOUTH EVERY DAY IN THE MORNING     lisinopriL (PRINIVIL,ZESTRIL) 40 MG tablet TAKE 1 TABLET BY MOUTH EVERY DAY                   Ductal carcinoma in situ (DCIS) of right breast  s/p right breat lumpectomy  Final Active Diagnoses:    Diagnosis Date Noted POA    PRINCIPAL PROBLEM:  Hypoxia [R09.02] 01/14/2024 Yes    Pulmonary nodule [R91.1] 01/14/2024 Yes    Bronchospasm [J98.01] 12/14/2023 Yes    Pneumonia [J18.9] 12/14/2023 Yes    Chronic rhinitis [J31.0] 10/20/2023 Yes    Ductal carcinoma in situ (DCIS) of right breast [D05.11] 09/19/2017 Yes     Chronic    Essential hypertension [I10] 08/11/2016 Yes      Problems Resolved During this Admission:    Diagnosis Date Noted Date Resolved POA    Acute hypoxemic respiratory failure [J96.01] 01/15/2024 01/15/2024 Yes       Discharged Condition: fair    Disposition: Home or Self Carehome    Follow Up:    Patient Instructions:      NEBULIZER KIT (SUPPLIES) FOR HOME USE     Order Specific Question Answer Comments   Height: 5' 3" (1.6 m)    Weight: 81.2 kg (179 lb)    Does patient have medical equipment at home? none    Length of need (1-99 months): 99    Mask or Mouthpiece? Mask      NEBULIZER FOR HOME USE     Order Specific Question Answer Comments   Height: 5' 3" (1.6 m)    Weight: 81.2 kg (179 lb)    Does patient have medical equipment at home? none    Length of need (1-99 months): 99      OXYGEN FOR HOME USE     Order Specific Question Answer Comments   Liter Flow 2    Duration With activity    Qualifying Test Performed at: Activity    Oxygen saturation at rest 93    Oxygen saturation with activity 86    Oxygen saturation with activity on oxygen 91    Portable mode: pulse dose acceptable    Mode: Portable concentrator    Route nasal cannula  " "  Device: home concentrator with portable concentrator    Length of need (in months): 99 mos    Patient condition with qualifying saturation COPD    Height: 5' 3" (1.6 m)    Weight: 81.2 kg (179 lb)    Alternative treatment measures have been tried or considered and deemed clinically ineffective. Yes      Ambulatory referral/consult to Pulmonology   Standing Status: Future   Referral Priority: Routine Referral Type: Consultation   Referral Reason: Specialty Services Required   Requested Specialty: Pulmonary Disease   Number of Visits Requested: 1     Ambulatory referral/consult to Pulmonology   Standing Status: Future   Referral Priority: Routine Referral Type: Consultation   Referral Reason: Specialty Services Required   Requested Specialty: Pulmonary Disease   Number of Visits Requested: 1       Significant Diagnostic Studies: Labs: BMP:   Recent Labs   Lab 01/15/24  0634 01/16/24  0250   GLU 92 105    140   K 3.5 3.6    102   CO2 27 29   BUN 16 15   CREATININE 0.6 0.7   CALCIUM 8.7 8.9   MG 2.1 2.2   , CMP   Recent Labs   Lab 01/15/24  0634 01/16/24  0250    140   K 3.5 3.6    102   CO2 27 29   GLU 92 105   BUN 16 15   CREATININE 0.6 0.7   CALCIUM 8.7 8.9   PROT 6.4 6.4   ALBUMIN 3.2* 3.2*   BILITOT 0.3 0.3   ALKPHOS 82 74   AST 13 12   ALT 17 16   ANIONGAP 9 9   , CBC   Recent Labs   Lab 01/15/24  0634 01/16/24  0250   WBC 9.60 6.91   HGB 11.9* 12.0   HCT 37.4 38.6    245   , INR No results found for: "INR", "PROTIME", Lipid Panel   Lab Results   Component Value Date    CHOL 176 05/04/2023    HDL 40 05/04/2023    LDLCALC 114.4 05/04/2023    TRIG 108 05/04/2023    CHOLHDL 22.7 05/04/2023   , Troponin   Recent Labs   Lab 01/14/24  0854 01/14/24  1139 01/15/24  0907   TROPONINI <0.006 <0.006 0.006   , A1C: No results for input(s): "HGBA1C" in the last 4320 hours., and All labs within the past 24 hours have been reviewed  Microbiology: Blood Culture   Lab Results   Component Value Date " "   LABBLOO No growth after 5 days. 10/14/2023   , Sputum Culture   Lab Results   Component Value Date    GSRESP <10 epithelial cells per low power field. 10/15/2023    GSRESP Moderate WBC's 10/15/2023    GSRESP Rare Gram positive cocci 10/15/2023    GSRESP Rare Gram negative rods 10/15/2023    RESPIRATORYC Normal respiratory noah 10/15/2023    RESPIRATORYC No S aureus or Pseudomonas isolated. 10/15/2023   , and Urine Culture  No results found for: "LABURIN"    X-Ray Chest 1 View  Narrative: EXAMINATION:  XR CHEST 1 VIEW    CLINICAL HISTORY:  chest pain;    TECHNIQUE:  Single frontal view of the chest was performed.    FINDINGS:  The lungs are well expanded and unremarkable.  There is no pneumothorax or pleural fluid.  The cardiac silhouette is not enlarged.  The osseous structures are unremarkable.  Impression: As above.    Electronically signed by: Best Lemon MD  Date:    01/15/2024  Time:    09:25       Pending Diagnostic Studies:       Procedure Component Value Units Date/Time    SARS-Cov2 (COVID) with FLU/RSV by PCR [[id number]] Collected: 01/14/24 1214    Order Status: Sent Lab Status: In process Updated: 01/16/24 1133    Specimen: Respiratory from Nasopharyngeal            Medications:  Reconciled Home Medications:      Medication List        Start taking these medications      albuterol-ipratropium 2.5 mg-0.5 mg/3 mL nebulizer solution  Commonly known as: DUO-NEB  Take 3 mLs by nebulization every 4 (four) hours. Rescue     azithromycin 250 MG tablet  Commonly known as: Z-KATIE  Take 2 tablets by mouth on day 1; Take 1 tablet by mouth on days 2-5  Start taking on: January 17, 2024     fluticasone furoate-vilanteroL 100-25 mcg/dose diskus inhaler  Commonly known as: BREO  Inhale 1 puff into the lungs once daily. Controller            Change how you take these medications      * predniSONE 20 MG tablet  Commonly known as: DELTASONE  Take 2 tablets (40 mg total) by mouth once daily. for 3 days  Start taking on: " January 17, 2024  What changed: You were already taking a medication with the same name, and this prescription was added. Make sure you understand how and when to take each.     * predniSONE 20 MG tablet  Commonly known as: DELTASONE  Take 1 tablet (20 mg total) by mouth once daily. for 5 days  Start taking on: January 20, 2024  What changed:   how much to take  These instructions start on January 20, 2024. If you are unsure what to do until then, ask your doctor or other care provider.     * predniSONE 10 MG tablet  Commonly known as: DELTASONE  Take 1 tablet (10 mg total) by mouth once daily. for 5 days  Start taking on: January 25, 2024  What changed: You were already taking a medication with the same name, and this prescription was added. Make sure you understand how and when to take each.      * This list has 3 medication(s) that are the same as other medications prescribed for you. Read the directions carefully, and ask your doctor or other care provider to review them with you.       Continue taking these medications      albuterol 90 mcg/actuation inhaler  Commonly known as: PROAIR HFA  Inhale 2 puffs into the lungs every 6 (six) hours as needed for Wheezing. Rescue     amLODIPine 10 MG tablet  Commonly known as: NORVASC  TAKE 1 TABLET BY MOUTH EVERY DAY     aspirin 81 MG EC tablet  Commonly known as: ECOTRIN  Take 81 mg by mouth every other day.     azelastine 137 mcg (0.1 %) nasal spray  Commonly known as: ASTELIN  1 spray (137 mcg total) by Nasal route 2 (two) times daily.     benzonatate 100 MG capsule  Commonly known as: TESSALON PERLES  Take 1 capsule (100 mg total) by mouth every 6 (six) hours as needed for Cough.     calcium carbonate 600 mg calcium (1,500 mg) Tab  Commonly known as: OS-JONAS  Take 600 mg by mouth once.     fluticasone propionate 50 mcg/actuation nasal spray  Commonly known as: FLONASE  1 spray (50 mcg total) by Each Nostril route once daily.     hydroCHLOROthiazide 25 MG  tablet  Commonly known as: HYDRODIURIL  TAKE 1 TABLET BY MOUTH EVERY DAY IN THE MORNING     ICAPS AREDS ORAL  Take 1 tablet by mouth once daily.     lisinopriL 40 MG tablet  Commonly known as: PRINIVIL,ZESTRIL  TAKE 1 TABLET BY MOUTH EVERY DAY     vitamin D 1000 units Tab  Commonly known as: VITAMIN D3  Take 1,000 Units by mouth once daily.              Indwelling Lines/Drains at time of discharge:   Lines/Drains/Airways       None                   Time spent on the discharge of patient: 45 minutes         Walker Rouse MD  Department of Hospital Medicine  [facility] Surg ([place]-[facility])

## 2024-01-17 NOTE — PLAN OF CARE
[facility] - [facility] S[facility] ([facility])  Discharge Final Note    Primary Care Provider: Nathaniel Greenfield MD    Expected Discharge Date: 1/16/2024    Final Discharge Note (most recent)       Final Note - 01/17/24 0850          Final Note    Assessment Type Final Discharge Note (P)      Anticipated Discharge Disposition Home or Self Care (P)      What phone number can be called within the next 1-3 days to see how you are doing after discharge? [phone] (P)         Post-Acute Status    Post-Acute Authorization HME (P)      HME Status Set-up Complete/Auth obtained (P)      Coverage [facility] [place] [facility] - (P)                      Important Message from Medicare             Patient dc home w/ HME (home oxygen + nebulizer)

## 2024-02-13 ENCOUNTER — OFFICE VISIT (OUTPATIENT)
Dept: URGENT CARE | Facility: CLINIC | Age: 79
End: 2024-02-13
Payer: MEDICARE

## 2024-02-13 VITALS
TEMPERATURE: 98 F | RESPIRATION RATE: 18 BRPM | HEART RATE: 88 BPM | DIASTOLIC BLOOD PRESSURE: 68 MMHG | OXYGEN SATURATION: 95 % | WEIGHT: 179 LBS | SYSTOLIC BLOOD PRESSURE: 149 MMHG | HEIGHT: 63 IN | BODY MASS INDEX: 31.71 KG/M2

## 2024-02-13 DIAGNOSIS — U07.1 COVID: Primary | ICD-10-CM

## 2024-02-13 DIAGNOSIS — R05.9 COUGH, UNSPECIFIED TYPE: ICD-10-CM

## 2024-02-13 LAB
CTP QC/QA: YES
SARS-COV-2 AG RESP QL IA.RAPID: POSITIVE

## 2024-02-13 PROCEDURE — 99213 OFFICE O/P EST LOW 20 MIN: CPT | Mod: S$GLB,,, | Performed by: PHYSICIAN ASSISTANT

## 2024-02-13 PROCEDURE — 87811 SARS-COV-2 COVID19 W/OPTIC: CPT | Mod: QW,S$GLB,, | Performed by: PHYSICIAN ASSISTANT

## 2024-02-13 NOTE — PATIENT INSTRUCTIONS
Instructions for Patients with Confirmed or Suspected COVID-19    If you are awaiting your test result, you will either be called or it will be released to the patient portal.  If you have any questions about your test, please visit [email or web address] or call our COVID-19 information line at [phone].      Please isolate yourself at home.  You may leave home and/or return to work once the following conditions are met:    If you have symptoms and tested positive:  More than 5 days since symptoms first appeared AND  More than 24 hours fever free without medications AND       symptoms have improved   For five days after ending isolation, masks are required.    If you had no symptoms but tested positive:  More than 5 days since the date of the first positive test. If you develop symptoms, then use the guidelines above  For five days after ending isolation, masks are required.      Testing is not recommended if you are symptom free after completing isolation.

## 2024-02-13 NOTE — PROGRESS NOTES
"Subjective:      Patient ID: Melani Holloway is a 78 y.o. female.    Vitals:  height is 5' 3" (1.6 m) and weight is 81.2 kg (179 lb). Her temperature is 98.1 °F (36.7 °C). Her blood pressure is 149/68 (abnormal) and her pulse is 88. Her respiration is 18 and oxygen saturation is 95%.     Chief Complaint: Cough    Patient presents to the clinic with sinus pressure, cough and congestion x last night.  No fever chills nausea vomiting diarrhea    Cough  This is a new problem. The current episode started yesterday. The problem has been gradually worsening. The problem occurs constantly. The cough is Non-productive. Associated symptoms include headaches, myalgias, nasal congestion and postnasal drip. She has tried nothing for the symptoms. The treatment provided no relief.       HENT:  Positive for postnasal drip.    Respiratory:  Positive for cough.    Musculoskeletal:  Positive for muscle ache.   Neurological:  Positive for headaches.      Objective:     Physical Exam   Constitutional: She is oriented to person, place, and time. She appears well-developed. She is cooperative.  Non-toxic appearance. She does not appear ill. No distress.   HENT:   Head: Normocephalic and atraumatic.   Ears:   Right Ear: Hearing, tympanic membrane, external ear and ear canal normal.   Left Ear: Hearing, tympanic membrane, external ear and ear canal normal.   Nose: Nose normal. No mucosal edema, rhinorrhea or nasal deformity. No epistaxis. Right sinus exhibits no maxillary sinus tenderness and no frontal sinus tenderness. Left sinus exhibits no maxillary sinus tenderness and no frontal sinus tenderness.   Mouth/Throat: Uvula is midline, oropharynx is clear and moist and mucous membranes are normal. No trismus in the jaw. Normal dentition. No uvula swelling. No oropharyngeal exudate, posterior oropharyngeal edema or posterior oropharyngeal erythema.   Eyes: Conjunctivae and lids are normal. No scleral icterus.   Neck: Trachea normal and " phonation normal. Neck supple. No edema present. No erythema present. No neck rigidity present.   Cardiovascular: Normal rate, regular rhythm, normal heart sounds and normal pulses.   Pulmonary/Chest: Effort normal and breath sounds normal. No respiratory distress. She has no decreased breath sounds. She has no rhonchi.   Abdominal: Normal appearance.   Musculoskeletal: Normal range of motion.         General: No deformity. Normal range of motion.   Neurological: She is alert and oriented to person, place, and time. She exhibits normal muscle tone. Coordination normal.   Skin: Skin is warm, dry, intact, not diaphoretic and not pale.   Psychiatric: Her speech is normal and behavior is normal. Judgment and thought content normal.   Nursing note and vitals reviewed.    Results for orders placed or performed in visit on 02/13/24   SARS Coronavirus 2 Antigen, POCT Manual Read   Result Value Ref Range    SARS Coronavirus 2 Antigen Positive (A) Negative     Acceptable Yes      Assessment:     1. COVID    2. Cough, unspecified type        Plan:       COVID  -     nirmatrelvir-ritonavir 150-100 mg DsPk; Take 2 tablets by mouth 2 (two) times daily for 5 days. Each dose contains 1 nirmatrelvir (pink tablet) and 1 ritonavir (white tablet). Take both tablets together  Dispense: 20 tablet; Refill: 0    Cough, unspecified type  -     SARS Coronavirus 2 Antigen, POCT Manual Read      Follow up if symptoms worsen or fail to improve, for F/U with PCP or ED.   Patient Instructions   Instructions for Patients with Confirmed or Suspected COVID-19    If you are awaiting your test result, you will either be called or it will be released to the patient portal.  If you have any questions about your test, please visit [email or web address] or call our COVID-19 information line at [phone].      Please isolate yourself at home.  You may leave home and/or return to work once the following conditions are met:    If  you have symptoms and tested positive:  More than 5 days since symptoms first appeared AND  More than 24 hours fever free without medications AND       symptoms have improved   For five days after ending isolation, masks are required.    If you had no symptoms but tested positive:  More than 5 days since the date of the first positive test. If you develop symptoms, then use the guidelines above  For five days after ending isolation, masks are required.      Testing is not recommended if you are symptom free after completing isolation.

## 2024-02-14 ENCOUNTER — PATIENT MESSAGE (OUTPATIENT)
Dept: RESEARCH | Facility: HOSPITAL | Age: 79
End: 2024-02-14
Payer: MEDICARE

## 2024-03-14 ENCOUNTER — OFFICE VISIT (OUTPATIENT)
Dept: PULMONOLOGY | Facility: CLINIC | Age: 79
End: 2024-03-14
Payer: MEDICARE

## 2024-03-14 VITALS
OXYGEN SATURATION: 94 % | DIASTOLIC BLOOD PRESSURE: 62 MMHG | WEIGHT: 183.44 LBS | BODY MASS INDEX: 32.5 KG/M2 | SYSTOLIC BLOOD PRESSURE: 124 MMHG | HEIGHT: 63 IN | HEART RATE: 81 BPM

## 2024-03-14 DIAGNOSIS — R91.1 SOLITARY PULMONARY NODULE: Primary | ICD-10-CM

## 2024-03-14 DIAGNOSIS — J18.9 PNEUMONIA DUE TO INFECTIOUS ORGANISM, UNSPECIFIED LATERALITY, UNSPECIFIED PART OF LUNG: ICD-10-CM

## 2024-03-14 DIAGNOSIS — J96.91 RESPIRATORY FAILURE WITH HYPOXIA, UNSPECIFIED CHRONICITY: ICD-10-CM

## 2024-03-14 DIAGNOSIS — J44.1 COPD WITH ACUTE EXACERBATION: ICD-10-CM

## 2024-03-14 DIAGNOSIS — R09.02 HYPOXIA: ICD-10-CM

## 2024-03-14 DIAGNOSIS — J43.2 CENTRILOBULAR EMPHYSEMA: ICD-10-CM

## 2024-03-14 DIAGNOSIS — R91.1 PULMONARY NODULE: ICD-10-CM

## 2024-03-14 PROBLEM — J43.9 EMPHYSEMA LUNG: Status: ACTIVE | Noted: 2023-12-14

## 2024-03-14 PROCEDURE — 99999 PR PBB SHADOW E&M-EST. PATIENT-LVL V: CPT | Mod: PBBFAC,,, | Performed by: INTERNAL MEDICINE

## 2024-03-14 PROCEDURE — G2211 COMPLEX E/M VISIT ADD ON: HCPCS | Mod: S$GLB,,, | Performed by: INTERNAL MEDICINE

## 2024-03-14 PROCEDURE — 99215 OFFICE O/P EST HI 40 MIN: CPT | Mod: S$GLB,,, | Performed by: INTERNAL MEDICINE

## 2024-03-14 NOTE — PROGRESS NOTES
"Subjective:       Patient ID: Melani Holloway is a 78 y.o. female.    Chief Complaint: Pulmonary Nodules    77 yo former smoker who quit 20 years ago.  With a 40 pk year h/o tobacco.  Hospitalized in October for pneumonia, January COPD exacerbation (first) and had covid February.  Seen by Dr. Prater, here for follow up of pulmonary nodule.      Discharged on Breo/advair and signficantly improved.      Doing well since last hospital discharge.  No weight loss.  Dyspnea is improved.  Cannot walk [place].  Workinig in the yard, needs to take a break.  Exercise is steadily declining.      Was discharged from the hospital with supplemental oxygen.    Does not use it.  Lowest saturation to 89%.   Review of Systems   HENT:  Negative for trouble swallowing.    Respiratory:  Positive for dyspnea on extertion. Negative for wheezing.    Gastrointestinal:  Negative for acid reflux.       Objective:      Vitals:    03/14/24 1419   BP: 124/62   BP Location: Right arm   Patient Position: Sitting   BP Method: Medium (Manual)   Pulse: 81   SpO2: (!) 94%   Weight: 83.2 kg (183 lb 6.8 oz)   Height: 5' 3" (1.6 m)      Physical Exam   Constitutional: She is oriented to person, place, and time. She appears well-developed and well-nourished.   HENT:   Head: Normocephalic.   Cardiovascular: Normal rate and regular rhythm.   Pulmonary/Chest: Normal expansion and symmetric chest wall expansion. She has no decreased breath sounds.   Musculoskeletal:         General: No edema. Normal range of motion.   Lymphadenopathy: No supraclavicular adenopathy is present.     She has no cervical adenopathy.   Neurological: She is alert and oriented to person, place, and time.       Personal Diagnostic Review    CT of chest with ECHO 2.1 cm nodule abutting aorta 1/2024,  adenopathy. Emphysema is present.        3/14/2024     2:19 PM 2/13/2024     9:00 AM 1/16/2024     2:04 PM 1/16/2024    11:26 AM 1/16/2024     8:00 AM 1/16/2024     4:37 AM 1/16/2024    " "12:17 AM   Pulmonary Function Tests   SpO2 94 % 95 % 92 % 94 % 92 % 94 % 96 %   Height 5' 3" (1.6 m) 5' 3" (1.6 m)        Weight 83.2 kg (183 lb 6.8 oz) 81.2 kg (179 lb)        BMI (Calculated) 32.5 31.7              Assessment:       1. Solitary pulmonary nodule    2. COPD with acute exacerbation    3. Respiratory failure with hypoxia, unspecified chronicity    4. Centrilobular emphysema    5. Pulmonary nodule    6. Hypoxia    7. Pneumonia due to infectious organism, unspecified laterality, unspecified part of lung        Outpatient Encounter Medications as of 3/14/2024   Medication Sig Dispense Refill    albuterol (PROAIR HFA) 90 mcg/actuation inhaler Inhale 2 puffs into the lungs every 6 (six) hours as needed for Wheezing. Rescue 6.7 g 1    albuterol-ipratropium (DUO-NEB) 2.5 mg-0.5 mg/3 mL nebulizer solution Take 3 mLs by nebulization every 4 (four) hours. Rescue 75 mL 0    amLODIPine (NORVASC) 10 MG tablet TAKE 1 TABLET BY MOUTH EVERY DAY 90 tablet 3    aspirin (ECOTRIN) 81 MG EC tablet Take 81 mg by mouth every other day.       azelastine (ASTELIN) 137 mcg (0.1 %) nasal spray 1 spray (137 mcg total) by Nasal route 2 (two) times daily. 30 mL 3    benzonatate (TESSALON PERLES) 100 MG capsule Take 1 capsule (100 mg total) by mouth every 6 (six) hours as needed for Cough. 30 capsule 1    calcium carbonate (OS-JONAS) 600 mg calcium (1,500 mg) Tab Take 600 mg by mouth once.      fluticasone furoate-vilanteroL (BREO) 100-25 mcg/dose diskus inhaler Inhale 1 puff into the lungs once daily. Controller 60 each 2    fluticasone propionate (FLONASE) 50 mcg/actuation nasal spray 1 spray (50 mcg total) by Each Nostril route once daily. 1 Bottle 0    hydroCHLOROthiazide (HYDRODIURIL) 25 MG tablet TAKE 1 TABLET BY MOUTH EVERY DAY IN THE MORNING 90 tablet 3    lisinopriL (PRINIVIL,ZESTRIL) 40 MG tablet TAKE 1 TABLET BY MOUTH EVERY DAY 90 tablet 3    vit A/vit C/vit E/zinc/copper (ICAPS AREDS ORAL) Take 1 tablet by mouth once " daily.      vitamin D 1000 units Tab Take 1,000 Units by mouth once daily.      [] nirmatrelvir-ritonavir 150-100 mg DsPk Take 2 tablets by mouth 2 (two) times daily for 5 days. Each dose contains 1 nirmatrelvir (pink tablet) and 1 ritonavir (white tablet). Take both tablets together 20 tablet 0     No facility-administered encounter medications on file as of 3/14/2024.     Orders Placed This Encounter   Procedures    CT Chest Without Contrast     Standing Status:   Future     Standing Expiration Date:   3/14/2025     Scheduling Instructions:      Chest Navigational Bronchoscopy     Order Specific Question:   May the Radiologist modify the order per protocol to meet the clinical needs of the patient?     Answer:   No     Order Specific Question:   Reason:     Answer:   Chest Navigational Bronchoscopy    Pulse Oximetry Q4H     Standing Status:   Standing     Number of Occurrences:   20    Stress test, pulmonary     Standing Status:   Future     Standing Expiration Date:   3/14/2025     Order Specific Question:   Reason for study     Answer:   Oxygen prescription     Order Specific Question:   Release to patient     Answer:   Immediate    Case Request Operating Room: ROBOTIC BRONCHOSCOPY     Standing Status:   Standing     Number of Occurrences:   1     Order Specific Question:   Medical Necessity:     Answer:   Medically Urgent [101]     Order Specific Question:   CPT Code:     Answer:   CA BRONCHOSCOPY,BIOPSY [78982]     Order Specific Question:   CPT Code:     Answer:   CA BRONCH W/ EBUS, SAMPLING 3 OR MORE NODES, INCL GUIDE [17827]     Order Specific Question:   CPT Code:     Answer:   CA BRONCH W/ EBUS, DIAG OR THERA INTERVENTION PERIPHERAL LESION(S), INCL GUID, ADD ON CODE [29107]     Order Specific Question:   CPT Code:     Answer:   CA BRONCHOSCOPY,COMPUTER ASSIST/IMAGE-GUIDED NAVIGATION [22505]     Order Specific Question:   Post-Procedure Disposition:     Answer:   Amb Surgery/DOS[facility] [2]     Order  Specific Question:   Is an on-site pathologist required for this procedure?     Answer:   N/A     Plan:     Problem List Items Addressed This Visit       Pneumonia    Overview     Treated, clinically improved.          Emphysema lung    Overview     Continue ics/laba FOR CONTROL  NEEDS pftS    Albuterol as needed.          Hypoxia    Overview     If qualifies per 6MWT, would benefit from POC of choice.          Pulmonary nodule    Overview     Follow up with cCT.  If nodule is persistent,  Will plan for diagnostic and staging robotic bronchoscopy with EBUS    I have explained the risks, benefits and alternatives of the procedure in detail.  The patient voices understanding and all questions have been answered.  The patient agrees to proceed as planned.           Other Visit Diagnoses       Solitary pulmonary nodule    -  Primary    Relevant Orders    CT Chest Without Contrast    Pulse Oximetry Q4H    Case Request Operating Room: ROBOTIC BRONCHOSCOPY (Completed)    COPD with acute exacerbation        Respiratory failure with hypoxia, unspecified chronicity        Relevant Orders    Stress test, pulmonary        Should keep follow up appt with Dr Prater for emphysema follow up and to reestablish care in pulmonary clinic.    Visit today included increased complexity associated with the care of the episodic problem emphysema, pneumonia, hypoxia and lung mass addressed and managing the longitudinal care of the patient due to the serious and/or complex managed problem(s) as stated above with plans discussed, follow up recommendations ordered and scheduled.     50 minutes of total time spent on the encounter, which includes face to face time and non-face to face time preparing to see the patient (eg, review of tests), Obtaining and/or reviewing separately obtained history, Documenting clinical information in the electronic or other health record, Independently interpreting results (not separately reported) and communicating  results to the patient/family/caregiver, or Care coordination (not separately reported).  .

## 2024-04-02 ENCOUNTER — HOSPITAL ENCOUNTER (OUTPATIENT)
Dept: RADIOLOGY | Facility: HOSPITAL | Age: 79
Discharge: HOME | End: 2024-04-02
Attending: INTERNAL MEDICINE
Payer: MEDICARE

## 2024-04-02 ENCOUNTER — HOSPITAL ENCOUNTER (OUTPATIENT)
Dept: PULMONOLOGY | Facility: CLINIC | Age: 79
Discharge: HOME | End: 2024-04-02
Payer: MEDICARE

## 2024-04-02 VITALS — HEIGHT: 63 IN | WEIGHT: 186.06 LBS | BODY MASS INDEX: 32.97 KG/M2

## 2024-04-02 DIAGNOSIS — J96.91 RESPIRATORY FAILURE WITH HYPOXIA, UNSPECIFIED CHRONICITY: ICD-10-CM

## 2024-04-02 DIAGNOSIS — R91.1 SOLITARY PULMONARY NODULE: ICD-10-CM

## 2024-04-02 DIAGNOSIS — J98.01 BRONCHOSPASM: ICD-10-CM

## 2024-04-02 LAB
DLCO SINGLE BREATH LLN: 13.47
DLCO SINGLE BREATH PRE REF: 64.1 %
DLCO SINGLE BREATH REF: 19.2
DLCOC SBVA LLN: 2.56
DLCOC SBVA REF: 4.02
DLCOC SINGLE BREATH LLN: 13.47
DLCOC SINGLE BREATH REF: 19.2
DLCOCSBVAULN: 5.49
DLCOCSINGLEBREATHULN: 24.93
DLCOSINGLEBREATHULN: 24.93
DLCOVA LLN: 2.56
DLCOVA PRE REF: 68.9 %
DLCOVA PRE: 2.77 ML/(MIN*MMHG*L) (ref 2.56–5.49)
DLCOVA REF: 4.02
DLCOVAULN: 5.49
ERV LLN: -16449.51
ERV PRE REF: 158.8 %
ERV REF: 0.49
ERVULN: ABNORMAL
FEF 25 75 LLN: 0.62
FEF 25 75 PRE REF: 47.9 %
FEF 25 75 REF: 1.5
FET100 CHG: 7.1 %
FEV05 LLN: 0.66
FEV05 REF: 1.51
FEV1 CHG: -1.6 %
FEV1 FVC LLN: 62
FEV1 FVC PRE REF: 78.2 %
FEV1 FVC REF: 77
FEV1 LLN: 1.3
FEV1 PRE REF: 80.7 %
FEV1 REF: 1.85
FEV1 VOL CHG: -0.02
FRCPLETH LLN: 1.84
FRCPLETH PREREF: 166.8 %
FRCPLETH REF: 2.66
FRCPLETHULN: 3.49
FVC CHG: -0.6 %
FVC LLN: 1.7
FVC PRE REF: 101.8 %
FVC REF: 2.44
FVC VOL CHG: -0.01
IVC PRE: 2.56 L (ref 1.7–3.21)
IVC SINGLE BREATH LLN: 1.7
IVC SINGLE BREATH PRE REF: 105.1 %
IVC SINGLE BREATH REF: 2.44
IVCSINGLEBREATHULN: 3.21
LLN IC: -16448.23
PEF LLN: 2.98
PEF PRE REF: 67 %
PEF REF: 4.64
PHYSICIAN COMMENT: ABNORMAL
POST FEF 25 75: 0.67 L/S (ref 0.62–2.84)
POST FET 100: 9.05 SEC
POST FEV1 FVC: 59.65 % (ref 62.32–89.89)
POST FEV1: 1.47 L (ref 1.3–2.39)
POST FEV5: 1.06 L (ref 0.66–2.37)
POST FVC: 2.47 L (ref 1.7–3.21)
POST PEF: 3.34 L/S (ref 2.98–6.3)
PRE DLCO: 12.3 ML/(MIN*MMHG) (ref 13.47–24.93)
PRE ERV: 0.78 L (ref -16449.51–16450.49)
PRE FEF 25 75: 0.72 L/S (ref 0.62–2.84)
PRE FET 100: 8.45 SEC
PRE FEV05 REF: 71.2 %
PRE FEV1 FVC: 60.28 % (ref 62.32–89.89)
PRE FEV1: 1.5 L (ref 1.3–2.39)
PRE FEV5: 1.08 L (ref 0.66–2.37)
PRE FRC PL: 4.44 L (ref 1.84–3.49)
PRE FVC: 2.48 L (ref 1.7–3.21)
PRE IC: 1.79 L (ref -16448.23–16451.77)
PRE PEF: 3.11 L/S (ref 2.98–6.3)
PRE REF IC: 101 %
PRE RV: 3.67 L (ref 1.6–2.75)
PRE TLC: 6.23 L (ref 3.78–5.76)
RAW PRE REF: 135.3 %
RAW PRE: 4.14 CMH2O*S/L (ref 3.06–3.06)
RAW REF: 3.06
REF IC: 1.77
RV LLN: 1.6
RV PRE REF: 168.5 %
RV REF: 2.18
RVTLC LLN: 37
RVTLC PRE REF: 127.6 %
RVTLC PRE: 58.88 % (ref 36.57–55.75)
RVTLC REF: 46
RVTLCULN: 56
RVULN: 2.75
SGAW PRE REF: 48 %
SGAW PRE: 0.05 1/(CMH2O*S) (ref 0.1–0.1)
SGAW REF: 0.1
TLC LLN: 3.78
TLC PRE REF: 130.6 %
TLC REF: 4.77
TLC ULN: 5.76
ULN IC: ABNORMAL
VA PRE: 4.44 L (ref 4.62–4.62)
VA SINGLE BREATH LLN: 4.62
VA SINGLE BREATH PRE REF: 96.1 %
VA SINGLE BREATH REF: 4.62
VASINGLEBREATHULN: 4.62
VC LLN: 1.7
VC PRE REF: 105.1 %
VC PRE: 2.56 L (ref 1.7–3.21)
VC REF: 2.44
VC ULN: 3.21

## 2024-04-02 PROCEDURE — 71250 CT THORAX DX C-: CPT | Mod: 26,,, | Performed by: STUDENT IN AN ORGANIZED HEALTH CARE EDUCATION/TRAINING PROGRAM

## 2024-04-02 PROCEDURE — 71250 CT THORAX DX C-: CPT | Mod: TC

## 2024-04-02 PROCEDURE — 94729 DIFFUSING CAPACITY: CPT | Mod: S$GLB,,, | Performed by: INTERNAL MEDICINE

## 2024-04-02 PROCEDURE — 94726 PLETHYSMOGRAPHY LUNG VOLUMES: CPT | Mod: S$GLB,,, | Performed by: INTERNAL MEDICINE

## 2024-04-02 PROCEDURE — 94060 EVALUATION OF WHEEZING: CPT | Mod: 59,S$GLB,, | Performed by: INTERNAL MEDICINE

## 2024-04-02 PROCEDURE — 94618 PULMONARY STRESS TESTING: CPT | Mod: S$GLB,,, | Performed by: INTERNAL MEDICINE

## 2024-04-08 ENCOUNTER — TELEPHONE (OUTPATIENT)
Dept: PULMONOLOGY | Facility: CLINIC | Age: 79
End: 2024-04-08
Payer: MEDICARE

## 2024-04-08 NOTE — TELEPHONE ENCOUNTER
Called patient to confirm bronchoscopy/EBUS/robotic procedure scheduled for 4/19, left message to return call at [phone].

## 2024-04-09 ENCOUNTER — TELEPHONE (OUTPATIENT)
Dept: PULMONOLOGY | Facility: CLINIC | Age: 79
End: 2024-04-09
Payer: MEDICARE

## 2024-04-09 NOTE — TELEPHONE ENCOUNTER
Phone call to patient to confirm appointment and instructions given in clinic for EBUS/Robotic procedure scheduled for 04/16/24.   Patient instructed to arrive to the 2nd floor [facility] check in desk at 1100 on  04/16/24 with designated .  NPO after midnight the night prior to scheduled procedure. Patient states not currently taking any GLP1 or antiplatelet/anticoags.  Patient verbalizes understanding of all above instructions given.

## 2024-04-16 ENCOUNTER — HOSPITAL ENCOUNTER (OUTPATIENT)
Facility: HOSPITAL | Age: 79
Discharge: HOME | End: 2024-04-16
Attending: INTERNAL MEDICINE | Admitting: INTERNAL MEDICINE
Payer: MEDICARE

## 2024-04-16 ENCOUNTER — ANESTHESIA EVENT (OUTPATIENT)
Dept: SURGERY | Facility: HOSPITAL | Age: 79
End: 2024-04-16
Payer: MEDICARE

## 2024-04-16 ENCOUNTER — ANESTHESIA (OUTPATIENT)
Dept: SURGERY | Facility: HOSPITAL | Age: 79
End: 2024-04-16
Payer: MEDICARE

## 2024-04-16 VITALS
HEIGHT: 63 IN | WEIGHT: 186.06 LBS | DIASTOLIC BLOOD PRESSURE: 62 MMHG | SYSTOLIC BLOOD PRESSURE: 136 MMHG | OXYGEN SATURATION: 94 % | RESPIRATION RATE: 19 BRPM | BODY MASS INDEX: 32.97 KG/M2 | TEMPERATURE: 97 F | HEART RATE: 62 BPM

## 2024-04-16 DIAGNOSIS — R91.1 PULMONARY NODULE: Primary | ICD-10-CM

## 2024-04-16 DIAGNOSIS — R91.1 SOLITARY PULMONARY NODULE: ICD-10-CM

## 2024-04-16 PROCEDURE — D9220A PRA ANESTHESIA: Mod: ANES,,, | Performed by: ANESTHESIOLOGY

## 2024-04-16 PROCEDURE — 36000708 HC OR TIME LEV III 1ST 15 MIN: Performed by: INTERNAL MEDICINE

## 2024-04-16 PROCEDURE — 37000009 HC ANESTHESIA EA ADD 15 MINS: Performed by: INTERNAL MEDICINE

## 2024-04-16 PROCEDURE — 31628 BRONCHOSCOPY/LUNG BX EACH: CPT | Mod: 51,,, | Performed by: INTERNAL MEDICINE

## 2024-04-16 PROCEDURE — 31629 BRONCHOSCOPY/NEEDLE BX EACH: CPT | Mod: 51,,, | Performed by: INTERNAL MEDICINE

## 2024-04-16 PROCEDURE — D9220A PRA ANESTHESIA: Mod: CRNA,,, | Performed by: NURSE ANESTHETIST, CERTIFIED REGISTERED

## 2024-04-16 PROCEDURE — 31627 NAVIGATIONAL BRONCHOSCOPY: CPT | Mod: ,,, | Performed by: INTERNAL MEDICINE

## 2024-04-16 PROCEDURE — 31654 BRONCH EBUS IVNTJ PERPH LES: CPT | Mod: ,,, | Performed by: INTERNAL MEDICINE

## 2024-04-16 PROCEDURE — 25000242 PHARM REV CODE 250 ALT 637 W/ HCPCS: Performed by: ANESTHESIOLOGY

## 2024-04-16 PROCEDURE — 71000015 HC POSTOP RECOV 1ST HR: Performed by: INTERNAL MEDICINE

## 2024-04-16 PROCEDURE — C1726 CATH, BAL DIL, NON-VASCULAR: HCPCS | Performed by: INTERNAL MEDICINE

## 2024-04-16 PROCEDURE — 63600175 PHARM REV CODE 636 W HCPCS: Performed by: NURSE ANESTHETIST, CERTIFIED REGISTERED

## 2024-04-16 PROCEDURE — 25000003 PHARM REV CODE 250: Performed by: NURSE ANESTHETIST, CERTIFIED REGISTERED

## 2024-04-16 PROCEDURE — 71000044 HC DOSC ROUTINE RECOVERY FIRST HOUR: Performed by: INTERNAL MEDICINE

## 2024-04-16 PROCEDURE — 36000709 HC OR TIME LEV III EA ADD 15 MIN: Performed by: INTERNAL MEDICINE

## 2024-04-16 PROCEDURE — 37000008 HC ANESTHESIA 1ST 15 MINUTES: Performed by: INTERNAL MEDICINE

## 2024-04-16 PROCEDURE — 27201423 OPTIME MED/SURG SUP & DEVICES STERILE SUPPLY: Performed by: INTERNAL MEDICINE

## 2024-04-16 PROCEDURE — 31652 BRONCH EBUS SAMPLNG 1/2 NODE: CPT | Mod: ,,, | Performed by: INTERNAL MEDICINE

## 2024-04-16 RX ORDER — PROPOFOL 10 MG/ML
VIAL (ML) INTRAVENOUS
Status: DISCONTINUED | OUTPATIENT
Start: 2024-04-16 | End: 2024-04-16

## 2024-04-16 RX ORDER — HYDROMORPHONE HYDROCHLORIDE 1 MG/ML
0.2 INJECTION, SOLUTION INTRAMUSCULAR; INTRAVENOUS; SUBCUTANEOUS EVERY 5 MIN PRN
Status: DISCONTINUED | OUTPATIENT
Start: 2024-04-16 | End: 2024-04-16 | Stop reason: HOSPADM

## 2024-04-16 RX ORDER — HALOPERIDOL 5 MG/ML
0.5 INJECTION INTRAMUSCULAR EVERY 10 MIN PRN
Status: DISCONTINUED | OUTPATIENT
Start: 2024-04-16 | End: 2024-04-16 | Stop reason: HOSPADM

## 2024-04-16 RX ORDER — ONDANSETRON HYDROCHLORIDE 2 MG/ML
4 INJECTION, SOLUTION INTRAVENOUS DAILY PRN
Status: DISCONTINUED | OUTPATIENT
Start: 2024-04-16 | End: 2024-04-16 | Stop reason: HOSPADM

## 2024-04-16 RX ORDER — SODIUM CHLORIDE 0.9 % (FLUSH) 0.9 %
3 SYRINGE (ML) INJECTION
Status: DISCONTINUED | OUTPATIENT
Start: 2024-04-16 | End: 2024-04-16 | Stop reason: HOSPADM

## 2024-04-16 RX ORDER — ONDANSETRON HYDROCHLORIDE 2 MG/ML
INJECTION, SOLUTION INTRAVENOUS
Status: DISCONTINUED | OUTPATIENT
Start: 2024-04-16 | End: 2024-04-16

## 2024-04-16 RX ORDER — MIDAZOLAM HYDROCHLORIDE 1 MG/ML
INJECTION INTRAMUSCULAR; INTRAVENOUS
Status: DISCONTINUED | OUTPATIENT
Start: 2024-04-16 | End: 2024-04-16

## 2024-04-16 RX ORDER — DEXAMETHASONE SODIUM PHOSPHATE 4 MG/ML
INJECTION, SOLUTION INTRA-ARTICULAR; INTRALESIONAL; INTRAMUSCULAR; INTRAVENOUS; SOFT TISSUE
Status: DISCONTINUED | OUTPATIENT
Start: 2024-04-16 | End: 2024-04-16

## 2024-04-16 RX ORDER — LIDOCAINE HYDROCHLORIDE 20 MG/ML
INJECTION INTRAVENOUS
Status: DISCONTINUED | OUTPATIENT
Start: 2024-04-16 | End: 2024-04-16

## 2024-04-16 RX ORDER — PHENYLEPHRINE HCL IN 0.9% NACL 1 MG/10 ML
SYRINGE (ML) INTRAVENOUS
Status: DISCONTINUED | OUTPATIENT
Start: 2024-04-16 | End: 2024-04-16

## 2024-04-16 RX ORDER — IPRATROPIUM BROMIDE AND ALBUTEROL SULFATE 2.5; .5 MG/3ML; MG/3ML
3 SOLUTION RESPIRATORY (INHALATION) ONCE
Status: COMPLETED | OUTPATIENT
Start: 2024-04-16 | End: 2024-04-16

## 2024-04-16 RX ORDER — FENTANYL CITRATE 50 UG/ML
INJECTION, SOLUTION INTRAMUSCULAR; INTRAVENOUS
Status: DISCONTINUED | OUTPATIENT
Start: 2024-04-16 | End: 2024-04-16

## 2024-04-16 RX ORDER — ROCURONIUM BROMIDE 10 MG/ML
INJECTION, SOLUTION INTRAVENOUS
Status: DISCONTINUED | OUTPATIENT
Start: 2024-04-16 | End: 2024-04-16

## 2024-04-16 RX ORDER — PROPOFOL 10 MG/ML
VIAL (ML) INTRAVENOUS CONTINUOUS PRN
Status: DISCONTINUED | OUTPATIENT
Start: 2024-04-16 | End: 2024-04-16

## 2024-04-16 RX ORDER — OXYCODONE AND ACETAMINOPHEN 5; 325 MG/1; MG/1
1 TABLET ORAL
Status: DISCONTINUED | OUTPATIENT
Start: 2024-04-16 | End: 2024-04-16 | Stop reason: HOSPADM

## 2024-04-16 RX ADMIN — IPRATROPIUM BROMIDE AND ALBUTEROL SULFATE 3 ML: 2.5; .5 SOLUTION RESPIRATORY (INHALATION) at 11:04

## 2024-04-16 RX ADMIN — ROCURONIUM BROMIDE 40 MG: 10 INJECTION, SOLUTION INTRAVENOUS at 12:04

## 2024-04-16 RX ADMIN — PROPOFOL 120 MG: 10 INJECTION, EMULSION INTRAVENOUS at 12:04

## 2024-04-16 RX ADMIN — LIDOCAINE HYDROCHLORIDE 100 MG: 20 INJECTION INTRAVENOUS at 12:04

## 2024-04-16 RX ADMIN — SUGAMMADEX 200 MG: 100 INJECTION, SOLUTION INTRAVENOUS at 02:04

## 2024-04-16 RX ADMIN — ROCURONIUM BROMIDE 10 MG: 10 INJECTION, SOLUTION INTRAVENOUS at 01:04

## 2024-04-16 RX ADMIN — PROPOFOL 150 MCG/KG/MIN: 10 INJECTION, EMULSION INTRAVENOUS at 12:04

## 2024-04-16 RX ADMIN — ONDANSETRON 4 MG: 2 INJECTION INTRAMUSCULAR; INTRAVENOUS at 02:04

## 2024-04-16 RX ADMIN — SODIUM CHLORIDE: 0.9 INJECTION, SOLUTION INTRAVENOUS at 12:04

## 2024-04-16 RX ADMIN — MIDAZOLAM HYDROCHLORIDE 2 MG: 1 INJECTION, SOLUTION INTRAMUSCULAR; INTRAVENOUS at 12:04

## 2024-04-16 RX ADMIN — FENTANYL CITRATE 50 MCG: 50 INJECTION, SOLUTION INTRAMUSCULAR; INTRAVENOUS at 12:04

## 2024-04-16 RX ADMIN — DEXAMETHASONE SODIUM PHOSPHATE 4 MG: 4 INJECTION, SOLUTION INTRAMUSCULAR; INTRAVENOUS at 12:04

## 2024-04-16 RX ADMIN — Medication 100 MCG: at 01:04

## 2024-04-16 RX ADMIN — ROCURONIUM BROMIDE 20 MG: 10 INJECTION, SOLUTION INTRAVENOUS at 12:04

## 2024-04-16 NOTE — PROCEDURES
Melani Holloway is a 78 y.o.  female patient, who presents for a 6 minute walk test ordered by MD Suzie.  The diagnosis is Shortness of Breath; COPD/Emphysema.  The patient's BMI is 33 kg/m2.  Predicted distance (lower limit of normal) is 205.68 meters.      Test Results:    The test was completed without stopping.  The total time walked was 360 seconds.  During walking, the patient reported:  Dyspnea. The patient used no assistive devices during testing.     04/02/2024---------Distance: 365.76 meters (1200 feet)     O2 Sat % Supplemental Oxygen Heart Rate Blood Pressure Leonardo Scale   Pre-exercise  (Resting) 94 % Room Air 58 bpm 168/77 mmHg 1   During Exercise 93 % Room Air 79 bpm 181/74 mmHg 7-8   Post-exercise  (Recovery) 97 % Room Air  71 bpm       Recovery Time: 75 seconds    Performing nurse/tech: Juliana BOUCHER      PREVIOUS STUDY:   The patient has not had a previous study.      CLINICAL INTERPRETATION:  Six minute walk distance is 365.76 meters (1200 feet) with very heavy dyspnea.  During exercise, there was desaturation while breathing room air.  Both blood pressure and heart rate increased significantly with walking.  Bradycardia and Hypertension were present prior to exercise.  The patient did not report non-pulmonary symptoms during exercise.  No previous study performed.  Based upon age and body mass index, exercise capacity is normal.

## 2024-04-16 NOTE — ANESTHESIA PREPROCEDURE EVALUATION
04/16/2024  Melani Holloway is a 78 y.o., female.    Procedure: ROBOTIC BRONCHOSCOPY   Anesthesia type: General   Diagnosis: Solitary pulmonary nodule [R91.1]     Expand All Collapse All    Subjective:         Subjective  Patient ID: Melani Holloway is a 78 y.o. female.     Chief Complaint: Pulmonary Nodules     77 yo former smoker who quit 20 years ago.  With a 40 pk year h/o tobacco.  Hospitalized in October for pneumonia, January COPD exacerbation (first) and had covid February.  Seen by Dr. Prater, here for follow up of pulmonary nodule.        Discharged on Breo/advair and signficantly improved.       Doing well since last hospital discharge.  No weight loss.  Dyspnea is improved.  Cannot walk [place].  Workinig in the yard, needs to take a break.  Exercise is steadily declining.       Was discharged from the hospital with supplemental oxygen.     Does not use it.  Lowest saturation to 89%.      Pre-operative evaluation for Procedure(s) (LRB):  ROBOTIC BRONCHOSCOPY (N/A)      Encounter Diagnosis   Name Primary?    Solitary pulmonary nodule        Review of patient's allergies indicates:  No Known Allergies    Medications Prior to Admission   Medication Sig Dispense Refill Last Dose    albuterol (PROAIR HFA) 90 mcg/actuation inhaler Inhale 2 puffs into the lungs every 6 (six) hours as needed for Wheezing. Rescue 6.7 g 1     albuterol-ipratropium (DUO-NEB) 2.5 mg-0.5 mg/3 mL nebulizer solution Take 3 mLs by nebulization every 4 (four) hours. Rescue 75 mL 0     amLODIPine (NORVASC) 10 MG tablet TAKE 1 TABLET BY MOUTH EVERY DAY 90 tablet 3     aspirin (ECOTRIN) 81 MG EC tablet Take 81 mg by mouth every other day.        azelastine (ASTELIN) 137 mcg (0.1 %) nasal spray 1 spray (137 mcg total) by Nasal route 2 (two) times daily. 30 mL 3     benzonatate (TESSALON PERLES) 100 MG capsule Take 1 capsule (100  mg total) by mouth every 6 (six) hours as needed for Cough. 30 capsule 1     calcium carbonate (OS-JONAS) 600 mg calcium (1,500 mg) Tab Take 600 mg by mouth once.       fluticasone furoate-vilanteroL (BREO) 100-25 mcg/dose diskus inhaler Inhale 1 puff into the lungs once daily. Controller 60 each 2     fluticasone propionate (FLONASE) 50 mcg/actuation nasal spray 1 spray (50 mcg total) by Each Nostril route once daily. 1 Bottle 0     hydroCHLOROthiazide (HYDRODIURIL) 25 MG tablet TAKE 1 TABLET BY MOUTH EVERY DAY IN THE MORNING 90 tablet 3     lisinopriL (PRINIVIL,ZESTRIL) 40 MG tablet TAKE 1 TABLET BY MOUTH EVERY DAY 90 tablet 3     vit A/vit C/vit E/zinc/copper (ICAPS AREDS ORAL) Take 1 tablet by mouth once daily.       vitamin D 1000 units Tab Take 1,000 Units by mouth once daily.           Current Facility-Administered Medications   Medication Dose Route Frequency Provider Last Rate Last Admin    albuterol-ipratropium 2.5 mg-0.5 mg/3 mL nebulizer solution 3 mL  3 mL Nebulization Once Martinez Chanel Jr., MD           No current facility-administered medications on file prior to encounter.     Current Outpatient Medications on File Prior to Encounter   Medication Sig Dispense Refill    albuterol (PROAIR HFA) 90 mcg/actuation inhaler Inhale 2 puffs into the lungs every 6 (six) hours as needed for Wheezing. Rescue 6.7 g 1    albuterol-ipratropium (DUO-NEB) 2.5 mg-0.5 mg/3 mL nebulizer solution Take 3 mLs by nebulization every 4 (four) hours. Rescue 75 mL 0    aspirin (ECOTRIN) 81 MG EC tablet Take 81 mg by mouth every other day.       azelastine (ASTELIN) 137 mcg (0.1 %) nasal spray 1 spray (137 mcg total) by Nasal route 2 (two) times daily. 30 mL 3    benzonatate (TESSALON PERLES) 100 MG capsule Take 1 capsule (100 mg total) by mouth every 6 (six) hours as needed for Cough. 30 capsule 1    calcium carbonate (OS-JONAS) 600 mg calcium (1,500 mg) Tab Take 600 mg by mouth once.      fluticasone furoate-vilanteroL (BREO)  "100-25 mcg/dose diskus inhaler Inhale 1 puff into the lungs once daily. Controller 60 each 2    fluticasone propionate (FLONASE) 50 mcg/actuation nasal spray 1 spray (50 mcg total) by Each Nostril route once daily. 1 Bottle 0    vit A/vit C/vit E/zinc/copper (ICAPS AREDS ORAL) Take 1 tablet by mouth once daily.      vitamin D 1000 units Tab Take 1,000 Units by mouth once daily.         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  No date: Hypertension    Past Surgical History:   Procedure Laterality Date    BREAST BIOPSY Right 2010    BREAST LUMPECTOMY Right 2010    cataract surgery         Social History     Tobacco Use   Smoking Status Former    Current packs/day: 0.00    Average packs/day: 1 pack/day for 38.0 years (38.0 ttl pk-yrs)    Types: Cigarettes    Start date:     Quit date:     Years since quittin.3   Smokeless Tobacco Never       Social History     Substance and Sexual Activity   Alcohol Use Yes    Comment: Occasionally       Physical Activity: Sufficiently Active (10/16/2023)    Exercise Vital Sign     Days of Exercise per Week: 7 days     Minutes of Exercise per Session: 150+ min         No results for input(s): "HCT" in the last 72 hours.  No results for input(s): "PLT" in the last 72 hours.  No results for input(s): "K" in the last 72 hours.  No results for input(s): "CREATININE" in the last 72 hours.  No results for input(s): "GLU" in the last 72 hours.  No results for input(s): "PT" in the last 72 hours.  There were no vitals filed for this visit.                    Pre-op Assessment          Review of Systems  Anesthesia Hx:  No problems with previous Anesthesia                Hematology/Oncology:  Hematology Normal                     Denies Current/Recent Cancer  --  Cancer in past history ():       Breast    right axillary node dissection no lymphedema        Cardiovascular:     Hypertension, well controlled   Denies MI.        Denies " Angina.                            Hypertension         Pulmonary:  Pneumonia COPD  Denies Asthma.   Denies Shortness of breath.      Chronic Obstructive Pulmonary Disease (COPD):                  Pulmonary Infection:  Pneumonia.     Renal/:   Denies Chronic Renal Disease.                Hepatic/GI:      Denies Liver Disease.            Neurological:  Denies TIA.  Denies CVA.    Denies Seizures.                                Endocrine:  Denies Diabetes.               Physical Exam  General: Well nourished, Cooperative, Alert and Oriented    Airway:  Mallampati: II   Mouth Opening: Normal  TM Distance: Normal  Tongue: Normal  Neck ROM: Normal ROM        Anesthesia Plan  Type of Anesthesia, risks & benefits discussed:    Anesthesia Type: Gen ETT  Intra-op Monitoring Plan: Standard ASA Monitors  Post Op Pain Control Plan: multimodal analgesia and IV/PO Opioids PRN  Induction:  IV  Informed Consent: Informed consent signed with the Patient and all parties understand the risks and agree with anesthesia plan.  All questions answered.   ASA Score: 2  Day of Surgery Review of History & Physical: H&P Update referred to the surgeon/provider.    Ready For Surgery From Anesthesia Perspective.     .

## 2024-04-16 NOTE — TRANSFER OF CARE
"Anesthesia Transfer of Care Note    Patient: Melani Holloway    Procedure(s) Performed: Procedure(s) (LRB):  ROBOTIC BRONCHOSCOPY (N/A)    Patient location: PACU    Anesthesia Type: general    Transport from OR: Transported from OR on room air with adequate spontaneous ventilation    Post pain: adequate analgesia    Post assessment: no apparent anesthetic complications and tolerated procedure well    Post vital signs: stable    Level of consciousness: awake and alert    Nausea/Vomiting: no nausea/vomiting    Complications: none    Transfer of care protocol was followed    Last vitals: Visit Vitals  BP (!) 122/59   Pulse 67   Temp 37.5 °C (99.5 °F) (Tympanic)   Resp 16   Ht 5' 3" (1.6 m)   Wt 84.4 kg (186 lb 1.1 oz)   LMP  (LMP Unknown)   SpO2 (!) 94%   Breastfeeding No   BMI 32.96 kg/m²     "

## 2024-04-16 NOTE — H&P
"   Subjective  Patient ID: Melani Holloway is a 78 y.o. female.     Chief Complaint: Pulmonary Nodules     77 yo former smoker who quit 20 years ago.  With a 40 pk year h/o tobacco.  Hospitalized in October for pneumonia, January COPD exacerbation (first) and had covid February.  Seen by Dr. Prater, here for follow up of pulmonary nodule.        Discharged on Breo/advair and signficantly improved.       Doing well since last hospital discharge.  No weight loss.  Dyspnea is improved.  Cannot walk up [place].  Workinig in the yard, needs to take a break.  Exercise is steadily declining.       Was discharged from the hospital with supplemental oxygen.     Does not use it.  Lowest saturation to 89%.   Review of Systems   HENT:  Negative for trouble swallowing.    Respiratory:  Positive for dyspnea on extertion. Negative for wheezing.    Gastrointestinal:  Negative for acid reflux.         Objective:      Objective  Vitals       Vitals:     03/14/24 1419   BP: 124/62   BP Location: Right arm   Patient Position: Sitting   BP Method: Medium (Manual)   Pulse: 81   SpO2: (!) 94%   Weight: 83.2 kg (183 lb 6.8 oz)   Height: 5' 3" (1.6 m)         Physical Exam   Constitutional: She is oriented to person, place, and time. She appears well-developed and well-nourished.   HENT:   Head: Normocephalic.   Cardiovascular: Normal rate and regular rhythm.   Pulmonary/Chest: Normal expansion and symmetric chest wall expansion. She has no decreased breath sounds.   Musculoskeletal:         General: No edema. Normal range of motion.   Lymphadenopathy: No supraclavicular adenopathy is present.     She has no cervical adenopathy.   Neurological: She is alert and oriented to person, place, and time.         Personal Diagnostic Review     CT of chest with ECHO 2.1 cm nodule abutting aorta 1/2024,  adenopathy. Emphysema is present.         3/14/2024     2:19 PM 2/13/2024     9:00 AM 1/16/2024     2:04 PM 1/16/2024    11:26 AM 1/16/2024     8:00 " "AM 1/16/2024     4:37 AM 1/16/2024    12:17 AM   Pulmonary Function Tests   SpO2 94 % 95 % 92 % 94 % 92 % 94 % 96 %   Height 5' 3" (1.6 m) 5' 3" (1.6 m)             Weight 83.2 kg (183 lb 6.8 oz) 81.2 kg (179 lb)             BMI (Calculated) 32.5 31.7                      Assessment:      Assessment  1. Solitary pulmonary nodule    2. COPD with acute exacerbation    3. Respiratory failure with hypoxia, unspecified chronicity    4. Centrilobular emphysema    5. Pulmonary nodule    6. Hypoxia    7. Pneumonia due to infectious organism, unspecified laterality, unspecified part of lung          Encounter Medications          Outpatient Encounter Medications as of 3/14/2024   Medication Sig Dispense Refill    albuterol (PROAIR HFA) 90 mcg/actuation inhaler Inhale 2 puffs into the lungs every 6 (six) hours as needed for Wheezing. Rescue 6.7 g 1    albuterol-ipratropium (DUO-NEB) 2.5 mg-0.5 mg/3 mL nebulizer solution Take 3 mLs by nebulization every 4 (four) hours. Rescue 75 mL 0    amLODIPine (NORVASC) 10 MG tablet TAKE 1 TABLET BY MOUTH EVERY DAY 90 tablet 3    aspirin (ECOTRIN) 81 MG EC tablet Take 81 mg by mouth every other day.         azelastine (ASTELIN) 137 mcg (0.1 %) nasal spray 1 spray (137 mcg total) by Nasal route 2 (two) times daily. 30 mL 3    benzonatate (TESSALON PERLES) 100 MG capsule Take 1 capsule (100 mg total) by mouth every 6 (six) hours as needed for Cough. 30 capsule 1    calcium carbonate (OS-JONAS) 600 mg calcium (1,500 mg) Tab Take 600 mg by mouth once.        fluticasone furoate-vilanteroL (BREO) 100-25 mcg/dose diskus inhaler Inhale 1 puff into the lungs once daily. Controller 60 each 2    fluticasone propionate (FLONASE) 50 mcg/actuation nasal spray 1 spray (50 mcg total) by Each Nostril route once daily. 1 Bottle 0    hydroCHLOROthiazide (HYDRODIURIL) 25 MG tablet TAKE 1 TABLET BY MOUTH EVERY DAY IN THE MORNING 90 tablet 3    lisinopriL (PRINIVIL,ZESTRIL) 40 MG tablet TAKE 1 TABLET BY MOUTH " EVERY DAY 90 tablet 3    vit A/vit C/vit E/zinc/copper (ICAPS AREDS ORAL) Take 1 tablet by mouth once daily.        vitamin D 1000 units Tab Take 1,000 Units by mouth once daily.        [] nirmatrelvir-ritonavir 150-100 mg DsPk Take 2 tablets by mouth 2 (two) times daily for 5 days. Each dose contains 1 nirmatrelvir (pink tablet) and 1 ritonavir (white tablet). Take both tablets together 20 tablet 0      No facility-administered encounter medications on file as of 3/14/2024.                Orders Placed This Encounter   Procedures    CT Chest Without Contrast       Standing Status:   Future       Standing Expiration Date:   3/14/2025       Scheduling Instructions:         Chest Navigational Bronchoscopy       Order Specific Question:   May the Radiologist modify the order per protocol to meet the clinical needs of the patient?       Answer:   No       Order Specific Question:   Reason:       Answer:   Chest Navigational Bronchoscopy    Pulse Oximetry Q4H       Standing Status:   Standing       Number of Occurrences:   20    Stress test, pulmonary       Standing Status:   Future       Standing Expiration Date:   3/14/2025       Order Specific Question:   Reason for study       Answer:   Oxygen prescription       Order Specific Question:   Release to patient       Answer:   Immediate    Case Request Operating Room: ROBOTIC BRONCHOSCOPY       Standing Status:   Standing       Number of Occurrences:   1       Order Specific Question:   Medical Necessity:       Answer:   Medically Urgent [101]       Order Specific Question:   CPT Code:       Answer:   HI BRONCHOSCOPY,BIOPSY [96968]       Order Specific Question:   CPT Code:       Answer:   HI BRONCH W/ EBUS, SAMPLING 3 OR MORE NODES, INCL GUIDE [14364]       Order Specific Question:   CPT Code:       Answer:   HI BRONCH W/ EBUS, DIAG OR THERA INTERVENTION PERIPHERAL LESION(S), INCL GUID, ADD ON CODE [77487]       Order Specific Question:   CPT Code:       Answer:    AL BRONCHOSCOPY,COMPUTER ASSIST/IMAGE-GUIDED NAVIGATION [96200]       Order Specific Question:   Post-Procedure Disposition:       Answer:   Amb Surgery/DOSC [2]       Order Specific Question:   Is an on-site pathologist required for this procedure?       Answer:   N/A      Plan:      Problem List Items Addressed This Visit         Pneumonia     Overview       Treated, clinically improved.            Emphysema lung     Overview       Continue ics/laba FOR CONTROL  NEEDS pftS     Albuterol as needed.            Hypoxia     Overview       If qualifies per 6MWT, would benefit from POC of choice.            Pulmonary nodule     Overview       Follow up with cCT.  If nodule is persistent,  Will plan for diagnostic and staging robotic bronchoscopy with EBUS     I have explained the risks, benefits and alternatives of the procedure in detail.  The patient voices understanding and all questions have been answered.  The patient agrees to proceed as planned.             Other Visit Diagnoses         Solitary pulmonary nodule    -  Primary     Relevant Orders     CT Chest Without Contrast     Pulse Oximetry Q4H     Case Request Operating Room: ROBOTIC BRONCHOSCOPY (Completed)     COPD with acute exacerbation         Respiratory failure with hypoxia, unspecified chronicity         Relevant Orders     Stress test, pulmonary       Post clinic PFT with persistent GGO in the anterior segment of the ECHO and solid nodule in ECHO.  Navigation planning to both nodules have been made and will biopsy both if patient tolerates today.    No changes sine last visit.    I have explained the risks, benefits and alternatives of the procedure in detail.  The patient voices understanding and all questions have been answered.  The patient agrees to proceed as planned.

## 2024-04-16 NOTE — ANESTHESIA POSTPROCEDURE EVALUATION
Anesthesia Post Evaluation    Patient: Melani Holloway    Procedure(s) Performed: Procedure(s) (LRB):  ROBOTIC BRONCHOSCOPY (N/A)    Final Anesthesia Type: general      Patient location during evaluation: PACU  Patient participation: Yes- Able to Participate  Level of consciousness: awake and alert and oriented  Post-procedure vital signs: reviewed and stable  Pain management: adequate  Airway patency: patent    PONV status at discharge: No PONV  Anesthetic complications: no      Cardiovascular status: stable  Respiratory status: unassisted, spontaneous ventilation and room air  Hydration status: euvolemic  Follow-up not needed.              Vitals Value Taken Time   /56 04/16/24 1447   Temp 36.3 °C (97.3 °F) 04/16/24 1419   Pulse 62 04/16/24 1451   Resp 18 04/16/24 1451   SpO2 94 % 04/16/24 1451   Vitals shown include unfiled device data.      No case tracking events are documented in the log.      Pain/Tara Score: Tara Score: 9 (4/16/2024  2:45 PM)

## 2024-04-16 NOTE — DISCHARGE SUMMARY
[facility] (2nd Fl)  Discharge Note  Short Stay    Procedure(s) (LRB):  ROBOTIC BRONCHOSCOPY (N/A)      OUTCOME: Patient tolerated treatment/procedure well without complication and is now ready for discharge.    DISPOSITION: Home or Self Care    FINAL DIAGNOSIS:  Pulmonary nodule     FOLLOWUP:  Will call patient with results    DISCHARGE INSTRUCTIONS:    Discharge Procedure Orders   Diet general     Call MD for:  temperature >101     Call MD for:  coughing up blood greater than 3 tablespoons in volume     Call MD for:  chest pain     Call MD for:  difficulty breathing or shortness of breath     Call MD for:  development of yellow/green sputum        TIME SPENT ON DISCHARGE: 15 minutes

## 2024-04-16 NOTE — ANESTHESIA PROCEDURE NOTES
Intubation    Date/Time: 4/16/2024 12:36 PM    Performed by: Jose Valdez CRNA  Authorized by: Martinez Chanel Jr., MD    Intubation:     Induction:  Intravenous    Intubated:  Postinduction    Mask Ventilation:  Easy mask    Attempts:  1    Attempted By:  CRNA    Method of Intubation:  Video laryngoscopy    Blade:  Arana 3    Laryngeal View Grade: Grade IIA - cords partially seen      Difficult Airway Encountered?: No      Complications:  None    Airway Device:  Oral endotracheal tube    Airway Device Size:  8.0    Style/Cuff Inflation:  Cuffed    Inflation Amount (mL):  10    Tube secured:  20    Secured at:  The teeth    Placement Verified By:  Capnometry    Complicating Factors:  None    Findings Post-Intubation:  BS equal bilateral and atraumatic/condition of teeth unchanged

## 2024-04-22 ENCOUNTER — HOSPITAL ENCOUNTER (OUTPATIENT)
Dept: RADIOLOGY | Facility: HOSPITAL | Age: 79
Discharge: HOME | End: 2024-04-22
Attending: INTERNAL MEDICINE
Payer: MEDICARE

## 2024-04-22 ENCOUNTER — TELEPHONE (OUTPATIENT)
Dept: PULMONOLOGY | Facility: HOSPITAL | Age: 79
End: 2024-04-22
Payer: MEDICARE

## 2024-04-22 DIAGNOSIS — J18.9 PNEUMONIA DUE TO INFECTIOUS ORGANISM, UNSPECIFIED LATERALITY, UNSPECIFIED PART OF LUNG: ICD-10-CM

## 2024-04-22 LAB
FINAL PATHOLOGIC DIAGNOSIS: ABNORMAL
Lab: ABNORMAL
MICROSCOPIC EXAM: ABNORMAL

## 2024-04-22 PROCEDURE — 71046 X-RAY EXAM CHEST 2 VIEWS: CPT | Mod: TC

## 2024-04-22 PROCEDURE — 71046 X-RAY EXAM CHEST 2 VIEWS: CPT | Mod: 26,,, | Performed by: RADIOLOGY

## 2024-04-22 NOTE — TELEPHONE ENCOUNTER
Left message to call back to review biopsy results.  Will need to present at [facility] with multiple solid and GGO

## 2024-04-23 ENCOUNTER — PATIENT MESSAGE (OUTPATIENT)
Dept: PULMONOLOGY | Facility: CLINIC | Age: 79
End: 2024-04-23
Payer: MEDICARE

## 2024-04-23 DIAGNOSIS — C34.92 NON-SMALL CELL CARCINOMA OF LEFT LUNG: Primary | ICD-10-CM

## 2024-04-23 DIAGNOSIS — C34.90 MALIGNANT NEOPLASM OF UNSPECIFIED PART OF UNSPECIFIED BRONCHUS OR LUNG: Primary | ICD-10-CM

## 2024-04-24 ENCOUNTER — TELEPHONE (OUTPATIENT)
Dept: HEMATOLOGY/ONCOLOGY | Facility: CLINIC | Age: 79
End: 2024-04-24
Payer: MEDICARE

## 2024-04-24 NOTE — TELEPHONE ENCOUNTER
Oscar Larsen,     I have her added on for [facility] next week already and we'll get her in for clinic as well.

## 2024-04-24 NOTE — NURSING
Patient notified of the scheduled appointment for PET CT (NPO instructions given) and Consultation with Dr. Fay for 04/29/24.  Patient scheduled for Consultation with Dr. Murguia for 05/01/24.  Agreeable to dates and times.  Active on the patient portal.  Provided my direct contact information.  Oncology Navigation   Intake  Cancer Type: Thoracic  Type of Referral: Internal  Date of Referral: 04/24/24  Initial Nurse Navigator Contact: 04/24/24  Referral to Initial Contact Timeline (days): 0  Date Worked: 04/24/24  Multiple appointments: Yes     Treatment  Current Status: Active    Surgical Oncologist: Sumeet  Consult Date: 04/29/24       Radiation Oncologist: Blade    Procedures: PET scan  PET Scan Schedule Date: 04/29/24          Radiation Oncologist: Blade    Support Systems: Children     Acuity      Follow Up  No follow-ups on file.

## 2024-04-24 NOTE — TELEPHONE ENCOUNTER
"Spoke to patient regarding results. Solid "S" nodule is positive for malignancy in the ECHO.  The GGO is non-diagnostic but atypical cells are present.  I recommend a PET and a multi-disciplinary approach with rad/onc and thoracic surgery.  Patient 's son had a lobectomy and recently  from lung cancer.  She is understandably "scared", which is why all options whould be considered.    Remains very active and healthy.  In fact, she was working in her garden when I called.       "

## 2024-04-25 NOTE — PROGRESS NOTES
History & Physical    Subjective     History of Present Illness:  Patient is a 78 y.o. female former smoker with HTN and R breast DCIS here today for evaluation of ECHO NSCLC. Treated for PNA in Oct, COPD exacerbation in January, and COVID in February. CTA during ER visit in  showed 2.2 cm solid nodule in ECHO and more peripheral 1.6 cm GGO. Repeat CT in April with persistence of these findings. Robotic bronchoscopy with EBUS on 24. Solid nodule in ECHO positive for malignancy favor adenocarcinoma. GGO in ECHO with atypical cells present. Levels 7 and 11L negative for malignancy. Patient denies SOB, dyspnea, hemoptysis or weight loss.     Former smoker. Quit 30 years ago. 40 pack years.   Active working in garden     Son recently  of lung cancer.     Chief Complaint   Patient presents with    Consult       Review of patient's allergies indicates:  No Known Allergies    Current Outpatient Medications   Medication Sig Dispense Refill    albuterol (PROAIR HFA) 90 mcg/actuation inhaler Inhale 2 puffs into the lungs every 6 (six) hours as needed for Wheezing. Rescue 6.7 g 1    albuterol-ipratropium (DUO-NEB) 2.5 mg-0.5 mg/3 mL nebulizer solution Take 3 mLs by nebulization every 4 (four) hours. Rescue 75 mL 0    amLODIPine (NORVASC) 10 MG tablet TAKE 1 TABLET BY MOUTH EVERY DAY 90 tablet 3    aspirin (ECOTRIN) 81 MG EC tablet Take 81 mg by mouth every other day.       azelastine (ASTELIN) 137 mcg (0.1 %) nasal spray 1 spray (137 mcg total) by Nasal route 2 (two) times daily. 30 mL 3    benzonatate (TESSALON PERLES) 100 MG capsule Take 1 capsule (100 mg total) by mouth every 6 (six) hours as needed for Cough. 30 capsule 1    calcium carbonate (OS-JONAS) 600 mg calcium (1,500 mg) Tab Take 600 mg by mouth once.      fluticasone furoate-vilanteroL (BREO) 100-25 mcg/dose diskus inhaler Inhale 1 puff into the lungs once daily. Controller 60 each 2    fluticasone propionate (FLONASE) 50 mcg/actuation nasal spray 1 spray  "(50 mcg total) by Each Nostril route once daily. 1 Bottle 0    hydroCHLOROthiazide (HYDRODIURIL) 25 MG tablet TAKE 1 TABLET BY MOUTH EVERY DAY IN THE MORNING 90 tablet 3    lisinopriL (PRINIVIL,ZESTRIL) 40 MG tablet TAKE 1 TABLET BY MOUTH EVERY DAY 90 tablet 3    vit A/vit C/vit E/zinc/copper (ICAPS AREDS ORAL) Take 1 tablet by mouth once daily.      vitamin D 1000 units Tab Take 1,000 Units by mouth once daily.       No current facility-administered medications for this visit.       Past Medical History:   Diagnosis Date    Ductal carcinoma in situ (DCIS) of right breast 2017    Hypertension      Past Surgical History:   Procedure Laterality Date    BREAST BIOPSY Right 2010    BREAST LUMPECTOMY Right 2010    cataract surgery      ROBOTIC BRONCHOSCOPY N/A 2024    Procedure: ROBOTIC BRONCHOSCOPY;  Surgeon: Uzma Larsen MD;  Location: [address];  Service: Pulmonary;  Laterality: N/A;     Family History   Problem Relation Name Age of Onset    No Known Problems Mother      Heart attack Father      Breast cancer Paternal Aunt       Social History     Tobacco Use    Smoking status: Former     Current packs/day: 0.00     Average packs/day: 1 pack/day for 38.0 years (38.0 ttl pk-yrs)     Types: Cigarettes     Start date:      Quit date:      Years since quittin.3    Smokeless tobacco: Never   Substance Use Topics    Alcohol use: Yes     Comment: Occasionally    Drug use: No        Review of Systems:  Review of Systems       Objective     Vital Signs (Most Recent)  Vitals:    24 1040   BP: (!) 148/62   Pulse: 66   SpO2: 95%   Weight: 83.9 kg (184 lb 15.5 oz)   Height: 5' 3" (1.6 m)   PainSc: 0-No pain       ECO - Ambulates, capable of light work    Physical Exam:  Physical Exam  Constitutional:       Appearance: Normal appearance.   Eyes:      Extraocular Movements: Extraocular movements intact.   Cardiovascular:      Rate and Rhythm: Normal rate and regular rhythm.   Pulmonary:     "  Effort: Pulmonary effort is normal.      Breath sounds: Normal breath sounds.   Abdominal:      General: Abdomen is flat.      Palpations: Abdomen is soft.   Skin:     General: Skin is warm and dry.   Neurological:      General: No focal deficit present.      Mental Status: She is alert and oriented to person, place, and time.   Psychiatric:         Mood and Affect: Mood normal.         Behavior: Behavior normal.         Chest CT 4/2/24:   Lungs and pleura: Unchanged left upper lobe medial pulmonary nodule measuring 21 x 15 mm.  Left upper lobe ground-glass nodule measuring 16 x 13 mm, grossly unchanged.  Right upper lobe 8 x 5 mm nodule (series 3, image 100), unchanged.  Additional bilateral noncalcified nodules are unchanged.  Diffuse bilateral centrilobular emphysema.  Bi apical parenchymal scarring, left greater than right.  Bilateral bronchial wall thickening.  Subsegmental atelectasis in the right middle lobe and lingula.     Mediastinum and arely: Unchanged mediastinal lymph nodes measuring up to 12 mm in station 7.    6MWT    PFTS        PET 4/29/24: awaiting final report.    Assessment and Plan     Patient is a 78 y.o. female former smoker with HTN and R breast DCIS (lumpectomy) here today for evaluation of ECHO NSCLC.    PLAN:    Order pre-op labs, Stress ECHO, and CT   Chest w/ contrast to assess relationship of solid ECHO NSCLC with aorta  Patient scheduled to meet with Rad Onc as well   Discussed pre-, intra-, and post-operative course of treatment as well as the risks and benefits of surgery. Also counseled patient on alternative forms of treatment including chemotherapy and radiation therapy  Following above will discuss/plan for robotic left upper lobectomy

## 2024-04-25 NOTE — H&P (VIEW-ONLY)
History & Physical    Subjective     History of Present Illness:  Patient is a 78 y.o. female former smoker with HTN and R breast DCIS here today for evaluation of ECHO NSCLC. Treated for PNA in Oct, COPD exacerbation in January, and COVID in February. CTA during ER visit in  showed 2.2 cm solid nodule in ECHO and more peripheral 1.6 cm GGO. Repeat CT in April with persistence of these findings. Robotic bronchoscopy with EBUS on 24. Solid nodule in ECHO positive for malignancy favor adenocarcinoma. GGO in ECHO with atypical cells present. Levels 7 and 11L negative for malignancy. Patient denies SOB, dyspnea, hemoptysis or weight loss.     Former smoker. Quit 30 years ago. 40 pack years.   Active working in garden     Son recently  of lung cancer.     Chief Complaint   Patient presents with    Consult       Review of patient's allergies indicates:  No Known Allergies    Current Outpatient Medications   Medication Sig Dispense Refill    albuterol (PROAIR HFA) 90 mcg/actuation inhaler Inhale 2 puffs into the lungs every 6 (six) hours as needed for Wheezing. Rescue 6.7 g 1    albuterol-ipratropium (DUO-NEB) 2.5 mg-0.5 mg/3 mL nebulizer solution Take 3 mLs by nebulization every 4 (four) hours. Rescue 75 mL 0    amLODIPine (NORVASC) 10 MG tablet TAKE 1 TABLET BY MOUTH EVERY DAY 90 tablet 3    aspirin (ECOTRIN) 81 MG EC tablet Take 81 mg by mouth every other day.       azelastine (ASTELIN) 137 mcg (0.1 %) nasal spray 1 spray (137 mcg total) by Nasal route 2 (two) times daily. 30 mL 3    benzonatate (TESSALON PERLES) 100 MG capsule Take 1 capsule (100 mg total) by mouth every 6 (six) hours as needed for Cough. 30 capsule 1    calcium carbonate (OS-JONAS) 600 mg calcium (1,500 mg) Tab Take 600 mg by mouth once.      fluticasone furoate-vilanteroL (BREO) 100-25 mcg/dose diskus inhaler Inhale 1 puff into the lungs once daily. Controller 60 each 2    fluticasone propionate (FLONASE) 50 mcg/actuation nasal spray 1 spray  "(50 mcg total) by Each Nostril route once daily. 1 Bottle 0    hydroCHLOROthiazide (HYDRODIURIL) 25 MG tablet TAKE 1 TABLET BY MOUTH EVERY DAY IN THE MORNING 90 tablet 3    lisinopriL (PRINIVIL,ZESTRIL) 40 MG tablet TAKE 1 TABLET BY MOUTH EVERY DAY 90 tablet 3    vit A/vit C/vit E/zinc/copper (ICAPS AREDS ORAL) Take 1 tablet by mouth once daily.      vitamin D 1000 units Tab Take 1,000 Units by mouth once daily.       No current facility-administered medications for this visit.       Past Medical History:   Diagnosis Date    Ductal carcinoma in situ (DCIS) of right breast 2017    Hypertension      Past Surgical History:   Procedure Laterality Date    BREAST BIOPSY Right 2010    BREAST LUMPECTOMY Right 2010    cataract surgery      ROBOTIC BRONCHOSCOPY N/A 2024    Procedure: ROBOTIC BRONCHOSCOPY;  Surgeon: Uzma Larsen MD;  Location: [address];  Service: Pulmonary;  Laterality: N/A;     Family History   Problem Relation Name Age of Onset    No Known Problems Mother      Heart attack Father      Breast cancer Paternal Aunt       Social History     Tobacco Use    Smoking status: Former     Current packs/day: 0.00     Average packs/day: 1 pack/day for 38.0 years (38.0 ttl pk-yrs)     Types: Cigarettes     Start date:      Quit date:      Years since quittin.3    Smokeless tobacco: Never   Substance Use Topics    Alcohol use: Yes     Comment: Occasionally    Drug use: No        Review of Systems:  Review of Systems       Objective     Vital Signs (Most Recent)  Vitals:    24 1040   BP: (!) 148/62   Pulse: 66   SpO2: 95%   Weight: 83.9 kg (184 lb 15.5 oz)   Height: 5' 3" (1.6 m)   PainSc: 0-No pain       ECO - Ambulates, capable of light work    Physical Exam:  Physical Exam  Constitutional:       Appearance: Normal appearance.   Eyes:      Extraocular Movements: Extraocular movements intact.   Cardiovascular:      Rate and Rhythm: Normal rate and regular rhythm.   Pulmonary:     "  Effort: Pulmonary effort is normal.      Breath sounds: Normal breath sounds.   Abdominal:      General: Abdomen is flat.      Palpations: Abdomen is soft.   Skin:     General: Skin is warm and dry.   Neurological:      General: No focal deficit present.      Mental Status: She is alert and oriented to person, place, and time.   Psychiatric:         Mood and Affect: Mood normal.         Behavior: Behavior normal.         Chest CT 4/2/24:   Lungs and pleura: Unchanged left upper lobe medial pulmonary nodule measuring 21 x 15 mm.  Left upper lobe ground-glass nodule measuring 16 x 13 mm, grossly unchanged.  Right upper lobe 8 x 5 mm nodule (series 3, image 100), unchanged.  Additional bilateral noncalcified nodules are unchanged.  Diffuse bilateral centrilobular emphysema.  Bi apical parenchymal scarring, left greater than right.  Bilateral bronchial wall thickening.  Subsegmental atelectasis in the right middle lobe and lingula.     Mediastinum and arely: Unchanged mediastinal lymph nodes measuring up to 12 mm in station 7.    6MWT    PFTS        PET 4/29/24: awaiting final report.    Assessment and Plan     Patient is a 78 y.o. female former smoker with HTN and R breast DCIS (lumpectomy) here today for evaluation of ECHO NSCLC.    PLAN:    Order pre-op labs, Stress ECHO, and CT   Chest w/ contrast to assess relationship of solid ECHO NSCLC with aorta  Patient scheduled to meet with Rad Onc as well   Discussed pre-, intra-, and post-operative course of treatment as well as the risks and benefits of surgery. Also counseled patient on alternative forms of treatment including chemotherapy and radiation therapy  Following above will discuss/plan for robotic left upper lobectomy

## 2024-04-29 ENCOUNTER — LAB VISIT (OUTPATIENT)
Dept: LAB | Facility: HOSPITAL | Age: 79
End: 2024-04-29
Payer: MEDICARE

## 2024-04-29 ENCOUNTER — HOSPITAL ENCOUNTER (OUTPATIENT)
Dept: RADIOLOGY | Facility: HOSPITAL | Age: 79
Discharge: HOME | End: 2024-04-29
Attending: INTERNAL MEDICINE
Payer: MEDICARE

## 2024-04-29 ENCOUNTER — OFFICE VISIT (OUTPATIENT)
Dept: CARDIOTHORACIC SURGERY | Facility: CLINIC | Age: 79
End: 2024-04-29
Payer: MEDICARE

## 2024-04-29 VITALS
WEIGHT: 184.94 LBS | BODY MASS INDEX: 32.77 KG/M2 | HEIGHT: 63 IN | OXYGEN SATURATION: 95 % | SYSTOLIC BLOOD PRESSURE: 148 MMHG | HEART RATE: 66 BPM | DIASTOLIC BLOOD PRESSURE: 62 MMHG

## 2024-04-29 DIAGNOSIS — Z01.810 PRE-OPERATIVE CARDIOVASCULAR EXAMINATION: ICD-10-CM

## 2024-04-29 DIAGNOSIS — D68.9 COAGULOPATHY: ICD-10-CM

## 2024-04-29 DIAGNOSIS — C34.90 MALIGNANT NEOPLASM OF UNSPECIFIED PART OF UNSPECIFIED BRONCHUS OR LUNG: ICD-10-CM

## 2024-04-29 DIAGNOSIS — C34.92 NSCLC OF LEFT LUNG: Primary | ICD-10-CM

## 2024-04-29 DIAGNOSIS — C34.92 NSCLC OF LEFT LUNG: ICD-10-CM

## 2024-04-29 LAB
ALBUMIN SERPL BCP-MCNC: 3.9 G/DL (ref 3.5–5.2)
ALP SERPL-CCNC: 80 U/L (ref 55–135)
ALT SERPL W/O P-5'-P-CCNC: 17 U/L (ref 10–44)
ANION GAP SERPL CALC-SCNC: 10 MMOL/L (ref 8–16)
APTT PPP: 26.7 SEC (ref 21–32)
AST SERPL-CCNC: 19 U/L (ref 10–40)
BASOPHILS # BLD AUTO: 0.04 K/UL (ref 0–0.2)
BASOPHILS NFR BLD: 1 % (ref 0–1.9)
BILIRUB SERPL-MCNC: 0.3 MG/DL (ref 0.1–1)
BUN SERPL-MCNC: 20 MG/DL (ref 8–23)
CALCIUM SERPL-MCNC: 9.7 MG/DL (ref 8.7–10.5)
CHLORIDE SERPL-SCNC: 105 MMOL/L (ref 95–110)
CO2 SERPL-SCNC: 24 MMOL/L (ref 23–29)
CREAT SERPL-MCNC: 0.8 MG/DL (ref 0.5–1.4)
CREAT SERPL-MCNC: 0.8 MG/DL (ref 0.5–1.4)
DIFFERENTIAL METHOD BLD: ABNORMAL
EOSINOPHIL # BLD AUTO: 0 K/UL (ref 0–0.5)
EOSINOPHIL NFR BLD: 0.5 % (ref 0–8)
ERYTHROCYTE [DISTWIDTH] IN BLOOD BY AUTOMATED COUNT: 13.7 % (ref 11.5–14.5)
EST. GFR  (NO RACE VARIABLE): >60 ML/MIN/1.73 M^2
EST. GFR  (NO RACE VARIABLE): >60 ML/MIN/1.73 M^2
GLUCOSE SERPL-MCNC: 94 MG/DL (ref 70–110)
HCT VFR BLD AUTO: 43.7 % (ref 37–48.5)
HGB BLD-MCNC: 14 G/DL (ref 12–16)
IMM GRANULOCYTES # BLD AUTO: 0.02 K/UL (ref 0–0.04)
IMM GRANULOCYTES NFR BLD AUTO: 0.5 % (ref 0–0.5)
INR PPP: 1 (ref 0.8–1.2)
LYMPHOCYTES # BLD AUTO: 1.5 K/UL (ref 1–4.8)
LYMPHOCYTES NFR BLD: 37 % (ref 18–48)
MCH RBC QN AUTO: 27.9 PG (ref 27–31)
MCHC RBC AUTO-ENTMCNC: 32 G/DL (ref 32–36)
MCV RBC AUTO: 87 FL (ref 82–98)
MONOCYTES # BLD AUTO: 0.6 K/UL (ref 0.3–1)
MONOCYTES NFR BLD: 15.2 % (ref 4–15)
NEUTROPHILS # BLD AUTO: 1.9 K/UL (ref 1.8–7.7)
NEUTROPHILS NFR BLD: 45.8 % (ref 38–73)
NRBC BLD-RTO: 0 /100 WBC
PLATELET # BLD AUTO: 214 K/UL (ref 150–450)
PMV BLD AUTO: 12 FL (ref 9.2–12.9)
POCT GLUCOSE: 110 MG/DL (ref 70–110)
POTASSIUM SERPL-SCNC: 3.5 MMOL/L (ref 3.5–5.1)
PREALB SERPL-MCNC: 26 MG/DL (ref 20–43)
PROT SERPL-MCNC: 7.2 G/DL (ref 6–8.4)
PROTHROMBIN TIME: 10.6 SEC (ref 9–12.5)
RBC # BLD AUTO: 5.02 M/UL (ref 4–5.4)
SODIUM SERPL-SCNC: 139 MMOL/L (ref 136–145)
WBC # BLD AUTO: 4.08 K/UL (ref 3.9–12.7)

## 2024-04-29 PROCEDURE — 85730 THROMBOPLASTIN TIME PARTIAL: CPT

## 2024-04-29 PROCEDURE — 85025 COMPLETE CBC W/AUTO DIFF WBC: CPT

## 2024-04-29 PROCEDURE — 80053 COMPREHEN METABOLIC PANEL: CPT

## 2024-04-29 PROCEDURE — 3077F SYST BP >= 140 MM HG: CPT | Mod: CPTII,S$GLB,, | Performed by: STUDENT IN AN ORGANIZED HEALTH CARE EDUCATION/TRAINING PROGRAM

## 2024-04-29 PROCEDURE — 85610 PROTHROMBIN TIME: CPT

## 2024-04-29 PROCEDURE — 1101F PT FALLS ASSESS-DOCD LE1/YR: CPT | Mod: CPTII,S$GLB,, | Performed by: STUDENT IN AN ORGANIZED HEALTH CARE EDUCATION/TRAINING PROGRAM

## 2024-04-29 PROCEDURE — 78815 PET IMAGE W/CT SKULL-THIGH: CPT | Mod: 26,PI,, | Performed by: STUDENT IN AN ORGANIZED HEALTH CARE EDUCATION/TRAINING PROGRAM

## 2024-04-29 PROCEDURE — A9552 F18 FDG: HCPCS | Performed by: INTERNAL MEDICINE

## 2024-04-29 PROCEDURE — 1126F AMNT PAIN NOTED NONE PRSNT: CPT | Mod: CPTII,S$GLB,, | Performed by: STUDENT IN AN ORGANIZED HEALTH CARE EDUCATION/TRAINING PROGRAM

## 2024-04-29 PROCEDURE — 36415 COLL VENOUS BLD VENIPUNCTURE: CPT

## 2024-04-29 PROCEDURE — 78815 PET IMAGE W/CT SKULL-THIGH: CPT | Mod: TC

## 2024-04-29 PROCEDURE — 84134 ASSAY OF PREALBUMIN: CPT

## 2024-04-29 PROCEDURE — 99999 PR PBB SHADOW E&M-EST. PATIENT-LVL IV: CPT | Mod: PBBFAC,,, | Performed by: STUDENT IN AN ORGANIZED HEALTH CARE EDUCATION/TRAINING PROGRAM

## 2024-04-29 PROCEDURE — 99204 OFFICE O/P NEW MOD 45 MIN: CPT | Mod: 57,S$GLB,, | Performed by: STUDENT IN AN ORGANIZED HEALTH CARE EDUCATION/TRAINING PROGRAM

## 2024-04-29 PROCEDURE — 3078F DIAST BP <80 MM HG: CPT | Mod: CPTII,S$GLB,, | Performed by: STUDENT IN AN ORGANIZED HEALTH CARE EDUCATION/TRAINING PROGRAM

## 2024-04-29 PROCEDURE — 3288F FALL RISK ASSESSMENT DOCD: CPT | Mod: CPTII,S$GLB,, | Performed by: STUDENT IN AN ORGANIZED HEALTH CARE EDUCATION/TRAINING PROGRAM

## 2024-04-29 RX ORDER — FLUDEOXYGLUCOSE F18 500 MCI/ML
12.14 INJECTION INTRAVENOUS
Status: COMPLETED | OUTPATIENT
Start: 2024-04-29 | End: 2024-04-29

## 2024-04-29 RX ADMIN — FLUDEOXYGLUCOSE F-18 12.14 MILLICURIE: 500 INJECTION INTRAVENOUS at 07:04

## 2024-04-30 ENCOUNTER — HOSPITAL ENCOUNTER (OUTPATIENT)
Dept: RADIOLOGY | Facility: HOSPITAL | Age: 79
Discharge: HOME | End: 2024-04-30
Payer: MEDICARE

## 2024-04-30 DIAGNOSIS — C34.92 NSCLC OF LEFT LUNG: ICD-10-CM

## 2024-04-30 PROCEDURE — 71260 CT THORAX DX C+: CPT | Mod: TC

## 2024-04-30 PROCEDURE — 71260 CT THORAX DX C+: CPT | Mod: 26,,, | Performed by: STUDENT IN AN ORGANIZED HEALTH CARE EDUCATION/TRAINING PROGRAM

## 2024-04-30 PROCEDURE — 25500020 PHARM REV CODE 255

## 2024-04-30 RX ADMIN — IOHEXOL 75 ML: 350 INJECTION, SOLUTION INTRAVENOUS at 08:04

## 2024-04-30 NOTE — PROGRESS NOTES
[facility] Consult Note    Referring provider: Uzma Larsen MD    Assessment:  Melani Holloway is a 78 y.o. female with a T3N0M0, group stage IIB, adenocarcinoma of the ECHO vs synchronous T1N0M0 primaries in the ECHO.   Uptake in colon on PET, C scope not up to date.   ECOG: (1) Restricted in physically strenuous activity, ambulatory and able to do work of light nature        Plan:  Treatment options were discussed with the patient including surgery, radiotherapy, systemic therapy and some combination thereof.  I have ordered MRI brain for staging.   We discussed the goals of treatment  The risks, benefits, scheduling, alternatives to and rationale of radiation therapy were explained in detail.    Will plan to discuss surgical candidacy at tumor board today.   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  1/14/24: CTA chest for PE rule out demonstrated a 1.5 x 2.2 cm ECHO mass abutting the aortic arch with a focal GGO in the ECHO. There are other subcm nodules in the lung as well as a 1.3cm right hilar and 1.2cm subcarinal lymph node.   4/2/24: CT chest with a stable ECHO pulmonary nodule, measuring 2.2 cm and stable ECHO GGO, measuring 1.6cm. stable mediastinal LN  4/2/24: PFT with FEV1 of 1.5L  DLCO 64%  4/16/24:   Path: ECHO with NSCLC, favor adenocarcinoma  ECHO (G) with rare atypical cells. Station 7 and 11L are negative for malignancy with LN present  4/29/24: PET/CT with FDG avidity in the 1.9 cm ECHO nodule. 1.5cm GGO with background uptake. No pathologically enlarged or avid LN. No distant mets.       Possibility of pregnancy: No  History of prior irradiation: Yes - right breast  History of prior systemic anti-cancer therapy: Yes -  AI x 5 yrs  History of collagen vascular disease: No  Implanted electronic device (pacer/defib/nerve stimulator): No     History of Present Illness:  Melani Holloway presents today to discuss the role of radiotherapy.    She is independent in ADLs, she reports ~1-2 flight of stairs tolerance. No supplemental oxygen use. No worsening SOB, CP, hemoptysis. She is on Breo for her COPD.     1ppd x ~30 yrs, quit ~30 yrs ago.     Review of Systems:  ROS as above    Social History:  Social History     Tobacco Use    Smoking status: Former     Current packs/day: 0.00     Average packs/day: 1 pack/day for 38.0 years (38.0 ttl pk-yrs)     Types: Cigarettes     Start date:      Quit date:      Years since quittin.3    Smokeless tobacco: Never   Substance Use Topics    Alcohol use: Yes     Comment: Occasionally    Drug use: No       Past Medical History:  Past Medical History:   Diagnosis Date    COPD (chronic obstructive pulmonary disease)     Ductal carcinoma in situ (DCIS) of right breast 2017    Hypertension        Past Surgical History:   Procedure Laterality Date    BREAST BIOPSY Right 2010    BREAST LUMPECTOMY Right 2010    cataract surgery      ROBOTIC BRONCHOSCOPY N/A 2024    Procedure: ROBOTIC BRONCHOSCOPY;  Surgeon: Uzma Larsen MD;  Location: [facility] OR [address] [place] [address];  Service: Pulmonary;  Laterality: N/A;         Medications:  Current Outpatient Medications on File Prior to Visit   Medication Sig Dispense Refill    albuterol (PROAIR HFA) 90 mcg/actuation inhaler Inhale 2 puffs into the lungs every 6 (six) hours as needed for Wheezing. Rescue 6.7 g 1    albuterol-ipratropium (DUO-NEB) 2.5 mg-0.5 mg/3 mL nebulizer solution Take 3 mLs by nebulization every 4 (four) hours. Rescue 75 mL 0    amLODIPine (NORVASC) 10 MG tablet TAKE 1 TABLET BY MOUTH EVERY DAY 90 tablet 3    aspirin (ECOTRIN) 81 MG EC tablet Take 81 mg by mouth every other day.       azelastine (ASTELIN) 137 mcg (0.1 %) nasal spray 1  "spray (137 mcg total) by Nasal route 2 (two) times daily. 30 mL 3    benzonatate (TESSALON PERLES) 100 MG capsule Take 1 capsule (100 mg total) by mouth every 6 (six) hours as needed for Cough. 30 capsule 1    calcium carbonate (OS-JONAS) 600 mg calcium (1,500 mg) Tab Take 600 mg by mouth once.      fluticasone furoate-vilanteroL (BREO) 100-25 mcg/dose diskus inhaler Inhale 1 puff into the lungs once daily. Controller 60 each 2    fluticasone propionate (FLONASE) 50 mcg/actuation nasal spray 1 spray (50 mcg total) by Each Nostril route once daily. 1 Bottle 0    hydroCHLOROthiazide (HYDRODIURIL) 25 MG tablet TAKE 1 TABLET BY MOUTH EVERY DAY IN THE MORNING 90 tablet 3    lisinopriL (PRINIVIL,ZESTRIL) 40 MG tablet TAKE 1 TABLET BY MOUTH EVERY DAY 90 tablet 3    vit A/vit C/vit E/zinc/copper (ICAPS AREDS ORAL) Take 1 tablet by mouth once daily.      vitamin D 1000 units Tab Take 1,000 Units by mouth once daily.       No current facility-administered medications on file prior to visit.       Allergies:  Review of patient's allergies indicates:  No Known Allergies    Exam:  Vitals:    05/01/24 0948   BP: (!) 179/74   BP Location: Right arm   Patient Position: Sitting   Pulse: 70   Resp: 19   Temp: 97.3 °F (36.3 °C)   SpO2: (!) 94%   Weight: 84.1 kg (185 lb 6.5 oz)   Height: 5' 3" (1.6 m)     Constitutional: Pleasant 78 y.o. female in no acute distress.  Well nourished. Well groomed.   HEENT: Normocephalic and atraumatic   Cardiovascular: Upper extremities warm to touch  Lungs: No audible wheezing.  Normal effort.   Musculoskeletal: No gross MSK deformities.  Ambulates well.  Skin: No rashes appreciated.  Psych: Alert and oriented with appropriate mood and affect.  Neuro:  Grossly normal.    Data Review:  Information obtained from Melani Holloway and via chart review.     Independent Interpretation of Test(s): CT chest from 4/30/24 was personally reviewed      Update:     Patient discussed at Tumor Board on 5/1/24 with " recommendation to offer lobectomy pending cardiac workup.      Please re-refer if not a surgical candidate         Spencer Murguia MD  Radiation Oncology

## 2024-05-01 ENCOUNTER — OFFICE VISIT (OUTPATIENT)
Dept: RADIATION ONCOLOGY | Facility: CLINIC | Age: 79
End: 2024-05-01
Payer: MEDICARE

## 2024-05-01 VITALS
HEART RATE: 70 BPM | SYSTOLIC BLOOD PRESSURE: 179 MMHG | WEIGHT: 185.44 LBS | TEMPERATURE: 97 F | DIASTOLIC BLOOD PRESSURE: 74 MMHG | HEIGHT: 63 IN | BODY MASS INDEX: 32.86 KG/M2 | RESPIRATION RATE: 19 BRPM | OXYGEN SATURATION: 94 %

## 2024-05-01 DIAGNOSIS — C34.90 MALIGNANT NEOPLASM OF UNSPECIFIED PART OF UNSPECIFIED BRONCHUS OR LUNG: ICD-10-CM

## 2024-05-01 PROCEDURE — 99999 PR PBB SHADOW E&M-EST. PATIENT-LVL IV: CPT | Mod: PBBFAC,,, | Performed by: STUDENT IN AN ORGANIZED HEALTH CARE EDUCATION/TRAINING PROGRAM

## 2024-05-01 PROCEDURE — 99205 OFFICE O/P NEW HI 60 MIN: CPT | Mod: S$GLB,,, | Performed by: STUDENT IN AN ORGANIZED HEALTH CARE EDUCATION/TRAINING PROGRAM

## 2024-05-01 PROCEDURE — 3078F DIAST BP <80 MM HG: CPT | Mod: CPTII,S$GLB,, | Performed by: STUDENT IN AN ORGANIZED HEALTH CARE EDUCATION/TRAINING PROGRAM

## 2024-05-01 PROCEDURE — 1159F MED LIST DOCD IN RCRD: CPT | Mod: CPTII,S$GLB,, | Performed by: STUDENT IN AN ORGANIZED HEALTH CARE EDUCATION/TRAINING PROGRAM

## 2024-05-01 PROCEDURE — 1101F PT FALLS ASSESS-DOCD LE1/YR: CPT | Mod: CPTII,S$GLB,, | Performed by: STUDENT IN AN ORGANIZED HEALTH CARE EDUCATION/TRAINING PROGRAM

## 2024-05-01 PROCEDURE — 1126F AMNT PAIN NOTED NONE PRSNT: CPT | Mod: CPTII,S$GLB,, | Performed by: STUDENT IN AN ORGANIZED HEALTH CARE EDUCATION/TRAINING PROGRAM

## 2024-05-01 PROCEDURE — 3288F FALL RISK ASSESSMENT DOCD: CPT | Mod: CPTII,S$GLB,, | Performed by: STUDENT IN AN ORGANIZED HEALTH CARE EDUCATION/TRAINING PROGRAM

## 2024-05-01 PROCEDURE — 3077F SYST BP >= 140 MM HG: CPT | Mod: CPTII,S$GLB,, | Performed by: STUDENT IN AN ORGANIZED HEALTH CARE EDUCATION/TRAINING PROGRAM

## 2024-05-02 ENCOUNTER — TELEPHONE (OUTPATIENT)
Dept: CARDIOLOGY | Facility: HOSPITAL | Age: 79
End: 2024-05-02
Payer: MEDICARE

## 2024-05-06 ENCOUNTER — HOSPITAL ENCOUNTER (OUTPATIENT)
Dept: CARDIOLOGY | Facility: HOSPITAL | Age: 79
Discharge: HOME | End: 2024-05-06
Payer: MEDICARE

## 2024-05-06 VITALS
BODY MASS INDEX: 32.78 KG/M2 | SYSTOLIC BLOOD PRESSURE: 165 MMHG | DIASTOLIC BLOOD PRESSURE: 70 MMHG | HEIGHT: 63 IN | HEART RATE: 56 BPM | WEIGHT: 185 LBS

## 2024-05-06 DIAGNOSIS — Z01.810 PRE-OPERATIVE CARDIOVASCULAR EXAMINATION: ICD-10-CM

## 2024-05-06 LAB
CV PHARM DOSE: 0.4 MG
CV STRESS BASE HR: 56 BPM
DIASTOLIC BLOOD PRESSURE: 70 MMHG
OHS CV CPX 85 PERCENT MAX PREDICTED HEART RATE MALE: 119
OHS CV CPX MAX PREDICTED HEART RATE: 140
OHS CV CPX PATIENT IS FEMALE: 1
OHS CV CPX PATIENT IS MALE: 0
OHS CV CPX PEAK DIASTOLIC BLOOD PRESSURE: 70 MMHG
OHS CV CPX PEAK HEAR RATE: 59 BPM
OHS CV CPX PEAK RATE PRESSURE PRODUCT: 9735
OHS CV CPX PEAK SYSTOLIC BLOOD PRESSURE: 165 MMHG
OHS CV CPX PERCENT MAX PREDICTED HEART RATE ACHIEVED: 44
OHS CV CPX RATE PRESSURE PRODUCT PRESENTING: 9240
SYSTOLIC BLOOD PRESSURE: 165 MMHG

## 2024-05-06 PROCEDURE — 63600175 PHARM REV CODE 636 W HCPCS

## 2024-05-06 PROCEDURE — 78452 HT MUSCLE IMAGE SPECT MULT: CPT

## 2024-05-06 PROCEDURE — 93018 CV STRESS TEST I&R ONLY: CPT | Mod: ,,, | Performed by: INTERNAL MEDICINE

## 2024-05-06 PROCEDURE — A9502 TC99M TETROFOSMIN: HCPCS

## 2024-05-06 PROCEDURE — 78452 HT MUSCLE IMAGE SPECT MULT: CPT | Mod: 26,,, | Performed by: INTERNAL MEDICINE

## 2024-05-06 PROCEDURE — 93016 CV STRESS TEST SUPVJ ONLY: CPT | Mod: ,,, | Performed by: INTERNAL MEDICINE

## 2024-05-06 RX ORDER — REGADENOSON 0.08 MG/ML
0.4 INJECTION, SOLUTION INTRAVENOUS
Status: COMPLETED | OUTPATIENT
Start: 2024-05-06 | End: 2024-05-06

## 2024-05-06 RX ADMIN — TETROFOSMIN 10.2 MILLICURIE: 1.38 INJECTION, POWDER, LYOPHILIZED, FOR SOLUTION INTRAVENOUS at 08:05

## 2024-05-06 RX ADMIN — TETROFOSMIN 30 MILLICURIE: 1.38 INJECTION, POWDER, LYOPHILIZED, FOR SOLUTION INTRAVENOUS at 09:05

## 2024-05-06 RX ADMIN — REGADENOSON 0.4 MG: 0.08 INJECTION, SOLUTION INTRAVENOUS at 09:05

## 2024-05-08 ENCOUNTER — HOSPITAL ENCOUNTER (OUTPATIENT)
Dept: RADIOLOGY | Facility: HOSPITAL | Age: 79
Discharge: HOME | End: 2024-05-08
Attending: STUDENT IN AN ORGANIZED HEALTH CARE EDUCATION/TRAINING PROGRAM
Payer: MEDICARE

## 2024-05-08 DIAGNOSIS — C34.90 MALIGNANT NEOPLASM OF UNSPECIFIED PART OF UNSPECIFIED BRONCHUS OR LUNG: ICD-10-CM

## 2024-05-08 DIAGNOSIS — C34.92 NSCLC OF LEFT LUNG: Primary | ICD-10-CM

## 2024-05-08 PROCEDURE — 25500020 PHARM REV CODE 255: Performed by: STUDENT IN AN ORGANIZED HEALTH CARE EDUCATION/TRAINING PROGRAM

## 2024-05-08 PROCEDURE — 70553 MRI BRAIN STEM W/O & W/DYE: CPT | Mod: 26,,, | Performed by: RADIOLOGY

## 2024-05-08 PROCEDURE — A9585 GADOBUTROL INJECTION: HCPCS | Performed by: STUDENT IN AN ORGANIZED HEALTH CARE EDUCATION/TRAINING PROGRAM

## 2024-05-08 PROCEDURE — 70553 MRI BRAIN STEM W/O & W/DYE: CPT | Mod: TC

## 2024-05-08 RX ORDER — GADOBUTROL 604.72 MG/ML
9 INJECTION INTRAVENOUS
Status: COMPLETED | OUTPATIENT
Start: 2024-05-08 | End: 2024-05-08

## 2024-05-08 RX ADMIN — GADOBUTROL 9 ML: 604.72 INJECTION INTRAVENOUS at 06:05

## 2024-05-09 ENCOUNTER — PATIENT MESSAGE (OUTPATIENT)
Dept: RESEARCH | Facility: HOSPITAL | Age: 79
End: 2024-05-09
Payer: MEDICARE

## 2024-05-09 PROBLEM — C34.12 MALIGNANT NEOPLASM OF UPPER LOBE OF LEFT LUNG: Status: ACTIVE | Noted: 2024-05-09

## 2024-05-09 PROBLEM — R09.02 HYPOXIA: Status: RESOLVED | Noted: 2024-01-14 | Resolved: 2024-05-09

## 2024-05-09 PROBLEM — J98.01 BRONCHOSPASM: Status: RESOLVED | Noted: 2024-04-02 | Resolved: 2024-05-09

## 2024-05-09 PROBLEM — J18.9 PNEUMONIA: Status: RESOLVED | Noted: 2023-12-14 | Resolved: 2024-05-09

## 2024-05-09 PROBLEM — R91.1 PULMONARY NODULE: Status: RESOLVED | Noted: 2024-01-14 | Resolved: 2024-05-09

## 2024-05-10 ENCOUNTER — RESEARCH ENCOUNTER (OUTPATIENT)
Dept: RESEARCH | Facility: HOSPITAL | Age: 79
End: 2024-05-10
Payer: MEDICARE

## 2024-05-10 ENCOUNTER — PATIENT MESSAGE (OUTPATIENT)
Dept: RESEARCH | Facility: HOSPITAL | Age: 79
End: 2024-05-10
Payer: MEDICARE

## 2024-05-10 NOTE — PROGRESS NOTES
Ms. Holloway was called today regarding her participation in (IRB #[id number] PI: Fidelia).   The Verbal Informed Consent was read and discussed by the consenter. The following was discussed:  Types of specimens to be collected  All medical information released to researchers will be stripped of identifiers and no patient information will be given to anyone outside of this research project.   Participating in a research study is not the same as getting regular medical care and will not improve the patient's health. The purpose of a research study is to gather information.  Being in this study does not interfere with your regular medical care.  The patient does not have to participate in this study. If they do not join, their care at [facility] will not be affected.  The person granting permission was provided adequate time to ask questions regarding the scope and purpose of the study.  Permission was obtained by telephone.   The above statements were read by the person obtaining permission to the person granting permission and witnessed by Juliano Raymond. The witness information was documented on the verbal consent form as well.  This Verbal Informed Consent process was conducted prior to initiation of any study procedures.

## 2024-05-19 LAB
DNA RANGE(S) EXAMINED NAR: NORMAL
GENE DIS ANL INTERP-IMP: NORMAL
GENE DIS ASSESSED: NORMAL
GENE MUT TESTED BLD/T: 2.1 M/MB
MSI CA SPEC-IMP: NORMAL
PD-L1 BY 22C3 TISS IMSTN DOC: NEGATIVE
REASON FOR STUDY: NORMAL
TEMPUS FUSIONADDENDUM: NORMAL
TEMPUS LCA: NORMAL
TEMPUS PD-L1 (22C3) COMBINED POSITIVE SCORE: <1
TEMPUS PD-L1 (22C3) TUMOR PROPORTION SCORE: <1 %
TEMPUS PERTINENTNEGATIVES: NORMAL
TEMPUS PORTAL: NORMAL

## 2024-05-21 ENCOUNTER — ANESTHESIA EVENT (OUTPATIENT)
Dept: SURGERY | Facility: HOSPITAL | Age: 79
DRG: 164 | End: 2024-05-21
Payer: MEDICARE

## 2024-05-21 NOTE — PRE-PROCEDURE INSTRUCTIONS
PreOp Instructions given:   - Verbal medication information (what to hold and what to take)   - NPO guidelines 2300  - Arrival place directions given; time to be given the day before procedure by the   Surgeon's Office 0500 DOSC  - Bathing with antibacterial soap   - Don't wear any jewelry or bring any valuables AM of surgery   - No makeup or moisturizer to face   - No perfume/cologne, powder, lotions or aftershave   Pt. verbalized understanding.   Pt denies any h/o Anesthesia/Sedation complications or side effects.  Patient does not know arrival time.  Explained that this information comes from the surgeon's office and if they haven't heard from them by 2 or 3 pm to call the office.  Patient stated an understanding.

## 2024-05-22 ENCOUNTER — TELEPHONE (OUTPATIENT)
Dept: CARDIOTHORACIC SURGERY | Facility: CLINIC | Age: 79
End: 2024-05-22
Payer: MEDICARE

## 2024-05-23 ENCOUNTER — HOSPITAL ENCOUNTER (INPATIENT)
Facility: HOSPITAL | Age: 79
LOS: 5 days | Discharge: HOME | DRG: 164 | End: 2024-05-28
Attending: STUDENT IN AN ORGANIZED HEALTH CARE EDUCATION/TRAINING PROGRAM | Admitting: STUDENT IN AN ORGANIZED HEALTH CARE EDUCATION/TRAINING PROGRAM
Payer: MEDICARE

## 2024-05-23 ENCOUNTER — ANESTHESIA (OUTPATIENT)
Dept: SURGERY | Facility: HOSPITAL | Age: 79
DRG: 164 | End: 2024-05-23
Payer: MEDICARE

## 2024-05-23 DIAGNOSIS — I48.91 A-FIB: ICD-10-CM

## 2024-05-23 DIAGNOSIS — C34.12 MALIGNANT NEOPLASM OF UPPER LOBE OF LEFT LUNG: ICD-10-CM

## 2024-05-23 LAB
ABO + RH BLD: NORMAL
ALBUMIN SERPL BCP-MCNC: 3.5 G/DL (ref 3.5–5.2)
ALP SERPL-CCNC: 74 U/L (ref 55–135)
ALT SERPL W/O P-5'-P-CCNC: 16 U/L (ref 10–44)
ANION GAP SERPL CALC-SCNC: 12 MMOL/L (ref 8–16)
AST SERPL-CCNC: 23 U/L (ref 10–40)
BASOPHILS # BLD AUTO: 0.07 K/UL (ref 0–0.2)
BASOPHILS NFR BLD: 0.6 % (ref 0–1.9)
BILIRUB SERPL-MCNC: 0.3 MG/DL (ref 0.1–1)
BLD GP AB SCN CELLS X3 SERPL QL: NORMAL
BUN SERPL-MCNC: 20 MG/DL (ref 8–23)
CALCIUM SERPL-MCNC: 8.6 MG/DL (ref 8.7–10.5)
CHLORIDE SERPL-SCNC: 108 MMOL/L (ref 95–110)
CO2 SERPL-SCNC: 20 MMOL/L (ref 23–29)
CREAT SERPL-MCNC: 0.9 MG/DL (ref 0.5–1.4)
DIFFERENTIAL METHOD BLD: ABNORMAL
EOSINOPHIL # BLD AUTO: 0 K/UL (ref 0–0.5)
EOSINOPHIL NFR BLD: 0 % (ref 0–8)
ERYTHROCYTE [DISTWIDTH] IN BLOOD BY AUTOMATED COUNT: 13.6 % (ref 11.5–14.5)
EST. GFR  (NO RACE VARIABLE): >60 ML/MIN/1.73 M^2
GLUCOSE SERPL-MCNC: 164 MG/DL (ref 70–110)
HCT VFR BLD AUTO: 42.1 % (ref 37–48.5)
HGB BLD-MCNC: 13.1 G/DL (ref 12–16)
IMM GRANULOCYTES # BLD AUTO: 0.19 K/UL (ref 0–0.04)
IMM GRANULOCYTES NFR BLD AUTO: 1.6 % (ref 0–0.5)
LYMPHOCYTES # BLD AUTO: 0.7 K/UL (ref 1–4.8)
LYMPHOCYTES NFR BLD: 5.5 % (ref 18–48)
MCH RBC QN AUTO: 27.5 PG (ref 27–31)
MCHC RBC AUTO-ENTMCNC: 31.1 G/DL (ref 32–36)
MCV RBC AUTO: 88 FL (ref 82–98)
MONOCYTES # BLD AUTO: 0.4 K/UL (ref 0.3–1)
MONOCYTES NFR BLD: 2.9 % (ref 4–15)
NEUTROPHILS # BLD AUTO: 10.7 K/UL (ref 1.8–7.7)
NEUTROPHILS NFR BLD: 89.4 % (ref 38–73)
NRBC BLD-RTO: 0 /100 WBC
PLATELET # BLD AUTO: 224 K/UL (ref 150–450)
PMV BLD AUTO: 12.1 FL (ref 9.2–12.9)
POTASSIUM SERPL-SCNC: 4.6 MMOL/L (ref 3.5–5.1)
PROT SERPL-MCNC: 6.4 G/DL (ref 6–8.4)
RBC # BLD AUTO: 4.77 M/UL (ref 4–5.4)
SODIUM SERPL-SCNC: 140 MMOL/L (ref 136–145)
SPECIMEN OUTDATE: NORMAL
WBC # BLD AUTO: 11.98 K/UL (ref 3.9–12.7)

## 2024-05-23 PROCEDURE — 63600175 PHARM REV CODE 636 W HCPCS

## 2024-05-23 PROCEDURE — 37000008 HC ANESTHESIA 1ST 15 MINUTES: Performed by: STUDENT IN AN ORGANIZED HEALTH CARE EDUCATION/TRAINING PROGRAM

## 2024-05-23 PROCEDURE — 25000242 PHARM REV CODE 250 ALT 637 W/ HCPCS

## 2024-05-23 PROCEDURE — D9220A PRA ANESTHESIA: Mod: CRNA,,, | Performed by: NURSE ANESTHETIST, CERTIFIED REGISTERED

## 2024-05-23 PROCEDURE — 36620 INSERTION CATHETER ARTERY: CPT | Mod: 59,,, | Performed by: ANESTHESIOLOGY

## 2024-05-23 PROCEDURE — 25000242 PHARM REV CODE 250 ALT 637 W/ HCPCS: Performed by: STUDENT IN AN ORGANIZED HEALTH CARE EDUCATION/TRAINING PROGRAM

## 2024-05-23 PROCEDURE — 25000003 PHARM REV CODE 250: Performed by: NURSE ANESTHETIST, CERTIFIED REGISTERED

## 2024-05-23 PROCEDURE — C9290 INJ, BUPIVACAINE LIPOSOME: HCPCS | Performed by: STUDENT IN AN ORGANIZED HEALTH CARE EDUCATION/TRAINING PROGRAM

## 2024-05-23 PROCEDURE — 0BNJ4ZZ RELEASE LEFT LOWER LUNG LOBE, PERCUTANEOUS ENDOSCOPIC APPROACH: ICD-10-PCS | Performed by: STUDENT IN AN ORGANIZED HEALTH CARE EDUCATION/TRAINING PROGRAM

## 2024-05-23 PROCEDURE — 85025 COMPLETE CBC W/AUTO DIFF WBC: CPT | Performed by: STUDENT IN AN ORGANIZED HEALTH CARE EDUCATION/TRAINING PROGRAM

## 2024-05-23 PROCEDURE — 25000003 PHARM REV CODE 250

## 2024-05-23 PROCEDURE — C1729 CATH, DRAINAGE: HCPCS | Performed by: STUDENT IN AN ORGANIZED HEALTH CARE EDUCATION/TRAINING PROGRAM

## 2024-05-23 PROCEDURE — 63600175 PHARM REV CODE 636 W HCPCS: Performed by: ANESTHESIOLOGY

## 2024-05-23 PROCEDURE — 8E0W4CZ ROBOTIC ASSISTED PROCEDURE OF TRUNK REGION, PERCUTANEOUS ENDOSCOPIC APPROACH: ICD-10-PCS | Performed by: STUDENT IN AN ORGANIZED HEALTH CARE EDUCATION/TRAINING PROGRAM

## 2024-05-23 PROCEDURE — 71000039 HC RECOVERY, EACH ADD'L HOUR: Performed by: STUDENT IN AN ORGANIZED HEALTH CARE EDUCATION/TRAINING PROGRAM

## 2024-05-23 PROCEDURE — 80053 COMPREHEN METABOLIC PANEL: CPT | Performed by: STUDENT IN AN ORGANIZED HEALTH CARE EDUCATION/TRAINING PROGRAM

## 2024-05-23 PROCEDURE — 86850 RBC ANTIBODY SCREEN: CPT

## 2024-05-23 PROCEDURE — 0BNG4ZZ RELEASE LEFT UPPER LUNG LOBE, PERCUTANEOUS ENDOSCOPIC APPROACH: ICD-10-PCS | Performed by: STUDENT IN AN ORGANIZED HEALTH CARE EDUCATION/TRAINING PROGRAM

## 2024-05-23 PROCEDURE — 36000712 HC OR TIME LEV V 1ST 15 MIN: Performed by: STUDENT IN AN ORGANIZED HEALTH CARE EDUCATION/TRAINING PROGRAM

## 2024-05-23 PROCEDURE — 0BJ08ZZ INSPECTION OF TRACHEOBRONCHIAL TREE, VIA NATURAL OR ARTIFICIAL OPENING ENDOSCOPIC: ICD-10-PCS | Performed by: STUDENT IN AN ORGANIZED HEALTH CARE EDUCATION/TRAINING PROGRAM

## 2024-05-23 PROCEDURE — 63600175 PHARM REV CODE 636 W HCPCS: Mod: JZ,JG | Performed by: STUDENT IN AN ORGANIZED HEALTH CARE EDUCATION/TRAINING PROGRAM

## 2024-05-23 PROCEDURE — 37000009 HC ANESTHESIA EA ADD 15 MINS: Performed by: STUDENT IN AN ORGANIZED HEALTH CARE EDUCATION/TRAINING PROGRAM

## 2024-05-23 PROCEDURE — 25000003 PHARM REV CODE 250: Performed by: STUDENT IN AN ORGANIZED HEALTH CARE EDUCATION/TRAINING PROGRAM

## 2024-05-23 PROCEDURE — 94761 N-INVAS EAR/PLS OXIMETRY MLT: CPT

## 2024-05-23 PROCEDURE — 86900 BLOOD TYPING SEROLOGIC ABO: CPT

## 2024-05-23 PROCEDURE — 71000033 HC RECOVERY, INTIAL HOUR: Performed by: STUDENT IN AN ORGANIZED HEALTH CARE EDUCATION/TRAINING PROGRAM

## 2024-05-23 PROCEDURE — 63600175 PHARM REV CODE 636 W HCPCS: Performed by: NURSE ANESTHETIST, CERTIFIED REGISTERED

## 2024-05-23 PROCEDURE — 99900035 HC TECH TIME PER 15 MIN (STAT)

## 2024-05-23 PROCEDURE — 07B74ZX EXCISION OF THORAX LYMPHATIC, PERCUTANEOUS ENDOSCOPIC APPROACH, DIAGNOSTIC: ICD-10-PCS | Performed by: STUDENT IN AN ORGANIZED HEALTH CARE EDUCATION/TRAINING PROGRAM

## 2024-05-23 PROCEDURE — 36415 COLL VENOUS BLD VENIPUNCTURE: CPT

## 2024-05-23 PROCEDURE — 36000713 HC OR TIME LEV V EA ADD 15 MIN: Performed by: STUDENT IN AN ORGANIZED HEALTH CARE EDUCATION/TRAINING PROGRAM

## 2024-05-23 PROCEDURE — 0BBG4ZZ EXCISION OF LEFT UPPER LUNG LOBE, PERCUTANEOUS ENDOSCOPIC APPROACH: ICD-10-PCS | Performed by: STUDENT IN AN ORGANIZED HEALTH CARE EDUCATION/TRAINING PROGRAM

## 2024-05-23 PROCEDURE — 20000000 HC ICU ROOM

## 2024-05-23 PROCEDURE — 27201037 HC PRESSURE MONITORING SET UP

## 2024-05-23 PROCEDURE — 32669 THORACOSCOPY REMOVE SEGMENT: CPT | Mod: AS,LT,,

## 2024-05-23 PROCEDURE — D9220A PRA ANESTHESIA: Mod: ANES,,, | Performed by: ANESTHESIOLOGY

## 2024-05-23 PROCEDURE — 27201423 OPTIME MED/SURG SUP & DEVICES STERILE SUPPLY: Performed by: STUDENT IN AN ORGANIZED HEALTH CARE EDUCATION/TRAINING PROGRAM

## 2024-05-23 PROCEDURE — 5A0945A ASSISTANCE WITH RESPIRATORY VENTILATION, 24-96 CONSECUTIVE HOURS, HIGH NASAL FLOW/VELOCITY: ICD-10-PCS | Performed by: STUDENT IN AN ORGANIZED HEALTH CARE EDUCATION/TRAINING PROGRAM

## 2024-05-23 PROCEDURE — 32669 THORACOSCOPY REMOVE SEGMENT: CPT | Mod: LT,,, | Performed by: STUDENT IN AN ORGANIZED HEALTH CARE EDUCATION/TRAINING PROGRAM

## 2024-05-23 PROCEDURE — 94640 AIRWAY INHALATION TREATMENT: CPT

## 2024-05-23 PROCEDURE — 0BBT4ZZ EXCISION OF DIAPHRAGM, PERCUTANEOUS ENDOSCOPIC APPROACH: ICD-10-PCS | Performed by: STUDENT IN AN ORGANIZED HEALTH CARE EDUCATION/TRAINING PROGRAM

## 2024-05-23 PROCEDURE — A4216 STERILE WATER/SALINE, 10 ML: HCPCS | Performed by: STUDENT IN AN ORGANIZED HEALTH CARE EDUCATION/TRAINING PROGRAM

## 2024-05-23 PROCEDURE — 27000221 HC OXYGEN, UP TO 24 HOURS

## 2024-05-23 PROCEDURE — 63600175 PHARM REV CODE 636 W HCPCS: Performed by: STUDENT IN AN ORGANIZED HEALTH CARE EDUCATION/TRAINING PROGRAM

## 2024-05-23 PROCEDURE — 32674 THORACOSCOPY LYMPH NODE EXC: CPT | Mod: AS,,,

## 2024-05-23 PROCEDURE — 32674 THORACOSCOPY LYMPH NODE EXC: CPT | Mod: ,,, | Performed by: STUDENT IN AN ORGANIZED HEALTH CARE EDUCATION/TRAINING PROGRAM

## 2024-05-23 RX ORDER — DEXMEDETOMIDINE HYDROCHLORIDE 100 UG/ML
INJECTION, SOLUTION INTRAVENOUS
Status: DISCONTINUED | OUTPATIENT
Start: 2024-05-23 | End: 2024-05-23

## 2024-05-23 RX ORDER — HYDROMORPHONE HYDROCHLORIDE 1 MG/ML
0.2 INJECTION, SOLUTION INTRAMUSCULAR; INTRAVENOUS; SUBCUTANEOUS EVERY 5 MIN PRN
Status: DISCONTINUED | OUTPATIENT
Start: 2024-05-23 | End: 2024-05-23 | Stop reason: HOSPADM

## 2024-05-23 RX ORDER — SODIUM CHLORIDE 9 MG/ML
INJECTION, SOLUTION INTRAMUSCULAR; INTRAVENOUS; SUBCUTANEOUS
Status: DISCONTINUED | OUTPATIENT
Start: 2024-05-23 | End: 2024-05-23 | Stop reason: HOSPADM

## 2024-05-23 RX ORDER — GABAPENTIN 300 MG/1
300 CAPSULE ORAL NIGHTLY
Status: DISCONTINUED | OUTPATIENT
Start: 2024-05-23 | End: 2024-05-24

## 2024-05-23 RX ORDER — CALCIUM GLUCONATE 20 MG/ML
1 INJECTION, SOLUTION INTRAVENOUS
Status: DISCONTINUED | OUTPATIENT
Start: 2024-05-23 | End: 2024-05-25

## 2024-05-23 RX ORDER — FAMOTIDINE 20 MG/1
20 TABLET, FILM COATED ORAL 2 TIMES DAILY
Status: DISCONTINUED | OUTPATIENT
Start: 2024-05-23 | End: 2024-05-28 | Stop reason: HOSPADM

## 2024-05-23 RX ORDER — LEVALBUTEROL 1.25 MG/.5ML
1.25 SOLUTION, CONCENTRATE RESPIRATORY (INHALATION) EVERY 12 HOURS
Status: DISCONTINUED | OUTPATIENT
Start: 2024-05-23 | End: 2024-05-23

## 2024-05-23 RX ORDER — EPHEDRINE SULFATE 50 MG/ML
INJECTION, SOLUTION INTRAVENOUS
Status: DISCONTINUED | OUTPATIENT
Start: 2024-05-23 | End: 2024-05-23

## 2024-05-23 RX ORDER — OXYCODONE HYDROCHLORIDE 5 MG/1
5 TABLET ORAL EVERY 4 HOURS PRN
Status: DISCONTINUED | OUTPATIENT
Start: 2024-05-23 | End: 2024-05-28 | Stop reason: HOSPADM

## 2024-05-23 RX ORDER — HALOPERIDOL 5 MG/ML
0.5 INJECTION INTRAMUSCULAR EVERY 10 MIN PRN
Status: DISCONTINUED | OUTPATIENT
Start: 2024-05-23 | End: 2024-05-23 | Stop reason: HOSPADM

## 2024-05-23 RX ORDER — BISACODYL 10 MG/1
10 SUPPOSITORY RECTAL DAILY PRN
Status: DISCONTINUED | OUTPATIENT
Start: 2024-05-23 | End: 2024-05-28 | Stop reason: HOSPADM

## 2024-05-23 RX ORDER — MAGNESIUM SULFATE HEPTAHYDRATE 40 MG/ML
4 INJECTION, SOLUTION INTRAVENOUS
Status: DISCONTINUED | OUTPATIENT
Start: 2024-05-23 | End: 2024-05-25

## 2024-05-23 RX ORDER — PROPOFOL 10 MG/ML
VIAL (ML) INTRAVENOUS
Status: DISCONTINUED | OUTPATIENT
Start: 2024-05-23 | End: 2024-05-23

## 2024-05-23 RX ORDER — POTASSIUM CHLORIDE 7.45 MG/ML
80 INJECTION INTRAVENOUS
Status: DISCONTINUED | OUTPATIENT
Start: 2024-05-23 | End: 2024-05-25

## 2024-05-23 RX ORDER — INDOCYANINE GREEN AND WATER 25 MG
KIT INJECTION
Status: DISCONTINUED | OUTPATIENT
Start: 2024-05-23 | End: 2024-05-23

## 2024-05-23 RX ORDER — POTASSIUM CHLORIDE 7.45 MG/ML
40 INJECTION INTRAVENOUS
Status: DISCONTINUED | OUTPATIENT
Start: 2024-05-23 | End: 2024-05-25

## 2024-05-23 RX ORDER — LEVALBUTEROL 1.25 MG/.5ML
SOLUTION, CONCENTRATE RESPIRATORY (INHALATION)
Status: DISPENSED
Start: 2024-05-23 | End: 2024-05-24

## 2024-05-23 RX ORDER — FLUTICASONE FUROATE AND VILANTEROL 100; 25 UG/1; UG/1
1 POWDER RESPIRATORY (INHALATION) DAILY
Status: DISCONTINUED | OUTPATIENT
Start: 2024-05-23 | End: 2024-05-28 | Stop reason: HOSPADM

## 2024-05-23 RX ORDER — ROCURONIUM BROMIDE 10 MG/ML
INJECTION, SOLUTION INTRAVENOUS
Status: DISCONTINUED | OUTPATIENT
Start: 2024-05-23 | End: 2024-05-23

## 2024-05-23 RX ORDER — ACETAMINOPHEN 500 MG
1000 TABLET ORAL EVERY 8 HOURS
Status: DISCONTINUED | OUTPATIENT
Start: 2024-05-23 | End: 2024-05-28 | Stop reason: HOSPADM

## 2024-05-23 RX ORDER — ONDANSETRON HYDROCHLORIDE 2 MG/ML
INJECTION, SOLUTION INTRAVENOUS
Status: DISCONTINUED | OUTPATIENT
Start: 2024-05-23 | End: 2024-05-23

## 2024-05-23 RX ORDER — MAGNESIUM SULFATE HEPTAHYDRATE 40 MG/ML
2 INJECTION, SOLUTION INTRAVENOUS
Status: DISCONTINUED | OUTPATIENT
Start: 2024-05-23 | End: 2024-05-25

## 2024-05-23 RX ORDER — LEVALBUTEROL 1.25 MG/.5ML
1.25 SOLUTION, CONCENTRATE RESPIRATORY (INHALATION) EVERY 12 HOURS
Status: DISCONTINUED | OUTPATIENT
Start: 2024-05-23 | End: 2024-05-28 | Stop reason: HOSPADM

## 2024-05-23 RX ORDER — METHOCARBAMOL 500 MG/1
500 TABLET, FILM COATED ORAL 4 TIMES DAILY
Status: DISCONTINUED | OUTPATIENT
Start: 2024-05-23 | End: 2024-05-28 | Stop reason: HOSPADM

## 2024-05-23 RX ORDER — LIDOCAINE HYDROCHLORIDE 20 MG/ML
INJECTION INTRAVENOUS
Status: DISCONTINUED | OUTPATIENT
Start: 2024-05-23 | End: 2024-05-23

## 2024-05-23 RX ORDER — METOPROLOL TARTRATE 25 MG/1
12.5 TABLET ORAL 2 TIMES DAILY
Status: DISCONTINUED | OUTPATIENT
Start: 2024-05-23 | End: 2024-05-25

## 2024-05-23 RX ORDER — DIPHENHYDRAMINE HYDROCHLORIDE 50 MG/ML
25 INJECTION INTRAMUSCULAR; INTRAVENOUS EVERY 6 HOURS PRN
Status: DISCONTINUED | OUTPATIENT
Start: 2024-05-23 | End: 2024-05-23 | Stop reason: HOSPADM

## 2024-05-23 RX ORDER — ENOXAPARIN SODIUM 100 MG/ML
40 INJECTION SUBCUTANEOUS EVERY 24 HOURS
Status: DISCONTINUED | OUTPATIENT
Start: 2024-05-23 | End: 2024-05-28 | Stop reason: HOSPADM

## 2024-05-23 RX ORDER — FENTANYL CITRATE 50 UG/ML
INJECTION, SOLUTION INTRAMUSCULAR; INTRAVENOUS
Status: DISCONTINUED | OUTPATIENT
Start: 2024-05-23 | End: 2024-05-23

## 2024-05-23 RX ORDER — FENTANYL CITRATE 50 UG/ML
25 INJECTION, SOLUTION INTRAMUSCULAR; INTRAVENOUS EVERY 5 MIN PRN
Status: DISCONTINUED | OUTPATIENT
Start: 2024-05-23 | End: 2024-05-23 | Stop reason: HOSPADM

## 2024-05-23 RX ORDER — IPRATROPIUM BROMIDE AND ALBUTEROL SULFATE 2.5; .5 MG/3ML; MG/3ML
3 SOLUTION RESPIRATORY (INHALATION)
Status: DISCONTINUED | OUTPATIENT
Start: 2024-05-23 | End: 2024-05-23

## 2024-05-23 RX ORDER — AMOXICILLIN 250 MG
1 CAPSULE ORAL DAILY
Status: DISCONTINUED | OUTPATIENT
Start: 2024-05-23 | End: 2024-05-28 | Stop reason: HOSPADM

## 2024-05-23 RX ORDER — POLYETHYLENE GLYCOL 3350 17 G/17G
17 POWDER, FOR SOLUTION ORAL DAILY
Status: DISCONTINUED | OUTPATIENT
Start: 2024-05-23 | End: 2024-05-28 | Stop reason: HOSPADM

## 2024-05-23 RX ORDER — METOCLOPRAMIDE HYDROCHLORIDE 5 MG/ML
5 INJECTION INTRAMUSCULAR; INTRAVENOUS EVERY 6 HOURS PRN
Status: DISCONTINUED | OUTPATIENT
Start: 2024-05-23 | End: 2024-05-28 | Stop reason: HOSPADM

## 2024-05-23 RX ORDER — LIDOCAINE HYDROCHLORIDE 10 MG/ML
1 INJECTION, SOLUTION EPIDURAL; INFILTRATION; INTRACAUDAL; PERINEURAL ONCE
Status: DISCONTINUED | OUTPATIENT
Start: 2024-05-23 | End: 2024-05-23 | Stop reason: HOSPADM

## 2024-05-23 RX ORDER — SODIUM CHLORIDE 0.9 % (FLUSH) 0.9 %
10 SYRINGE (ML) INJECTION
Status: DISCONTINUED | OUTPATIENT
Start: 2024-05-23 | End: 2024-05-28 | Stop reason: HOSPADM

## 2024-05-23 RX ORDER — ACETAMINOPHEN 500 MG
1000 TABLET ORAL
Status: COMPLETED | OUTPATIENT
Start: 2024-05-23 | End: 2024-05-23

## 2024-05-23 RX ORDER — BUPIVACAINE HYDROCHLORIDE 2.5 MG/ML
INJECTION, SOLUTION EPIDURAL; INFILTRATION; INTRACAUDAL
Status: DISCONTINUED | OUTPATIENT
Start: 2024-05-23 | End: 2024-05-23 | Stop reason: HOSPADM

## 2024-05-23 RX ORDER — OXYCODONE HYDROCHLORIDE 10 MG/1
10 TABLET ORAL EVERY 4 HOURS PRN
Status: DISCONTINUED | OUTPATIENT
Start: 2024-05-23 | End: 2024-05-28 | Stop reason: HOSPADM

## 2024-05-23 RX ORDER — SODIUM CHLORIDE 0.9 % (FLUSH) 0.9 %
3 SYRINGE (ML) INJECTION
Status: DISCONTINUED | OUTPATIENT
Start: 2024-05-23 | End: 2024-05-23 | Stop reason: HOSPADM

## 2024-05-23 RX ORDER — PROCHLORPERAZINE EDISYLATE 5 MG/ML
5 INJECTION INTRAMUSCULAR; INTRAVENOUS EVERY 30 MIN PRN
Status: DISCONTINUED | OUTPATIENT
Start: 2024-05-23 | End: 2024-05-23 | Stop reason: HOSPADM

## 2024-05-23 RX ORDER — BUPIVACAINE HYDROCHLORIDE 5 MG/ML
INJECTION, SOLUTION EPIDURAL; INTRACAUDAL
Status: DISCONTINUED | OUTPATIENT
Start: 2024-05-23 | End: 2024-05-23 | Stop reason: HOSPADM

## 2024-05-23 RX ORDER — POTASSIUM CHLORIDE 7.45 MG/ML
60 INJECTION INTRAVENOUS
Status: DISCONTINUED | OUTPATIENT
Start: 2024-05-23 | End: 2024-05-25

## 2024-05-23 RX ORDER — ONDANSETRON 8 MG/1
8 TABLET, ORALLY DISINTEGRATING ORAL EVERY 8 HOURS PRN
Status: DISCONTINUED | OUTPATIENT
Start: 2024-05-23 | End: 2024-05-28 | Stop reason: HOSPADM

## 2024-05-23 RX ORDER — DEXAMETHASONE SODIUM PHOSPHATE 4 MG/ML
INJECTION, SOLUTION INTRA-ARTICULAR; INTRALESIONAL; INTRAMUSCULAR; INTRAVENOUS; SOFT TISSUE
Status: DISCONTINUED | OUTPATIENT
Start: 2024-05-23 | End: 2024-05-23

## 2024-05-23 RX ADMIN — ROCURONIUM BROMIDE 20 MG: 10 INJECTION, SOLUTION INTRAVENOUS at 11:05

## 2024-05-23 RX ADMIN — INDOCYANINE GREEN AND WATER 8.33 MG: KIT at 12:05

## 2024-05-23 RX ADMIN — METOPROLOL TARTRATE 12.5 MG: 25 TABLET, FILM COATED ORAL at 08:05

## 2024-05-23 RX ADMIN — SODIUM CHLORIDE, SODIUM GLUCONATE, SODIUM ACETATE, POTASSIUM CHLORIDE, MAGNESIUM CHLORIDE, SODIUM PHOSPHATE, DIBASIC, AND POTASSIUM PHOSPHATE: .53; .5; .37; .037; .03; .012; .00082 INJECTION, SOLUTION INTRAVENOUS at 07:05

## 2024-05-23 RX ADMIN — SUGAMMADEX 200 MG: 100 INJECTION, SOLUTION INTRAVENOUS at 02:05

## 2024-05-23 RX ADMIN — SODIUM CHLORIDE: 0.9 INJECTION, SOLUTION INTRAVENOUS at 07:05

## 2024-05-23 RX ADMIN — EPHEDRINE SULFATE 5 MG: 50 INJECTION INTRAVENOUS at 09:05

## 2024-05-23 RX ADMIN — FLUTICASONE FUROATE AND VILANTEROL TRIFENATATE 1 PUFF: 100; 25 POWDER RESPIRATORY (INHALATION) at 04:05

## 2024-05-23 RX ADMIN — METHOCARBAMOL 500 MG: 500 TABLET ORAL at 08:05

## 2024-05-23 RX ADMIN — LEVALBUTEROL 1.25 MG: 1.25 SOLUTION, CONCENTRATE RESPIRATORY (INHALATION) at 04:05

## 2024-05-23 RX ADMIN — DEXMEDETOMIDINE 4 MCG: 100 INJECTION, SOLUTION, CONCENTRATE INTRAVENOUS at 07:05

## 2024-05-23 RX ADMIN — DEXMEDETOMIDINE 12 MCG: 100 INJECTION, SOLUTION, CONCENTRATE INTRAVENOUS at 07:05

## 2024-05-23 RX ADMIN — GLYCOPYRROLATE 0.1 MG: 0.2 INJECTION, SOLUTION INTRAMUSCULAR; INTRAVENOUS at 07:05

## 2024-05-23 RX ADMIN — FENTANYL CITRATE 25 MCG: 50 INJECTION INTRAMUSCULAR; INTRAVENOUS at 05:05

## 2024-05-23 RX ADMIN — PROPOFOL 150 MG: 10 INJECTION, EMULSION INTRAVENOUS at 07:05

## 2024-05-23 RX ADMIN — CEFAZOLIN 2 G: 2 INJECTION, POWDER, FOR SOLUTION INTRAMUSCULAR; INTRAVENOUS at 03:05

## 2024-05-23 RX ADMIN — ACETAMINOPHEN 1000 MG: 500 TABLET ORAL at 06:05

## 2024-05-23 RX ADMIN — FENTANYL CITRATE 100 MCG: 50 INJECTION, SOLUTION INTRAMUSCULAR; INTRAVENOUS at 07:05

## 2024-05-23 RX ADMIN — ROCURONIUM BROMIDE 50 MG: 10 INJECTION, SOLUTION INTRAVENOUS at 07:05

## 2024-05-23 RX ADMIN — CEFAZOLIN 2 G: 2 INJECTION, POWDER, FOR SOLUTION INTRAMUSCULAR; INTRAVENOUS at 11:05

## 2024-05-23 RX ADMIN — FENTANYL CITRATE 25 MCG: 50 INJECTION INTRAMUSCULAR; INTRAVENOUS at 04:05

## 2024-05-23 RX ADMIN — GABAPENTIN 400 MG: 300 CAPSULE ORAL at 06:05

## 2024-05-23 RX ADMIN — EPHEDRINE SULFATE 5 MG: 50 INJECTION INTRAVENOUS at 12:05

## 2024-05-23 RX ADMIN — ACETAMINOPHEN 1000 MG: 500 TABLET ORAL at 10:05

## 2024-05-23 RX ADMIN — ROCURONIUM BROMIDE 20 MG: 10 INJECTION, SOLUTION INTRAVENOUS at 12:05

## 2024-05-23 RX ADMIN — CEFAZOLIN 2 G: 2 INJECTION, POWDER, FOR SOLUTION INTRAMUSCULAR; INTRAVENOUS at 07:05

## 2024-05-23 RX ADMIN — ROCURONIUM BROMIDE 20 MG: 10 INJECTION, SOLUTION INTRAVENOUS at 09:05

## 2024-05-23 RX ADMIN — ONDANSETRON 4 MG: 2 INJECTION INTRAMUSCULAR; INTRAVENOUS at 01:05

## 2024-05-23 RX ADMIN — ENOXAPARIN SODIUM 40 MG: 40 INJECTION SUBCUTANEOUS at 04:05

## 2024-05-23 RX ADMIN — LIDOCAINE HYDROCHLORIDE 100 MG: 20 INJECTION INTRAVENOUS at 07:05

## 2024-05-23 RX ADMIN — PHENYLEPHRINE HYDROCHLORIDE 0.25 MCG/KG/MIN: 10 INJECTION INTRAVENOUS at 07:05

## 2024-05-23 RX ADMIN — FENTANYL CITRATE 25 MCG: 50 INJECTION INTRAMUSCULAR; INTRAVENOUS at 03:05

## 2024-05-23 RX ADMIN — OXYCODONE HYDROCHLORIDE 10 MG: 10 TABLET ORAL at 07:05

## 2024-05-23 RX ADMIN — GABAPENTIN 300 MG: 300 CAPSULE ORAL at 08:05

## 2024-05-23 RX ADMIN — DEXAMETHASONE SODIUM PHOSPHATE 8 MG: 4 INJECTION, SOLUTION INTRAMUSCULAR; INTRAVENOUS at 07:05

## 2024-05-23 RX ADMIN — ROCURONIUM BROMIDE 20 MG: 10 INJECTION, SOLUTION INTRAVENOUS at 08:05

## 2024-05-23 RX ADMIN — FAMOTIDINE 20 MG: 20 TABLET ORAL at 08:05

## 2024-05-23 RX ADMIN — CEFAZOLIN 2 G: 2 INJECTION, POWDER, FOR SOLUTION INTRAMUSCULAR; INTRAVENOUS at 12:05

## 2024-05-23 RX ADMIN — PROPOFOL 40 MG: 10 INJECTION, EMULSION INTRAVENOUS at 07:05

## 2024-05-23 NOTE — BRIEF OP NOTE
[facility] - [facility] (2nd Fl)  Brief Operative Note    SUMMARY     Surgery Date: 5/23/2024     Surgeons and Role:     * Riaz Fay MD - Primary     * Devang Alarcon PA-C - First Assist    Assisting Surgeon: None    Pre-op Diagnosis:  NSCLC of left lung [C34.92]    Post-op Diagnosis:  Post-Op Diagnosis Codes:     * NSCLC of left lung [C34.92]    Procedure(s) (LRB):  XI ROBOTIC LEFT UPPER LOBECTOMY (Left)  LYMPHADENECTOMY (N/A)  BLOCK, NERVE, INTERCOSTAL, 2 OR MORE (Left)  BRONCHOSCOPY (Left)    Anesthesia: General    Implants:  * No implants in log *    Operative Findings: insertion of robotic port sites, docking of Directa Plusi Xi robot, lysis of adhesions, mediastinal lymph node dissection, upper division of left upper lobe    Estimated Blood Loss: 10 mL    Estimated Blood Loss has been documented.         Specimens:   Specimen (24h ago, onward)       Start     Ordered    05/23/24 1322  Specimen to Pathology, Surgery Pulmonary and Thoracic  Once        Comments: Pre-op Diagnosis: NSCLC of left lung [C34.92]Procedure(s):XI ROBOTIC LEFT UPPER LOBECTOMY,LUNG VS SEGMENTECTOMY VS UPPER DIVISIONLYMPHADENECTOMYBLOCK, NERVE, INTERCOSTAL, 2 OR MORE Number of specimens: 13Name of specimens: 1) DIAPHRAGMATIC NODULE - FROZEN2) VISCERAL PLEURAL NODULE - FROZEN3) LEFT LOWER LOBE VISCERAL PLEURAL NODULE - FROZEN4) LEFT LEVEL 9 - PERMANENT5) LEFT LEVEL 8 - PERMANENT6) LEFT LEVEL 7 - PERMANENT7) LEFT LEVEL 10 - PERMANENT8) LEFT LEVEL 11 - PERMANENT9) LINGULAR VISCERAL PLEURAL NODE - [id number]) LEFT LEVEL 12 - [id number]) LEVEL 11 ANTERIOR - [id number]) LEFT LEVEL 5 - [id number]) LEFT UPPER LOBE - PERMANENT     References:    Click here for ordering Quick Tip   Question Answer Comment   Procedure Type: Pulmonary and Thoracic    Specimen Class: Known or suspected malignancy        05/23/24 1350                    [id number]

## 2024-05-23 NOTE — ANESTHESIA PROCEDURE NOTES
Arterial    Diagnosis: lung cancer    Patient location during procedure: done in OR    Staffing  Authorizing Provider: Rubén Sroenson MD  Performing Provider: Rubén Sroenson MD    Staffing  Performed by: Rubén Sorenson MD  Authorized by: Rubén Sorenson MD    Anesthesiologist was present at the time of the procedure.    Preanesthetic Checklist  Completed: patient identified, IV checked, site marked, risks and benefits discussed, surgical consent, monitors and equipment checked, pre-op evaluation, timeout performed and anesthesia consent givenArterial  Skin Prep: chlorhexidine gluconate  Orientation: left  Location: radial    Catheter Size: 20 G  Catheter placement by Anatomical landmarks. Heme positive aspiration all ports. Insertion Attempts: 1  Assessment  Dressing: secured with tape and tegaderm  Patient: Tolerated well

## 2024-05-23 NOTE — ANESTHESIA PROCEDURE NOTES
Intubation    Date/Time: 5/23/2024 7:12 AM    Performed by: Roney Bianchi CRNA  Authorized by: Rubén Sorenson MD    Intubation:     Induction:  Intravenous    Intubated:  Postinduction    Mask Ventilation:  Easy with oral airway    Attempts:  1    Attempted By:  CRNA    Method of Intubation:  Video laryngoscopy    Blade:  Arana 3    Laryngeal View Grade: Grade I - full view of cords      Difficult Airway Encountered?: No      Complications:  None    Airway Device:  Double lumen tube left    Airway Device Size:  37F    Style/Cuff Inflation:  Cuffed (inflated to minimal occlusive pressure)    Inflation Amount (mL):  5    Tube secured:  28    Secured at:  The lips    Placement Verified By:  Capnometry, Revisualization with laryngoscopy and Fiber optic visualization    Complicating Factors:  None    Findings Post-Intubation:  BS equal bilateral and atraumatic/condition of teeth unchanged

## 2024-05-23 NOTE — TRANSFER OF CARE
"Anesthesia Transfer of Care Note    Patient: Melani Holloway    Procedure(s) Performed: Procedure(s) (LRB):  XI ROBOTIC LEFT UPPER LOBECTOMY (Left)  LYMPHADENECTOMY (N/A)  BLOCK, NERVE, INTERCOSTAL, 2 OR MORE (Left)  BRONCHOSCOPY (Left)    Patient location: PACU    Anesthesia Type: general    Transport from OR: Transported from OR on 6-10 L/min O2 by face mask with adequate spontaneous ventilation    Post pain: adequate analgesia    Post assessment: no apparent anesthetic complications and tolerated procedure well    Post vital signs: stable    Level of consciousness: lethargic and responds to stimulation    Nausea/Vomiting: no nausea/vomiting    Complications: none    Transfer of care protocol was followed      Last vitals: Visit Vitals  BP (!) 124/56 (BP Location: Left arm, Patient Position: Lying)   Pulse 71   Temp 36.6 °C (97.8 °F) (Temporal)   Resp 16   Ht 5' 3" (1.6 m)   Wt 81.6 kg (179 lb 14.3 oz)   LMP  (LMP Unknown)   SpO2 96%   Breastfeeding No   BMI 31.87 kg/m²     "

## 2024-05-23 NOTE — ANESTHESIA PREPROCEDURE EVALUATION
05/23/2024  Pre-operative evaluation for Procedure(s) (LRB):  XI ROBOTIC LEFT UPPER LOBECTOMY,LUNG VS SEGMENTECTOMY VS UPPER DIVISION (Left)  LYMPHADENECTOMY (N/A)    Melani Holloway is a 78 y.o. female with lung cancer of left upper lobe here for robotic lobectomy    Patient Active Problem List   Diagnosis    Positive fecal occult blood test - declines colonoscopy    Ductal carcinoma in situ (DCIS) of right breast    Essential hypertension    Impaired fasting glucose    Osteopenia    Vitamin D deficiency    Other nonthrombocytopenic purpura    Chronic rhinitis    Emphysema lung    Malignant neoplasm of upper lobe of left lung       Review of patient's allergies indicates:  No Known Allergies    No current facility-administered medications on file prior to encounter.     Current Outpatient Medications on File Prior to Encounter   Medication Sig Dispense Refill    albuterol-ipratropium (DUO-NEB) 2.5 mg-0.5 mg/3 mL nebulizer solution Take 3 mLs by nebulization every 4 (four) hours. Rescue 75 mL 0    amLODIPine (NORVASC) 10 MG tablet TAKE 1 TABLET BY MOUTH EVERY DAY 90 tablet 3    aspirin (ECOTRIN) 81 MG EC tablet Take 81 mg by mouth every other day.       calcium carbonate (OS-JONAS) 600 mg calcium (1,500 mg) Tab Take 600 mg by mouth once.      fluticasone furoate-vilanteroL (BREO) 100-25 mcg/dose diskus inhaler Inhale 1 puff into the lungs once daily. Controller 60 each 2    fluticasone propionate (FLONASE) 50 mcg/actuation nasal spray 1 spray (50 mcg total) by Each Nostril route once daily. 1 Bottle 0    hydroCHLOROthiazide (HYDRODIURIL) 25 MG tablet TAKE 1 TABLET BY MOUTH EVERY DAY IN THE MORNING 90 tablet 3    lisinopriL (PRINIVIL,ZESTRIL) 40 MG tablet TAKE 1 TABLET BY MOUTH EVERY DAY 90 tablet 3    vit A/vit C/vit E/zinc/copper (ICAPS AREDS ORAL) Take 1 tablet by mouth once daily.      vitamin D 1000  units Tab Take 1,000 Units by mouth once daily.      albuterol (PROAIR HFA) 90 mcg/actuation inhaler Inhale 2 puffs into the lungs every 6 (six) hours as needed for Wheezing. Rescue 6.7 g 1    azelastine (ASTELIN) 137 mcg (0.1 %) nasal spray 1 spray (137 mcg total) by Nasal route 2 (two) times daily. (Patient not taking: Reported on 2024) 30 mL 3    benzonatate (TESSALON PERLES) 100 MG capsule Take 1 capsule (100 mg total) by mouth every 6 (six) hours as needed for Cough. (Patient not taking: Reported on 2024) 30 capsule 1       Past Surgical History:   Procedure Laterality Date    BREAST BIOPSY Right 2010    BREAST LUMPECTOMY Right 2010    cataract surgery      ROBOTIC BRONCHOSCOPY N/A 2024    Procedure: ROBOTIC BRONCHOSCOPY;  Surgeon: Uzma Larsen MD;  Location: [facility] OR [address] [place] [address];  Service: Pulmonary;  Laterality: N/A;       Social History     Socioeconomic History    Marital status:    Occupational History    Occupation: Retired   Tobacco Use    Smoking status: Former     Current packs/day: 0.00     Average packs/day: 1 pack/day for 38.0 years (38.0 ttl pk-yrs)     Types: Cigarettes     Start date:      Quit date:      Years since quittin.4    Smokeless tobacco: Never   Substance and Sexual Activity    Alcohol use: Yes     Comment: Occasionally    Drug use: No    Sexual activity: Not Currently     Social Determinants of Health     Financial Resource Strain: Low Risk  (2024)    Overall Financial Resource Strain (CARDIA)     Difficulty of Paying Living Expenses: Not hard at all   Food Insecurity: No Food Insecurity (2024)    Hunger Vital Sign     Worried About Running Out of Food in the Last Year: Never true     Ran Out of Food in the Last Year: Never true   Transportation Needs: No Transportation Needs (2024)    PRAPARE - Transportation     Lack of Transportation (Medical): No     Lack of Transportation (Non-Medical): No   Physical Activity: Sufficiently Active  (10/16/2023)    Exercise Vital Sign     Days of Exercise per Week: 7 days     Minutes of Exercise per Session: 150+ min   Stress: No Stress Concern Present (1/16/2024)    [employer] - Occupational Stress Questionnaire     Feeling of Stress : Not at all   Housing Stability: Low Risk  (1/16/2024)    Housing Stability Vital Sign     Unable to Pay for Housing in the Last Year: No     Number of Places Lived in the Last Year: 1     Unstable Housing in the Last Year: No               Pre-op Assessment    I have reviewed the Patient Summary Reports.     I have reviewed the Nursing Notes. I have reviewed the NPO Status.      Review of Systems  Anesthesia Hx:  No problems with previous Anesthesia                Hematology/Oncology:                      Current/Recent Cancer.                Cardiovascular:     Hypertension                                        Pulmonary:   COPD                     Hepatic/GI:  Hepatic/GI Normal                 Neurological:  Neurology Normal                                      Endocrine:  Endocrine Normal                Physical Exam    Airway:  Mallampati: II   Mouth Opening: Normal  Tongue: Normal    Chest/Lungs:  Normal Respiratory Rate    Heart:  Rhythm: Regular Rhythm        Anesthesia Plan  Type of Anesthesia, risks & benefits discussed:    Anesthesia Type: Gen ETT  Intra-op Monitoring Plan: Standard ASA Monitors and Art Line  Induction:  IV  Informed Consent: Informed consent signed with the Patient and all parties understand the risks and agree with anesthesia plan.  All questions answered.   ASA Score: 3    Ready For Surgery From Anesthesia Perspective.     .

## 2024-05-23 NOTE — PROGRESS NOTES
Turbulent sound noted at chest tube insertion site with each respiration, team notified, Ave Alarcon PA-C reinforced insertion site with tegaderm. Sound still present, team aware

## 2024-05-23 NOTE — PROGRESS NOTES
Pt IV flushed. LCW chest tube CDI at insertion site, air leak present MD aware, to wall suction. PT does not c/o pain at this time. LAp sites x4 CDI.

## 2024-05-23 NOTE — BRIEF OP NOTE
Certification of Assistant at Surgery       Surgery Date: 5/23/2024     Participating Surgeons:  Surgeons and Role:     * Riaz Fay MD - Primary     * Devang Alarcon PA-C - First Assist    Procedures:  Procedure(s) (LRB):  XI ROBOTIC LEFT UPPER LOBECTOMY (Left)  LYMPHADENECTOMY (N/A)  BLOCK, NERVE, INTERCOSTAL, 2 OR MORE (Left)  BRONCHOSCOPY (Left)    Assistant Surgeon's Certification of Necessity:  I understand that section 1842 (b) (6) (d) of the Social Security Act generally prohibits Medicare Part B reasonable charge payment for the services of assistants at surgery in teaching hospitals when qualified residents are available to furnish such services. I certify that the services for which payment is claimed were medically necessary, and that no qualified resident was available to perform the services. I further understand that these services are subject to post-payment review by the Medicare carrier.      Freedom Alarcon PA-C    05/23/2024  2:28 PM

## 2024-05-23 NOTE — OP NOTE
Thoracic Surgery Operative Report      Date:05/23/24     Preoperative Diagnosis: Non-Small Cell Lung Cancer of the Left Upper Lobe     Postoperative Diagnosis: Non-Small Cell Lung Cancer of the Left Upper Lobe     Procedure Performed:   1.  Flexible bronchoscopy  2.  Left Robotic-Assisted Left upper division segmentectomy    3.  Mediastinal Lymph Node Dissection.   4.  Intercostal nerve block       Intraoperative Findings: Left upper lobe nodule,  multiple visceral pleural nodules with negative frozen section, dense pleural adhesions throughout the chest         This was a complex case that went over and above the technical work required for a standard segmentectomy for a lung nodule under the appropriate code.  I am attaching a -22 modifier to reflect the additional 200% hours of work demanded by dense adhesions, distorted anatomy and complex intraoperative decision making.    Surgeon: Riaz Fay M.D.    Fellow/Resident: N/a       Assistant:   Bedside assistant attestation:  Freedom Alarcon PA-C functioned as a bedside 1st assistant.  His duties included robotic docking, instrument exchange, and specimen retrieval throughout the entire surgery.  There was no qualified resident available to function as a bedside first assistant given the case complexity and extent of duties described above.      Anesthesia Type: General Anesthesia.       Estimated Blood Loss: 25 mL.      Intravenous Fluid: See Anesthesia Record.       Specimens:   Specimen (24h ago, onward)       Start     Ordered    05/23/24 1322  Specimen to Pathology, Surgery Pulmonary and Thoracic  Once        Comments: Pre-op Diagnosis: NSCLC of left lung [C34.92]Procedure(s):XI ROBOTIC LEFT UPPER LOBECTOMY,LUNG VS SEGMENTECTOMY VS UPPER DIVISIONLYMPHADENECTOMYBLOCK, NERVE, INTERCOSTAL, 2 OR MORE Number of specimens: 13Name of specimens: 1) DIAPHRAGMATIC NODULE - FROZEN2) VISCERAL PLEURAL NODULE - FROZEN3) LEFT LOWER LOBE VISCERAL PLEURAL NODULE - FROZEN4)  LEFT LEVEL 9 - PERMANENT5) LEFT LEVEL 8 - PERMANENT6) LEFT LEVEL 7 - PERMANENT7) LEFT LEVEL 10 - PERMANENT8) LEFT LEVEL 11 - PERMANENT9) LINGULAR VISCERAL PLEURAL NODE - [id number]) LEFT LEVEL 12 - [id number]) LEVEL 11 ANTERIOR - [id number]) LEFT LEVEL 5 - [id number]) LEFT UPPER LOBE - PERMANENT     References:    Click here for ordering Quick Tip   Question Answer Comment   Procedure Type: Pulmonary and Thoracic    Specimen Class: Known or suspected malignancy        05/23/24 1352                    Drains: 24 [place] Jitendra Chest Tube.      Complications: None.       Disposition: The patient tolerated the operation well and was transported the postanesthesia care unit in stable condition.       Indication for the procedure:   Mrs Holloway  is a 78 year old former smoker, who presented with a left upper lobe lung nodule, consistent with clinical stage 1 lung cancer.  After extensive medical & oncological work-up and discussion at multi-disciplinary cancer conference, she was deemed a suitable surgical candidate. The patient presents today for resection. The patient was offered a left upper lobe upper division segmentectomy vs lobectomy. I discussed with the patient the risks, benefits and alternatives to surgery. Specifically I informed her of the risk of bleeding, infection, injury to nearby structures within the chest and conversion to an open procedure. I discussed with her that although there does not appear to be evidence of regional or distant disease there may evidence of spread at the time of surgery or when the specimen is examined by pathologist. Following this conversation the patient consented to surgery.     Description of the operation:   Mrs. Holloway was transferred to the operating room and placed supine on the operating room table. The patient was intubated with a single-lumen endotracheal tube by anesthesia. General anesthesia was induced. Intravenous lines and an arterial line were placed the  anesthesia team. A harris catheter was inserted.  Compression boots were placed in the lower extremities with DVT prophylaxis. The patient received IV Cefazolin prior to the incision for antimicrobial prophylaxis.  After a time out, a flexible bronchoscopy was performed. The flexible bronchoscope was advanced through the endotracheal tube advanced to the masha. The masha appeared to be sharp. The right mainstem bronchus was within normal limits. The right upper lobe bronchial orifice was normal. The bronchus intermedius was normal. The right middle lobe and right lower lobe bronchial orifices were normal. Left mainstem bronchus was entered and was normal. The left upper lobe and left lower lobe bronchial orifices were normal. The patient was then intubated with a double-lumen endotracheal tube and positioned in the right lateral decubitus position with the left chest facing upward. Secretions were noted to be minimal.       The left chest was prepped and draped in the standard sterile surgical fashion. A total of two 8 mm trocars and two 12mm robotic trocars were placed along the 8th intercostal space approximately 8-10 cm apart.  A 12 mm Air Seal Port was placed in the 10th intercostal space. The 8mm 30 degree robotic camera was placed thorough the posterior axillary line trocar and the left hemithorax was inspected and we noted dense adhesions at the apex and there was no evidence of pleural effusion or pleural dissemination, although our view of the lung surface was limited due to adhesions.. The left upper lobe lung carcinoma was located in the expected location but was adhesed to the apex of the chest. The GRIDinci Xi robot was then docked to the robotic ports. A Cadiere grasper, a tip up fenestrated lung grasper, and a long bipolar forcep were placed through the ports.     We began by taking down the adhesions of the upper and lower lobe to the anterior chest wall. We then attempted to lift the lower lobe  "cephalad but we noted dense adhesions from the lower lobe to the diaphragm and posterior mediastinum.    After mobilization off the diaphragm we noted several small diaphragmatic nodules as well as visceral pleural nodules. Further inspection revealed visceral pleural nodules of the upper lobe and lingula as well. All of these nodules were sent frozen to pathology to rule out disseminated disease and the biopsy were negative for carcinoma and appeared to be "sclerotic" in nature. After over an hour of lysis of adhesions, the mediastinal pleura was incised posteriorly  with the bilpolar grapser to the level of the aortic isthmus. Level 7, 8, 9 and 10 and 11 lymph nodes were identified, removed en bloc and sent for permanent histology. Similarly the lung was reflected caudally which facilitated dissection of the AP window, and a level 5 lymph node was obtained and sent for permanent pathology. We then reflected the lung posterolaterally to expose the anterior hilum. The pleura was divided and the superior pulmonary venous trunk was identified. The upper division and lingular branches were dissected but not divided at this point.  Next, the major fissure, which was thick and complete, was then dissected with the bipolar grasper to expose the interlobar pulmonary artery. The posterior fissure was connected to the posterior hilum and divided with a single application of the Robotic stapling device with a blue load. This exposed a level 12 lymph node which was removed en block and sent to pathology.  There were two upper division arterial branches which were circumferntially dissected  and divided with a separate firings of the Robotic stapling device with a white load.     We then returned to the anterior hilum and the previously dissected upper division  pulmonary vein was divided with a single application of the Robotic stapling device with a white vascular load.  There was a large anterior level 11 lymph node that was " straddling the upper division artery, vein and bronchus which was removed en bloc. Next The truncus truncus branch of the pulmonary artery was then dissected and divided with a single application of the Robotic stapling device with a white vascular load. We then dissected the upper division bronchus and clamp trial was performed by clamping, but not dividing the airway. Once clamped the anesthesiologist aerated the left side and the lower lobe and lingular bronchus were confirmed to be widely patent.  The upper division bronchus was then divided with a single application of the Robotic stapling device with a green robotic staple load. Next 3cc of ICG was injected intravenously and using firefly we assessed perfusion to the upper lobe. There was a clear line of demarcation between the upper division and the lingula which was marked with the bipolar. Next using multiple firings of the robotic green load stapler, the parenchyma was divided along this line of demarcation.     The specimen was then placed in an endoscopic retrieval bag and removed through the 10th intercostal space Air seal port which was enlarged approximately 4 cm. The left chest was then meticulously inspected and hemostasis was ensured. Intercostal nerve blocks were performed with Exaprel injections posteriorly from T4-9. A 24 Indonesian chest tube was placed in the 8th intercostal space and secured with a #0 Ethibond suture. The left lower lobe and lingula were then re-expanded under direct vision. The robotic incisions were closed in 3 layers. The muscle layers were reapproximated with a running #0 Vicryl suture. The adipose tissue layers were reapproximated a running 2-0 Vicryl suture. The skin was reapproximated a running 4-0 Vicryl suture. Sterile dressings were placed on the incisions.       The sponge and instrument counts were correct x 2. The patient was transported to the post anesthesia care unit in stable condition. The patient was extubated  in the operating room and was transported to the PACU by the anesthesia team. There no intraoperative surgical complications.     Thoracic (Pulmonary) Cancer - Synoptic Operative Report Summary    Side of surgery Left   Surgical approach Robotic   Tumor type NSCLC - Adenocarcinoma   Which lymph nodes were submitted as separate specimens? 5 - Subaortic, 7 - Subcarinal, 8L - Paraesophageal (left), 9L - Pulmonary ligament (left), 10L - Hilar (left), 11L - Interlobar (left), and 12L - Lobar (left)   What pre-operative therapies did the patient receive? None

## 2024-05-24 LAB
ALBUMIN SERPL BCP-MCNC: 3.2 G/DL (ref 3.5–5.2)
ALP SERPL-CCNC: 65 U/L (ref 55–135)
ALT SERPL W/O P-5'-P-CCNC: 16 U/L (ref 10–44)
ANION GAP SERPL CALC-SCNC: 9 MMOL/L (ref 8–16)
AST SERPL-CCNC: 39 U/L (ref 10–40)
BASOPHILS # BLD AUTO: 0.07 K/UL (ref 0–0.2)
BASOPHILS NFR BLD: 0.6 % (ref 0–1.9)
BILIRUB SERPL-MCNC: 0.3 MG/DL (ref 0.1–1)
BUN SERPL-MCNC: 24 MG/DL (ref 8–23)
CALCIUM SERPL-MCNC: 8.4 MG/DL (ref 8.7–10.5)
CHLORIDE SERPL-SCNC: 107 MMOL/L (ref 95–110)
CO2 SERPL-SCNC: 23 MMOL/L (ref 23–29)
CREAT SERPL-MCNC: 0.8 MG/DL (ref 0.5–1.4)
DIFFERENTIAL METHOD BLD: ABNORMAL
EOSINOPHIL # BLD AUTO: 0 K/UL (ref 0–0.5)
EOSINOPHIL NFR BLD: 0 % (ref 0–8)
ERYTHROCYTE [DISTWIDTH] IN BLOOD BY AUTOMATED COUNT: 14.1 % (ref 11.5–14.5)
EST. GFR  (NO RACE VARIABLE): >60 ML/MIN/1.73 M^2
GLUCOSE SERPL-MCNC: 124 MG/DL (ref 70–110)
HCT VFR BLD AUTO: 38.3 % (ref 37–48.5)
HGB BLD-MCNC: 12.4 G/DL (ref 12–16)
IMM GRANULOCYTES # BLD AUTO: 0.14 K/UL (ref 0–0.04)
IMM GRANULOCYTES NFR BLD AUTO: 1.2 % (ref 0–0.5)
LYMPHOCYTES # BLD AUTO: 1.4 K/UL (ref 1–4.8)
LYMPHOCYTES NFR BLD: 12.1 % (ref 18–48)
MAGNESIUM SERPL-MCNC: 2.1 MG/DL (ref 1.6–2.6)
MCH RBC QN AUTO: 28.2 PG (ref 27–31)
MCHC RBC AUTO-ENTMCNC: 32.4 G/DL (ref 32–36)
MCV RBC AUTO: 87 FL (ref 82–98)
MONOCYTES # BLD AUTO: 1.7 K/UL (ref 0.3–1)
MONOCYTES NFR BLD: 14.7 % (ref 4–15)
NEUTROPHILS # BLD AUTO: 8.2 K/UL (ref 1.8–7.7)
NEUTROPHILS NFR BLD: 71.4 % (ref 38–73)
NRBC BLD-RTO: 0 /100 WBC
PHOSPHATE SERPL-MCNC: 4.6 MG/DL (ref 2.7–4.5)
PLATELET # BLD AUTO: 220 K/UL (ref 150–450)
PMV BLD AUTO: 11.9 FL (ref 9.2–12.9)
POTASSIUM SERPL-SCNC: 4.2 MMOL/L (ref 3.5–5.1)
PROT SERPL-MCNC: 6 G/DL (ref 6–8.4)
RBC # BLD AUTO: 4.39 M/UL (ref 4–5.4)
SODIUM SERPL-SCNC: 139 MMOL/L (ref 136–145)
WBC # BLD AUTO: 11.53 K/UL (ref 3.9–12.7)

## 2024-05-24 PROCEDURE — 25000003 PHARM REV CODE 250: Performed by: STUDENT IN AN ORGANIZED HEALTH CARE EDUCATION/TRAINING PROGRAM

## 2024-05-24 PROCEDURE — 25000242 PHARM REV CODE 250 ALT 637 W/ HCPCS: Performed by: STUDENT IN AN ORGANIZED HEALTH CARE EDUCATION/TRAINING PROGRAM

## 2024-05-24 PROCEDURE — 94761 N-INVAS EAR/PLS OXIMETRY MLT: CPT

## 2024-05-24 PROCEDURE — 63600175 PHARM REV CODE 636 W HCPCS: Performed by: STUDENT IN AN ORGANIZED HEALTH CARE EDUCATION/TRAINING PROGRAM

## 2024-05-24 PROCEDURE — 20600001 HC STEP DOWN PRIVATE ROOM

## 2024-05-24 PROCEDURE — 80053 COMPREHEN METABOLIC PANEL: CPT

## 2024-05-24 PROCEDURE — 99900035 HC TECH TIME PER 15 MIN (STAT)

## 2024-05-24 PROCEDURE — 27000221 HC OXYGEN, UP TO 24 HOURS

## 2024-05-24 PROCEDURE — 85025 COMPLETE CBC W/AUTO DIFF WBC: CPT

## 2024-05-24 PROCEDURE — 83735 ASSAY OF MAGNESIUM: CPT

## 2024-05-24 PROCEDURE — 84100 ASSAY OF PHOSPHORUS: CPT

## 2024-05-24 PROCEDURE — 94640 AIRWAY INHALATION TREATMENT: CPT

## 2024-05-24 PROCEDURE — 99499 UNLISTED E&M SERVICE: CPT | Mod: ,,, | Performed by: ANESTHESIOLOGY

## 2024-05-24 PROCEDURE — 25000003 PHARM REV CODE 250

## 2024-05-24 RX ORDER — GABAPENTIN 300 MG/1
300 CAPSULE ORAL NIGHTLY
Status: CANCELLED | OUTPATIENT
Start: 2024-05-24

## 2024-05-24 RX ORDER — GABAPENTIN 100 MG/1
200 CAPSULE ORAL NIGHTLY
Status: DISCONTINUED | OUTPATIENT
Start: 2024-05-24 | End: 2024-05-28 | Stop reason: HOSPADM

## 2024-05-24 RX ADMIN — METHOCARBAMOL 500 MG: 500 TABLET ORAL at 08:05

## 2024-05-24 RX ADMIN — LEVALBUTEROL 1.25 MG: 1.25 SOLUTION, CONCENTRATE RESPIRATORY (INHALATION) at 07:05

## 2024-05-24 RX ADMIN — METHOCARBAMOL 500 MG: 500 TABLET ORAL at 02:05

## 2024-05-24 RX ADMIN — ACETAMINOPHEN 1000 MG: 500 TABLET ORAL at 10:05

## 2024-05-24 RX ADMIN — LEVALBUTEROL 1.25 MG: 1.25 SOLUTION, CONCENTRATE RESPIRATORY (INHALATION) at 09:05

## 2024-05-24 RX ADMIN — FLUTICASONE FUROATE AND VILANTEROL TRIFENATATE 1 PUFF: 100; 25 POWDER RESPIRATORY (INHALATION) at 07:05

## 2024-05-24 RX ADMIN — METHOCARBAMOL 500 MG: 500 TABLET ORAL at 10:05

## 2024-05-24 RX ADMIN — GABAPENTIN 200 MG: 100 CAPSULE ORAL at 10:05

## 2024-05-24 RX ADMIN — FAMOTIDINE 20 MG: 20 TABLET ORAL at 08:05

## 2024-05-24 RX ADMIN — SENNOSIDES AND DOCUSATE SODIUM 1 TABLET: 50; 8.6 TABLET ORAL at 08:05

## 2024-05-24 RX ADMIN — ACETAMINOPHEN 1000 MG: 500 TABLET ORAL at 02:05

## 2024-05-24 RX ADMIN — METHOCARBAMOL 500 MG: 500 TABLET ORAL at 06:05

## 2024-05-24 RX ADMIN — ACETAMINOPHEN 1000 MG: 500 TABLET ORAL at 06:05

## 2024-05-24 RX ADMIN — FAMOTIDINE 20 MG: 20 TABLET ORAL at 10:05

## 2024-05-24 RX ADMIN — METOPROLOL TARTRATE 12.5 MG: 25 TABLET, FILM COATED ORAL at 10:05

## 2024-05-24 RX ADMIN — OXYCODONE 5 MG: 5 TABLET ORAL at 06:05

## 2024-05-24 RX ADMIN — POLYETHYLENE GLYCOL 3350 17 G: 17 POWDER, FOR SOLUTION ORAL at 08:05

## 2024-05-24 RX ADMIN — ENOXAPARIN SODIUM 40 MG: 40 INJECTION SUBCUTANEOUS at 06:05

## 2024-05-24 NOTE — NURSING TRANSFER
Nursing Transfer Note      5/24/2024   1:19 PM    Nurse giving handoff:DAYSI Watts  Nurse receiving handoff:Corinne, RN    Reason patient is being transferred: Step-down    Transfer To: [place] [id number]    Transfer via bed    Transfer with 4L to O2, cardiac monitoring    Transported by RN x2    Transfer Vital Signs:  Blood Pressure:156/68  Heart Rate:58  O2:95  Temperature:97.8  Respirations:28    Telemetry: Rhythm Sinus deny  Order for Tele Monitor? Yes    Additional Lines: Oxygen and Chest Tube    4eyes on Skin: yes    Medicines sent: fluticasone furoate-vilanterol    Any special needs or follow-up needed: n/a    Patient belongings transferred with patient: Yes    Chart send with patient: Yes    Notified: daughter    Upon arrival to floor: cardiac monitor applied, patient oriented to room, call bell in reach, and bed in lowest position

## 2024-05-24 NOTE — ASSESSMENT & PLAN NOTE
POD1 s/p robotic left upper division of left upper lobe of lung and MLND.     - Step down to floor from SICU  - Chest tube to suction  - Daily CXR  - Multimodal pain control  - Prophylactic betablockade for prevention of Afib  - DVT ppx  - Regular diet

## 2024-05-24 NOTE — SUBJECTIVE & OBJECTIVE
Follow-up For: Procedure(s) (LRB):  XI ROBOTIC LEFT UPPER LOBECTOMY (Left)  LYMPHADENECTOMY (N/A)  BLOCK, NERVE, INTERCOSTAL, 2 OR MORE (Left)  BRONCHOSCOPY (Left)    Post-Operative Day: Day of Surgery     Past Medical History:   Diagnosis Date    COPD (chronic obstructive pulmonary disease)     Ductal carcinoma in situ (DCIS) of right breast 2017    Hypertension        Past Surgical History:   Procedure Laterality Date    BREAST BIOPSY Right 2010    BREAST LUMPECTOMY Right 2010    cataract surgery      ROBOTIC BRONCHOSCOPY N/A 2024    Procedure: ROBOTIC BRONCHOSCOPY;  Surgeon: Uzma Larsen MD;  Location: [facility] OR [address] [place] [address];  Service: Pulmonary;  Laterality: N/A;       Review of patient's allergies indicates:  No Known Allergies    Family History       Problem Relation (Age of Onset)    Breast cancer Paternal Aunt    Heart attack Father    No Known Problems Mother          Tobacco Use    Smoking status: Former     Current packs/day: 0.00     Average packs/day: 1 pack/day for 38.0 years (38.0 ttl pk-yrs)     Types: Cigarettes     Start date:      Quit date:      Years since quittin.4    Smokeless tobacco: Never   Substance and Sexual Activity    Alcohol use: Yes     Comment: Occasionally    Drug use: No    Sexual activity: Not Currently      Review of Systems   Constitutional:  Negative for chills and fever.   HENT: Negative.     Eyes: Negative.    Respiratory:  Negative for cough and shortness of breath.    Cardiovascular:  Negative for chest pain.   Gastrointestinal:  Negative for abdominal pain, nausea and vomiting.   Genitourinary: Negative.    Musculoskeletal: Negative.    Skin: Negative.    Neurological:  Negative for dizziness and weakness.   Psychiatric/Behavioral: Negative.         Objective:     Vital Signs (Most Recent):  Temp: 98.6 °F (37 °C) (24)  Pulse: 71 (24)  Resp: 20 (24)  BP: 132/62 (24)  SpO2: 95 % (24) Vital Signs (24h  Range):  Temp:  [97.5 °F (36.4 °C)-98.6 °F (37 °C)] 98.6 °F (37 °C)  Pulse:  [61-76] 71  Resp:  [13-34] 20  SpO2:  [93 %-99 %] 95 %  BP: (123-183)/(57-76) 132/62     Weight: 81.6 kg (179 lb 14.3 oz)  Body mass index is 31.87 kg/m².      Intake/Output Summary (Last 24 hours) at 5/23/2024 1947  Last data filed at 5/23/2024 1823  Gross per 24 hour   Intake 1100 ml   Output 380 ml   Net 720 ml          Physical Exam  Constitutional:       General: She is not in acute distress.     Appearance: Normal appearance.   HENT:      Head: Normocephalic and atraumatic.      Mouth/Throat:      Mouth: Mucous membranes are moist.   Eyes:      Extraocular Movements: Extraocular movements intact.   Cardiovascular:      Rate and Rhythm: Normal rate.      Pulses: Normal pulses.   Pulmonary:      Effort: Pulmonary effort is normal. No respiratory distress.      Comments: 3L NC  Thoracoscopic incisions c/d/I with dressings in place, no strikethrough  Left chest tube in place, SS output. Air leak noted  Abdominal:      General: There is no distension.      Palpations: Abdomen is soft.      Tenderness: There is no abdominal tenderness.   Skin:     General: Skin is warm.      Coloration: Skin is not jaundiced.   Neurological:      General: No focal deficit present.      Mental Status: She is alert and oriented to person, place, and time.            Vents:       Lines/Drains/Airways       Drain  Duration                  Chest Tube 05/23/24 1338 Tube - 1 Left Pleural 19 Fr. <1 day              Peripheral Intravenous Line  Duration                  Peripheral IV - Single Lumen 05/23/24 0720 18 G Left Wrist <1 day         Peripheral IV - Single Lumen 05/23/24 1549 20 G Right Hand <1 day                    Significant Labs:    CBC/Anemia Profile:  Recent Labs   Lab 05/23/24  1524   WBC 11.98   HGB 13.1   HCT 42.1      MCV 88   RDW 13.6        Chemistries:  Recent Labs   Lab 05/23/24  1524      K 4.6      CO2 20*   BUN 20    CREATININE 0.9   CALCIUM 8.6*   ALBUMIN 3.5   PROT 6.4   BILITOT 0.3   ALKPHOS 74   ALT 16   AST 23       All pertinent labs within the past 24 hours have been reviewed.    Significant Imaging: I have reviewed all pertinent imaging results/findings within the past 24 hours.

## 2024-05-24 NOTE — NURSING
Nurses Note -- 4 Eyes      5/24/2024   2:39 PM      Skin assessed during: Transfer      [x] No Altered Skin Integrity Present    []Prevention Measures Documented      [] Yes- Altered Skin Integrity Present or Discovered   [] LDA Added if Not in Epic (Describe Wound)   [] New Altered Skin Integrity was Present on Admit and Documented in LDA   [] Wound Image Taken    Wound Care Consulted? No    Attending Nurse:  Corinne Second RN/Staff Member:  DAYSI Yi

## 2024-05-24 NOTE — SUBJECTIVE & OBJECTIVE
Interval History: Patient seen and examined. Stepped up to the ICU for increasing supplemental oxygen requirements. Doing well this morning. Serosanguinous output from chest tube, air leak appreciated. CXR with apical pneumothorax, which is to be expected given left upper lobectomy.     Medications:  Continuous Infusions:  Scheduled Meds:   acetaminophen  1,000 mg Oral Q8H    enoxparin  40 mg Subcutaneous Daily    famotidine  20 mg Oral BID    fluticasone furoate-vilanteroL  1 puff Inhalation Daily    gabapentin  200 mg Oral QHS    levalbuterol  1.25 mg Nebulization Q12H    methocarbamoL  500 mg Oral QID    metoprolol tartrate  12.5 mg Oral BID    polyethylene glycol  17 g Oral Daily    senna-docusate 8.6-50 mg  1 tablet Oral Daily     PRN Meds:  Current Facility-Administered Medications:     bisacodyL, 10 mg, Rectal, Daily PRN    calcium gluconate IVPB, 1 g, Intravenous, PRN    calcium gluconate IVPB, 1 g, Intravenous, PRN    calcium gluconate IVPB, 1 g, Intravenous, PRN    magnesium sulfate IVPB, 2 g, Intravenous, PRN    magnesium sulfate IVPB, 4 g, Intravenous, PRN    metoclopramide, 5 mg, Intravenous, Q6H PRN    ondansetron, 8 mg, Oral, Q8H PRN    oxyCODONE, 5 mg, Oral, Q4H PRN    oxyCODONE, 10 mg, Oral, Q4H PRN    potassium chloride, 40 mEq, Intravenous, PRN **AND** potassium chloride, 60 mEq, Intravenous, PRN **AND** potassium chloride, 80 mEq, Intravenous, PRN    sodium chloride 0.9%, 10 mL, Intravenous, PRN    sodium phosphate 15 mmol in dextrose 5 % (D5W) 250 mL IVPB, 15 mmol, Intravenous, PRN    sodium phosphate 20.01 mmol in dextrose 5 % (D5W) 250 mL IVPB, 20.01 mmol, Intravenous, PRN    sodium phosphate 30 mmol in dextrose 5 % (D5W) 250 mL IVPB, 30 mmol, Intravenous, PRN     Review of patient's allergies indicates:  No Known Allergies  Objective:     Vital Signs (Most Recent):  Temp: 98.3 °F (36.8 °C) (05/24/24 0300)  Pulse: (!) 53 (05/24/24 0630)  Resp: (!) 33 (05/24/24 0630)  BP: (!) 117/57 (05/24/24  0600)  SpO2: (!) 92 % (05/24/24 0630) Vital Signs (24h Range):  Temp:  [97.5 °F (36.4 °C)-98.6 °F (37 °C)] 98.3 °F (36.8 °C)  Pulse:  [53-76] 53  Resp:  [13-34] 33  SpO2:  [92 %-99 %] 92 %  BP: (116-141)/(57-69) 117/57     Weight: 81.6 kg (179 lb 14.3 oz)  Body mass index is 31.87 kg/m².    Intake/Output - Last 3 Shifts         05/22 0700 05/23 0659 05/23 0700 05/24 0659 05/24 0700 05/25 0659    IV Piggyback  1149.6     Total Intake(mL/kg)  1149.6 (14.1)     Urine (mL/kg/hr)  790 (0.4)     Stool  0     Blood  10     Chest Tube  325     Total Output  1125     Net  +24.6            Stool Occurrence  0 x              Physical Exam  Vitals and nursing note reviewed.   Constitutional:       General: She is not in acute distress.     Appearance: Normal appearance.   HENT:      Mouth/Throat:      Mouth: Mucous membranes are moist.   Cardiovascular:      Rate and Rhythm: Regular rhythm. Bradycardia present.   Pulmonary:      Comments: Normal respiratory effort, without increased work of breathing  Chest tube in place to left chest, minimal serosanguinous output; air leak appreciated.   Abdominal:      General: Abdomen is flat. There is no distension.      Palpations: Abdomen is soft.      Tenderness: There is no abdominal tenderness.   Musculoskeletal:         General: Normal range of motion.   Neurological:      General: No focal deficit present.      Mental Status: She is alert.          Significant Labs:  I have reviewed all pertinent lab results within the past 24 hours.  CBC:   Recent Labs   Lab 05/24/24  0408   WBC 11.53   RBC 4.39   HGB 12.4   HCT 38.3      MCV 87   MCH 28.2   MCHC 32.4     CMP:   Recent Labs   Lab 05/24/24  0408   *   CALCIUM 8.4*   ALBUMIN 3.2*   PROT 6.0      K 4.2   CO2 23      BUN 24*   CREATININE 0.8   ALKPHOS 65   ALT 16   AST 39   BILITOT 0.3       Significant Diagnostics:  I have reviewed all pertinent imaging results/findings within the past 24 hours.

## 2024-05-24 NOTE — PROGRESS NOTES
[facility]  Thoracic Surgery  Progress Note    Subjective:     History of Present Illness:  78 y.o. female former smoker with HTN, R breast DCIS, and ECHO NSCLC. Treated for PNA in Oct, COPD exacerbation in January, and COVID in February. CTA during ER visit in Jan showed 2.2 cm solid nodule in ECHO and more peripheral 1.6 cm GGO. Repeat CT in April with persistence of these findings. Robotic bronchoscopy with EBUS on 4/16/24. Solid nodule in ECHO positive for malignancy favor adenocarcinoma. GGO in ECHO with atypical cells present. Levels 7 and 11L negative for malignancy.    PFTs - FEV1 1.5 80.7% DLCO 12.3 64.1     Post-Op Info:  Procedure(s) (LRB):  XI ROBOTIC LEFT UPPER LOBECTOMY (Left)  LYMPHADENECTOMY (N/A)  BLOCK, NERVE, INTERCOSTAL, 2 OR MORE (Left)  BRONCHOSCOPY (Left)   1 Day Post-Op     Interval History: S/p robotic left upper division of left upper lobe of lung and MLND yesterday, 05/23/24. Air leak seen immediately post-operation and chest tube (24Fr sorin) placed to suction. Patient taken to PACU in stable condition. Subsequently, CXR in PACU showed pneumothorax; however, patient HDS, VSS, afebrile, SpO2 >92% on 3L NC. Ultimately, she was stepped up to the SICU overnight and doing well this AM. SpO2 >92% on 3L NC. Pain controlled. Air leak present in atrium    Medications:  Continuous Infusions:  Scheduled Meds:   acetaminophen  1,000 mg Oral Q8H    enoxparin  40 mg Subcutaneous Daily    famotidine  20 mg Oral BID    fluticasone furoate-vilanteroL  1 puff Inhalation Daily    gabapentin  200 mg Oral QHS    levalbuterol  1.25 mg Nebulization Q12H    methocarbamoL  500 mg Oral QID    metoprolol tartrate  12.5 mg Oral BID    polyethylene glycol  17 g Oral Daily    senna-docusate 8.6-50 mg  1 tablet Oral Daily     PRN Meds:  Current Facility-Administered Medications:     bisacodyL, 10 mg, Rectal, Daily PRN    calcium gluconate IVPB, 1 g, Intravenous, PRN    calcium gluconate IVPB, 1 g,  Intravenous, PRN    calcium gluconate IVPB, 1 g, Intravenous, PRN    magnesium sulfate IVPB, 2 g, Intravenous, PRN    magnesium sulfate IVPB, 4 g, Intravenous, PRN    metoclopramide, 5 mg, Intravenous, Q6H PRN    ondansetron, 8 mg, Oral, Q8H PRN    oxyCODONE, 5 mg, Oral, Q4H PRN    oxyCODONE, 10 mg, Oral, Q4H PRN    potassium chloride, 40 mEq, Intravenous, PRN **AND** potassium chloride, 60 mEq, Intravenous, PRN **AND** potassium chloride, 80 mEq, Intravenous, PRN    sodium chloride 0.9%, 10 mL, Intravenous, PRN    sodium phosphate 15 mmol in dextrose 5 % (D5W) 250 mL IVPB, 15 mmol, Intravenous, PRN    sodium phosphate 20.01 mmol in dextrose 5 % (D5W) 250 mL IVPB, 20.01 mmol, Intravenous, PRN    sodium phosphate 30 mmol in dextrose 5 % (D5W) 250 mL IVPB, 30 mmol, Intravenous, PRN     Review of patient's allergies indicates:  No Known Allergies  Objective:     Vital Signs (Most Recent):  Temp: 98.3 °F (36.8 °C) (05/24/24 0300)  Pulse: (!) 53 (05/24/24 0630)  Resp: (!) 33 (05/24/24 0630)  BP: (!) 117/57 (05/24/24 0600)  SpO2: (!) 92 % (05/24/24 0630) Vital Signs (24h Range):  Temp:  [97.5 °F (36.4 °C)-98.6 °F (37 °C)] 98.3 °F (36.8 °C)  Pulse:  [53-76] 53  Resp:  [13-34] 33  SpO2:  [92 %-99 %] 92 %  BP: (116-141)/(57-69) 117/57     Intake/Output - Last 3 Shifts         05/22 0700 05/23 0659 05/23 0700 05/24 0659 05/24 0700 05/25 0659    IV Piggyback  1149.6     Total Intake(mL/kg)  1149.6 (14.1)     Urine (mL/kg/hr)  790 (0.4)     Stool  0     Blood  10     Chest Tube  325     Total Output  1125     Net  +24.6            Stool Occurrence  0 x             SpO2: (!) 92 %        Physical Exam  Constitutional:       Appearance: Normal appearance. She is obese.   Eyes:      Extraocular Movements: Extraocular movements intact.   Cardiovascular:      Rate and Rhythm: Normal rate and regular rhythm.      Pulses: Normal pulses.   Pulmonary:      Effort: Pulmonary effort is normal.      Breath sounds: Normal breath sounds.    Abdominal:      General: Abdomen is flat.      Palpations: Abdomen is soft.   Skin:     General: Skin is warm and dry.      Comments: 24 Fr sorin drain present on left side   Neurological:      General: No focal deficit present.      Mental Status: She is alert and oriented to person, place, and time. Mental status is at baseline.   Psychiatric:         Mood and Affect: Mood normal.         Behavior: Behavior normal.         Thought Content: Thought content normal.            Significant Labs:  Recent Lab Results         05/24/24  0408   05/23/24  1524        Albumin 3.2   3.5       ALP 65   74       ALT 16   16       Anion Gap 9   12       AST 39   23       Baso # 0.07   0.07       Basophil % 0.6   0.6       BILIRUBIN TOTAL 0.3  Comment: For infants and newborns, interpretation of results should be based  on gestational age, weight and in agreement with clinical  observations.    Premature Infant recommended reference ranges:  Up to 24 hours.............<8.0 mg/dL  Up to 48 hours............<12.0 mg/dL  3-5 days..................<15.0 mg/dL  6-29 days.................<15.0 mg/dL     0.3  Comment: For infants and newborns, interpretation of results should be based  on gestational age, weight and in agreement with clinical  observations.    Premature Infant recommended reference ranges:  Up to 24 hours.............<8.0 mg/dL  Up to 48 hours............<12.0 mg/dL  3-5 days..................<15.0 mg/dL  6-29 days.................<15.0 mg/dL         BUN 24   20       Calcium 8.4   8.6       Chloride 107   108       CO2 23   20       Creatinine 0.8   0.9       Differential Method Automated   Automated       eGFR >60.0   >60.0       Eos # 0.0   0.0       Eos % 0.0   0.0       Glucose 124   164       Gran # (ANC) 8.2   10.7       Gran % 71.4   89.4       Hematocrit 38.3   42.1       Hemoglobin 12.4   13.1       Immature Grans (Abs) 0.14  Comment: Mild elevation in immature granulocytes is non specific and   can be seen  in a variety of conditions including stress response,   acute inflammation, trauma and pregnancy. Correlation with other   laboratory and clinical findings is essential.     0.19  Comment: Mild elevation in immature granulocytes is non specific and   can be seen in a variety of conditions including stress response,   acute inflammation, trauma and pregnancy. Correlation with other   laboratory and clinical findings is essential.         Immature Granulocytes 1.2   1.6       Lymph # 1.4   0.7       Lymph % 12.1   5.5       Magnesium  2.1         MCH 28.2   27.5       MCHC 32.4   31.1       MCV 87   88       Mono # 1.7   0.4       Mono % 14.7   2.9       MPV 11.9   12.1       nRBC 0   0       Phosphorus Level 4.6         Platelet Count 220   224       Potassium 4.2   4.6       PROTEIN TOTAL 6.0   6.4       RBC 4.39   4.77       RDW 14.1   13.6       Sodium 139   140       WBC 11.53   11.98               Significant Diagnostics:  CXR: I have reviewed all pertinent results/findings within the past 24 hours    VTE Risk Mitigation (From admission, onward)           Ordered     enoxaparin injection 40 mg  Daily         05/23/24 1451     IP VTE HIGH RISK PATIENT  Once         05/23/24 1451     Place NARENDRA hose  Until discontinued         05/23/24 1451     Place sequential compression device  Until discontinued         05/23/24 1451                  Assessment/Plan:     * Malignant neoplasm of upper lobe of left lung  POD1 s/p robotic left upper division of left upper lobe of lung and MLND.     - Step down to floor from SICU  - Chest tube to suction  - Daily CXR  - Multimodal pain control  - Prophylactic betablockade for prevention of Afib  - DVT ppx  - Regular diet          Freedom Alarcon PA-C  Thoracic Surgery  [facility]

## 2024-05-24 NOTE — H&P
[facility]  Critical Care - Surgery  History & Physical    Patient Name: Melani Holloway  MRN: [id number]  Admission Date: 5/23/2024  Code Status: Full Code  Attending Physician: Riaz Fay MD   Primary Care Provider: Nathaniel Greenfield MD   Principal Problem: Malignant neoplasm of upper lobe of left lung    Subjective:     HPI:  Melani Holloway is a 78 y.o. F with PMHx prior tobacco use, HTN, and right breast DCIS s/p lumpectomy who was recently diagnosed with ECHO NSCLC, favoring adenocarcinoma. She presents to the SICU s/p robotic Left upper lobectomy with Dr. Fay on 5/23/24. On admission, she is alert and oriented and in stable condition. She is not requiring any Inotropic or vasopressor support to maintain MAPs > 65. Access includes 2 PIVs. She has a left sided chest tube in place with 160cc SS output so far post op. Large air leak noted. She is maintaining O2 sats > 95 % on 3L O2 by nasal cannula. She is admitted to the SICU for close respiratory monitoring.      Immediate post-operative plans include weaning of respiratory support, and close monitoring of chest tube output.     Hospital/ICU Course:  No notes on file    Follow-up For: Procedure(s) (LRB):  XI ROBOTIC LEFT UPPER LOBECTOMY (Left)  LYMPHADENECTOMY (N/A)  BLOCK, NERVE, INTERCOSTAL, 2 OR MORE (Left)  BRONCHOSCOPY (Left)    Post-Operative Day: Day of Surgery     Past Medical History:   Diagnosis Date    COPD (chronic obstructive pulmonary disease)     Ductal carcinoma in situ (DCIS) of right breast 09/19/2017    Hypertension        Past Surgical History:   Procedure Laterality Date    BREAST BIOPSY Right 2010    BREAST LUMPECTOMY Right 2010    cataract surgery      ROBOTIC BRONCHOSCOPY N/A 4/16/2024    Procedure: ROBOTIC BRONCHOSCOPY;  Surgeon: Uzma Larsen MD;  Location: [facility] OR [address] [place] [address];  Service: Pulmonary;  Laterality: N/A;       Review of patient's allergies indicates:  No Known Allergies    Family  History       Problem Relation (Age of Onset)    Breast cancer Paternal Aunt    Heart attack Father    No Known Problems Mother          Tobacco Use    Smoking status: Former     Current packs/day: 0.00     Average packs/day: 1 pack/day for 38.0 years (38.0 ttl pk-yrs)     Types: Cigarettes     Start date:      Quit date:      Years since quittin.4    Smokeless tobacco: Never   Substance and Sexual Activity    Alcohol use: Yes     Comment: Occasionally    Drug use: No    Sexual activity: Not Currently      Review of Systems   Constitutional:  Negative for chills and fever.   HENT: Negative.     Eyes: Negative.    Respiratory:  Negative for cough and shortness of breath.    Cardiovascular:  Negative for chest pain.   Gastrointestinal:  Negative for abdominal pain, nausea and vomiting.   Genitourinary: Negative.    Musculoskeletal: Negative.    Skin: Negative.    Neurological:  Negative for dizziness and weakness.   Psychiatric/Behavioral: Negative.         Objective:     Vital Signs (Most Recent):  Temp: 98.6 °F (37 °C) (24 1800)  Pulse: 71 (24)  Resp: 20 (24)  BP: 132/62 (24 1800)  SpO2: 95 % (24) Vital Signs (24h Range):  Temp:  [97.5 °F (36.4 °C)-98.6 °F (37 °C)] 98.6 °F (37 °C)  Pulse:  [61-76] 71  Resp:  [13-34] 20  SpO2:  [93 %-99 %] 95 %  BP: (123-183)/(57-76) 132/62     Weight: 81.6 kg (179 lb 14.3 oz)  Body mass index is 31.87 kg/m².      Intake/Output Summary (Last 24 hours) at 2024  Last data filed at 2024 1823  Gross per 24 hour   Intake 1100 ml   Output 380 ml   Net 720 ml          Physical Exam  Constitutional:       General: She is not in acute distress.     Appearance: Normal appearance.   HENT:      Head: Normocephalic and atraumatic.      Mouth/Throat:      Mouth: Mucous membranes are moist.   Eyes:      Extraocular Movements: Extraocular movements intact.   Cardiovascular:      Rate and Rhythm: Normal rate.      Pulses: Normal  pulses.   Pulmonary:      Effort: Pulmonary effort is normal. No respiratory distress.      Comments: 3L NC  Thoracoscopic incisions c/d/I with dressings in place, no strikethrough  Left chest tube in place, SS output. Air leak noted  Abdominal:      General: There is no distension.      Palpations: Abdomen is soft.      Tenderness: There is no abdominal tenderness.   Skin:     General: Skin is warm.      Coloration: Skin is not jaundiced.   Neurological:      General: No focal deficit present.      Mental Status: She is alert and oriented to person, place, and time.            Vents:       Lines/Drains/Airways       Drain  Duration                  Chest Tube 05/23/24 1338 Tube - 1 Left Pleural 19 Fr. <1 day              Peripheral Intravenous Line  Duration                  Peripheral IV - Single Lumen 05/23/24 0720 18 G Left Wrist <1 day         Peripheral IV - Single Lumen 05/23/24 1549 20 G Right Hand <1 day                    Significant Labs:    CBC/Anemia Profile:  Recent Labs   Lab 05/23/24  1524   WBC 11.98   HGB 13.1   HCT 42.1      MCV 88   RDW 13.6        Chemistries:  Recent Labs   Lab 05/23/24  1524      K 4.6      CO2 20*   BUN 20   CREATININE 0.9   CALCIUM 8.6*   ALBUMIN 3.5   PROT 6.4   BILITOT 0.3   ALKPHOS 74   ALT 16   AST 23       All pertinent labs within the past 24 hours have been reviewed.    Significant Imaging: I have reviewed all pertinent imaging results/findings within the past 24 hours.  Assessment/Plan:     Oncology  * Malignant neoplasm of upper lobe of left lung  Melani Holloway is a 78 y.o. F with recently diagnosed ECHO NSCLC now s/p left upper lobectomy with  5/23/24.      Neuro/Psych:   -- Sedation: none   -- Pain: MM pain control: tylenol, robaxin, PRN oxy             Cards:   -- HDS  -- home metoprolol  -- no pressor requirements      Pulm:   -- Goal O2 sat > 90%  -- 3L NC, Wean as able  -- left chest tube in place, continue to suction       Renal:  -- harris removed post op, follow up voiding  -- BUN/Cr 20/0.9      FEN / GI:   -- Replace lytes as needed  -- Nutrition: CLD tonight, advance to regular diet in the AM if tolerating      ID:   -- Tm: afebrile; WBC 11.9  -- Abx none      Heme/Onc:   -- H/H stable Hb 13.1  -- Daily CBC      Endo:   -- Gluc goal 140-180      PPx:   Feeding: CLD  Analgesia/Sedation: MM pain control / NA  Thromboembolic prevention: Lovenox daily  HOB >30: yes  Stress Ulcer ppx: famotidine BID  Glucose control: Critical care goal 140-180 g/dl, ISS    Lines/Drains/Airway: 2 PIVs, left chest tube      Dispo/Code Status/Palliative:   -- SICU / Full Code         Camila Cabrera MD  Critical Care - Surgery  [facility]

## 2024-05-24 NOTE — ASSESSMENT & PLAN NOTE
Melani Holloway is a 78 y.o. F with recently diagnosed ECHO NSCLC now s/p left upper lobectomy with  5/23/24.      Neuro/Psych:   -- Sedation: none   -- Pain: MM pain control: tylenol, robaxin, PRN oxy             Cards:   -- HDS  -- home metoprolol  -- no pressor requirements      Pulm:   -- Goal O2 sat > 90%  -- 3L NC, Wean as able  -- left chest tube in place, continue to suction      Renal:  -- harris removed post op, follow up voiding  -- BUN/Cr 20/0.9      FEN / GI:   -- Replace lytes as needed  -- Nutrition: CLD tonight, advance to regular diet in the AM if tolerating      ID:   -- Tm: afebrile; WBC 11.9  -- Abx none      Heme/Onc:   -- H/H stable Hb 13.1  -- Daily CBC      Endo:   -- Gluc goal 140-180      PPx:   Feeding: CLD  Analgesia/Sedation: MM pain control / NA  Thromboembolic prevention: Lovenox daily  HOB >30: yes  Stress Ulcer ppx: famotidine BID  Glucose control: Critical care goal 140-180 g/dl, ISS    Lines/Drains/Airway: 2 PIVs, left chest tube      Dispo/Code Status/Palliative:   -- SICU / Full Code

## 2024-05-24 NOTE — CODE/ RAPID DOCUMENTATION
Respiratory notified to bring high flow cannula top place p[t on 10\L high flow per rapid team, chest tube noted to be clogged, rapid team, aware

## 2024-05-24 NOTE — CARE UPDATE
Called to patient's room to place on 10 lhf cannula due to chest tube clotting. Patient found with sats 90%. Patient placed on 10 liter high flow with sats slowly increasing to 94%. O2 decreased back to 4 liters by Physician @bedside with sats 93-94% sustained. Stat xray obtained, Patient did not appear to be SOB, will pass on report to night Therapist.

## 2024-05-24 NOTE — PLAN OF CARE
SICU PLAN OF CARE    Dx: Malignant neoplasm of upper lobe of left lung    Goals of Care: MAP >65, SBP <160    Vital Signs (last 12 hours):   Temp:  [97.8 °F (36.6 °C)-98.6 °F (37 °C)]   Pulse:  [53-75]   Resp:  [13-34]   BP: (120-140)/(57-64)   SpO2:  [93 %-99 %]      Neuro: AAOx4    Cardiac: NSR    Respiratory:  3L Nasal Cannula , w/ humidification     Urine Output: External Catheter  575 mL/shift    Drains: Chest Tube, total output 170mL/shift    Diet: Full Liquid     Labs/Accuchecks: Daily labs    Skin:  No signs of skin breakdown.  Patient turned q2h, bony prominences protected, and mattress inflated/working correctly.   Magen Score: 20. If Magen Score is 16 or less, complete 4EYES note each shift.    Shift Events:  Pt monitored throughout the shift. Pt labs remained normal. Pt required bladder scan; UOP after in/out cath was 575cc @0400. See flowsheet for further assessment/details. Family updated on current condition/plan of care, questions answered, and emotional support provided.  MD updated on current condition, vitals, labs, and gtts.

## 2024-05-24 NOTE — PROGRESS NOTES
[facility]  General Surgery  Progress Note    Subjective:     History of Present Illness:  Melani Holloway is a 78 y.o. F with PMHx prior tobacco use, HTN, and right breast DCIS s/p lumpectomy who was recently diagnosed with ECHO NSCLC, favoring adenocarcinoma. She presents to the SICU s/p robotic Left upper lobectomy with Dr. Fay on 5/23/24. On admission, she is alert and oriented and in stable condition. She is not requiring any Inotropic or vasopressor support to maintain MAPs > 65. Access includes 2 PIVs. She has a left sided chest tube in place with 160cc SS output so far post op. Large air leak noted. She is maintaining O2 sats > 95 % on 3L O2 by nasal cannula. She is admitted to the SICU for close respiratory monitoring.      Immediate post-operative plans include weaning of respiratory support, and close monitoring of chest tube output.     Post-Op Info:  Procedure(s) (LRB):  XI ROBOTIC LEFT UPPER LOBECTOMY (Left)  LYMPHADENECTOMY (N/A)  BLOCK, NERVE, INTERCOSTAL, 2 OR MORE (Left)  BRONCHOSCOPY (Left)   1 Day Post-Op     Interval History: Patient seen and examined. Stepped up to the ICU for increasing supplemental oxygen requirements. Doing well this morning. Serosanguinous output from chest tube, air leak appreciated. CXR with apical pneumothorax, which is to be expected given left upper lobectomy.     Medications:  Continuous Infusions:  Scheduled Meds:   acetaminophen  1,000 mg Oral Q8H    enoxparin  40 mg Subcutaneous Daily    famotidine  20 mg Oral BID    fluticasone furoate-vilanteroL  1 puff Inhalation Daily    gabapentin  200 mg Oral QHS    levalbuterol  1.25 mg Nebulization Q12H    methocarbamoL  500 mg Oral QID    metoprolol tartrate  12.5 mg Oral BID    polyethylene glycol  17 g Oral Daily    senna-docusate 8.6-50 mg  1 tablet Oral Daily     PRN Meds:  Current Facility-Administered Medications:     bisacodyL, 10 mg, Rectal, Daily PRN    calcium gluconate IVPB, 1 g,  Intravenous, PRN    calcium gluconate IVPB, 1 g, Intravenous, PRN    calcium gluconate IVPB, 1 g, Intravenous, PRN    magnesium sulfate IVPB, 2 g, Intravenous, PRN    magnesium sulfate IVPB, 4 g, Intravenous, PRN    metoclopramide, 5 mg, Intravenous, Q6H PRN    ondansetron, 8 mg, Oral, Q8H PRN    oxyCODONE, 5 mg, Oral, Q4H PRN    oxyCODONE, 10 mg, Oral, Q4H PRN    potassium chloride, 40 mEq, Intravenous, PRN **AND** potassium chloride, 60 mEq, Intravenous, PRN **AND** potassium chloride, 80 mEq, Intravenous, PRN    sodium chloride 0.9%, 10 mL, Intravenous, PRN    sodium phosphate 15 mmol in dextrose 5 % (D5W) 250 mL IVPB, 15 mmol, Intravenous, PRN    sodium phosphate 20.01 mmol in dextrose 5 % (D5W) 250 mL IVPB, 20.01 mmol, Intravenous, PRN    sodium phosphate 30 mmol in dextrose 5 % (D5W) 250 mL IVPB, 30 mmol, Intravenous, PRN     Review of patient's allergies indicates:  No Known Allergies  Objective:     Vital Signs (Most Recent):  Temp: 98.3 °F (36.8 °C) (05/24/24 0300)  Pulse: (!) 53 (05/24/24 0630)  Resp: (!) 33 (05/24/24 0630)  BP: (!) 117/57 (05/24/24 0600)  SpO2: (!) 92 % (05/24/24 0630) Vital Signs (24h Range):  Temp:  [97.5 °F (36.4 °C)-98.6 °F (37 °C)] 98.3 °F (36.8 °C)  Pulse:  [53-76] 53  Resp:  [13-34] 33  SpO2:  [92 %-99 %] 92 %  BP: (116-141)/(57-69) 117/57     Weight: 81.6 kg (179 lb 14.3 oz)  Body mass index is 31.87 kg/m².    Intake/Output - Last 3 Shifts         05/22 0700 05/23 0659 05/23 0700 05/24 0659 05/24 0700 05/25 0659    IV Piggyback  1149.6     Total Intake(mL/kg)  1149.6 (14.1)     Urine (mL/kg/hr)  790 (0.4)     Stool  0     Blood  10     Chest Tube  325     Total Output  1125     Net  +24.6            Stool Occurrence  0 x              Physical Exam  Vitals and nursing note reviewed.   Constitutional:       General: She is not in acute distress.     Appearance: Normal appearance.   HENT:      Mouth/Throat:      Mouth: Mucous membranes are moist.   Cardiovascular:      Rate and  Rhythm: Regular rhythm. Bradycardia present.   Pulmonary:      Comments: Normal respiratory effort, without increased work of breathing  Chest tube in place to left chest, minimal serosanguinous output; air leak appreciated.   Abdominal:      General: Abdomen is flat. There is no distension.      Palpations: Abdomen is soft.      Tenderness: There is no abdominal tenderness.   Musculoskeletal:         General: Normal range of motion.   Neurological:      General: No focal deficit present.      Mental Status: She is alert.          Significant Labs:  I have reviewed all pertinent lab results within the past 24 hours.  CBC:   Recent Labs   Lab 05/24/24  0408   WBC 11.53   RBC 4.39   HGB 12.4   HCT 38.3      MCV 87   MCH 28.2   MCHC 32.4     CMP:   Recent Labs   Lab 05/24/24  0408   *   CALCIUM 8.4*   ALBUMIN 3.2*   PROT 6.0      K 4.2   CO2 23      BUN 24*   CREATININE 0.8   ALKPHOS 65   ALT 16   AST 39   BILITOT 0.3       Significant Diagnostics:  I have reviewed all pertinent imaging results/findings within the past 24 hours.  Assessment/Plan:     * Malignant neoplasm of upper lobe of left lung  Melani Holloway is a 78 y.o. F with recently diagnosed ECHO NSCLC now s/p left upper lobectomy with  5/23/24.    - patient seen and examined   - on 3L nasal cannula maintaining oxygen saturations   - CXR with improved re-expansion of the left lung  - chest tube in place, air leak noted; continue to wall suction  - home meds as appropriate  - regular diet  - step down from ICU  - IS use, out of bed        Roney Ahuja MD  General Surgery  [facility] - Surgical Intensive Care

## 2024-05-24 NOTE — HPI
Melani Holloway is a 78 y.o. F with PMHx prior tobacco use, HTN, and right breast DCIS s/p lumpectomy who was recently diagnosed with ECHO NSCLC, favoring adenocarcinoma. She presents to the SICU s/p robotic Left upper lobectomy with Dr. Fay on 5/23/24. On admission, she is alert and oriented and in stable condition. She is not requiring any Inotropic or vasopressor support to maintain MAPs > 65. Access includes 2 PIVs. She has a left sided chest tube in place with 160cc SS output so far post op. Large air leak noted. She is maintaining O2 sats > 95 % on 3L O2 by nasal cannula. She is admitted to the SICU for close respiratory monitoring.      Immediate post-operative plans include weaning of respiratory support, and close monitoring of chest tube output.

## 2024-05-24 NOTE — SUBJECTIVE & OBJECTIVE
Interval History: S/p robotic left upper division of left upper lobe of lung and MLND yesterday, 05/23/24. Air leak seen immediately post-operation and chest tube (24Fr sorin) placed to suction. Patient taken to PACU in stable condition. Subsequently, CXR in PACU showed pneumothorax; however, patient HDS, VSS, afebrile, SpO2 >92% on 3L NC. Ultimately, she was stepped up to the SICU overnight and doing well this AM. SpO2 >92% on 3L NC. Pain controlled. Air leak present in atrium    Medications:  Continuous Infusions:  Scheduled Meds:   acetaminophen  1,000 mg Oral Q8H    enoxparin  40 mg Subcutaneous Daily    famotidine  20 mg Oral BID    fluticasone furoate-vilanteroL  1 puff Inhalation Daily    gabapentin  200 mg Oral QHS    levalbuterol  1.25 mg Nebulization Q12H    methocarbamoL  500 mg Oral QID    metoprolol tartrate  12.5 mg Oral BID    polyethylene glycol  17 g Oral Daily    senna-docusate 8.6-50 mg  1 tablet Oral Daily     PRN Meds:  Current Facility-Administered Medications:     bisacodyL, 10 mg, Rectal, Daily PRN    calcium gluconate IVPB, 1 g, Intravenous, PRN    calcium gluconate IVPB, 1 g, Intravenous, PRN    calcium gluconate IVPB, 1 g, Intravenous, PRN    magnesium sulfate IVPB, 2 g, Intravenous, PRN    magnesium sulfate IVPB, 4 g, Intravenous, PRN    metoclopramide, 5 mg, Intravenous, Q6H PRN    ondansetron, 8 mg, Oral, Q8H PRN    oxyCODONE, 5 mg, Oral, Q4H PRN    oxyCODONE, 10 mg, Oral, Q4H PRN    potassium chloride, 40 mEq, Intravenous, PRN **AND** potassium chloride, 60 mEq, Intravenous, PRN **AND** potassium chloride, 80 mEq, Intravenous, PRN    sodium chloride 0.9%, 10 mL, Intravenous, PRN    sodium phosphate 15 mmol in dextrose 5 % (D5W) 250 mL IVPB, 15 mmol, Intravenous, PRN    sodium phosphate 20.01 mmol in dextrose 5 % (D5W) 250 mL IVPB, 20.01 mmol, Intravenous, PRN    sodium phosphate 30 mmol in dextrose 5 % (D5W) 250 mL IVPB, 30 mmol, Intravenous, PRN     Review of patient's allergies  indicates:  No Known Allergies  Objective:     Vital Signs (Most Recent):  Temp: 98.3 °F (36.8 °C) (05/24/24 0300)  Pulse: (!) 53 (05/24/24 0630)  Resp: (!) 33 (05/24/24 0630)  BP: (!) 117/57 (05/24/24 0600)  SpO2: (!) 92 % (05/24/24 0630) Vital Signs (24h Range):  Temp:  [97.5 °F (36.4 °C)-98.6 °F (37 °C)] 98.3 °F (36.8 °C)  Pulse:  [53-76] 53  Resp:  [13-34] 33  SpO2:  [92 %-99 %] 92 %  BP: (116-141)/(57-69) 117/57     Intake/Output - Last 3 Shifts         05/22 0700  05/23 0659 05/23 0700 05/24 0659 05/24 0700 05/25 0659    IV Piggyback  1149.6     Total Intake(mL/kg)  1149.6 (14.1)     Urine (mL/kg/hr)  790 (0.4)     Stool  0     Blood  10     Chest Tube  325     Total Output  1125     Net  +24.6            Stool Occurrence  0 x             SpO2: (!) 92 %        Physical Exam  Constitutional:       Appearance: Normal appearance. She is obese.   Eyes:      Extraocular Movements: Extraocular movements intact.   Cardiovascular:      Rate and Rhythm: Normal rate and regular rhythm.      Pulses: Normal pulses.   Pulmonary:      Effort: Pulmonary effort is normal.      Breath sounds: Normal breath sounds.   Abdominal:      General: Abdomen is flat.      Palpations: Abdomen is soft.   Skin:     General: Skin is warm and dry.      Comments: 24 Fr sorin drain present on left side   Neurological:      General: No focal deficit present.      Mental Status: She is alert and oriented to person, place, and time. Mental status is at baseline.   Psychiatric:         Mood and Affect: Mood normal.         Behavior: Behavior normal.         Thought Content: Thought content normal.            Significant Labs:  Recent Lab Results         05/24/24  0408   05/23/24  1524        Albumin 3.2   3.5       ALP 65   74       ALT 16   16       Anion Gap 9   12       AST 39   23       Baso # 0.07   0.07       Basophil % 0.6   0.6       BILIRUBIN TOTAL 0.3  Comment: For infants and newborns, interpretation of results should be based  on  gestational age, weight and in agreement with clinical  observations.    Premature Infant recommended reference ranges:  Up to 24 hours.............<8.0 mg/dL  Up to 48 hours............<12.0 mg/dL  3-5 days..................<15.0 mg/dL  6-29 days.................<15.0 mg/dL     0.3  Comment: For infants and newborns, interpretation of results should be based  on gestational age, weight and in agreement with clinical  observations.    Premature Infant recommended reference ranges:  Up to 24 hours.............<8.0 mg/dL  Up to 48 hours............<12.0 mg/dL  3-5 days..................<15.0 mg/dL  6-29 days.................<15.0 mg/dL         BUN 24   20       Calcium 8.4   8.6       Chloride 107   108       CO2 23   20       Creatinine 0.8   0.9       Differential Method Automated   Automated       eGFR >60.0   >60.0       Eos # 0.0   0.0       Eos % 0.0   0.0       Glucose 124   164       Gran # (ANC) 8.2   10.7       Gran % 71.4   89.4       Hematocrit 38.3   42.1       Hemoglobin 12.4   13.1       Immature Grans (Abs) 0.14  Comment: Mild elevation in immature granulocytes is non specific and   can be seen in a variety of conditions including stress response,   acute inflammation, trauma and pregnancy. Correlation with other   laboratory and clinical findings is essential.     0.19  Comment: Mild elevation in immature granulocytes is non specific and   can be seen in a variety of conditions including stress response,   acute inflammation, trauma and pregnancy. Correlation with other   laboratory and clinical findings is essential.         Immature Granulocytes 1.2   1.6       Lymph # 1.4   0.7       Lymph % 12.1   5.5       Magnesium  2.1         MCH 28.2   27.5       MCHC 32.4   31.1       MCV 87   88       Mono # 1.7   0.4       Mono % 14.7   2.9       MPV 11.9   12.1       nRBC 0   0       Phosphorus Level 4.6         Platelet Count 220   224       Potassium 4.2   4.6       PROTEIN TOTAL 6.0   6.4       RBC  4.39   4.77       RDW 14.1   13.6       Sodium 139   140       WBC 11.53   11.98               Significant Diagnostics:  CXR: I have reviewed all pertinent results/findings within the past 24 hours    VTE Risk Mitigation (From admission, onward)           Ordered     enoxaparin injection 40 mg  Daily         05/23/24 1451     IP VTE HIGH RISK PATIENT  Once         05/23/24 1451     Place NARENDRA hose  Until discontinued         05/23/24 1451     Place sequential compression device  Until discontinued         05/23/24 1451

## 2024-05-24 NOTE — SUBJECTIVE & OBJECTIVE
Interval History/Significant Events: HIEN. Admitted to SICU after robo left upper lobectomy, found to have large air leak from chest tube. Currently on 3L NC.    Follow-up For: Procedure(s) (LRB):  XI ROBOTIC LEFT UPPER LOBECTOMY (Left)  LYMPHADENECTOMY (N/A)  BLOCK, NERVE, INTERCOSTAL, 2 OR MORE (Left)  BRONCHOSCOPY (Left)    Post-Operative Day: 1 Day Post-Op    Objective:     Vital Signs (Most Recent):  Temp: 98.3 °F (36.8 °C) (05/24/24 0300)  Pulse: (!) 53 (05/24/24 0430)  Resp: 14 (05/24/24 0430)  BP: (!) 129/58 (05/24/24 0400)  SpO2: (!) 94 % (05/24/24 0430) Vital Signs (24h Range):  Temp:  [97.5 °F (36.4 °C)-98.6 °F (37 °C)] 98.3 °F (36.8 °C)  Pulse:  [53-76] 53  Resp:  [13-34] 14  SpO2:  [93 %-99 %] 94 %  BP: (120-183)/(57-76) 129/58     Weight: 81.6 kg (179 lb 14.3 oz)  Body mass index is 31.87 kg/m².      Intake/Output Summary (Last 24 hours) at 5/24/2024 0523  Last data filed at 5/24/2024 0500  Gross per 24 hour   Intake 1149.64 ml   Output 1125 ml   Net 24.64 ml          Physical Exam  Constitutional:       General: She is not in acute distress.     Appearance: Normal appearance.   HENT:      Head: Normocephalic and atraumatic.      Mouth/Throat:      Mouth: Mucous membranes are moist.   Eyes:      Extraocular Movements: Extraocular movements intact.   Cardiovascular:      Rate and Rhythm: Normal rate.      Pulses: Normal pulses.   Pulmonary:      Effort: Pulmonary effort is normal. No respiratory distress.      Comments: 3L NC  Thoracoscopic incisions c/d/I with dressings in place, no strikethrough  Left chest tube in place, SS output. Air leak noted  Abdominal:      General: There is no distension.      Palpations: Abdomen is soft.      Tenderness: There is no abdominal tenderness.   Skin:     General: Skin is warm.      Coloration: Skin is not jaundiced.   Neurological:      General: No focal deficit present.      Mental Status: She is alert and oriented to person, place, and time.            Vents:        Lines/Drains/Airways       Drain  Duration                  Chest Tube 05/23/24 1338 Tube - 1 Left Pleural 19 Fr. <1 day              Peripheral Intravenous Line  Duration                  Peripheral IV - Single Lumen 05/23/24 0720 18 G Left Wrist <1 day         Peripheral IV - Single Lumen 05/23/24 1549 20 G Right Hand <1 day                    Significant Labs:    CBC/Anemia Profile:  Recent Labs   Lab 05/23/24  1524 05/24/24  0408   WBC 11.98 11.53   HGB 13.1 12.4   HCT 42.1 38.3    220   MCV 88 87   RDW 13.6 14.1        Chemistries:  Recent Labs   Lab 05/23/24  1524 05/24/24  0408    139   K 4.6 4.2    107   CO2 20* 23   BUN 20 24*   CREATININE 0.9 0.8   CALCIUM 8.6* 8.4*   ALBUMIN 3.5 3.2*   PROT 6.4 6.0   BILITOT 0.3 0.3   ALKPHOS 74 65   ALT 16 16   AST 23 39   MG  --  2.1   PHOS  --  4.6*           Significant Imaging:  I have reviewed all pertinent imaging results/findings within the past 24 hours.

## 2024-05-24 NOTE — NURSING
Pt transported to SICU 15758 by PACU RN on 3L NC with portable monitor. Pt AAOx4, VSS, no complaints of pain. Chest tube connected to suction, site observed by MD Santiago. No new orders at this time. Report given to oncoming RN.  Nurses Note -- 4 Eyes      5/23/2024   7:23 PM      Skin assessed during: Admit      [x] No Altered Skin Integrity Present    []Prevention Measures Documented      [] Yes- Altered Skin Integrity Present or Discovered   [] LDA Added if Not in Epic (Describe Wound)   [] New Altered Skin Integrity was Present on Admit and Documented in LDA   [] Wound Image Taken    Wound Care Consulted? No    Attending Nurse:  DAYSI Trujillo    Second RN/Staff Member:   DAYSI Ortez

## 2024-05-24 NOTE — ASSESSMENT & PLAN NOTE
Melani Holloway is a 78 y.o. F with recently diagnosed ECHO NSCLC now s/p left upper lobectomy with  5/23/24.       Neuro/Psych:   -- Sedation: none   -- Pain: MM pain control: tylenol, robaxin; PRN oxy, manish             Cards:   -- HDS  -- home metoprolol  -- no pressor requirements      Pulm:   -- Goal O2 sat > 90%  -- 3L NC, Wean as able  -- left chest tube in place, continue to suction  --  325cc since placement      Renal:  -- Vernon removed post op, follow up voiding  -- BUN/Cr 24/0.8      FEN / GI:   -- Replace lytes as needed  -- Nutrition: regular diet  -- famotidine, miralax, senna      ID:   -- Tm: afebrile; WBC 11.9, stable  -- Abx none      Heme/Onc:   -- H/H stable Hb 13.1  -- Daily CBC      Endo:   -- Gluc goal 140-180      PPx:   Feeding: regular diet  Analgesia/Sedation: MM pain control / NA  Thromboembolic prevention: Lovenox daily  HOB >30: yes  Stress Ulcer ppx: famotidine BID  Glucose control: Critical care goal 140-180 g/dl, ISS     Lines/Drains/Airway: 2 PIVs, left chest tube      Dispo/Code Status/Palliative:   -- SICU / Full Code

## 2024-05-24 NOTE — CARE UPDATE
RAPID RESPONSE NURSE PROACTIVE ROUNDING NOTE       Time of Visit:     Admit Date: 2024  LOS: 1  Code Status: Full Code   Date of Visit: 2024  : 1945  Age: 78 y.o.  Sex: female  Race: White  Bed: 1024/1024 A:   MRN: [id number]  Was the patient discharged from an ICU this admission? Yes   Was the patient discharged from a PACU within last 24 hours? No   Did the patient receive conscious sedation/general anesthesia in last 24 hours? No  Was the patient in the ED within the past 24 hours? No  Was the patient on NIPPV within the past 24 hours? No   Attending Physician: Riaz Fay MD  Primary Service: Cardiothoracic Surgery   Time spent at the bedside: 15 -30 min    SITUATION    Notified by charge RN via phone call.  Reason for alert: Increased WOB  Called to evaluate the patient for Respiratory    BACKGROUND     Why is the patient in the hospital?: Malignant neoplasm of upper lobe of left lung    Patient has a past medical history of COPD (chronic obstructive pulmonary disease), Ductal carcinoma in situ (DCIS) of right breast, and Hypertension.    Last Vitals:  Temp: 98.5 °F (36.9 °C) ( 165)  Pulse: 68 ( 1651)  Resp: 16 ( 1835)  BP: 146/67 ( 1651)  SpO2: 94 % ( 1827)    24 Hours Vitals Range:  Temp:  [97.8 °F (36.6 °C)-98.5 °F (36.9 °C)]   Pulse:  [51-71]   Resp:  [13-42]   BP: (113-169)/(55-70)   SpO2:  [1 %-96 %]     Labs:  Recent Labs     24  1524 24  0408   WBC 11.98 11.53   HGB 13.1 12.4   HCT 42.1 38.3    220       Recent Labs     24  1524 24  0408    139   K 4.6 4.2    107   CO2 20* 23   BUN 20 24*   CREATININE 0.9 0.8   * 124*   PHOS  --  4.6*   MG  --  2.1        ASSESSMENT    Physical Exam  Vitals and nursing note reviewed.   Pulmonary:      Effort: Tachypnea present.   Chest:      Chest wall: Crepitus present.      Comments: Crepitus to left shoulder and upper back.    Neurological:      General: No focal  deficit present.      Mental Status: She is alert.     INTERVENTIONS    Pt lying in bed on RRN arrival. She reports SOB. Bedside RN notes new facial swelling to right side of face and crepitus to left shoulder and upper back. SPO2 94% on 4L NC, RR 18/min.     Dr Choudhary called and to bedside to assess patient. MD stripped and increased CT suction to 30cm of water at this time. MD also palpated and assessed crepitus. CXR ordered and done. No other orders at this time.       The patient was seen for Respiratory problem. Staff concerns included increased WOB and increased oxygen requirements. The following interventions were performed: portable chest x-ray.      RECOMMENDATIONS    Monitor for increasing Crepitus, Shortness of breath, and worsening facial swelling.    Pt educated to call RN if she experiences any increase in SOB or difficulty breathing. Verbalized understanding.     RR Team will continue to follow patient.       PROVIDER ESCALATION    Yes/No  Yes    Orders received and case discussed with Dr. Choudhary .    Disposition: Remain in room 1024.    FOLLOW-UP    charge RNTimoteo  updated on plan of care. Instructed to call the Rapid Response Nurse, Ivonne Hanks RN at [phone] for additional questions or concerns.

## 2024-05-24 NOTE — HPI
78 y.o. female former smoker with HTN, R breast DCIS, and ECHO NSCLC. Treated for PNA in Oct, COPD exacerbation in January, and COVID in February. CTA during ER visit in Jan showed 2.2 cm solid nodule in ECHO and more peripheral 1.6 cm GGO. Repeat CT in April with persistence of these findings. Robotic bronchoscopy with EBUS on 4/16/24. Solid nodule in ECHO positive for malignancy favor adenocarcinoma. GGO in ECHO with atypical cells present. Levels 7 and 11L negative for malignancy.    PFTs - FEV1 1.5 80.7% DLCO 12.3 64.1

## 2024-05-24 NOTE — HOSPITAL COURSE
Patient admitted 05/23/24 following robotic left upper division of left upper lobe of lung and MLND. Tolerated procedure well. Air leak seen immediately post-operation. Chest tube (24Fr sorin) placed to suction. Patient taken to PACU in stable condition. Subsequently, CXR in PACU showed pneumothorax; however, patient HDS, VSS, afebrile, SpO2 >92% on 3L NC. Ultimately, she was stepped up to the SICU overnight for closer observation and stepped back down to the floor on POD 1 with a continuous air leak noted. Went into rate controlled afib on POD 2 and had worsening subcutaneous emphysema secondary to a clogged chest tube. Issues resolved by POD 3, and she underwent successful clamp trial with subsequent chest tube removal on POD 4. On POD 5 (5/28/24), patient was deemed clinically stable for discharge following a home O2 walk test and instructed to resume using her home oxygen as previously ordered.

## 2024-05-24 NOTE — NURSING
"[facility] Plan of Care Note    Dx:   NSCLC of left lung [C34.92]  Malignant neoplasm of upper lobe of left lung [C34.12]    Shift Events: Transfer to [facility] 1024, @ 1755 rapid team paged patient having SOB, crepitus to L chest, chest tube not suctioning properly, see rapid team note    Goals of Care: ambulation promotion, pain control    Neuro: A+Ox4, pleasant with care    Vital Signs: BP (!) 169/70   Pulse 65   Temp 98 °F (36.7 °C) (Oral)   Resp (!) 22   Ht 5' 3" (1.6 m)   Wt 81.6 kg (179 lb 14.3 oz)   LMP  (LMP Unknown)   SpO2 (!) 91%   Breastfeeding No   BMI 31.87 kg/m²     Respiratory: LS diminished to ECHO, wheezing present, R Upper/Middle/Lower lung clear    Diet: Diet Adult Regular (IDDSI Level 7)      Is patient tolerating current diet? yes    GTTS: none    Urine Output/Bowel Movement:   I/O this shift:  In: -   Out: 40 [Chest Tube:40]  Last Bowel Movement: 05/22/24      Drains/Tubes/Tube Feeds (include total output/shift):   I/O this shift:  In: -   Out: 40 [Chest Tube:40]    Chest tube to -30mm per MD     Lines: PIV R Hand 20 g, PIV L wrist 18g      Accuchecks:none    Skin: grossly intact, surg site to L chest/rib cage, chest tube site    Fall Risk Score: see flowsheets    Activity level? 1A stand/pivot to bedside commode    Any scheduled procedures? none    Any safety concerns? Watch for s/s of continued chest tube malfunction, increasing crepitus, rapid team aware of patient    Other: n/a     "

## 2024-05-24 NOTE — PLAN OF CARE
[facility]  Initial Discharge Assessment       Primary Care Provider: Nathaniel Greenfield MD    Admission Diagnosis: NSCLC of left lung [C34.92]  Malignant neoplasm of upper lobe of left lung [C34.12]    Admission Date: 5/23/2024  Expected Discharge Date:     Transition of Care Barriers: None    Payor: [employer] MC[employer] [facility] / Plan: [employer] CHOICES / Product Type: Medicare Advantage /     Extended Emergency Contact Information  Primary Emergency Contact: Donovan Bran  Mobile Phone: [phone]  Relation: Daughter  Preferred language: English   needed? No    Discharge Plan A: Home, Home with family  Discharge Plan B: Home Health      [id number] - [place], [place] - [address] [facility]  [address]  [facility] [address]  Phone: [phone] Fax: [phone]      Initial Assessment (most recent)       Adult Discharge Assessment - 05/24/24 1216          Discharge Assessment    Assessment Type Discharge Planning Assessment     Confirmed/corrected address, phone number and insurance Yes     Confirmed Demographics Correct on Facesheet     Source of Information patient     Communicated RODOLFO with patient/caregiver Date not available/Unable to determine     Reason For Admission Malignant neoplasm of upper lobe of left lung     People in Home alone     Do you expect to return to your current living situation? Yes     Do you have help at home or someone to help you manage your care at home? Yes     Who are your caregiver(s) and their phone number(s)? Donovan Bran ( Daughter )     Prior to hospitilization cognitive status: Alert/Oriented     Current cognitive status: Alert/Oriented     Walking or Climbing Stairs Difficulty no     Dressing/Bathing Difficulty no     Equipment Currently Used at Home none     Patient currently being followed by outpatient case management? No     Do you currently have service(s) that help you manage your care at home?  No     Do you take prescription medications? Yes     Do you have prescription coverage? Yes     [facility] CHOICES -     Is the patient taking medications as prescribed? yes     Who is going to help you get home at discharge? Donovan Bran     Are you on dialysis? No     Do you take coumadin? No     Discharge Plan A Home;Home with family     Discharge Plan B Home Health     DME Needed Upon Discharge  other (see comments)   TBD    Discharge Plan discussed with: Patient;Adult children     Transition of Care Barriers None                   The SW met with the patient and her daughter at bedside and completed the patient's DPA. The patient does not use any DME. The patient is not on Coumadin or Dialysis. The patient's daughter will be providing assistance/ help upon discharge. The patient's daughter will be providing transportation.     Discharge Plan A and Plan B have been determined by review of patient's clinical status, future medical and therapeutic needs, and coverage/benefits for post-acute care in coordination with multidisciplinary team members.    Fabrizio Jolley LMSW  Case Management [facility]

## 2024-05-24 NOTE — PROGRESS NOTES
[facility]  Critical Care - Surgery  Progress Note    Patient Name: Melani Holloway  MRN: [id number]  Admission Date: 5/23/2024  Hospital Length of Stay: 1 days  Code Status: Full Code  Attending Provider: Riaz Fay MD  Primary Care Provider: Nathaniel Greenfield MD   Principal Problem: Malignant neoplasm of upper lobe of left lung    Subjective:     Hospital/ICU Course:  No notes on file    Interval History/Significant Events: HIEN. Admitted to SICU after robo left upper lobectomy, found to have large air leak from chest tube. Currently on 3L NC satting >92%. Has air leak with inspiration, no large rolling air leak noted.    Follow-up For: Procedure(s) (LRB):  XI ROBOTIC LEFT UPPER LOBECTOMY (Left)  LYMPHADENECTOMY (N/A)  BLOCK, NERVE, INTERCOSTAL, 2 OR MORE (Left)  BRONCHOSCOPY (Left)    Post-Operative Day: 1 Day Post-Op    Objective:     Vital Signs (Most Recent):  Temp: 98.3 °F (36.8 °C) (05/24/24 0300)  Pulse: (!) 53 (05/24/24 0430)  Resp: 14 (05/24/24 0430)  BP: (!) 129/58 (05/24/24 0400)  SpO2: (!) 94 % (05/24/24 0430) Vital Signs (24h Range):  Temp:  [97.5 °F (36.4 °C)-98.6 °F (37 °C)] 98.3 °F (36.8 °C)  Pulse:  [53-76] 53  Resp:  [13-34] 14  SpO2:  [93 %-99 %] 94 %  BP: (120-183)/(57-76) 129/58     Weight: 81.6 kg (179 lb 14.3 oz)  Body mass index is 31.87 kg/m².      Intake/Output Summary (Last 24 hours) at 5/24/2024 0523  Last data filed at 5/24/2024 0500  Gross per 24 hour   Intake 1149.64 ml   Output 1125 ml   Net 24.64 ml          Physical Exam  Constitutional:       General: She is not in acute distress.     Appearance: Normal appearance.   HENT:      Head: Normocephalic and atraumatic.      Mouth/Throat:      Mouth: Mucous membranes are moist.   Eyes:      Extraocular Movements: Extraocular movements intact.   Cardiovascular:      Rate and Rhythm: Normal rate.      Pulses: Normal pulses.   Pulmonary:      Effort: Pulmonary effort is normal. No respiratory  distress.      Comments: 3L NC  Thoracoscopic incisions c/d/I with dressings in place, no strikethrough  Left chest tube in place, SS output. Air leak noted  Abdominal:      General: There is no distension.      Palpations: Abdomen is soft.      Tenderness: There is no abdominal tenderness.   Skin:     General: Skin is warm.      Coloration: Skin is not jaundiced.   Neurological:      General: No focal deficit present.      Mental Status: She is alert and oriented to person, place, and time.            Vents:       Lines/Drains/Airways       Drain  Duration                  Chest Tube 05/23/24 1338 Tube - 1 Left Pleural 19 Fr. <1 day              Peripheral Intravenous Line  Duration                  Peripheral IV - Single Lumen 05/23/24 0720 18 G Left Wrist <1 day         Peripheral IV - Single Lumen 05/23/24 1549 20 G Right Hand <1 day                    Significant Labs:    CBC/Anemia Profile:  Recent Labs   Lab 05/23/24  1524 05/24/24  0408   WBC 11.98 11.53   HGB 13.1 12.4   HCT 42.1 38.3    220   MCV 88 87   RDW 13.6 14.1        Chemistries:  Recent Labs   Lab 05/23/24  1524 05/24/24  0408    139   K 4.6 4.2    107   CO2 20* 23   BUN 20 24*   CREATININE 0.9 0.8   CALCIUM 8.6* 8.4*   ALBUMIN 3.5 3.2*   PROT 6.4 6.0   BILITOT 0.3 0.3   ALKPHOS 74 65   ALT 16 16   AST 23 39   MG  --  2.1   PHOS  --  4.6*           Significant Imaging:  I have reviewed all pertinent imaging results/findings within the past 24 hours.  Assessment/Plan:     Oncology  * Malignant neoplasm of upper lobe of left lung  Melani Holloway is a 78 y.o. F with recently diagnosed ECHO NSCLC now s/p left upper lobectomy with  5/23/24.       Neuro/Psych:   -- Sedation: none   -- Pain: MM pain control: tylenol, robaxin; PRN oxy, manish             Cards:   -- HDS  -- home metoprolol  -- no pressor requirements      Pulm:   -- Goal O2 sat > 90%  -- 3L NC, Wean as able  -- left chest tube in place, continue to suction  --   325cc since placement      Renal:  -- Vernon removed post op, follow up voiding  -- BUN/Cr 24/0.8      FEN / GI:   -- Replace lytes as needed  -- Nutrition: regular diet  -- famotidine, miralax, senna      ID:   -- Tm: afebrile; WBC 11.9, stable  -- Abx none      Heme/Onc:   -- H/H stable Hb 13.1  -- Daily CBC      Endo:   -- Gluc goal 140-180      PPx:   Feeding: regular diet  Analgesia/Sedation: MM pain control / NA  Thromboembolic prevention: Lovenox daily  HOB >30: yes  Stress Ulcer ppx: famotidine BID  Glucose control: Critical care goal 140-180 g/dl, ISS     Lines/Drains/Airway: 2 PIVs, left chest tube      Dispo/Code Status/Palliative:   -- Stepdown / Full Code            Critical care was time spent personally by me on the following activities: development of treatment plan with patient or surrogate and bedside caregivers, discussions with consultants, evaluation of patient's response to treatment, examination of patient, ordering and performing treatments and interventions, ordering and review of laboratory studies, ordering and review of radiographic studies, pulse oximetry, re-evaluation of patient's condition.  This critical care time did not overlap with that of any other provider or involve time for any procedures.     Km Veliz MD  Critical Care - Surgery  [facility]

## 2024-05-24 NOTE — ASSESSMENT & PLAN NOTE
Melani Holloway is a 78 y.o. F with recently diagnosed ECHO NSCLC now s/p left upper lobectomy with  5/23/24.    - patient seen and examined   - on 3L nasal cannula maintaining oxygen saturations   - CXR with improved re-expansion of the left lung  - chest tube in place, air leak noted; continue to wall suction  - home meds as appropriate  - regular diet  - step down from ICU  - IS use, out of bed

## 2024-05-25 LAB
ALBUMIN SERPL BCP-MCNC: 2.9 G/DL (ref 3.5–5.2)
ALP SERPL-CCNC: 69 U/L (ref 55–135)
ALT SERPL W/O P-5'-P-CCNC: 15 U/L (ref 10–44)
ANION GAP SERPL CALC-SCNC: 8 MMOL/L (ref 8–16)
AST SERPL-CCNC: 36 U/L (ref 10–40)
BASOPHILS # BLD AUTO: 0.08 K/UL (ref 0–0.2)
BASOPHILS NFR BLD: 0.6 % (ref 0–1.9)
BILIRUB SERPL-MCNC: 0.4 MG/DL (ref 0.1–1)
BUN SERPL-MCNC: 18 MG/DL (ref 8–23)
CALCIUM SERPL-MCNC: 8.8 MG/DL (ref 8.7–10.5)
CHLORIDE SERPL-SCNC: 105 MMOL/L (ref 95–110)
CO2 SERPL-SCNC: 23 MMOL/L (ref 23–29)
CREAT SERPL-MCNC: 0.7 MG/DL (ref 0.5–1.4)
DIFFERENTIAL METHOD BLD: ABNORMAL
EOSINOPHIL # BLD AUTO: 0 K/UL (ref 0–0.5)
EOSINOPHIL NFR BLD: 0.1 % (ref 0–8)
ERYTHROCYTE [DISTWIDTH] IN BLOOD BY AUTOMATED COUNT: 14.6 % (ref 11.5–14.5)
EST. GFR  (NO RACE VARIABLE): >60 ML/MIN/1.73 M^2
GLUCOSE SERPL-MCNC: 119 MG/DL (ref 70–110)
HCT VFR BLD AUTO: 38.1 % (ref 37–48.5)
HGB BLD-MCNC: 12.1 G/DL (ref 12–16)
IMM GRANULOCYTES # BLD AUTO: 0.16 K/UL (ref 0–0.04)
IMM GRANULOCYTES NFR BLD AUTO: 1.2 % (ref 0–0.5)
LACTATE SERPL-SCNC: 0.9 MMOL/L (ref 0.5–2.2)
LYMPHOCYTES # BLD AUTO: 2.1 K/UL (ref 1–4.8)
LYMPHOCYTES NFR BLD: 16 % (ref 18–48)
MAGNESIUM SERPL-MCNC: 2.3 MG/DL (ref 1.6–2.6)
MCH RBC QN AUTO: 27.8 PG (ref 27–31)
MCHC RBC AUTO-ENTMCNC: 31.8 G/DL (ref 32–36)
MCV RBC AUTO: 88 FL (ref 82–98)
MONOCYTES # BLD AUTO: 1.9 K/UL (ref 0.3–1)
MONOCYTES NFR BLD: 14.7 % (ref 4–15)
NEUTROPHILS # BLD AUTO: 8.8 K/UL (ref 1.8–7.7)
NEUTROPHILS NFR BLD: 67.4 % (ref 38–73)
NRBC BLD-RTO: 0 /100 WBC
PLATELET # BLD AUTO: 214 K/UL (ref 150–450)
PMV BLD AUTO: 12 FL (ref 9.2–12.9)
POTASSIUM SERPL-SCNC: 4.2 MMOL/L (ref 3.5–5.1)
PROT SERPL-MCNC: 6.1 G/DL (ref 6–8.4)
RBC # BLD AUTO: 4.35 M/UL (ref 4–5.4)
SODIUM SERPL-SCNC: 136 MMOL/L (ref 136–145)
WBC # BLD AUTO: 13.1 K/UL (ref 3.9–12.7)

## 2024-05-25 PROCEDURE — 63600175 PHARM REV CODE 636 W HCPCS: Performed by: STUDENT IN AN ORGANIZED HEALTH CARE EDUCATION/TRAINING PROGRAM

## 2024-05-25 PROCEDURE — 25000003 PHARM REV CODE 250

## 2024-05-25 PROCEDURE — 20600001 HC STEP DOWN PRIVATE ROOM

## 2024-05-25 PROCEDURE — 85025 COMPLETE CBC W/AUTO DIFF WBC: CPT | Performed by: STUDENT IN AN ORGANIZED HEALTH CARE EDUCATION/TRAINING PROGRAM

## 2024-05-25 PROCEDURE — 97116 GAIT TRAINING THERAPY: CPT

## 2024-05-25 PROCEDURE — 83605 ASSAY OF LACTIC ACID: CPT | Performed by: STUDENT IN AN ORGANIZED HEALTH CARE EDUCATION/TRAINING PROGRAM

## 2024-05-25 PROCEDURE — 83735 ASSAY OF MAGNESIUM: CPT | Performed by: STUDENT IN AN ORGANIZED HEALTH CARE EDUCATION/TRAINING PROGRAM

## 2024-05-25 PROCEDURE — 27000221 HC OXYGEN, UP TO 24 HOURS

## 2024-05-25 PROCEDURE — 80053 COMPREHEN METABOLIC PANEL: CPT | Performed by: STUDENT IN AN ORGANIZED HEALTH CARE EDUCATION/TRAINING PROGRAM

## 2024-05-25 PROCEDURE — 25000003 PHARM REV CODE 250: Performed by: STUDENT IN AN ORGANIZED HEALTH CARE EDUCATION/TRAINING PROGRAM

## 2024-05-25 PROCEDURE — 93005 ELECTROCARDIOGRAM TRACING: CPT

## 2024-05-25 PROCEDURE — 93010 ELECTROCARDIOGRAM REPORT: CPT | Mod: ,,, | Performed by: INTERNAL MEDICINE

## 2024-05-25 PROCEDURE — 97535 SELF CARE MNGMENT TRAINING: CPT

## 2024-05-25 PROCEDURE — 97165 OT EVAL LOW COMPLEX 30 MIN: CPT

## 2024-05-25 PROCEDURE — 97161 PT EVAL LOW COMPLEX 20 MIN: CPT

## 2024-05-25 PROCEDURE — 99900035 HC TECH TIME PER 15 MIN (STAT)

## 2024-05-25 PROCEDURE — 25000242 PHARM REV CODE 250 ALT 637 W/ HCPCS: Performed by: STUDENT IN AN ORGANIZED HEALTH CARE EDUCATION/TRAINING PROGRAM

## 2024-05-25 PROCEDURE — 36415 COLL VENOUS BLD VENIPUNCTURE: CPT | Performed by: STUDENT IN AN ORGANIZED HEALTH CARE EDUCATION/TRAINING PROGRAM

## 2024-05-25 PROCEDURE — 94640 AIRWAY INHALATION TREATMENT: CPT

## 2024-05-25 PROCEDURE — 94761 N-INVAS EAR/PLS OXIMETRY MLT: CPT

## 2024-05-25 RX ORDER — METOPROLOL TARTRATE 1 MG/ML
5 INJECTION, SOLUTION INTRAVENOUS EVERY 5 MIN PRN
Status: DISCONTINUED | OUTPATIENT
Start: 2024-05-25 | End: 2024-05-28 | Stop reason: HOSPADM

## 2024-05-25 RX ORDER — METOPROLOL TARTRATE 1 MG/ML
5 INJECTION, SOLUTION INTRAVENOUS EVERY 5 MIN PRN
Status: CANCELLED | OUTPATIENT
Start: 2024-05-25

## 2024-05-25 RX ADMIN — FLUTICASONE FUROATE AND VILANTEROL TRIFENATATE 1 PUFF: 100; 25 POWDER RESPIRATORY (INHALATION) at 08:05

## 2024-05-25 RX ADMIN — METOROPROLOL TARTRATE 5 MG: 5 INJECTION, SOLUTION INTRAVENOUS at 05:05

## 2024-05-25 RX ADMIN — METHOCARBAMOL 500 MG: 500 TABLET ORAL at 05:05

## 2024-05-25 RX ADMIN — METHOCARBAMOL 500 MG: 500 TABLET ORAL at 09:05

## 2024-05-25 RX ADMIN — LEVALBUTEROL 1.25 MG: 1.25 SOLUTION, CONCENTRATE RESPIRATORY (INHALATION) at 09:05

## 2024-05-25 RX ADMIN — POLYETHYLENE GLYCOL 3350 17 G: 17 POWDER, FOR SOLUTION ORAL at 08:05

## 2024-05-25 RX ADMIN — SENNOSIDES AND DOCUSATE SODIUM 1 TABLET: 50; 8.6 TABLET ORAL at 08:05

## 2024-05-25 RX ADMIN — FAMOTIDINE 20 MG: 20 TABLET ORAL at 08:05

## 2024-05-25 RX ADMIN — ENOXAPARIN SODIUM 40 MG: 40 INJECTION SUBCUTANEOUS at 05:05

## 2024-05-25 RX ADMIN — GABAPENTIN 200 MG: 100 CAPSULE ORAL at 09:05

## 2024-05-25 RX ADMIN — ACETAMINOPHEN 1000 MG: 500 TABLET ORAL at 09:05

## 2024-05-25 RX ADMIN — FAMOTIDINE 20 MG: 20 TABLET ORAL at 09:05

## 2024-05-25 RX ADMIN — METOPROLOL TARTRATE 12.5 MG: 25 TABLET, FILM COATED ORAL at 08:05

## 2024-05-25 RX ADMIN — ACETAMINOPHEN 1000 MG: 500 TABLET ORAL at 05:05

## 2024-05-25 RX ADMIN — METHOCARBAMOL 500 MG: 500 TABLET ORAL at 08:05

## 2024-05-25 RX ADMIN — ACETAMINOPHEN 1000 MG: 500 TABLET ORAL at 02:05

## 2024-05-25 RX ADMIN — METHOCARBAMOL 500 MG: 500 TABLET ORAL at 02:05

## 2024-05-25 NOTE — NURSING
Pt complain of chest tube pain and feeling of increased swelling in face and neck. Nurse at bedside. [place] just so happen to walk in at this time. New crepitus noted in facial area. Neck and chest crepitus still noted from initial assessment. Rapid at bedside, assessed pt. No new orders for pt at this time.

## 2024-05-25 NOTE — PROGRESS NOTES
[facility]  General Surgery  Progress Note    Subjective:     History of Present Illness:  Melani Holloway is a 78 y.o. F with PMHx prior tobacco use, HTN, and right breast DCIS s/p lumpectomy who was recently diagnosed with ECHO NSCLC, favoring adenocarcinoma. She presents to the SICU s/p robotic Left upper lobectomy with Dr. Fay on 5/23/24. On admission, she is alert and oriented and in stable condition. She is not requiring any Inotropic or vasopressor support to maintain MAPs > 65. Access includes 2 PIVs. She has a left sided chest tube in place with 160cc SS output so far post op. Large air leak noted. She is maintaining O2 sats > 95 % on 3L O2 by nasal cannula. She is admitted to the SICU for close respiratory monitoring.      Immediate post-operative plans include weaning of respiratory support, and close monitoring of chest tube output.     Post-Op Info:  Procedure(s) (LRB):  XI ROBOTIC LEFT UPPER LOBECTOMY (Left)  LYMPHADENECTOMY (N/A)  BLOCK, NERVE, INTERCOSTAL, 2 OR MORE (Left)  BRONCHOSCOPY (Left)   2 Days Post-Op     Interval History: No significant changes overnight. AFVSS. Subcutaneous emphysema is improving. Inspiratory effort better. Still with small continuous air leak.     Medications:  Continuous Infusions:  Scheduled Meds:   acetaminophen  1,000 mg Oral Q8H    enoxparin  40 mg Subcutaneous Daily    famotidine  20 mg Oral BID    fluticasone furoate-vilanteroL  1 puff Inhalation Daily    gabapentin  200 mg Oral QHS    levalbuterol  1.25 mg Nebulization Q12H    methocarbamoL  500 mg Oral QID    metoprolol tartrate  12.5 mg Oral BID    polyethylene glycol  17 g Oral Daily    senna-docusate 8.6-50 mg  1 tablet Oral Daily     PRN Meds:  Current Facility-Administered Medications:     bisacodyL, 10 mg, Rectal, Daily PRN    metoclopramide, 5 mg, Intravenous, Q6H PRN    ondansetron, 8 mg, Oral, Q8H PRN    oxyCODONE, 5 mg, Oral, Q4H PRN    oxyCODONE, 10 mg, Oral, Q4H PRN    sodium chloride 0.9%,  10 mL, Intravenous, PRN     Review of patient's allergies indicates:  No Known Allergies  Objective:     Vital Signs (Most Recent):  Temp: 98.4 °F (36.9 °C) (05/25/24 0726)  Pulse: 67 (05/25/24 0726)  Resp: 18 (05/25/24 0726)  BP: (!) 143/66 (05/25/24 0726)  SpO2: (!) 90 % (05/25/24 0726) Vital Signs (24h Range):  Temp:  [97.8 °F (36.6 °C)-99.5 °F (37.5 °C)] 98.4 °F (36.9 °C)  Pulse:  [55-76] 67  Resp:  [16-42] 18  SpO2:  [1 %-95 %] 90 %  BP: (113-169)/(55-70) 143/66     Weight: 81.6 kg (179 lb 14.3 oz)  Body mass index is 31.87 kg/m².    Intake/Output - Last 3 Shifts         05/23 0700 05/24 0659 05/24 0700 05/25 0659 05/25 0700 05/26 0659    I.V. (mL/kg)  22.6 (0.3)     IV Piggyback 1149.6      Total Intake(mL/kg) 1149.6 (14.1) 22.6 (0.3)     Urine (mL/kg/hr) 790 (0.4) 0 (0)     Stool 0      Blood 10      Chest Tube 325 220     Total Output 1125 220     Net +24.6 -197.4            Urine Occurrence  1 x     Stool Occurrence 0 x               Physical Exam  Constitutional:       Appearance: Normal appearance. She is obese.   Eyes:      Extraocular Movements: Extraocular movements intact.   Cardiovascular:      Rate and Rhythm: Normal rate and regular rhythm.      Pulses: Normal pulses.   Pulmonary:      Effort: Pulmonary effort is normal.      Breath sounds: Normal breath sounds.      Comments: 24 Fr sorin drain on left side with small air leak present. To suction.  Chest:      Comments: Moderate subcutaneous emphysema present extending up to lower face.  Abdominal:      General: Abdomen is flat.      Palpations: Abdomen is soft.   Skin:     General: Skin is warm and dry.   Neurological:      General: No focal deficit present.      Mental Status: She is alert and oriented to person, place, and time. Mental status is at baseline.   Psychiatric:         Mood and Affect: Mood normal.         Behavior: Behavior normal.         Thought Content: Thought content normal.          Significant Labs:  I have reviewed all  pertinent lab results within the past 24 hours.  CBC:   Recent Labs   Lab 05/25/24  0502   WBC 13.10*   RBC 4.35   HGB 12.1   HCT 38.1      MCV 88   MCH 27.8   MCHC 31.8*     CMP:   Recent Labs   Lab 05/25/24  0502   *   CALCIUM 8.8   ALBUMIN 2.9*   PROT 6.1      K 4.2   CO2 23      BUN 18   CREATININE 0.7   ALKPHOS 69   ALT 15   AST 36   BILITOT 0.4       Significant Diagnostics:  I have reviewed all pertinent imaging results/findings within the past 24 hours.  Assessment/Plan:     * Malignant neoplasm of upper lobe of left lung  Melani Holloway is a 78 y.o. F with recently diagnosed ECHO NSCLC now s/p left upper lobectomy with  5/23/24.    - patient seen and examined   - on room air maintaining oxygen saturations   - CXR pending this morning  - chest tube in place, air leak noted; continue to -30mmHg suction  - home meds as appropriate  - regular diet  - Multimodal pain control  - Scheduled nebs  - Ppx metoprolol 12.5 BID  - IS use, out of bed        Martinez Flynn MD  General Surgery  [facility]

## 2024-05-25 NOTE — CARE UPDATE
"RAPID RESPONSE NURSE CHART REVIEW        Chart Reviewed: 05/25/2024, 7:52 AM    MRN: [id number]  Bed: 1024/1024 A    Dx: Malignant neoplasm of upper lobe of left lung    Melani Holloway has a past medical history of COPD (chronic obstructive pulmonary disease), Ductal carcinoma in situ (DCIS) of right breast, and Hypertension.    Last VS: BP (!) 143/66 (BP Location: Right arm, Patient Position: Sitting)   Pulse 67   Temp 98.4 °F (36.9 °C) (Oral)   Resp 18   Ht 5' 3" (1.6 m)   Wt 81.6 kg (179 lb 14.3 oz)   LMP  (LMP Unknown)   SpO2 (!) 90%   Breastfeeding No   BMI 31.87 kg/m²     24H Vital Sign Range:  Temp:  [97.8 °F (36.6 °C)-99.5 °F (37.5 °C)]   Pulse:  [54-76]   Resp:  [16-42]   BP: (113-169)/(55-70)   SpO2:  [1 %-95 %]     Level of Consciousness (AVPU): alert    Recent Labs     05/23/24  1524 05/24/24  0408 05/25/24  0502   WBC 11.98 11.53 13.10*   HGB 13.1 12.4 12.1   HCT 42.1 38.3 38.1    220 214       Recent Labs     05/23/24  1524 05/24/24  0408 05/25/24  0502    139 136   K 4.6 4.2 4.2    107 105   CO2 20* 23 23   BUN 20 24* 18   CREATININE 0.9 0.8 0.7   * 124* 119*   PHOS  --  4.6*  --    MG  --  2.1 2.3        No results for input(s): "PH", "PCO2", "PO2", "HCO3", "POCSATURATED", "BE" in the last 72 hours.     OXYGEN:  Flow (L/min) (Oxygen Therapy): 4          MEWS score: 1    Charge Yanci TAVAREZ contacted for follow up for chest tube malfunction with resulting crepitus reports pt stable at this time, monitoring for further progression of subcutaneous air. No additional concerns verbalized at this time. Instructed to call [phone] for further concerns or assistance.    Mark Cat RN        "

## 2024-05-25 NOTE — PLAN OF CARE
Problem: Physical Therapy  Goal: Physical Therapy Goal  Description: Goals to be met by: 2024     Patient will increase functional independence with mobility by performin. Supine to sit with Set-up Guthrie  2. Sit to supine with Set-up Guthrie  3. Sit to stand transfer with Supervision  4. Bed to chair transfer with Supervision using LRAD  5. Gait  x 200 feet with Supervision using LRAD.   6. Lower extremity exercise program x15 reps per handout, with supervision    Outcome: Progressing

## 2024-05-25 NOTE — PT/OT/SLP EVAL
Occupational Therapy  Co -  Evaluation with PT    Co-evaluation/treatment performed due to patient's multiple deficits requiring two skilled therapists to appropriately and safely assess patient's strength and endurance while facilitating functional tasks in addition to accommodating for patient's activity tolerance.       Name: Melani Holloway  MRN: [id number]  Admitting Diagnosis: Malignant neoplasm of upper lobe of left lung  Recent Surgery: Procedure(s) (LRB):  XI ROBOTIC LEFT UPPER LOBECTOMY (Left)  LYMPHADENECTOMY (N/A)  BLOCK, NERVE, INTERCOSTAL, 2 OR MORE (Left)  BRONCHOSCOPY (Left) 2 Days Post-Op    Recommendations:     Discharge Recommendations: Low Intensity Therapy  Discharge Equipment Recommendations:  grab bar  Barriers to discharge:   increased skilled assistance    Assessment:     Melani Holloway is a 78 y.o. female with a medical diagnosis of Malignant neoplasm of upper lobe of left lung.  She presents with performance deficits affecting function: weakness, impaired endurance, impaired self care skills, impaired functional mobility, gait instability, impaired balance, decreased upper extremity function, decreased coordination, decreased safety awareness, pain, impaired coordination, impaired cardiopulmonary response to activity.      Pt engaged well in evaluative session this date, encountered with HOB raised and daughter at bedside. Pt presents with pleasant & humorous demeanor and agreeable to therapy.  Pt able to complete bed mobility with min assistance 2* pain at left chest at chest tube site, benefits from cues for regulated breathing to minimize pain and anxiety. Pt completed STS transfer with CGA, transfer to upright chair with CGA, and functional mobility taking forward and backward steps with CGA with HHA.  Patient currently demonstrates a need for low intensity therapy on a scheduled basis secondary to a decline in functional status due to illness.       Rehab Prognosis: Good; patient  "would benefit from acute skilled OT services to address these deficits and reach maximum level of function.       Plan:     Patient to be seen 3 x/week to address the above listed problems via self-care/home management, therapeutic activities, therapeutic exercises  Plan of Care Expires: 06/25/24  Plan of Care Reviewed with: patient, daughter    Subjective     Chief Complaint: "I'm doing well, I just have spasms where this tube goes in"    "This isn't my good arm" (re: L arm)  Patient/Family Comments/goals: Get better, return home     Occupational Profile:  Living Environment: Pt live alone in [facility] with 0 JOSE ANTONIO, tub/sh combo with SC, 0 gb  Previous level of function: independent with ADLs and functional mobility, pt states she was recently driving, babysitting, and doing yard work   Roles and Routines: mother, grandmother, enjoys independence  Equipment Used at Home: shower chair   Equipment Owned, Not Used: nadine bar  Assistance upon Discharge: Pt reports good support system: daughter, pt's grandaughter is a SLP at [employer], pt reports another granddaughter who is a PT, and a family member who is an OT    Pain/Comfort:  Pain Rating 1:  not rated but grimaced with movement  Location - Side 1: Left  Location - Orientation 1: generalized  Location 1: chest (chest tube insertion site)  Pain Addressed 1: Reposition, Distraction, Cessation of Activity, Nurse notified  Pain Rating Post-Intervention 1:  not rated    Patients cultural, spiritual, Buddhist conflicts given the current situation: no    Objective:     Communicated with: RN prior to session.  Patient found HOB elevated with bed alarm, chest tube, SCD, telemetry, pulse ox (continuous), oxygen upon OT entry to room.    General Precautions: Standard, fall  Orthopedic Precautions: N/A  Braces: N/A  Respiratory Status: Nasal cannula, flow 4 L/min    Occupational Performance:    Bed Mobility:    Patient completed Rolling/Turning to Left with  minimum assistance  Patient " completed Rolling/Turning to Right with minimum assistance  Patient completed Scooting/Bridging with minimum assistance  Patient completed Supine to Sit with minimum assistance  Pt demo's good EOB balance    Functional Mobility/Transfers:  Patient completed Sit <> Stand Transfer:  From EOB with contact guard assistance  with  hand-held assist   From upright chair with CGA/HHA  Pt completed bed to chair step transfer with CGA/HHA  Functional Mobility: Pt able to complete forward and backward steps with CGA/HHA    Activities of Daily Living:  Grooming: hygiene kit offered, pt politely declined having already completed tasks    Bathing: pt reports bathing with daughter earlier    Upper Body Dressing: minimum assistance donning personal robe with OT guarding lines and assisting threading over L arm    Cognitive/Visual Perceptual:  Cognitive/Psychosocial Skills:     -       Oriented to: AOx4   -       Follows Commands/attention:Follows multistep  commands  -       Communication: clear/fluent  -       Memory: No Deficits noted  -       Safety awareness/insight to disability: impaired   -       Mood/Affect/Coping skills/emotional control: Pleasant  Visual/Perceptual:      -Intact      Physical Exam:  Balance:    -       Standing: impaired  Postural examination/scapula alignment:    -       Rounded shoulders  Skin integrity: Visible skin intact  Edema:  None noted  Sensation:    -       Intact  Motor Planning:    -       Impaired  Left Upper Extremity:    - c/o instability/pain  Dominant hand:    -       right  Neurological:    -       Intact    AMPAC 6 Click ADL:  AMPAC Total Score: 19    Treatment & Education:  Pt educated on role of OT, POC, and goals for therapy.    POC was dicussed with patient/caregiver, who was included in its development and is in agreement with the identified goals and treatment plan.   Patient and family aware of patient's deficits and therapy progression.   Time provided for therapeutic  counseling and discussion of health disposition.   Educated on importance of EOB/OOB mobility, maintaining routine, sitting up in chair, and maximizing independence with ADLs during admission   Pt completed ADLs and functional mobility for treatment session as noted above   Pt/caregiver verbalized understanding and expressed no further concerns/questions.  Updated communication board with level of assist required      Patient left up in chair with all lines intact, call button in reach, RN notified, PCT and daughter present, and cushion placed under BLE    GOALS:   Multidisciplinary Problems       Occupational Therapy Goals          Problem: Occupational Therapy    Goal Priority Disciplines Outcome Interventions   Occupational Therapy Goal     OT, PT/OT Progressing    Description: Goals to be met by: 6/25/24     Patient will increase functional independence with ADLs by performing:    UE Dressing with Stand-by Assistance.  LE Dressing with Contact Guard Assistance.  Grooming while standing at sink with Stand-by Assistance.  Toileting from toilet with Stand-by Assistance for hygiene and clothing management.   Supine to sit with Stand-by Assistance.  Step transfer with Supervision  Toilet transfer to toilet with Stand-by Assistance.                         History:     Past Medical History:   Diagnosis Date    COPD (chronic obstructive pulmonary disease)     Ductal carcinoma in situ (DCIS) of right breast 09/19/2017    Hypertension          Past Surgical History:   Procedure Laterality Date    BREAST BIOPSY Right 2010    BREAST LUMPECTOMY Right 2010    BRONCHOSCOPY Left 5/23/2024    Procedure: BRONCHOSCOPY;  Surgeon: Riaz Fay MD;  Location: [facility] OR [address] [place] [address];  Service: Cardiothoracic;  Laterality: Left;    cataract surgery      INJECTION OF ANESTHETIC AGENT AROUND MULTIPLE INTERCOSTAL NERVES Left 5/23/2024    Procedure: BLOCK, NERVE, INTERCOSTAL, 2 OR MORE;  Surgeon: Riaz Fay MD;  Location: [facility] OR  2ND FLR;  Service: Cardiothoracic;  Laterality: Left;    LYMPHADENECTOMY N/A 5/23/2024    Procedure: LYMPHADENECTOMY;  Surgeon: Riaz Fay MD;  [facility];  Service: Cardiothoracic;  Laterality: N/A;    ROBOTIC BRONCHOSCOPY N/A 4/16/2024    Procedure: ROBOTIC BRONCHOSCOPY;  Surgeon: Uzma Larsen MD;  Location: [facility] OR [address] [place] [address];  Service: Pulmonary;  Laterality: N/A;    XI ROBOTIC RATS,WITH LOBECTOMY,LUNG Left 5/23/2024    Procedure: XI ROBOTIC LEFT UPPER LOBECTOMY;  Surgeon: Riaz Fay MD;  [facility];  Service: Cardiothoracic;  Laterality: Left;       Time Tracking:     OT Date of Treatment: 05/25/24  OT Start Time: 0950  OT Stop Time: 1006  OT Total Time (min): 16 min    Billable Minutes:Evaluation 8  Self Care/Home Management 8    5/25/2024

## 2024-05-25 NOTE — SUBJECTIVE & OBJECTIVE
Interval History: No significant changes overnight. AFVSS. Subcutaneous emphysema is improving. Inspiratory effort better. Still with small continuous air leak.     Medications:  Continuous Infusions:  Scheduled Meds:   acetaminophen  1,000 mg Oral Q8H    enoxparin  40 mg Subcutaneous Daily    famotidine  20 mg Oral BID    fluticasone furoate-vilanteroL  1 puff Inhalation Daily    gabapentin  200 mg Oral QHS    levalbuterol  1.25 mg Nebulization Q12H    methocarbamoL  500 mg Oral QID    metoprolol tartrate  12.5 mg Oral BID    polyethylene glycol  17 g Oral Daily    senna-docusate 8.6-50 mg  1 tablet Oral Daily     PRN Meds:  Current Facility-Administered Medications:     bisacodyL, 10 mg, Rectal, Daily PRN    metoclopramide, 5 mg, Intravenous, Q6H PRN    ondansetron, 8 mg, Oral, Q8H PRN    oxyCODONE, 5 mg, Oral, Q4H PRN    oxyCODONE, 10 mg, Oral, Q4H PRN    sodium chloride 0.9%, 10 mL, Intravenous, PRN     Review of patient's allergies indicates:  No Known Allergies  Objective:     Vital Signs (Most Recent):  Temp: 98.4 °F (36.9 °C) (05/25/24 0726)  Pulse: 67 (05/25/24 0726)  Resp: 18 (05/25/24 0726)  BP: (!) 143/66 (05/25/24 0726)  SpO2: (!) 90 % (05/25/24 0726) Vital Signs (24h Range):  Temp:  [97.8 °F (36.6 °C)-99.5 °F (37.5 °C)] 98.4 °F (36.9 °C)  Pulse:  [55-76] 67  Resp:  [16-42] 18  SpO2:  [1 %-95 %] 90 %  BP: (113-169)/(55-70) 143/66     Weight: 81.6 kg (179 lb 14.3 oz)  Body mass index is 31.87 kg/m².    Intake/Output - Last 3 Shifts         05/23 0700 05/24 0659 05/24 0700 05/25 0659 05/25 0700 05/26 0659    I.V. (mL/kg)  22.6 (0.3)     IV Piggyback 1149.6      Total Intake(mL/kg) 1149.6 (14.1) 22.6 (0.3)     Urine (mL/kg/hr) 790 (0.4) 0 (0)     Stool 0      Blood 10      Chest Tube 325 220     Total Output 1125 220     Net +24.6 -197.4            Urine Occurrence  1 x     Stool Occurrence 0 x               Physical Exam  Constitutional:       Appearance: Normal appearance. She is obese.   Eyes:       Extraocular Movements: Extraocular movements intact.   Cardiovascular:      Rate and Rhythm: Normal rate and regular rhythm.      Pulses: Normal pulses.   Pulmonary:      Effort: Pulmonary effort is normal.      Breath sounds: Normal breath sounds.      Comments: 24 Fr sorin drain on left side with small air leak present. To suction.  Chest:      Comments: Moderate subcutaneous emphysema present extending up to lower face.  Abdominal:      General: Abdomen is flat.      Palpations: Abdomen is soft.   Skin:     General: Skin is warm and dry.   Neurological:      General: No focal deficit present.      Mental Status: She is alert and oriented to person, place, and time. Mental status is at baseline.   Psychiatric:         Mood and Affect: Mood normal.         Behavior: Behavior normal.         Thought Content: Thought content normal.          Significant Labs:  I have reviewed all pertinent lab results within the past 24 hours.  CBC:   Recent Labs   Lab 05/25/24  0502   WBC 13.10*   RBC 4.35   HGB 12.1   HCT 38.1      MCV 88   MCH 27.8   MCHC 31.8*     CMP:   Recent Labs   Lab 05/25/24  0502   *   CALCIUM 8.8   ALBUMIN 2.9*   PROT 6.1      K 4.2   CO2 23      BUN 18   CREATININE 0.7   ALKPHOS 69   ALT 15   AST 36   BILITOT 0.4       Significant Diagnostics:  I have reviewed all pertinent imaging results/findings within the past 24 hours.

## 2024-05-25 NOTE — PLAN OF CARE
[facility] Plan of Care Note     Dx:   NSCLC of left lung [C34.92]  Malignant neoplasm of upper lobe of left lung [C34.12]     Shift Events: Pt reports improvement of chest tube pain and feeling of slight decrease in swelling in face and neck.    Pt went into Afib around 1700 and received 2 doses of metoprolol 5mg IV. Pt converted to NSR @ 18:35.     Goals of Care: Goals of care ongoing and progressing.      Neuro: A/Ox4     Vital Signs:      Respiratory: 4L NC     Diet: Diet Adult Regular (IDDSI Level 7)        Is patient tolerating current diet? Yes     GTTS: NA     Urine Output/Bowel Movement:     Last Bowel Movement: 05/22/24, Up to toilet         Drains/Tubes/Tube Feeds (include total output/shift):   Chest tube        Lines: 20g R hand, 18g wrist         Accuchecks: NA     Skin: 3Lap sites, chest tube insertion site     Fall Risk Score: Low     Activity level? Stand-by assist x1     Any scheduled procedures? NA     Any safety concerns? Crepitus noted to facial and chest area. Increased since beginning of shift. [place] and physician aware.   Problem: Adult Inpatient Plan of Care  Goal: Plan of Care Review  5/25/2024 1834 by Titi Garcia RN  Outcome: Progressing  5/25/2024 1829 by Titi Garcia RN  Outcome: Progressing  Goal: Patient-Specific Goal (Individualized)  5/25/2024 1834 by Titi Garcia RN  Outcome: Progressing  5/25/2024 1829 by Titi Garcia RN  Outcome: Progressing  Goal: Absence of Hospital-Acquired Illness or Injury  5/25/2024 1834 by Titi Garcia RN  Outcome: Progressing  5/25/2024 1829 by Titi Garcia RN  Outcome: Progressing  Goal: Optimal Comfort and Wellbeing  5/25/2024 1834 by Titi Garcia RN  Outcome: Progressing  5/25/2024 1829 by Titi Garcia RN  Outcome: Progressing  Goal: Readiness for Transition of Care  5/25/2024 1834 by Titi Garcia RN  Outcome: Progressing  5/25/2024 1829 by Titi Garcia RN  Outcome: Progressing     Problem: Wound  Goal: Optimal  Coping  5/25/2024 1834 by Titi Garcia RN  Outcome: Progressing  5/25/2024 1829 by Titi Garcia RN  Outcome: Progressing  Goal: Optimal Functional Ability  5/25/2024 1834 by Titi Garcia RN  Outcome: Progressing  5/25/2024 1829 by Titi Garcia RN  Outcome: Progressing  Goal: Absence of Infection Signs and Symptoms  5/25/2024 1834 by Titi Garcia RN  Outcome: Progressing  5/25/2024 1829 by Titi Garcia RN  Outcome: Progressing  Goal: Improved Oral Intake  5/25/2024 1834 by Titi Garcia RN  Outcome: Progressing  5/25/2024 1829 by Titi Garcia RN  Outcome: Progressing  Goal: Optimal Pain Control and Function  5/25/2024 1834 by Titi Garcia RN  Outcome: Progressing  5/25/2024 1829 by Titi Garcia RN  Outcome: Progressing  Goal: Skin Health and Integrity  5/25/2024 1834 by Titi Garcia RN  Outcome: Progressing  5/25/2024 1829 by Titi Garcia RN  Outcome: Progressing  Goal: Optimal Wound Healing  5/25/2024 1834 by Titi Garcia RN  Outcome: Progressing  5/25/2024 1829 by Titi Garcia RN  Outcome: Progressing     Problem: Fall Injury Risk  Goal: Absence of Fall and Fall-Related Injury  5/25/2024 1834 by iTti Garcia RN  Outcome: Progressing  5/25/2024 1829 by Titi Garcia RN  Outcome: Progressing

## 2024-05-25 NOTE — PT/OT/SLP EVAL
Physical Therapy Co-Evaluation    Patient Name:  Melani Holloway   MRN:  [id number]    Co-evaluation and co-treatment performed for this visit due to suspected patient need for two skilled therapists to ensure patient and staff safety and to accommodate for patient activity tolerance/pain management     Recommendations:     Discharge Recommendations: Low Intensity Therapy   Discharge Equipment Recommendations: none   Barriers to discharge: None    Assessment:     Melani Holloway is a 78 y.o. female admitted with a medical diagnosis of Malignant neoplasm of upper lobe of left lung.  She presents with the following impairments/functional limitations: weakness, impaired endurance, impaired self care skills, impaired functional mobility, gait instability, impaired balance, pain, impaired cardiopulmonary response to activity Pt. cooperative and tolerated treatment fairly well, but appeared limited by SOB and occasional pain at chest tube site. Pt. progressing with mobility.    Rehab Prognosis: Good; patient would benefit from acute skilled PT services to address these deficits and reach maximum level of function.    Recent Surgery: Procedure(s) (LRB):  XI ROBOTIC LEFT UPPER LOBECTOMY (Left)  LYMPHADENECTOMY (N/A)  BLOCK, NERVE, INTERCOSTAL, 2 OR MORE (Left)  BRONCHOSCOPY (Left) 2 Days Post-Op    Plan:     During this hospitalization, patient to be seen 4 x/week to address the identified rehab impairments via gait training, therapeutic activities, therapeutic exercises and progress toward the following goals:    Plan of Care Expires:  06/24/24    Subjective     Chief Complaint: pain at chest tube site, SOB  Patient/Family Comments/goals: Pt. Agreeable to PT  Pain/Comfort:  Pain Rating 1:  (pt. did not rate, but appeared uncomfortable wih movement at times at chest tube site)  Location - Side 1: Left  Location - Orientation 1: lateral  Location 1: chest  Pain Addressed 1: Pre-medicate for activity, Reposition,  Distraction, Cessation of Activity    Patients cultural, spiritual, Restoration conflicts given the current situation: no    Living Environment:  Pt. Lives alone in [facility] with no JOSE ANTONIO  Prior to admission, patients level of function was indep.  Equipment used at home: shower chair, walker, rolling, bath bench.  Upon discharge, patient will have assistance from family.    Objective:     Communicated with nursing prior to session.  Patient found supine with chest tube  upon PT entry to room.    General Precautions: Standard, fall  Orthopedic Precautions:N/A   Braces: N/A  Respiratory Status: Nasal cannula, flow 3 L/min    Exams:  RLE ROM: WFL  RLE Strength: WFL  LLE ROM: WFL  LLE Strength: WFL    Functional Mobility:  Bed Mobility:     Rolling Right: minimum assistance  Scooting: minimum assistance  Supine to Sit: minimum assistance  Transfers:     Sit to Stand:  contact guard assistance with hand-held assist  Bed to Chair: contact guard assistance with  hand-held assist  using  Stand Pivot  Gait: Pt. Amb. 6 steps x1 trial and 12 steps(6 forward/backward) x3 trials with HHA-CGA   Balance: fair      AM-PAC 6 CLICK MOBILITY  Total Score:18       Treatment & Education:  Discussed therapy needs, goals, LE therex, and POC.    Patient left up in chair with all lines intact and call button in reach.    GOALS:   Multidisciplinary Problems       Physical Therapy Goals          Problem: Physical Therapy    Goal Priority Disciplines Outcome Goal Variances Interventions   Physical Therapy Goal     PT, PT/OT Progressing     Description: Goals to be met by: 2024     Patient will increase functional independence with mobility by performin. Supine to sit with Set-up [place]  2. Sit to supine with Set-up [place]  3. Sit to stand transfer with Supervision  4. Bed to chair transfer with Supervision using LRAD  5. Gait  x 200 feet with Supervision using LRAD.   6. Lower extremity exercise program x15 reps per handout,  with supervision                         History:     Past Medical History:   Diagnosis Date    COPD (chronic obstructive pulmonary disease)     Ductal carcinoma in situ (DCIS) of right breast 09/19/2017    Hypertension        Past Surgical History:   Procedure Laterality Date    BREAST BIOPSY Right 2010    BREAST LUMPECTOMY Right 2010    BRONCHOSCOPY Left 5/23/2024    Procedure: BRONCHOSCOPY;  Surgeon: Riaz Fay MD;  [facility];  Service: Cardiothoracic;  Laterality: Left;    cataract surgery      INJECTION OF ANESTHETIC AGENT AROUND MULTIPLE INTERCOSTAL NERVES Left 5/23/2024    Procedure: BLOCK, NERVE, INTERCOSTAL, 2 OR MORE;  Surgeon: Riaz Fay MD;  [facility];  Service: Cardiothoracic;  Laterality: Left;    LYMPHADENECTOMY N/A 5/23/2024    Procedure: LYMPHADENECTOMY;  Surgeon: Riaz Fay MD;  [facility];  Service: Cardiothoracic;  Laterality: N/A;    ROBOTIC BRONCHOSCOPY N/A 4/16/2024    Procedure: ROBOTIC BRONCHOSCOPY;  Surgeon: Uzma Larsen MD;  Location: [facility] OR [address] [place] [address];  Service: Pulmonary;  Laterality: N/A;    XI ROBOTIC RATS,WITH LOBECTOMY,LUNG Left 5/23/2024    Procedure: XI ROBOTIC LEFT UPPER LOBECTOMY;  Surgeon: Riaz Fay MD;  [facility];  Service: Cardiothoracic;  Laterality: Left;       Time Tracking:     PT Received On: 05/25/24  PT Start Time: 1014     PT Stop Time: 1035  PT Total Time (min): 21 min     Billable Minutes: Evaluation 10 and Gait Training 11      05/25/2024

## 2024-05-25 NOTE — PLAN OF CARE
"[facility] Plan of Care Note    Dx:   NSCLC of left lung [C34.92]  Malignant neoplasm of upper lobe of left lung [C34.12]    Shift Events: Pt complain of chest tube pain and feeling of increased swelling in face and neck. Nurse at bedside. [place] just so happen to walk in at this time. New crepitus noted in facial area. Neck and chest crepitus still noted from initial assessment. Rapid at bedside, assessed pt. No new orders for pt at this time. Pt slept through remainder of shift with no difficulties noted. Slept with chest rising and falling, no complaints of pain,sob, or worsening chest tube symptoms.       Goals of Care: Goals of care ongoing and progressing.     Neuro: A/Ox4    Vital Signs: /60   Pulse 64   Temp 98.2 °F (36.8 °C)   Resp 18   Ht 5' 3" (1.6 m)   Wt 81.6 kg (179 lb 14.3 oz)   LMP  (LMP Unknown)   SpO2 (!) 91%   Breastfeeding No   BMI 31.87 kg/m²     Respiratory: 4L NC    Diet: Diet Adult Regular (IDDSI Level 7)      Is patient tolerating current diet? Yes    GTTS: NA    Urine Output/Bowel Movement:   No intake/output data recorded.  Last Bowel Movement: 05/22/24, Up to toilet       Drains/Tubes/Tube Feeds (include total output/shift):   Chest tube      Lines: 20g R hand, 18g wrist       Accuchecks: NA    Skin: 3Lap sites, chest tube insertion site    Fall Risk Score: Low    Activity level? Stand-by assist x1    Any scheduled procedures? NA    Any safety concerns? Crepitus noted to facial and chest area. Increased since beginning of shift. [place] and physician aware.       Problem: Adult Inpatient Plan of Care  Goal: Plan of Care Review  Outcome: Progressing  Goal: Patient-Specific Goal (Individualized)  Outcome: Progressing  Goal: Absence of Hospital-Acquired Illness or Injury  Outcome: Progressing  Goal: Optimal Comfort and Wellbeing  Outcome: Progressing  Goal: Readiness for Transition of Care  Outcome: Progressing     Problem: Wound  Goal: Optimal Coping  Outcome: Progressing  Goal: " Optimal Functional Ability  Outcome: Progressing  Goal: Absence of Infection Signs and Symptoms  Outcome: Progressing  Goal: Improved Oral Intake  Outcome: Progressing  Goal: Optimal Pain Control and Function  Outcome: Progressing  Goal: Skin Health and Integrity  Outcome: Progressing  Goal: Optimal Wound Healing  Outcome: Progressing     Problem: Fall Injury Risk  Goal: Absence of Fall and Fall-Related Injury  Outcome: Progressing

## 2024-05-25 NOTE — CARE UPDATE
"RAPID RESPONSE NURSE PROACTIVE ROUNDING NOTE       Time of Visit:     Admit Date: 2024  LOS: 2  Code Status: Full Code   Date of Visit: 2024  : 1945  Age: 78 y.o.  Sex: female  Race: White  Bed: 1024/1024 A:   MRN: [id number]  Was the patient discharged from an ICU this admission? Yes   Was the patient discharged from a PACU within last 24 hours? No   Did the patient receive conscious sedation/general anesthesia in last 24 hours? No  Was the patient in the ED within the past 24 hours? No  Was the patient on NIPPV within the past 24 hours? No   Attending Physician: Riaz Fay MD  Primary Service: Cardiothoracic Surgery   Time spent at the bedside: 15 -30 min    SITUATION    Notified by previous RRN during handoff.  Reason for alert: subcutaneous air in the setting of chest tube placement  Called to evaluate the patient for Respiratory    BACKGROUND     Why is the patient in the hospital?: Malignant neoplasm of upper lobe of left lung    Patient has a past medical history of COPD (chronic obstructive pulmonary disease), Ductal carcinoma in situ (DCIS) of right breast, and Hypertension.    Last Vitals:  Temp: 98.2 °F (36.8 °C) (2325)  Pulse: 70 (2338)  Resp: 18 (2325)  BP: 132/60 (2325)  SpO2: 91 % (2325)    24 Hours Vitals Range:  Temp:  [97.8 °F (36.6 °C)-99.5 °F (37.5 °C)]   Pulse:  [51-76]   Resp:  [13-42]   BP: (113-169)/(55-70)   SpO2:  [1 %-96 %]     Labs:  Recent Labs     24  1524 24  0408   WBC 11.98 11.53   HGB 13.1 12.4   HCT 42.1 38.3    220       Recent Labs     24  1524 24  0408    139   K 4.6 4.2    107   CO2 20* 23   BUN 20 24*   CREATININE 0.9 0.8   * 124*   PHOS  --  4.6*   MG  --  2.1        No results for input(s): "PH", "PCO2", "PO2", "HCO3", "POCSATURATED", "BE" in the last 72 hours.     ASSESSMENT    Physical Exam  Constitutional:       Appearance: She is ill-appearing.   Neck:      " "Comments: Swelling to bilateral sides of neck, with crepitus around jaw and clavicle. Per pt daughter at bedside, pt's voice is more "nasal" than her baseline  Cardiovascular:      Rate and Rhythm: Normal rate.      Pulses: Normal pulses.   Pulmonary:      Effort: Tachypnea present.      Breath sounds: Decreased breath sounds present.      Comments: Friction rub L side  Chest:      Chest wall: Swelling and crepitus present.      Comments: Chest tube in place to L upper chest wall. Dressing intact with scant dried drainage. Swelling noted to patient's L chest wall starting approximately at the axilla, extending up to both sides of the neck. Crepitus noted around edges of chest wall swelling  Musculoskeletal:      Cervical back: Crepitus present.   Skin:     General: Skin is warm.      Capillary Refill: Capillary refill takes less than 2 seconds.      Comments: Face and chest flushed red   Neurological:      Mental Status: She is alert and oriented to person, place, and time.     HR 76  /68  RR 34, SPO2 93% on 4L NC  Temp 99.5    Patient sitting up in bed, awake and alert. Pleasantly conversant. Denies any shortness of breath at rest but is also tachypneic at rest, RR 30-36 while lying in bed.  Chest tube in place to L upper chest wall to -30mmHg, dressing intact with small amount serous drainage. Scant serosanguineous drainage noted to in chest tube, with small clots in tubing but drainage currently flowing freely.    Patient endorses some intermittent pleuritic pain, as well as her neck and chest feeling more "tight" and swollen. Denies any feeling of her airway swelling, or any difficulty taking a breath. Pt's daughter at bedside does endorse noticing a slight vocal change throughout the course of the day.    INTERVENTIONS    The patient was seen for Respiratory problem. Staff concerns included tachypnea and increased WOB. The following interventions were performed: continuous pulse ox monitoring continued " and continuous cardiac monitoring continued.    No new interventions at this time    RECOMMENDATIONS    Maintain continuous cardiac, SpO2 monitoring  Titrate oxygen to maintain SpO2 of 88% or greater    Continue to monitor swelling, crepitus closely    Notify MD, RRN immediately of any increase in WOB, increase in oxygen demand of 2L or more, or concern for airway swelling/loss of airway integrity    Aspiration precautions    PROVIDER ESCALATION    Yes/No  No- bedside RN in contact with primary MD    Orders received and case discussed with NA.    Disposition: Remain in room 1024.    FOLLOW-UP    Bedside RNAdelaida, charge RN Gianni  updated on plan of care. Instructed to call the Rapid Response NurseORI RN at [phone] for additional questions or concerns.

## 2024-05-25 NOTE — ASSESSMENT & PLAN NOTE
Melani Holloway is a 78 y.o. F with recently diagnosed ECHO NSCLC now s/p left upper lobectomy with  5/23/24.    - patient seen and examined   - on room air maintaining oxygen saturations   - CXR pending this morning  - chest tube in place, air leak noted; continue to -30mmHg suction  - home meds as appropriate  - regular diet  - Multimodal pain control  - Scheduled nebs  - Ppx metoprolol 12.5 BID  - IS use, out of bed

## 2024-05-26 LAB
ANION GAP SERPL CALC-SCNC: 7 MMOL/L (ref 8–16)
BASOPHILS # BLD AUTO: 0.05 K/UL (ref 0–0.2)
BASOPHILS NFR BLD: 0.4 % (ref 0–1.9)
BUN SERPL-MCNC: 16 MG/DL (ref 8–23)
CALCIUM SERPL-MCNC: 9.1 MG/DL (ref 8.7–10.5)
CHLORIDE SERPL-SCNC: 107 MMOL/L (ref 95–110)
CO2 SERPL-SCNC: 25 MMOL/L (ref 23–29)
CREAT SERPL-MCNC: 0.7 MG/DL (ref 0.5–1.4)
DIFFERENTIAL METHOD BLD: ABNORMAL
EOSINOPHIL # BLD AUTO: 0.1 K/UL (ref 0–0.5)
EOSINOPHIL NFR BLD: 0.7 % (ref 0–8)
ERYTHROCYTE [DISTWIDTH] IN BLOOD BY AUTOMATED COUNT: 14.6 % (ref 11.5–14.5)
EST. GFR  (NO RACE VARIABLE): >60 ML/MIN/1.73 M^2
GLUCOSE SERPL-MCNC: 105 MG/DL (ref 70–110)
HCT VFR BLD AUTO: 36.8 % (ref 37–48.5)
HGB BLD-MCNC: 11.6 G/DL (ref 12–16)
IMM GRANULOCYTES # BLD AUTO: 0.19 K/UL (ref 0–0.04)
IMM GRANULOCYTES NFR BLD AUTO: 1.6 % (ref 0–0.5)
LYMPHOCYTES # BLD AUTO: 2.4 K/UL (ref 1–4.8)
LYMPHOCYTES NFR BLD: 19.5 % (ref 18–48)
MAGNESIUM SERPL-MCNC: 2.2 MG/DL (ref 1.6–2.6)
MCH RBC QN AUTO: 28.2 PG (ref 27–31)
MCHC RBC AUTO-ENTMCNC: 31.5 G/DL (ref 32–36)
MCV RBC AUTO: 89 FL (ref 82–98)
MONOCYTES # BLD AUTO: 1.7 K/UL (ref 0.3–1)
MONOCYTES NFR BLD: 13.7 % (ref 4–15)
NEUTROPHILS # BLD AUTO: 7.8 K/UL (ref 1.8–7.7)
NEUTROPHILS NFR BLD: 64.1 % (ref 38–73)
NRBC BLD-RTO: 0 /100 WBC
OHS QRS DURATION: 70 MS
OHS QRS DURATION: 88 MS
OHS QTC CALCULATION: 332 MS
OHS QTC CALCULATION: 428 MS
PLATELET # BLD AUTO: 214 K/UL (ref 150–450)
PMV BLD AUTO: 12.1 FL (ref 9.2–12.9)
POTASSIUM SERPL-SCNC: 3.7 MMOL/L (ref 3.5–5.1)
RBC # BLD AUTO: 4.12 M/UL (ref 4–5.4)
SODIUM SERPL-SCNC: 139 MMOL/L (ref 136–145)
WBC # BLD AUTO: 12.09 K/UL (ref 3.9–12.7)

## 2024-05-26 PROCEDURE — 99900035 HC TECH TIME PER 15 MIN (STAT)

## 2024-05-26 PROCEDURE — 93005 ELECTROCARDIOGRAM TRACING: CPT

## 2024-05-26 PROCEDURE — 36415 COLL VENOUS BLD VENIPUNCTURE: CPT | Performed by: STUDENT IN AN ORGANIZED HEALTH CARE EDUCATION/TRAINING PROGRAM

## 2024-05-26 PROCEDURE — 93010 ELECTROCARDIOGRAM REPORT: CPT | Mod: ,,, | Performed by: INTERNAL MEDICINE

## 2024-05-26 PROCEDURE — 94761 N-INVAS EAR/PLS OXIMETRY MLT: CPT

## 2024-05-26 PROCEDURE — 83735 ASSAY OF MAGNESIUM: CPT | Performed by: STUDENT IN AN ORGANIZED HEALTH CARE EDUCATION/TRAINING PROGRAM

## 2024-05-26 PROCEDURE — 94640 AIRWAY INHALATION TREATMENT: CPT

## 2024-05-26 PROCEDURE — 25000003 PHARM REV CODE 250

## 2024-05-26 PROCEDURE — 27000221 HC OXYGEN, UP TO 24 HOURS

## 2024-05-26 PROCEDURE — 25000003 PHARM REV CODE 250: Performed by: STUDENT IN AN ORGANIZED HEALTH CARE EDUCATION/TRAINING PROGRAM

## 2024-05-26 PROCEDURE — 63600175 PHARM REV CODE 636 W HCPCS: Performed by: STUDENT IN AN ORGANIZED HEALTH CARE EDUCATION/TRAINING PROGRAM

## 2024-05-26 PROCEDURE — 85025 COMPLETE CBC W/AUTO DIFF WBC: CPT | Performed by: STUDENT IN AN ORGANIZED HEALTH CARE EDUCATION/TRAINING PROGRAM

## 2024-05-26 PROCEDURE — 20600001 HC STEP DOWN PRIVATE ROOM

## 2024-05-26 PROCEDURE — 25000242 PHARM REV CODE 250 ALT 637 W/ HCPCS: Performed by: STUDENT IN AN ORGANIZED HEALTH CARE EDUCATION/TRAINING PROGRAM

## 2024-05-26 PROCEDURE — 80048 BASIC METABOLIC PNL TOTAL CA: CPT | Performed by: STUDENT IN AN ORGANIZED HEALTH CARE EDUCATION/TRAINING PROGRAM

## 2024-05-26 RX ADMIN — METHOCARBAMOL 500 MG: 500 TABLET ORAL at 01:05

## 2024-05-26 RX ADMIN — GABAPENTIN 200 MG: 100 CAPSULE ORAL at 08:05

## 2024-05-26 RX ADMIN — POLYETHYLENE GLYCOL 3350 17 G: 17 POWDER, FOR SOLUTION ORAL at 09:05

## 2024-05-26 RX ADMIN — METHOCARBAMOL 500 MG: 500 TABLET ORAL at 05:05

## 2024-05-26 RX ADMIN — METHOCARBAMOL 500 MG: 500 TABLET ORAL at 08:05

## 2024-05-26 RX ADMIN — METHOCARBAMOL 500 MG: 500 TABLET ORAL at 09:05

## 2024-05-26 RX ADMIN — SENNOSIDES AND DOCUSATE SODIUM 1 TABLET: 50; 8.6 TABLET ORAL at 09:05

## 2024-05-26 RX ADMIN — FLUTICASONE FUROATE AND VILANTEROL TRIFENATATE 1 PUFF: 100; 25 POWDER RESPIRATORY (INHALATION) at 07:05

## 2024-05-26 RX ADMIN — ACETAMINOPHEN 1000 MG: 500 TABLET ORAL at 08:05

## 2024-05-26 RX ADMIN — LEVALBUTEROL 1.25 MG: 1.25 SOLUTION, CONCENTRATE RESPIRATORY (INHALATION) at 09:05

## 2024-05-26 RX ADMIN — OXYCODONE HYDROCHLORIDE 10 MG: 10 TABLET ORAL at 08:05

## 2024-05-26 RX ADMIN — ACETAMINOPHEN 1000 MG: 500 TABLET ORAL at 05:05

## 2024-05-26 RX ADMIN — LEVALBUTEROL 1.25 MG: 1.25 SOLUTION, CONCENTRATE RESPIRATORY (INHALATION) at 07:05

## 2024-05-26 RX ADMIN — FAMOTIDINE 20 MG: 20 TABLET ORAL at 08:05

## 2024-05-26 RX ADMIN — ENOXAPARIN SODIUM 40 MG: 40 INJECTION SUBCUTANEOUS at 05:05

## 2024-05-26 RX ADMIN — FAMOTIDINE 20 MG: 20 TABLET ORAL at 09:05

## 2024-05-26 NOTE — SUBJECTIVE & OBJECTIVE
Interval History:  Episode of rate controlled AFib early in the evening yesterday.  She was asymptomatic during this time and remained hemodynamically stable.  After 2 pushes of IV metoprolol she was noted to be fairly bradycardic in the 40s.  Metoprolol was discontinued at the time.  Rates have been in the 70s to 80s.  Worsening subcutaneous emphysema this morning prompting repeat chest x-ray.  Chest tube still has output, but no air leak.  This is concerning for a blocked tube.    Medications:  Continuous Infusions:  Scheduled Meds:   acetaminophen  1,000 mg Oral Q8H    enoxparin  40 mg Subcutaneous Daily    famotidine  20 mg Oral BID    fluticasone furoate-vilanteroL  1 puff Inhalation Daily    gabapentin  200 mg Oral QHS    levalbuterol  1.25 mg Nebulization Q12H    methocarbamoL  500 mg Oral QID    polyethylene glycol  17 g Oral Daily    senna-docusate 8.6-50 mg  1 tablet Oral Daily     PRN Meds:  Current Facility-Administered Medications:     bisacodyL, 10 mg, Rectal, Daily PRN    metoclopramide, 5 mg, Intravenous, Q6H PRN    metoprolol, 5 mg, Intravenous, Q5 Min PRN    ondansetron, 8 mg, Oral, Q8H PRN    oxyCODONE, 5 mg, Oral, Q4H PRN    oxyCODONE, 10 mg, Oral, Q4H PRN    sodium chloride 0.9%, 10 mL, Intravenous, PRN     Review of patient's allergies indicates:  No Known Allergies  Objective:     Vital Signs (Most Recent):  Temp: 98.3 °F (36.8 °C) (05/26/24 0437)  Pulse: 69 (05/26/24 0614)  Resp: (!) 24 (05/26/24 0614)  BP: (!) 129/59 (05/26/24 0437)  SpO2: 95 % (05/26/24 0614) Vital Signs (24h Range):  Temp:  [97.8 °F (36.6 °C)-99.2 °F (37.3 °C)] 98.3 °F (36.8 °C)  Pulse:  [] 69  Resp:  [16-24] 24  SpO2:  [90 %-96 %] 95 %  BP: (120-144)/(59-78) 129/59     Weight: 81.6 kg (179 lb 14.3 oz)  Body mass index is 31.87 kg/m².    Intake/Output - Last 3 Shifts         05/24 0700 05/25 0659 05/25 0700 05/26 0659 05/26 0700 05/27 0659    P.O.  720     I.V. (mL/kg) 22.6 (0.3)      IV Piggyback       Total  Intake(mL/kg) 22.6 (0.3) 720 (8.8)     Urine (mL/kg/hr) 0 (0)      Stool       Blood       Chest Tube 220 200     Total Output 220 200     Net -197.4 +520            Urine Occurrence 1 x 4 x              Physical Exam  Constitutional:       Appearance: Normal appearance. She is obese.   Eyes:      Extraocular Movements: Extraocular movements intact.   Cardiovascular:      Rate and Rhythm: Normal rate and regular rhythm.      Pulses: Normal pulses.   Pulmonary:      Effort: Pulmonary effort is normal.      Breath sounds: Normal breath sounds.      Comments: 24 Fr sorin drain on left side. To suction. No appreciable air leak. Not tidaling appropriately.  Chest:      Comments: Moderate subcutaneous emphysema present extending up to lower face.  Abdominal:      General: Abdomen is flat.      Palpations: Abdomen is soft.   Skin:     General: Skin is warm and dry.      Comments: SubQ emphysema along the anterior chest wall and neck   Neurological:      General: No focal deficit present.      Mental Status: She is alert and oriented to person, place, and time. Mental status is at baseline.   Psychiatric:         Mood and Affect: Mood normal.         Behavior: Behavior normal.         Thought Content: Thought content normal.          Significant Labs:  I have reviewed all pertinent lab results within the past 24 hours.  CBC:   Recent Labs   Lab 05/26/24  0516   WBC 12.09   RBC 4.12   HGB 11.6*   HCT 36.8*      MCV 89   MCH 28.2   MCHC 31.5*     CMP:   Recent Labs   Lab 05/25/24  0502 05/26/24  0516   * 105   CALCIUM 8.8 9.1   ALBUMIN 2.9*  --    PROT 6.1  --     139   K 4.2 3.7   CO2 23 25    107   BUN 18 16   CREATININE 0.7 0.7   ALKPHOS 69  --    ALT 15  --    AST 36  --    BILITOT 0.4  --        Significant Diagnostics:  I have reviewed all pertinent imaging results/findings within the past 24 hours.

## 2024-05-26 NOTE — ASSESSMENT & PLAN NOTE
Melani Holloway is a 78 y.o. F with recently diagnosed ECHO NSCLC now s/p left upper lobectomy with  5/23/24.    - patient seen and examined   - on 4L NC maintaining oxygen saturations   - CXR this morning with worsening subcutaneous emphysema. Pneumothorax is improved.  - chest tube in place. Continue to -30mmHg suction. Will repeat chest xray at 10:00 am and penndig results will place to water seal  - home meds as appropriate  - regular diet  - Multimodal pain control  - Scheduled nebs  - IS use, out of bed

## 2024-05-26 NOTE — CARE UPDATE
RAPID RESPONSE NURSE FOLLOW-UP NOTE       Followed up with patient for proactive rounding last night.  Patient with new onset afib during day shift 5/26, got 2 doses 5mg IVP metoprolol at 1738 and 1758. EKG from 1817 shows sinus bradycardia with rate in the 40s. Review of current telemetry shows afib 50s-80s with frequent sinus pauses. 12.5mg PO metoprolol discontinued by provider, last dose given at 0853 5/26. VS otherwise stable, patient SpO2 90-96% on 4L NC.   Subcutaneous emphysema mildly improved per bedside RN. Chest tube remains in place to -30mmHg.  Repeat EKG ordered for now  AM labs (CBC, BMP, mag) ordered  Reviewed plan of care with Bedside RNAdelaida .   Team will continue to follow.  Please call Rapid Response ORI TAVAREZ, RN with any questions or concerns at [phone].

## 2024-05-26 NOTE — CARE UPDATE
RAPID RESPONSE NURSE PROACTIVE ROUNDING NOTE       Time of Visit: 0600    Admit Date: 2024  LOS: 3  Code Status: Full Code   Date of Visit: 2024  : 1945  Age: 78 y.o.  Sex: female  Race: White  Bed: 1024/1024 A:   MRN: [id number]  Was the patient discharged from an ICU this admission? Yes   Was the patient discharged from a PACU within last 24 hours? No   Did the patient receive conscious sedation/general anesthesia in last 24 hours? No  Was the patient in the ED within the past 24 hours? No  Was the patient on NIPPV within the past 24 hours? No   Attending Physician: Riaz Fay MD  Primary Service: Cardiothoracic Surgery   Time spent at the bedside: 15 -30 min    SITUATION    Notified by bedside RN via phone call.  Reason for alert: subcutaneous air  Called to evaluate the patient for  subcutaneous air    BACKGROUND     Why is the patient in the hospital?: Malignant neoplasm of upper lobe of left lung    Patient has a past medical history of COPD (chronic obstructive pulmonary disease), Ductal carcinoma in situ (DCIS) of right breast, and Hypertension.    Last Vitals:  Temp: 98.3 °F (36.8 °C) (437)  Pulse: 69 (614)  Resp: 24 (614)  BP: 129/59 (437)  SpO2: 95 % (614)    24 Hours Vitals Range:  Temp:  [97.8 °F (36.6 °C)-99.2 °F (37.3 °C)]   Pulse:  []   Resp:  [16-24]   BP: (120-144)/(59-78)   SpO2:  [90 %-96 %]     Labs:  Recent Labs     24  0408 24  0502 24  0516   WBC 11.53 13.10* 12.09   HGB 12.4 12.1 11.6*   HCT 38.3 38.1 36.8*    214 214       Recent Labs     24  0408 24  0502 24  0516    136 139   K 4.2 4.2 3.7    105 107   CO2 23 23 25   BUN 24* 18 16   CREATININE 0.8 0.7 0.7   * 119* 105   PHOS 4.6*  --   --    MG 2.1 2.3 2.2        ASSESSMENT    Physical Exam  Constitutional:       General: She is not in acute distress.  HENT:      Head:      Comments: Moderate subcutaneous  emphysema present extending up to lower face on left side and to eye on right side     Mouth/Throat:      Mouth: No oral lesions or angioedema.      Tongue: No lesions.      Palate: No mass.      Pharynx: No pharyngeal swelling.   Eyes:      Extraocular Movements: Extraocular movements intact.      Pupils: Pupils are equal, round, and reactive to light.   Neck:      Comments: Moderate subcutaneous emphysema present   Cardiovascular:      Heart sounds: Normal heart sounds. No murmur heard.     No friction rub. No gallop.   Pulmonary:      Effort: No accessory muscle usage or respiratory distress.      Breath sounds: Normal breath sounds. No wheezing or rhonchi.      Comments: 24 Fr sorin drain on left side with small air leak present. To suction.  Chest:      Comments: Moderate subcutaneous emphysema present throughout chest wall  Abdominal:      General: Bowel sounds are normal.      Palpations: Abdomen is soft.      Tenderness: There is no abdominal tenderness. There is no guarding.   Neurological:      Mental Status: She is alert.     Called to see pt for increase in ember-q air.  Pt in no distress, VSS pt abl to open eye with effort.  Throughout face and right eye no fluid based edema noted, only displacable air.  Airway patent. Dr Ayala at bedside.  PCXR obtained.  Pt hemodynamically stable with no immediate critical care needs identified.    INTERVENTIONS    The patient was seen for Medical problem. Staff concerns included subcutaneous air . The following interventions were performed: notify physician.    RECOMMENDATIONS    Continue POC as per primary team    PROVIDER ESCALATION    Yes/No  Yes    Orders received and case discussed with Dr. Ayala .    Disposition: Remain in room 1024.    FOLLOW-UP    Bedside Adelaida TAVAREZ  updated on plan of care. Instructed to call the Rapid Response Nurse, Jcarlos Leblanc RN at [phone] for additional questions or concerns.

## 2024-05-26 NOTE — PLAN OF CARE
"[facility] Plan of Care Note     Dx:   NSCLC of left lung [C34.92]  Malignant neoplasm of upper lobe of left lung [C34.12]     Shift Events: New onset Afib during day shift yesterday. Pt remained in Afib throughout shift. Asymptomatic. EKG placed last night. Afib noted. No distress noted at this time. Entered pt room to administer morning meds. R side of face swollen. Eye swollen shut. Called an unofficial; rapid due to change in appearance. Pt hemodynamically stable, O2 95%. No distress noted. NSR HR. Rapid at bedside. Chest x-ray completed at this time. Called ANGELIA Ayala MD to report. MD at bedside to strip chest tube. States there is a blockage. MD also stated airleak is suppose to be present at all times to prevent swelling. Nursing communication order placed.      Goals of Care: Goals of care ongoing and progressing.      Neuro: A/Ox4     Vital Signs: /60   Pulse 64   Temp 98.2 °F (36.8 °C)   Resp 18   Ht 5' 3" (1.6 m)   Wt 81.6 kg (179 lb 14.3 oz)   LMP  (LMP Unknown)   SpO2 (!) 91%   Breastfeeding No   BMI 31.87 kg/m²      Respiratory: 4L NC     Diet: Diet Adult Regular (IDDSI Level 7)        Is patient tolerating current diet? Yes     GTTS: NA     Urine Output/Bowel Movement:   No intake/output data recorded.  Last Bowel Movement: 05/22/24, Up to toilet         Drains/Tubes/Tube Feeds (include total output/shift):   Chest tube        Lines: 20g R hand, 18g wrist         Accuchecks: NA     Skin: 3Lap sites, chest tube insertion site     Fall Risk Score: Low     Activity level? Stand-by assist x1     Any scheduled procedures? NA       Problem: Adult Inpatient Plan of Care  Goal: Plan of Care Review  Outcome: Progressing  Goal: Patient-Specific Goal (Individualized)  Outcome: Progressing  Goal: Absence of Hospital-Acquired Illness or Injury  Outcome: Progressing  Goal: Optimal Comfort and Wellbeing  Outcome: Progressing  Goal: Readiness for Transition of Care  Outcome: Progressing     Problem: " Wound  Goal: Optimal Coping  Outcome: Progressing  Goal: Optimal Functional Ability  Outcome: Progressing  Goal: Absence of Infection Signs and Symptoms  Outcome: Progressing  Goal: Improved Oral Intake  Outcome: Progressing  Goal: Optimal Pain Control and Function  Outcome: Progressing  Goal: Skin Health and Integrity  Outcome: Progressing  Goal: Optimal Wound Healing  Outcome: Progressing     Problem: Fall Injury Risk  Goal: Absence of Fall and Fall-Related Injury  Outcome: Progressing

## 2024-05-26 NOTE — CARE UPDATE
RAPID RESPONSE NURSE PROACTIVE ROUNDING NOTE       Time of Visit: 1000    Admit Date: 2024  LOS: 3  Code Status: Full Code   Date of Visit: 2024  : 1945  Age: 78 y.o.  Sex: female  Race: White  Bed: 1024/1024 A:   MRN: [id number]  Was the patient discharged from an ICU this admission? Yes   Was the patient discharged from a PACU within last 24 hours? No   Did the patient receive conscious sedation/general anesthesia in last 24 hours? No  Was the patient in the ED within the past 24 hours? No  Was the patient on NIPPV within the past 24 hours? No   Attending Physician: Riaz Fay MD  Primary Service: Cardiothoracic Surgery   Time spent at the bedside: < 15 min    SITUATION    Notified by previous RRN during handoff.  Reason for alert: Facial Edema  Called to evaluate the patient for Respiratory    BACKGROUND     Why is the patient in the hospital?: Malignant neoplasm of upper lobe of left lung    Patient has a past medical history of COPD (chronic obstructive pulmonary disease), Ductal carcinoma in situ (DCIS) of right breast, and Hypertension.    Last Vitals:  Temp: 98 °F (36.7 °C) ( 0751)  Pulse: 64 ( 1053)  Resp: 16 ( 0751)  BP: 131/61 ( 0751)  SpO2: 95 % ( 1053)    24 Hours Vitals Range:  Temp:  [97.8 °F (36.6 °C)-99.2 °F (37.3 °C)]   Pulse:  []   Resp:  [16-24]   BP: (120-144)/(59-78)   SpO2:  [93 %-96 %]     Labs:  Recent Labs     24  0408 24  0502 24  0516   WBC 11.53 13.10* 12.09   HGB 12.4 12.1 11.6*   HCT 38.3 38.1 36.8*    214 214       Recent Labs     24  0408 24  0502 24  0516    136 139   K 4.2 4.2 3.7    105 107   CO2 23 23 25   BUN 24* 18 16   CREATININE 0.8 0.7 0.7   * 119* 105   PHOS 4.6*  --   --    MG 2.1 2.3 2.2            ASSESSMENT    Physical Exam  Vitals and nursing note reviewed.   Constitutional:       Interventions: Nasal cannula in place.   Eyes:      Comments: Right side  edema due to subcutaneous air    Neurological:      Mental Status: She is alert.   Psychiatric:         Behavior: Behavior is cooperative.         INTERVENTIONS    The patient was seen for Respiratory problem. Staff concerns included tachypnea. The following interventions were performed: supplemental oxygen.    RECOMMENDATIONS    -continue plan of care per primary team.  -maintain IV access  -continuous telemetry  -lights on during the day and off at night to help prevent delirium      PROVIDER ESCALATION    Yes/No  No      Disposition: Remain in room 1024.    FOLLOW-UP    Charge RNYanci  updated on plan of care. Instructed to call the Rapid Response Nurse, Kam Vidal RN at [phone] for additional questions or concerns.

## 2024-05-26 NOTE — PLAN OF CARE
[facility] Plan of Care Note     Dx:   NSCLC of left lung [C34.92]  Malignant neoplasm of upper lobe of left lung [C34.12]     Shift Events: Pt reports improvement of chest tube pain and feeling of slight decrease in swelling in face and neck.    Chest tube is now to water seal.     Goals of Care: Goals of care ongoing and progressing.      Neuro: A/Ox4     Vital Signs:      Respiratory: 4L NC     Diet: Diet Adult Regular (IDDSI Level 7)        Is patient tolerating current diet? Yes     GTTS: NA     Urine Output/Bowel Movement:      Last Bowel Movement: 05/22/24, Up to toilet         Drains/Tubes/Tube Feeds (include total output/shift):   Chest tube        Lines: 20g R hand, 18g wrist         Accuchecks: NA     Skin: 3Lap sites, chest tube insertion site     Fall Risk Score: Low     Activity level? Stand-by assist x1     Any scheduled procedures? NA     Any safety concerns? Crepitus noted to facial and chest area. [place] and physician aware.     Problem: Adult Inpatient Plan of Care  Goal: Plan of Care Review  Outcome: Progressing  Goal: Patient-Specific Goal (Individualized)  Outcome: Progressing  Goal: Absence of Hospital-Acquired Illness or Injury  Outcome: Progressing  Goal: Optimal Comfort and Wellbeing  Outcome: Progressing  Goal: Readiness for Transition of Care  Outcome: Progressing     Problem: Wound  Goal: Optimal Coping  Outcome: Progressing  Goal: Optimal Functional Ability  Outcome: Progressing  Goal: Absence of Infection Signs and Symptoms  Outcome: Progressing  Goal: Improved Oral Intake  Outcome: Progressing  Goal: Optimal Pain Control and Function  Outcome: Progressing  Goal: Skin Health and Integrity  Outcome: Progressing  Goal: Optimal Wound Healing  Outcome: Progressing     Problem: Fall Injury Risk  Goal: Absence of Fall and Fall-Related Injury  Outcome: Progressing

## 2024-05-26 NOTE — PROGRESS NOTES
[facility]  General Surgery  Progress Note    Subjective:     History of Present Illness:  Melani Holloway is a 78 y.o. F with PMHx prior tobacco use, HTN, and right breast DCIS s/p lumpectomy who was recently diagnosed with ECHO NSCLC, favoring adenocarcinoma. She presents to the SICU s/p robotic Left upper lobectomy with Dr. Fay on 5/23/24. On admission, she is alert and oriented and in stable condition. She is not requiring any Inotropic or vasopressor support to maintain MAPs > 65. Access includes 2 PIVs. She has a left sided chest tube in place with 160cc SS output so far post op. Large air leak noted. She is maintaining O2 sats > 95 % on 3L O2 by nasal cannula. She is admitted to the SICU for close respiratory monitoring.      Immediate post-operative plans include weaning of respiratory support, and close monitoring of chest tube output.     Post-Op Info:  Procedure(s) (LRB):  XI ROBOTIC LEFT UPPER LOBECTOMY (Left)  LYMPHADENECTOMY (N/A)  BLOCK, NERVE, INTERCOSTAL, 2 OR MORE (Left)  BRONCHOSCOPY (Left)   3 Days Post-Op     Interval History:  Episode of rate controlled AFib early in the evening yesterday.  She was asymptomatic during this time and remained hemodynamically stable.  After 2 pushes of IV metoprolol she was noted to be fairly bradycardic in the 40s.  Metoprolol was discontinued at the time.  Rates have been in the 70s to 80s.  Worsening subcutaneous emphysema this morning prompting repeat chest x-ray.  Chest tube still has output, but no air leak.  This is concerning for a blocked tube.    Medications:  Continuous Infusions:  Scheduled Meds:   acetaminophen  1,000 mg Oral Q8H    enoxparin  40 mg Subcutaneous Daily    famotidine  20 mg Oral BID    fluticasone furoate-vilanteroL  1 puff Inhalation Daily    gabapentin  200 mg Oral QHS    levalbuterol  1.25 mg Nebulization Q12H    methocarbamoL  500 mg Oral QID    polyethylene glycol  17 g Oral Daily    senna-docusate 8.6-50 mg  1 tablet  Oral Daily     PRN Meds:  Current Facility-Administered Medications:     bisacodyL, 10 mg, Rectal, Daily PRN    metoclopramide, 5 mg, Intravenous, Q6H PRN    metoprolol, 5 mg, Intravenous, Q5 Min PRN    ondansetron, 8 mg, Oral, Q8H PRN    oxyCODONE, 5 mg, Oral, Q4H PRN    oxyCODONE, 10 mg, Oral, Q4H PRN    sodium chloride 0.9%, 10 mL, Intravenous, PRN     Review of patient's allergies indicates:  No Known Allergies  Objective:     Vital Signs (Most Recent):  Temp: 98.3 °F (36.8 °C) (05/26/24 0437)  Pulse: 69 (05/26/24 0614)  Resp: (!) 24 (05/26/24 0614)  BP: (!) 129/59 (05/26/24 0437)  SpO2: 95 % (05/26/24 0614) Vital Signs (24h Range):  Temp:  [97.8 °F (36.6 °C)-99.2 °F (37.3 °C)] 98.3 °F (36.8 °C)  Pulse:  [] 69  Resp:  [16-24] 24  SpO2:  [90 %-96 %] 95 %  BP: (120-144)/(59-78) 129/59     Weight: 81.6 kg (179 lb 14.3 oz)  Body mass index is 31.87 kg/m².    Intake/Output - Last 3 Shifts         05/24 0700 05/25 0659 05/25 0700 05/26 0659 05/26 0700 05/27 0659    P.O.  720     I.V. (mL/kg) 22.6 (0.3)      IV Piggyback       Total Intake(mL/kg) 22.6 (0.3) 720 (8.8)     Urine (mL/kg/hr) 0 (0)      Stool       Blood       Chest Tube 220 200     Total Output 220 200     Net -197.4 +520            Urine Occurrence 1 x 4 x              Physical Exam  Constitutional:       Appearance: Normal appearance. She is obese.   Eyes:      Extraocular Movements: Extraocular movements intact.   Cardiovascular:      Rate and Rhythm: Normal rate and regular rhythm.      Pulses: Normal pulses.   Pulmonary:      Effort: Pulmonary effort is normal.      Breath sounds: Normal breath sounds.      Comments: 24 Fr sorin drain on left side. To suction. No appreciable air leak. Not tidaling appropriately.  Chest:      Comments: Moderate subcutaneous emphysema present extending up to lower face.  Abdominal:      General: Abdomen is flat.      Palpations: Abdomen is soft.   Skin:     General: Skin is warm and dry.      Comments: SubQ  emphysema along the anterior chest wall and neck   Neurological:      General: No focal deficit present.      Mental Status: She is alert and oriented to person, place, and time. Mental status is at baseline.   Psychiatric:         Mood and Affect: Mood normal.         Behavior: Behavior normal.         Thought Content: Thought content normal.          Significant Labs:  I have reviewed all pertinent lab results within the past 24 hours.  CBC:   Recent Labs   Lab 05/26/24  0516   WBC 12.09   RBC 4.12   HGB 11.6*   HCT 36.8*      MCV 89   MCH 28.2   MCHC 31.5*     CMP:   Recent Labs   Lab 05/25/24  0502 05/26/24  0516   * 105   CALCIUM 8.8 9.1   ALBUMIN 2.9*  --    PROT 6.1  --     139   K 4.2 3.7   CO2 23 25    107   BUN 18 16   CREATININE 0.7 0.7   ALKPHOS 69  --    ALT 15  --    AST 36  --    BILITOT 0.4  --        Significant Diagnostics:  I have reviewed all pertinent imaging results/findings within the past 24 hours.  Assessment/Plan:     * Malignant neoplasm of upper lobe of left lung  Melani Holloway is a 78 y.o. F with recently diagnosed ECHO NSCLC now s/p left upper lobectomy with  5/23/24.    - patient seen and examined   - on 4L NC maintaining oxygen saturations   - CXR this morning with worsening subcutaneous emphysema. Pneumothorax is improved.  - chest tube in place. Continue to -30mmHg suction. Will repeat chest xray at 10:00 am and penndig results will place to water seal  - home meds as appropriate  - regular diet  - Multimodal pain control  - Scheduled nebs  - IS use, out of bed        Martinez Flynn MD  General Surgery  [facility]

## 2024-05-26 NOTE — NURSING
Entered pt room to administer morning meds. R side of face swollen. Eye swollen shut. Called an unofficial; rapid due to change in appearance. Pt hemodynamically stable, O2 95%. No distress noted. NSR HR. Rapid at bedside. Chest x-ray completed at this time. Called ANGELIA Ayala MD to report. MD at bedside to strip chest tube. States there is a blockage. MD also stated airleak is suppose to be present at all times to prevent swelling. Nursing communication order placed.

## 2024-05-27 PROCEDURE — 97110 THERAPEUTIC EXERCISES: CPT | Mod: CO

## 2024-05-27 PROCEDURE — 27000221 HC OXYGEN, UP TO 24 HOURS

## 2024-05-27 PROCEDURE — 25000003 PHARM REV CODE 250: Performed by: STUDENT IN AN ORGANIZED HEALTH CARE EDUCATION/TRAINING PROGRAM

## 2024-05-27 PROCEDURE — 99900035 HC TECH TIME PER 15 MIN (STAT)

## 2024-05-27 PROCEDURE — 20600001 HC STEP DOWN PRIVATE ROOM

## 2024-05-27 PROCEDURE — 94640 AIRWAY INHALATION TREATMENT: CPT

## 2024-05-27 PROCEDURE — 97110 THERAPEUTIC EXERCISES: CPT

## 2024-05-27 PROCEDURE — 97530 THERAPEUTIC ACTIVITIES: CPT | Mod: CO

## 2024-05-27 PROCEDURE — 25000003 PHARM REV CODE 250

## 2024-05-27 PROCEDURE — 97535 SELF CARE MNGMENT TRAINING: CPT | Mod: CO

## 2024-05-27 PROCEDURE — 25000242 PHARM REV CODE 250 ALT 637 W/ HCPCS: Performed by: STUDENT IN AN ORGANIZED HEALTH CARE EDUCATION/TRAINING PROGRAM

## 2024-05-27 PROCEDURE — 94761 N-INVAS EAR/PLS OXIMETRY MLT: CPT

## 2024-05-27 PROCEDURE — 63600175 PHARM REV CODE 636 W HCPCS: Performed by: STUDENT IN AN ORGANIZED HEALTH CARE EDUCATION/TRAINING PROGRAM

## 2024-05-27 RX ADMIN — ENOXAPARIN SODIUM 40 MG: 40 INJECTION SUBCUTANEOUS at 05:05

## 2024-05-27 RX ADMIN — METHOCARBAMOL 500 MG: 500 TABLET ORAL at 05:05

## 2024-05-27 RX ADMIN — FAMOTIDINE 20 MG: 20 TABLET ORAL at 08:05

## 2024-05-27 RX ADMIN — SENNOSIDES AND DOCUSATE SODIUM 1 TABLET: 50; 8.6 TABLET ORAL at 08:05

## 2024-05-27 RX ADMIN — LEVALBUTEROL 1.25 MG: 1.25 SOLUTION, CONCENTRATE RESPIRATORY (INHALATION) at 07:05

## 2024-05-27 RX ADMIN — FLUTICASONE FUROATE AND VILANTEROL TRIFENATATE 1 PUFF: 100; 25 POWDER RESPIRATORY (INHALATION) at 08:05

## 2024-05-27 RX ADMIN — METHOCARBAMOL 500 MG: 500 TABLET ORAL at 01:05

## 2024-05-27 RX ADMIN — OXYCODONE 5 MG: 5 TABLET ORAL at 06:05

## 2024-05-27 RX ADMIN — METHOCARBAMOL 500 MG: 500 TABLET ORAL at 08:05

## 2024-05-27 RX ADMIN — POLYETHYLENE GLYCOL 3350 17 G: 17 POWDER, FOR SOLUTION ORAL at 08:05

## 2024-05-27 RX ADMIN — LEVALBUTEROL 1.25 MG: 1.25 SOLUTION, CONCENTRATE RESPIRATORY (INHALATION) at 08:05

## 2024-05-27 RX ADMIN — GABAPENTIN 200 MG: 100 CAPSULE ORAL at 08:05

## 2024-05-27 RX ADMIN — ACETAMINOPHEN 1000 MG: 500 TABLET ORAL at 06:05

## 2024-05-27 RX ADMIN — ACETAMINOPHEN 1000 MG: 500 TABLET ORAL at 01:05

## 2024-05-27 NOTE — SUBJECTIVE & OBJECTIVE
Interval History: Patient seen and examined. No acute events overnight. Subcutaneous emphysema improving. CXR stable. Plan for clamp trial today.     Medications:  Continuous Infusions:  Scheduled Meds:   acetaminophen  1,000 mg Oral Q8H    enoxparin  40 mg Subcutaneous Daily    famotidine  20 mg Oral BID    fluticasone furoate-vilanteroL  1 puff Inhalation Daily    gabapentin  200 mg Oral QHS    levalbuterol  1.25 mg Nebulization Q12H    methocarbamoL  500 mg Oral QID    polyethylene glycol  17 g Oral Daily    senna-docusate 8.6-50 mg  1 tablet Oral Daily     PRN Meds:  Current Facility-Administered Medications:     bisacodyL, 10 mg, Rectal, Daily PRN    metoclopramide, 5 mg, Intravenous, Q6H PRN    metoprolol, 5 mg, Intravenous, Q5 Min PRN    ondansetron, 8 mg, Oral, Q8H PRN    oxyCODONE, 5 mg, Oral, Q4H PRN    oxyCODONE, 10 mg, Oral, Q4H PRN    sodium chloride 0.9%, 10 mL, Intravenous, PRN     Review of patient's allergies indicates:  No Known Allergies  Objective:     Vital Signs (Most Recent):  Temp: 98.1 °F (36.7 °C) (05/27/24 0737)  Pulse: 63 (05/27/24 0737)  Resp: 18 (05/27/24 0737)  BP: 115/68 (05/27/24 0737)  SpO2: (!) 91 % (05/27/24 0737) Vital Signs (24h Range):  Temp:  [97.9 °F (36.6 °C)-98.7 °F (37.1 °C)] 98.1 °F (36.7 °C)  Pulse:  [61-73] 63  Resp:  [16-24] 18  SpO2:  [91 %-96 %] 91 %  BP: (115-139)/(56-68) 115/68     Weight: 81.6 kg (179 lb 14.3 oz)  Body mass index is 31.87 kg/m².    Intake/Output - Last 3 Shifts         05/25 0700 05/26 0659 05/26 0700 05/27 0659 05/27 0700 05/28 0659    P.O. 720 240     I.V. (mL/kg)       Total Intake(mL/kg) 720 (8.8) 240 (2.9)     Urine (mL/kg/hr) 0 (0) 0 (0)     Stool  0     Chest Tube 230 90     Total Output 230 90     Net +490 +150            Urine Occurrence 5 x 1 x     Stool Occurrence  0 x              Physical Exam  Constitutional:       Appearance: Normal appearance.   Eyes:      Extraocular Movements: Extraocular movements intact.   Cardiovascular:       Rate and Rhythm: Normal rate and regular rhythm.      Pulses: Normal pulses.   Pulmonary:      Effort: Pulmonary effort is normal.      Breath sounds: Normal breath sounds.      Comments: 24 Fr sorin drain on left side. Water sealed. No appreciable air leak.   Abdominal:      General: Abdomen is flat.      Palpations: Abdomen is soft.   Skin:     General: Skin is warm and dry.      Comments: Improving subcutaneous emphysema along the anterior chest wall and left neck   Neurological:      General: No focal deficit present.      Mental Status: She is alert and oriented to person, place, and time. Mental status is at baseline.   Psychiatric:         Mood and Affect: Mood normal.         Behavior: Behavior normal.         Thought Content: Thought content normal.          Significant Labs:  I have reviewed all pertinent lab results within the past 24 hours.  CBC:   Recent Labs   Lab 05/26/24  0516   WBC 12.09   RBC 4.12   HGB 11.6*   HCT 36.8*      MCV 89   MCH 28.2   MCHC 31.5*     CMP:   Recent Labs   Lab 05/25/24  0502 05/26/24  0516   * 105   CALCIUM 8.8 9.1   ALBUMIN 2.9*  --    PROT 6.1  --     139   K 4.2 3.7   CO2 23 25    107   BUN 18 16   CREATININE 0.7 0.7   ALKPHOS 69  --    ALT 15  --    AST 36  --    BILITOT 0.4  --        Significant Diagnostics:  I have reviewed all pertinent imaging results/findings within the past 24 hours.

## 2024-05-27 NOTE — PLAN OF CARE
Problem: Adult Inpatient Plan of Care  Goal: Plan of Care Review  Outcome: Progressing  Goal: Patient-Specific Goal (Individualized)  Outcome: Progressing  Goal: Absence of Hospital-Acquired Illness or Injury  Outcome: Progressing  Goal: Optimal Comfort and Wellbeing  Outcome: Progressing  Goal: Readiness for Transition of Care  Outcome: Progressing     Problem: Wound  Goal: Optimal Coping  Outcome: Progressing  Goal: Optimal Functional Ability  Outcome: Progressing  Goal: Absence of Infection Signs and Symptoms  Outcome: Progressing  Goal: Improved Oral Intake  Outcome: Progressing  Goal: Optimal Pain Control and Function  Outcome: Progressing  Goal: Skin Health and Integrity  Outcome: Progressing  Goal: Optimal Wound Healing  Outcome: Progressing     Problem: Fall Injury Risk  Goal: Absence of Fall and Fall-Related Injury  Outcome: Progressing     Problem: Pain Acute  Goal: Optimal Pain Control and Function  Outcome: Progressing

## 2024-05-27 NOTE — PT/OT/SLP PROGRESS
Physical Therapy Treatment    Patient Name:  Melani Holloway   MRN:  [id number]  Admitting Diagnosis:  Malignant neoplasm of upper lobe of left lung   Recent Surgery: Procedure(s) (LRB):  XI ROBOTIC LEFT UPPER LOBECTOMY (Left)  LYMPHADENECTOMY (N/A)  BLOCK, NERVE, INTERCOSTAL, 2 OR MORE (Left)  BRONCHOSCOPY (Left) 4 Days Post-Op  Admit Date: 5/23/2024  Length of Stay: 4 days    Recommendations:     Discharge Recommendations: Low Intensity Therapy  Discharge Equipment Recommendations: none   Barriers to discharge: None    Appropriate transfer level with nursing staff: ambulation with SBA and RW    Plan:     During this hospitalization, patient to be seen 4 x/week to address the identified rehab impairments via gait training, therapeutic activities, therapeutic exercises and progress towards the established goals.  Plan of Care Expires:  06/24/24  Plan of Care Reviewed with: patient, grandchild(geeta)    Assessment     Melani Holloway is a 78 y.o. female admitted with a medical diagnosis of Malignant neoplasm of upper lobe of left lung. Pt tolerated session well today. Treatment session focused on increasing activity tolerance and endurance to prepare for functional tasks. She made good progress towards goals this date and was ambulate an increased distance with decreased assist needed. Pt with improved stability and energy conservation with addition of RW. She was able to ambulate community distances on 1L O2 while walking/talking with SPO2 ranging from 89%-94%. Pt will continue to benefit from skilled PT services during this hospital admit to continue to improve transfer ability and efficiency as well as continue to progress pt's ambulation distance and cardiopulmonary endurance to maximize pt's functional independence and return to PLOF.    Problem List: weakness, impaired endurance, impaired functional mobility, gait instability, impaired balance, decreased safety awareness, impaired cardiopulmonary response to  "activity.  Rehab Prognosis: Good; patient would benefit from acute skilled PT services to address these deficits and reach maximum level of function.      Goals:   Multidisciplinary Problems       Physical Therapy Goals          Problem: Physical Therapy    Goal Priority Disciplines Outcome Goal Variances Interventions   Physical Therapy Goal     PT, PT/OT Progressing     Description: Goals to be met by: 2024     Patient will increase functional independence with mobility by performin. Supine to sit with Set-up [place]  2. Sit to supine with Set-up [place]  3. Sit to stand transfer with Supervision  4. Bed to chair transfer with Supervision using LRAD  5. Gait  x 200 feet with Supervision using LRAD.   6. Lower extremity exercise program x15 reps per handout, with supervision                         Subjective     RN notified prior to session. Granddaughter present upon PT entrance into room. Patient agreeable to PT evaluation.    Chief Complaint: pt with no complaints  Patient/Family Comments/goals: go home  Pain/Comfort:  Pain Rating 1: 0/10  Pain Rating Post-Intervention 1: 0/10    Objective:     Additional staff present: N/A    Patient found HOB elevated with: chest tube, oxygen   Cognition:   Alert and Cooperative  Patient is oriented to Person, Place, Time, Situation  Command following: Follows multistep verbal commands  Fluency: clear/fluent  General Precautions: Standard, Cardiac fall   Orthopedic Precautions:N/A   Braces: N/A   Body mass index is 31.87 kg/m².  Oxygen Device: Nasal Cannula 2L  Vitals: /68 (BP Location: Left arm, Patient Position: Sitting)   Pulse 74   Temp 98.3 °F (36.8 °C) (Oral)   Resp 20   Ht 5' 3" (1.6 m)   Wt 81.6 kg (179 lb 14.3 oz)   LMP  (LMP Unknown)   SpO2 96%   Breastfeeding No   BMI 31.87 kg/m²     Outcome Measures:  AM-PAC 6 CLICK MOBILITY  Turning over in bed (including adjusting bedclothes, sheets and blankets)?: 3  Sitting down on and " standing up from a chair with arms (e.g., wheelchair, bedside commode, etc.): 3  Moving from lying on back to sitting on the side of the bed?: 3  Moving to and from a bed to a chair (including a wheelchair)?: 3  Need to walk in hospital room?: 3  Climbing 3-5 steps with a railing?: 3  Basic Mobility Total Score: 18     Functional Mobility:    Bed Mobility:   Supine to Sit: stand by assistance with HOB elevated; to Lt side of bed  Scooting anteriorly to EOB to have both feet planted on floor: stand by assistance     Sitting Balance at Edge of Bed:  Static Sitting Balance: Good- : able to accept minimal resistance  Dynamic Sitting Balance: Good- : able to sit unsupported and weight shift across midline minimally  Assistance Level Required: Supervision    Transfers:   Sit <> Stand Transfer: stand by assistance with rolling walker. [id number] from EOB and [id number] from toliet    Standing Balance:  Static Standing Balance: Good- : able to maintain standing balance against minimal resistance  Dynamic Standing Balance: Good- : able to stand independently unsupported and weight shift across midline minimally  Assistance Level Required: Stand-by Assistance  Patient used: no assistive device       Gait:   Patient ambulated: 16 ft + toileting + ~380 ft   Patient required: stand by assistance  Patient used:  rolling walker   Gait Pattern observed: reciprocal gait  Gait Deviation(s): decreased step length, narrow base of support, decreased foot clearance, and decreased christ  Impairments due to: pain and decreased endurance  Portable Supplemental O2 2L utilized  all lines remained intact throughout ambulation trial  Comments: Patient required cues for upright posture to increase independence and safety. Patient required cues ~ 25% of the time. Pt was able to perform vertical/horizontal head turns, 180 degree turns while ambulating, and avoid hallway obstacles without LOB. Some vc's needed to take standing rest breaks due to SOB  with mobility and walking/talking.    Education:  Time provided for education, counseling and discussion of health disposition in regards to patient's current status  All questions answered within PT scope of practice and to patient's satisfaction  PT role in POC to address current functional deficits  Pt educated on proper body mechanics, safety techniques, and energy conservation with PT facilitation and cueing throughout session  Call nursing/pct to transfer to chair/use bathroom. Pt stated understanding.  RW ordered for in room use with nursing staff  Whiteboard updated with therapist name and pt's current mobility status documented above  Safe to perform step transfer to/from chair/bedside commode SBA and RW or HHA w/ nursing/PCT present  Pt to ambulate 3-4x/day SBA and RW with nsg/family in order to maintain functional mobility  Importance of OOB tolerance 8-10 hrs/day to improve lung ventilation and expansion as well as strengthen postural musculature    Patient left up in chair with all lines intact, call button in reach, and granddaughter present.    Time Tracking:     PT Received On: 05/27/24  PT Start Time: 1503     PT Stop Time: 1521  PT Total Time (min): 18 min     Billable Minutes:   Therapeutic Exercise 15 minutes    Treatment Type: Treatment  PT/PTA: PT       5/27/2024

## 2024-05-27 NOTE — PROGRESS NOTES
[facility] - [facility]  General Surgery  Progress Note    Subjective:     History of Present Illness:  Melani Holloway is a 78 y.o. F with PMHx prior tobacco use, HTN, and right breast DCIS s/p lumpectomy who was recently diagnosed with ECHO NSCLC, favoring adenocarcinoma. She presents to the SICU s/p robotic Left upper lobectomy with Dr. Fay on 5/23/24. On admission, she is alert and oriented and in stable condition. She is not requiring any Inotropic or vasopressor support to maintain MAPs > 65. Access includes 2 PIVs. She has a left sided chest tube in place with 160cc SS output so far post op. Large air leak noted. She is maintaining O2 sats > 95 % on 3L O2 by nasal cannula. She is admitted to the SICU for close respiratory monitoring.      Immediate post-operative plans include weaning of respiratory support, and close monitoring of chest tube output.     Post-Op Info:  Procedure(s) (LRB):  XI ROBOTIC LEFT UPPER LOBECTOMY (Left)  LYMPHADENECTOMY (N/A)  BLOCK, NERVE, INTERCOSTAL, 2 OR MORE (Left)  BRONCHOSCOPY (Left)   4 Days Post-Op     Interval History: Patient seen and examined. No acute events overnight. Subcutaneous emphysema improving. CXR stable. Plan for clamp trial today.     Medications:  Continuous Infusions:  Scheduled Meds:   acetaminophen  1,000 mg Oral Q8H    enoxparin  40 mg Subcutaneous Daily    famotidine  20 mg Oral BID    fluticasone furoate-vilanteroL  1 puff Inhalation Daily    gabapentin  200 mg Oral QHS    levalbuterol  1.25 mg Nebulization Q12H    methocarbamoL  500 mg Oral QID    polyethylene glycol  17 g Oral Daily    senna-docusate 8.6-50 mg  1 tablet Oral Daily     PRN Meds:  Current Facility-Administered Medications:     bisacodyL, 10 mg, Rectal, Daily PRN    metoclopramide, 5 mg, Intravenous, Q6H PRN    metoprolol, 5 mg, Intravenous, Q5 Min PRN    ondansetron, 8 mg, Oral, Q8H PRN    oxyCODONE, 5 mg, Oral, Q4H PRN    oxyCODONE, 10 mg, Oral, Q4H PRN    sodium chloride 0.9%, 10 mL,  Intravenous, PRN     Review of patient's allergies indicates:  No Known Allergies  Objective:     Vital Signs (Most Recent):  Temp: 98.1 °F (36.7 °C) (05/27/24 0737)  Pulse: 63 (05/27/24 0737)  Resp: 18 (05/27/24 0737)  BP: 115/68 (05/27/24 0737)  SpO2: (!) 91 % (05/27/24 0737) Vital Signs (24h Range):  Temp:  [97.9 °F (36.6 °C)-98.7 °F (37.1 °C)] 98.1 °F (36.7 °C)  Pulse:  [61-73] 63  Resp:  [16-24] 18  SpO2:  [91 %-96 %] 91 %  BP: (115-139)/(56-68) 115/68     Weight: 81.6 kg (179 lb 14.3 oz)  Body mass index is 31.87 kg/m².    Intake/Output - Last 3 Shifts         05/25 0700 05/26 0659 05/26 0700 05/27 0659 05/27 0700 05/28 0659    P.O. 720 240     I.V. (mL/kg)       Total Intake(mL/kg) 720 (8.8) 240 (2.9)     Urine (mL/kg/hr) 0 (0) 0 (0)     Stool  0     Chest Tube 230 90     Total Output 230 90     Net +490 +150            Urine Occurrence 5 x 1 x     Stool Occurrence  0 x              Physical Exam  Constitutional:       Appearance: Normal appearance.   Eyes:      Extraocular Movements: Extraocular movements intact.   Cardiovascular:      Rate and Rhythm: Normal rate and regular rhythm.      Pulses: Normal pulses.   Pulmonary:      Effort: Pulmonary effort is normal.      Breath sounds: Normal breath sounds.      Comments: 24 Fr sorin drain on left side. Water sealed. No appreciable air leak.   Abdominal:      General: Abdomen is flat.      Palpations: Abdomen is soft.   Skin:     General: Skin is warm and dry.      Comments: Improving subcutaneous emphysema along the anterior chest wall and left neck   Neurological:      General: No focal deficit present.      Mental Status: She is alert and oriented to person, place, and time. Mental status is at baseline.   Psychiatric:         Mood and Affect: Mood normal.         Behavior: Behavior normal.         Thought Content: Thought content normal.          Significant Labs:  I have reviewed all pertinent lab results within the past 24 hours.  CBC:   Recent Labs    Lab 05/26/24  0516   WBC 12.09   RBC 4.12   HGB 11.6*   HCT 36.8*      MCV 89   MCH 28.2   MCHC 31.5*     CMP:   Recent Labs   Lab 05/25/24  0502 05/26/24  0516   * 105   CALCIUM 8.8 9.1   ALBUMIN 2.9*  --    PROT 6.1  --     139   K 4.2 3.7   CO2 23 25    107   BUN 18 16   CREATININE 0.7 0.7   ALKPHOS 69  --    ALT 15  --    AST 36  --    BILITOT 0.4  --        Significant Diagnostics:  I have reviewed all pertinent imaging results/findings within the past 24 hours.  Assessment/Plan:     * Malignant neoplasm of upper lobe of left lung  Melani Holloway is a 78 y.o. F with recently diagnosed ECHO NSCLC now s/p left upper lobectomy with  5/23/24.    - patient seen and examined   - on 1L NC maintaining oxygen saturations; taken off this morning   - CXR this morning improved subcutaneous emphysema, no pneumothorax  - chest tube in place to water seal; clamp trail today  - home meds as appropriate  - regular diet  - Multimodal pain control  - Scheduled nebs  - IS use, out of bed        Roney Ahuja MD  General Surgery  [facility]

## 2024-05-27 NOTE — NURSING
"[facility] Plan of Care Note    Dx:   NSCLC of left lung [C34.92]  Malignant neoplasm of upper lobe of left lung [C34.12]    Shift Events: Chest tube removed, pt tolerating well    Goals of Care: ambulation, pain management, o2 weaning    Neuro: pt A+Ox4, pleasant with care    Vital Signs: /68 (BP Location: Left arm, Patient Position: Sitting)   Pulse 74   Temp 98.3 °F (36.8 °C) (Oral)   Resp 20   Ht 5' 3" (1.6 m)   Wt 81.6 kg (179 lb 14.3 oz)   LMP  (LMP Unknown)   SpO2 96%   Breastfeeding No   BMI 31.87 kg/m²     Respiratory: LS diminished, 1L NC, goal >88%    Cardiac: NSR on telemetry with no ectopy    Diet: Diet Adult Regular (IDDSI Level 7)      Is patient tolerating current diet? yes    GTTS: none    Urine Output/Bowel Movement:   I/O this shift:  In: 240 [P.O.:240]  Out: 20 [Chest Tube:20]  Last Bowel Movement: 05/26/24      Drains/Tubes/Tube Feeds (include total output/shift):   I/O this shift:  In: 240 [P.O.:240]  Out: 20 [Chest Tube:20]      Lines: PIV 18g L wrist      Accuchecks:none    Skin: grossly intact, chest tube site and x4 surg sites with steri strips    Fall Risk Score: see flowsheets    Activity level? Amb with 1A to BR    Any scheduled procedures? none    Any safety concerns? N/a    Other: Plan for d/c tomorrow 5/28     "

## 2024-05-27 NOTE — ASSESSMENT & PLAN NOTE
Melani Holloway is a 78 y.o. F with recently diagnosed ECHO NSCLC now s/p left upper lobectomy with  5/23/24.    - patient seen and examined   - on 1L NC maintaining oxygen saturations; taken off this morning   - CXR this morning improved subcutaneous emphysema, no pneumothorax  - chest tube in place to water seal; clamp trail today  - home meds as appropriate  - regular diet  - Multimodal pain control  - Scheduled nebs  - IS use, out of bed

## 2024-05-27 NOTE — PT/OT/SLP PROGRESS
Occupational Therapy   Treatment    Name: Melani Holloway  MRN: [id number]  Admitting Diagnosis:  Malignant neoplasm of upper lobe of left lung  4 Days Post-Op    Recommendations:     Discharge Recommendations: Low Intensity Therapy  Discharge Equipment Recommendations:  none  Barriers to discharge:  Other (Comment) (patient requires increased level of assistance)    Assessment:     Melani Holloway is a 78 y.o. female with a medical diagnosis of Malignant neoplasm of upper lobe of left lung.  She presents with the fallowing performance deficits affecting function are weakness, impaired endurance, impaired self care skills, impaired functional mobility, gait instability, impaired balance, pain, impaired cardiopulmonary response to activity, decreased safety awareness, decreased lower extremity function, decreased upper extremity function. Patient agreeable to tx session, patient is demonstrating progress with functional transfers, bed mobility, and self-care task, however; patient continues to have limited activity tolerance due to pain and weakness from recent surgical procedure.Patient's O2 levels were monitor through out tx session. Initially at rest was 93% 76HR, after ambulation task(10ft) it desat to 87% 76 HR in room air. After rested seated break and breathing techs if return to 91-92% and 98% 70HR with oxygen. Patient would benefit from Low intensity therapy intervention to address over all functional decline with mobility task, endurance, and ADLs in order to return to PLOF.     Rehab Prognosis:  Good; patient would benefit from acute skilled OT services to address these deficits and reach maximum level of function.       Plan:     Patient to be seen 3 x/week to address the above listed problems via self-care/home management, therapeutic activities, therapeutic exercises  Plan of Care Expires: 06/25/24  Plan of Care Reviewed with: patient, daughter    Subjective     Chief Complaint: pain, discomfort, and  fatigue   Patient/Family Comments/goals: to return to PLOF  Pain/Comfort:  Pain Rating 1:  (pain not rated)  Location - Side 1: Left  Location - Orientation 1: lateral  Location 1: chest  Pain Addressed 1: Reposition, Distraction  Pain Rating Post-Intervention 1:  (pain not rated)    Objective:     Communicated with: Nurse prior to session.  Patient found up in chair with oxygen, telemetry, pulse ox (continuous), chest tube upon OT entry to room.  A client care conference was completed by the OTR and the CHOU prior to treatment by the CHOU to discuss the patient's POC and current status.   General Precautions: Standard, fall    Orthopedic Precautions:N/A  Braces: N/A  Respiratory Status: Nasal cannula, flow 2 L/min     Occupational Performance:     Bed Mobility:    Patient completed Rolling/Turning to Left with  stand by assistance to reach for bed rail with HOB elevated   Patient completed Scooting/Bridging to HOB with stand by assistance  Patient completed Sit to Supine with stand by assistance and contact guard assistance for trunk support     Functional Mobility/Transfers:  Patient completed Sit <> Stand Transfer from bedside chair with stand by assistance  with  no assistive device and hand-held assist   Patient completed Bed <> Chair Transfer using Step Transfer technique with stand by assistance with no assistive device and hand-held assist  Functional Mobility: Patient ambulated 10ft with SBA with no AD and HHA  Patient exhibit slight instability and slow pace with no signs of LOB  Patient performed seated therapeutic exercises to improve B UE/LE strength postural muscle activation and activity tolerance 1X15 reps   Biceps curls   Chest press  Shoulder press   Arm raises  Ankle pumps  Leg kicks   Single leg marches   Lateral leg raises      Activities of Daily Living:  Lower Body Dressing: maximal assistance and total assistance to don/doff socks sitting up at bedside chair due to pain and discomfort when  attempting to bend down and cross leg over.       [facility] 6 Click ADL: 18    Treatment & Education:  Discussed OT POC and progress  Educated patient on the importance to continue to perform exercises in order to reduce stiffness and promote joint mobility and blood flow  Addressed patient's questions and concerns within CHOU scope of practice      Patient left HOB elevated with all lines intact and call button in reach    GOALS:   Multidisciplinary Problems       Occupational Therapy Goals          Problem: Occupational Therapy    Goal Priority Disciplines Outcome Interventions   Occupational Therapy Goal     OT, PT/OT Progressing    Description: Goals to be met by: 6/25/24     Patient will increase functional independence with ADLs by performing:    UE Dressing with Stand-by Assistance.  LE Dressing with Contact Guard Assistance.  Grooming while standing at sink with Stand-by Assistance.  Toileting from toilet with Stand-by Assistance for hygiene and clothing management.   Supine to sit with Stand-by Assistance.  Step transfer with Supervision  Toilet transfer to toilet with Stand-by Assistance.                         Time Tracking:     OT Date of Treatment: 05/27/24  OT Start Time: 1139  OT Stop Time: 1220  OT Total Time (min): 41 min    Billable Minutes:Self Care/Home Management 9  Therapeutic Activity 16  Therapeutic Exercise 16    OT/OVIDIO: OVIDIO     Number of OVIDIO visits since last OT visit: 1    5/27/2024

## 2024-05-28 VITALS
DIASTOLIC BLOOD PRESSURE: 73 MMHG | SYSTOLIC BLOOD PRESSURE: 143 MMHG | WEIGHT: 179.88 LBS | OXYGEN SATURATION: 93 % | HEART RATE: 80 BPM | HEIGHT: 63 IN | RESPIRATION RATE: 17 BRPM | TEMPERATURE: 98 F | BODY MASS INDEX: 31.87 KG/M2

## 2024-05-28 DIAGNOSIS — C34.92 NSCLC OF LEFT LUNG: Primary | ICD-10-CM

## 2024-05-28 PROCEDURE — 25000242 PHARM REV CODE 250 ALT 637 W/ HCPCS: Performed by: STUDENT IN AN ORGANIZED HEALTH CARE EDUCATION/TRAINING PROGRAM

## 2024-05-28 PROCEDURE — 25000003 PHARM REV CODE 250: Performed by: STUDENT IN AN ORGANIZED HEALTH CARE EDUCATION/TRAINING PROGRAM

## 2024-05-28 PROCEDURE — 94640 AIRWAY INHALATION TREATMENT: CPT

## 2024-05-28 PROCEDURE — 99900035 HC TECH TIME PER 15 MIN (STAT)

## 2024-05-28 PROCEDURE — 94761 N-INVAS EAR/PLS OXIMETRY MLT: CPT

## 2024-05-28 PROCEDURE — 27000221 HC OXYGEN, UP TO 24 HOURS

## 2024-05-28 PROCEDURE — 97110 THERAPEUTIC EXERCISES: CPT

## 2024-05-28 RX ORDER — POLYETHYLENE GLYCOL 3350 17 G/17G
17 POWDER, FOR SOLUTION ORAL 2 TIMES DAILY PRN
COMMUNITY
Start: 2024-05-28

## 2024-05-28 RX ORDER — GABAPENTIN 100 MG/1
200 CAPSULE ORAL NIGHTLY
Qty: 28 CAPSULE | Refills: 0 | Status: SHIPPED | OUTPATIENT
Start: 2024-05-28 | End: 2024-06-11

## 2024-05-28 RX ORDER — METHOCARBAMOL 500 MG/1
500 TABLET, FILM COATED ORAL 4 TIMES DAILY
Qty: 40 TABLET | Refills: 0 | Status: SHIPPED | OUTPATIENT
Start: 2024-05-28 | End: 2024-06-07

## 2024-05-28 RX ORDER — ACETAMINOPHEN 500 MG
1000 TABLET ORAL EVERY 8 HOURS PRN
COMMUNITY
Start: 2024-05-28

## 2024-05-28 RX ORDER — AMOXICILLIN 250 MG
1 CAPSULE ORAL DAILY
Qty: 14 TABLET | Refills: 0 | Status: SHIPPED | OUTPATIENT
Start: 2024-05-28 | End: 2024-06-11

## 2024-05-28 RX ORDER — OXYCODONE HYDROCHLORIDE 5 MG/1
5 TABLET ORAL EVERY 4 HOURS PRN
Qty: 20 TABLET | Refills: 0 | Status: SHIPPED | OUTPATIENT
Start: 2024-05-28

## 2024-05-28 RX ADMIN — METHOCARBAMOL 500 MG: 500 TABLET ORAL at 08:05

## 2024-05-28 RX ADMIN — POLYETHYLENE GLYCOL 3350 17 G: 17 POWDER, FOR SOLUTION ORAL at 08:05

## 2024-05-28 RX ADMIN — FLUTICASONE FUROATE AND VILANTEROL TRIFENATATE 1 PUFF: 100; 25 POWDER RESPIRATORY (INHALATION) at 08:05

## 2024-05-28 RX ADMIN — FAMOTIDINE 20 MG: 20 TABLET ORAL at 08:05

## 2024-05-28 RX ADMIN — ACETAMINOPHEN 1000 MG: 500 TABLET ORAL at 06:05

## 2024-05-28 RX ADMIN — LEVALBUTEROL 1.25 MG: 1.25 SOLUTION, CONCENTRATE RESPIRATORY (INHALATION) at 08:05

## 2024-05-28 RX ADMIN — SENNOSIDES AND DOCUSATE SODIUM 1 TABLET: 50; 8.6 TABLET ORAL at 08:05

## 2024-05-28 NOTE — PLAN OF CARE
[facility] - [facility]  Discharge Final Note    Primary Care Provider: Nathaniel Greenfield MD  Expected Discharge Date: 5/28/2024    Patient medically ready for discharge to home.     Transportation by family.     Is family/patient aware of discharge: yes     Hospital follow up scheduled: yes       Final Discharge Note (most recent)       Final Note - 05/28/24 1524          Final Note    Assessment Type Final Discharge Note     Anticipated Discharge Disposition Home or Self Care     Hospital Resources/Appts/Education Provided Provided patient/caregiver with written discharge plan information                     Important Message from Medicare  Important Message from Medicare regarding Discharge Appeal Rights: Given to patient/caregiver, Explained to patient/caregiver, Signed/date by patient/caregiver   Date IMM was signed: 05/28/24  Time IMM was signed: 0902  Referral Info (most recent)       Referral Info    No documentation.                   Future Appointments   Date Time Provider Department Center   6/6/2024 10:00 AM Brayden Prater MD [facility]   6/10/2024  8:30 AM [facility]IC-XRAY [facility]   6/10/2024  9:15 AM Riaz Fay MD [facility]   11/11/2024  9:00 AM Nathaniel Greenfield MD [facility] RICARDA K[facility]     Dajuan Parr, DAYSI  Case Management  Ext: [phone]  05/28/2024

## 2024-05-28 NOTE — DISCHARGE SUMMARY
Martin sharonda - [facility]  Thoracic Surgery  Discharge Summary    Patient Name: Melani Holloway  MRN: [id number]  Admission Date: 5/23/2024  Hospital Length of Stay: 5 days  Discharge Date and Time:  05/28/2024 8:10 AM  Attending Physician: Riaz Fay MD   Discharging Provider: Alton Fleming DO  Primary Care Provider: Nathaniel Greenfield MD    HPI:   78 y.o. female former smoker with HTN, R breast DCIS, and ECHO NSCLC. Treated for PNA in Oct, COPD exacerbation in January, and COVID in February. CTA during ER visit in Jan showed 2.2 cm solid nodule in ECHO and more peripheral 1.6 cm GGO. Repeat CT in April with persistence of these findings. Robotic bronchoscopy with EBUS on 4/16/24. Solid nodule in ECHO positive for malignancy favor adenocarcinoma. GGO in ECHO with atypical cells present. Levels 7 and 11L negative for malignancy.    PFTs - FEV1 1.5 80.7% DLCO 12.3 64.1     Procedure(s) (LRB):  XI ROBOTIC LEFT UPPER LOBECTOMY (Left)  LYMPHADENECTOMY (N/A)  BLOCK, NERVE, INTERCOSTAL, 2 OR MORE (Left)  BRONCHOSCOPY (Left)      Hospital Course: Patient admitted 05/23/24 following robotic left upper division of left upper lobe of lung and MLND. Tolerated procedure well. Air leak seen immediately post-operation. Chest tube (24Fr sorin) placed to suction. Patient taken to PACU in stable condition. Subsequently, CXR in PACU showed pneumothorax; however, patient HDS, VSS, afebrile, SpO2 >92% on 3L NC. Ultimately, she was stepped up to the SICU overnight for closer observation and stepped back down to the floor on POD 1 with a continuous air leak noted. Went into rate controlled afib on POD 2 and had worsening subcutaneous emphysema secondary to a clogged chest tube. Issues resolved by POD 3, and she underwent successful clamp trial with subsequent chest tube removal on POD 4. On POD 5 (5/28/24), patient was deemed clinically stable for discharge following a home O2 walk test and instructed to resume using her home oxygen as  previously ordered.    Goals of Care Treatment Preferences:  Code Status: Full Code          Significant Diagnostic Studies: N/A    Pending Diagnostic Studies:       Procedure Component Value Units Date/Time    Specimen to Pathology, Surgery Pulmonary and Thoracic [[id number]] Collected: 05/23/24 1356    Order Status: Sent Lab Status: In process Updated: 05/23/24 5818    Specimen: Tissue           Final Active Diagnoses:    Diagnosis Date Noted POA    PRINCIPAL PROBLEM:  Malignant neoplasm of upper lobe of left lung [C34.12] 05/09/2024 Yes      Problems Resolved During this Admission:      Discharged Condition: stable    Disposition: Home or Self Care    Follow Up: Dr. Fay 2 weeks    Patient Instructions:      Diet Adult Regular     Call MD for:  temperature >100.4     Call MD for:  persistent nausea and vomiting     Call MD for:  severe uncontrolled pain     Call MD for:  difficulty breathing, headache or visual disturbances     Call MD for:  redness, tenderness, or signs of infection (pain, swelling, redness, odor or green/yellow discharge around incision site)     Call MD for:  hives     Call MD for:  persistent dizziness or light-headedness     Call MD for:  extreme fatigue     Remove dressing in 48 hours     Wound care routine (specify)   Order Comments: Wound care routine: Suture removal at follow-up appointment     Activity as tolerated     Medications:  Reconciled Home Medications:      Medication List        START taking these medications      acetaminophen 500 MG tablet  Commonly known as: TYLENOL  Take 2 tablets (1,000 mg total) by mouth every 8 (eight) hours as needed for Pain.     gabapentin 100 MG capsule  Commonly known as: NEURONTIN  Take 2 capsules (200 mg total) by mouth every evening. for 14 days     methocarbamoL 500 MG Tab  Commonly known as: ROBAXIN  Take 1 tablet (500 mg total) by mouth 4 (four) times daily. for 10 days     oxyCODONE 5 MG immediate release tablet  Commonly known as:  ROXICODONE  Take 1 tablet (5 mg total) by mouth every 4 (four) hours as needed for Pain.     polyethylene glycol 17 gram Pwpk  Commonly known as: GLYCOLAX  Take 17 g by mouth 2 (two) times daily as needed for Constipation.     senna-docusate 8.6-50 mg 8.6-50 mg per tablet  Commonly known as: PERICOLACE  Take 1 tablet by mouth once daily. for 14 days            CONTINUE taking these medications      albuterol 90 mcg/actuation inhaler  Commonly known as: PROAIR HFA  Inhale 2 puffs into the lungs every 6 (six) hours as needed for Wheezing. Rescue     albuterol-ipratropium 2.5 mg-0.5 mg/3 mL nebulizer solution  Commonly known as: DUO-NEB  Take 3 mLs by nebulization every 4 (four) hours. Rescue     amLODIPine 10 MG tablet  Commonly known as: NORVASC  TAKE 1 TABLET BY MOUTH EVERY DAY     aspirin 81 MG EC tablet  Commonly known as: ECOTRIN  Take 81 mg by mouth every other day.     calcium carbonate 600 mg calcium (1,500 mg) Tab  Commonly known as: OS-JONAS  Take 600 mg by mouth once.     fluticasone furoate-vilanteroL 100-25 mcg/dose diskus inhaler  Commonly known as: BREO  Inhale 1 puff into the lungs once daily. Controller     fluticasone propionate 50 mcg/actuation nasal spray  Commonly known as: FLONASE  1 spray (50 mcg total) by Each Nostril route once daily.     hydroCHLOROthiazide 25 MG tablet  Commonly known as: HYDRODIURIL  TAKE 1 TABLET BY MOUTH EVERY DAY IN THE MORNING     ICAPS AREDS ORAL  Take 1 tablet by mouth once daily.     lisinopriL 40 MG tablet  Commonly known as: PRINIVIL,ZESTRIL  TAKE 1 TABLET BY MOUTH EVERY DAY     vitamin D 1000 units Tab  Commonly known as: VITAMIN D3  Take 1,000 Units by mouth once daily.            STOP taking these medications      azelastine 137 mcg (0.1 %) nasal spray  Commonly known as: ASTELIN     benzonatate 100 MG capsule  Commonly known as: YAMINI Fleming,   Thoracic Surgery  [facility]

## 2024-05-28 NOTE — PLAN OF CARE
Post-Acute Therapy Recommendation: low intensity     D/c from PT services today, pt met all goals.    Please continue Progressive Mobility Protocol as appropriate.    Appropriate transfer level with nursing staff: ambulation with Supervision and RW    2024    Problem: Physical Therapy  Goal: Physical Therapy Goal  Description: Goals to be met by: 2024     Patient will increase functional independence with mobility by performin. Supine to sit with Set-up [place]  2. Sit to supine with Set-up [place]  3. Sit to stand transfer with Supervision  4. Bed to chair transfer with Supervision using LRAD  5. Gait  x 200 feet with Supervision using LRAD.   6. Lower extremity exercise program x15 reps per handout, with supervision    Outcome: Met

## 2024-05-28 NOTE — PT/OT/SLP PROGRESS
Physical Therapy Treatment    Patient Name:  Melani Holloway   MRN:  [id number]  Admitting Diagnosis:  Malignant neoplasm of upper lobe of left lung   Recent Surgery: Procedure(s) (LRB):  XI ROBOTIC LEFT UPPER LOBECTOMY (Left)  LYMPHADENECTOMY (N/A)  BLOCK, NERVE, INTERCOSTAL, 2 OR MORE (Left)  BRONCHOSCOPY (Left) 5 Days Post-Op  Admit Date: 5/23/2024  Length of Stay: 5 days    Recommendations:     Discharge Recommendations: Low Intensity Therapy  Discharge Equipment Recommendations: none   Barriers to discharge: None    Appropriate transfer level with nursing staff: ambulation with RW and supervision    Plan:     D/c from PT services on this date    Assessment     Melani Holloway is a 78 y.o. female admitted with a medical diagnosis of Malignant neoplasm of upper lobe of left lung. Pt tolerated session well today. She showed excellent improvements in activity tolerance, endurance, and cardiopulmonary tolerance to activity today. This is seen by her ability to ambulate community distances safely with no rest breaks or LOB noted. Her cardiopulmonary tolerance also has improved as she was able to ambulate for >5 minutes on room air with SPO2 ranging from 91% -93%. At end of ambulation trial her SPO2 did slightly dip to 87% but pt was able to quickly recover to >90% with PLB and seated recovery. Due to pt's performance today she is safely functioning at a high level and has made satisfactory achievements by meeting goals. Therefore, pt no longer qualifies for acute PT services. D/c from services on this date.    Problem List: impaired endurance, impaired cardiopulmonary response to activity, pain.  Rehab Prognosis: Good; patient would benefit from acute skilled PT services to address these deficits and reach maximum level of function.      Goals:   Multidisciplinary Problems       Physical Therapy Goals       Not on file              Multidisciplinary Problems (Resolved)          Problem: Physical Therapy    Goal  "Priority Disciplines Outcome Goal Variances Interventions   Physical Therapy Goal   (Resolved)     PT, PT/OT Met     Description: Goals to be met by: 2024     Patient will increase functional independence with mobility by performin. Supine to sit with Set-up [place]  2. Sit to supine with Set-up [place]  3. Sit to stand transfer with Supervision  4. Bed to chair transfer with Supervision using LRAD  5. Gait  x 200 feet with Supervision using LRAD.   6. Lower extremity exercise program x15 reps per handout, with supervision                         Subjective     RN notified prior to session. No family/friends present upon PT entrance into room. Patient agreeable to PT evaluation.    Chief Complaint: pt ready to go home  Patient/Family Comments/goals: go home today  Pain/Comfort:  Pain Rating 1: 0/10  Pain Rating Post-Intervention 1: 0/10    Objective:     Additional staff present: N/A    Patient found HOB elevated with: telemetry, pulse ox (continuous), oxygen   Cognition:   Alert and Cooperative  Patient is oriented to Person, Place, Time, Situation  Command following: Follows multistep verbal commands  Fluency: clear/fluent  General Precautions: Standard, Cardiac fall   Orthopedic Precautions:N/A   Braces: N/A   Body mass index is 31.87 kg/m².  Oxygen Device: Nasal Cannula 2L; RN requesting pt to participate in session on room air to assess need for O2. O2 remains from 91-93% throughout activity. Dropped to 87% at very end of gait trial, quickly recovered to 90s with seated break  Vitals: BP (!) 143/73   Pulse 80   Temp 97.7 °F (36.5 °C) (Oral)   Resp 17   Ht 5' 3" (1.6 m)   Wt 81.6 kg (179 lb 14.3 oz)   LMP  (LMP Unknown)   SpO2 (!) 93%   Breastfeeding No   BMI 31.87 kg/m²     Outcome Measures:  AM-PAC 6 CLICK MOBILITY  Turning over in bed (including adjusting bedclothes, sheets and blankets)?: 3  Sitting down on and standing up from a chair with arms (e.g., wheelchair, bedside " commode, etc.): 3  Moving from lying on back to sitting on the side of the bed?: 3  Moving to and from a bed to a chair (including a wheelchair)?: 3  Need to walk in hospital room?: 3  Climbing 3-5 steps with a railing?: 3  Basic Mobility Total Score: 18     Functional Mobility:    Bed Mobility:   Supine to Sit: modified independence with HOB elevated; to Lt side of bed  Scooting anteriorly to EOB to have both feet planted on floor: independence     Sitting Balance at Edge of Bed:  Static Sitting Balance: Normal : able to maintain balance against maximal resistance  Dynamic Sitting Balance: Normal : able to sit unsupported and weight shift across midline maximally  Assistance Level Required: [place]  Comments: no c/o dizziness or SOB with sitting EOB    Transfers:   Sit <> Stand Transfer: supervision with rolling walker. [id number] from EOB    Standing Balance:  Static Standing Balance: Good : able to maintain standing balance against moderate resistance  Dynamic Standing Balance: Good : able to stand independently unsupported and cross midline moderately  Assistance Level Required: Supervision  Patient used: rolling walker       Gait:   Patient ambulated: 500 ft   Patient required: supervision  Patient used:  rolling walker   Gait Pattern observed: reciprocal gait  Gait Deviation(s): decreased step length and decreased christ  Impairments due to: decreased endurance  all lines remained intact throughout ambulation trial  Comments: Pt was able to perform vertical/horizontal head turns, 180 degree turns while ambulating, and avoid hallway obstacles without LOB. X1 vc given to keep RW on ground while performing 180 degree turn. She was able to ambulate community distances for >5 minutes on room air with SPO2 ranging from 91%-93%. At end of ambulation trial her SPO2 did slightly decrease to 87% but pt was able to quickly recover to >90% with PLB and seated recovery.    Education:  Time provided for education,  counseling and discussion of health disposition in regards to patient's current status  All questions answered within PT scope of practice and to patient's satisfaction  PT role in POC to address current functional deficits  Pt educated on proper body mechanics, safety techniques, and energy conservation with PT facilitation and cueing throughout session  Whiteboard updated with therapist name and pt's current mobility status documented above  Safe to perform step transfer to/from chair/bedside commode supervision and RW or HHA w/ nursing/PCT present  Pt to ambulate 3-4x/day supervision and RW with nsg/family in order to maintain functional mobility  Importance of OOB tolerance prn hrs/day to improve lung ventilation and expansion as well as strengthen postural musculature    Patient left  seated on sofa  with all lines intact, call button in reach, and RN notified.    Time Tracking:     PT Received On: 05/28/24  PT Start Time: 1044     PT Stop Time: 1100  PT Total Time (min): 16 min     Billable Minutes:   Therapeutic Exercise 16 minutes    Treatment Type: Treatment  PT/PTA: PT       5/28/2024

## 2024-05-28 NOTE — PLAN OF CARE
CHW met with patient/family at bedside. Patient experience rounding completed and reviewed the following.     Do you know your discharge plan? Yes or No,    If yes, what is the plan? (Home, Home Health, Rehab, SNF, LTAC, or Other)   Home    Have you discussed your needs and preferences with your SW/CM? Yes or No    Yes    If you are discharging home, do you have help at home? Yes or No    Yes    Do you think you will need help additional at home at discharge? Yes or No   No    Do you currently have difficulty keeping up with bills, affording medicine or buying food? Yes or No     No    Assigned SW/CM notified of any patient/family needs or concerns. Appropriate resources provided to address patient's needs.    Dajuan Castorena CHW  Case Management   [phone]

## 2024-05-31 DIAGNOSIS — C34.92 NON-SMALL CELL CARCINOMA OF LEFT LUNG: Primary | ICD-10-CM

## 2024-06-03 ENCOUNTER — HOSPITAL ENCOUNTER (OUTPATIENT)
Dept: RADIOLOGY | Facility: HOSPITAL | Age: 79
Discharge: HOME | End: 2024-06-03
Attending: STUDENT IN AN ORGANIZED HEALTH CARE EDUCATION/TRAINING PROGRAM
Payer: MEDICARE

## 2024-06-03 DIAGNOSIS — C34.92 NSCLC OF LEFT LUNG: Primary | ICD-10-CM

## 2024-06-03 DIAGNOSIS — R06.02 SOB (SHORTNESS OF BREATH): ICD-10-CM

## 2024-06-03 DIAGNOSIS — C34.92 NSCLC OF LEFT LUNG: ICD-10-CM

## 2024-06-03 PROCEDURE — 71046 X-RAY EXAM CHEST 2 VIEWS: CPT | Mod: TC

## 2024-06-03 PROCEDURE — 71046 X-RAY EXAM CHEST 2 VIEWS: CPT | Mod: 26,,, | Performed by: RADIOLOGY

## 2024-06-07 NOTE — PROGRESS NOTES
"Subjective     Patient ID: Melani Holloway is a 78 y.o. female.    Chief Complaint: Post-op Evaluation    Diagnosis:  NSCLC    Pre-operative therapy: none    Procedure(s) and date(s): 05/23/24 - robotic left upper lobectomy with MLND    Pathology:  2.4 cm acinar, multifocal (multiple nodules within same lobe) moderately differentiated adenocarcinoma. VPI present (PL1). DILEEP present. LVI negative. Margins negative (closest 1.5 cm; parenchymal). LN stations 5,7,8,9,10,11,12 negative. [id number] (Stage IIB).      Post-operative therapy: Refer to oncology for discussion of adjuvant therapy     HPI  78 y.o. female who presents to clinic for 2 week follow up s/p robotic left upper lobectomy with MLND. Tolerated procedure well; however, air leak seen immediately post-operation and chest tube (24Fr sorin) placed to suction. Subsequently, CXR in PACU showed pneumothorax and patient HDS, VSS, afebrile, SpO2 >92% on 3L NC. Ultimately, she was stepped up to the SICU overnight for closer observation and stepped back down to the floor on POD 1 with a continuous air leak noted. Went into rate controlled afib on POD 2 and had worsening subcutaneous emphysema secondary to a clogged chest tube. Issues resolved by POD 3, and she underwent successful clamp trial with subsequent chest tube removal on POD 4. Discharged home on POD 5.  Discussed at Thoracic Tumor Board 06/05/24 which recommended "Refer to oncology for discussion of adjuvant therapy although will weight risk benefits given age. Recommend close first interval scan given right sided nodules." Today patient reports she is doing better. Reports she is able to tolerate short bouts of exertion without oxygen. On 2L NC for extended periods of exertion. States she self-recorded O2 sats range from 85-94%. Denies fever, chills, diaphoresis, syncope, dizziness.     Review of Systems   Constitutional:  Negative for chills, diaphoresis, fatigue, fever and unexpected weight change. "   Respiratory:  Positive for shortness of breath (On 2L NC, see HPI). Negative for cough, wheezing and stridor.    Cardiovascular: Negative.  Negative for chest pain, palpitations, leg swelling and claudication.   Neurological: Negative.  Negative for dizziness, syncope, weakness and light-headedness.   Psychiatric/Behavioral: Negative.            Objective     Physical Exam  Constitutional:       Appearance: Normal appearance. She is obese.   Eyes:      Extraocular Movements: Extraocular movements intact.   Cardiovascular:      Rate and Rhythm: Normal rate and regular rhythm.   Pulmonary:      Effort: Pulmonary effort is normal.      Breath sounds: Normal breath sounds.   Abdominal:      General: Abdomen is flat.      Palpations: Abdomen is soft.   Skin:     General: Skin is warm and dry.      Comments: Incisions healing well. C/D/I   Neurological:      General: No focal deficit present.      Mental Status: She is alert and oriented to person, place, and time. Mental status is at baseline.   Psychiatric:         Mood and Affect: Mood normal.         Behavior: Behavior normal.         Thought Content: Thought content normal.       Diagnostics:    CXR 06/10/24: images reviewed.        Assessment and Plan   78 y.o. female who presents to clinic for 2 week follow up s/p robotic left upper lobectomy with MLND.     Plan:    Follow up with Med/Onc for discussion of adjuvant systemic therapy  Plan for f/u in 6 months if chemotherapy/immunotherapy not pursued

## 2024-06-10 ENCOUNTER — HOSPITAL ENCOUNTER (OUTPATIENT)
Dept: RADIOLOGY | Facility: HOSPITAL | Age: 79
Discharge: HOME | End: 2024-06-10
Attending: STUDENT IN AN ORGANIZED HEALTH CARE EDUCATION/TRAINING PROGRAM
Payer: MEDICARE

## 2024-06-10 ENCOUNTER — TELEPHONE (OUTPATIENT)
Dept: PULMONOLOGY | Facility: CLINIC | Age: 79
End: 2024-06-10
Payer: MEDICARE

## 2024-06-10 ENCOUNTER — OFFICE VISIT (OUTPATIENT)
Dept: CARDIOTHORACIC SURGERY | Facility: CLINIC | Age: 79
End: 2024-06-10
Attending: STUDENT IN AN ORGANIZED HEALTH CARE EDUCATION/TRAINING PROGRAM
Payer: MEDICARE

## 2024-06-10 VITALS
HEART RATE: 78 BPM | WEIGHT: 181.19 LBS | OXYGEN SATURATION: 97 % | BODY MASS INDEX: 32.11 KG/M2 | SYSTOLIC BLOOD PRESSURE: 149 MMHG | DIASTOLIC BLOOD PRESSURE: 71 MMHG | HEIGHT: 63 IN

## 2024-06-10 DIAGNOSIS — C34.92 NSCLC OF LEFT LUNG: ICD-10-CM

## 2024-06-10 DIAGNOSIS — C34.12 MALIGNANT NEOPLASM OF UPPER LOBE OF LEFT LUNG: Primary | ICD-10-CM

## 2024-06-10 PROCEDURE — 1159F MED LIST DOCD IN RCRD: CPT | Mod: CPTII,S$GLB,, | Performed by: STUDENT IN AN ORGANIZED HEALTH CARE EDUCATION/TRAINING PROGRAM

## 2024-06-10 PROCEDURE — 99024 POSTOP FOLLOW-UP VISIT: CPT | Mod: S$GLB,,, | Performed by: STUDENT IN AN ORGANIZED HEALTH CARE EDUCATION/TRAINING PROGRAM

## 2024-06-10 PROCEDURE — 1101F PT FALLS ASSESS-DOCD LE1/YR: CPT | Mod: CPTII,S$GLB,, | Performed by: STUDENT IN AN ORGANIZED HEALTH CARE EDUCATION/TRAINING PROGRAM

## 2024-06-10 PROCEDURE — 3077F SYST BP >= 140 MM HG: CPT | Mod: CPTII,S$GLB,, | Performed by: STUDENT IN AN ORGANIZED HEALTH CARE EDUCATION/TRAINING PROGRAM

## 2024-06-10 PROCEDURE — 71046 X-RAY EXAM CHEST 2 VIEWS: CPT | Mod: TC

## 2024-06-10 PROCEDURE — 71046 X-RAY EXAM CHEST 2 VIEWS: CPT | Mod: 26,,, | Performed by: RADIOLOGY

## 2024-06-10 PROCEDURE — 1126F AMNT PAIN NOTED NONE PRSNT: CPT | Mod: CPTII,S$GLB,, | Performed by: STUDENT IN AN ORGANIZED HEALTH CARE EDUCATION/TRAINING PROGRAM

## 2024-06-10 PROCEDURE — 3078F DIAST BP <80 MM HG: CPT | Mod: CPTII,S$GLB,, | Performed by: STUDENT IN AN ORGANIZED HEALTH CARE EDUCATION/TRAINING PROGRAM

## 2024-06-10 PROCEDURE — 99999 PR PBB SHADOW E&M-EST. PATIENT-LVL IV: CPT | Mod: PBBFAC,,, | Performed by: STUDENT IN AN ORGANIZED HEALTH CARE EDUCATION/TRAINING PROGRAM

## 2024-06-10 PROCEDURE — 3288F FALL RISK ASSESSMENT DOCD: CPT | Mod: CPTII,S$GLB,, | Performed by: STUDENT IN AN ORGANIZED HEALTH CARE EDUCATION/TRAINING PROGRAM

## 2024-06-10 NOTE — TELEPHONE ENCOUNTER
----- Message from Eveyln Nam RN sent at 6/10/2024  9:45 AM CDT -----  Regarding: Appointment  Good Morning,    Dr Fay saw her in the office today for a post op from her lung cancer surgery and she tried to schedule a follow up with Dr Prater for her COPD and was given December. Are you able to get her a sooner appointment?     Thanks,  Evelyn

## 2024-06-10 NOTE — TELEPHONE ENCOUNTER
Patient is scheduled on 10/18/24 at 11:30 AM with Dr Prater. Patient is added to the waiting list.

## 2024-06-13 LAB
DNA RANGE(S) EXAMINED NAR: NORMAL
GENE DIS ANL INTERP-IMP: POSITIVE
GENE DIS ASSESSED: NORMAL
GENE MUT TESTED BLD/T: 7.4 M/MB
MSI CA SPEC-IMP: NORMAL
PD-L1 BY 22C3 TISS IMSTN DOC: POSITIVE
REASON FOR STUDY: NORMAL
TEMPUS FUSIONADDENDUM: NORMAL
TEMPUS LCA: NORMAL
TEMPUS PD-L1 (22C3) COMBINED POSITIVE SCORE: 1
TEMPUS PD-L1 (22C3) TUMOR PROPORTION SCORE: 1 %
TEMPUS PERTINENTNEGATIVES: NORMAL
TEMPUS PORTAL: NORMAL
TEMPUS THERAPY1: NORMAL
TEMPUS THERAPY2: NORMAL
TEMPUS THERAPY3: NORMAL
TEMPUS THERAPY4: NORMAL
TEMPUS THERAPY5: NORMAL
TEMPUS THERAPY6: NORMAL
TEMPUS THERAPY7: NORMAL
TEMPUS THERAPYCOUNT: 7
TEMPUS TRIAL1: NORMAL
TEMPUS TRIAL2: NORMAL
TEMPUS TRIAL3: NORMAL
TEMPUS TRIALCOUNT: 3

## 2024-06-14 ENCOUNTER — TELEPHONE (OUTPATIENT)
Dept: HEMATOLOGY/ONCOLOGY | Facility: CLINIC | Age: 79
End: 2024-06-14
Payer: MEDICARE

## 2024-06-15 LAB
DNA RANGE(S) EXAMINED NAR: NORMAL
GENE DIS ANL INTERP-IMP: NORMAL
GENE DIS ASSESSED: NORMAL
GENE MUT TESTED BLD/T: 16.8 M/MB
MSI CA SPEC-IMP: NORMAL
PD-L1 BY 22C3 TISS IMSTN DOC: NEGATIVE
REASON FOR STUDY: NORMAL
TEMPUS FUSIONADDENDUM: NORMAL
TEMPUS LCA: NORMAL
TEMPUS PD-L1 (22C3) COMBINED POSITIVE SCORE: 3
TEMPUS PD-L1 (22C3) TUMOR PROPORTION SCORE: <1 %
TEMPUS PERTINENTNEGATIVES: NORMAL
TEMPUS PORTAL: NORMAL
TEMPUS TRIAL1: NORMAL
TEMPUS TRIAL2: NORMAL
TEMPUS TRIAL3: NORMAL
TEMPUS TRIALCOUNT: 3

## 2024-06-17 NOTE — PHYSICIAN QUERY
Please clarify the pathology findings:  Pathology findings noted above are ruled in/confirmed as diagnoses

## 2024-06-21 ENCOUNTER — OFFICE VISIT (OUTPATIENT)
Dept: HEMATOLOGY/ONCOLOGY | Facility: CLINIC | Age: 79
End: 2024-06-21
Payer: MEDICARE

## 2024-06-21 VITALS
HEIGHT: 63 IN | HEART RATE: 75 BPM | RESPIRATION RATE: 16 BRPM | WEIGHT: 182.31 LBS | SYSTOLIC BLOOD PRESSURE: 131 MMHG | OXYGEN SATURATION: 98 % | BODY MASS INDEX: 32.3 KG/M2 | TEMPERATURE: 98 F | DIASTOLIC BLOOD PRESSURE: 74 MMHG

## 2024-06-21 DIAGNOSIS — C34.12 MALIGNANT NEOPLASM OF UPPER LOBE OF LEFT LUNG: Primary | ICD-10-CM

## 2024-06-21 DIAGNOSIS — K63.9 LESION OF COLON: ICD-10-CM

## 2024-06-21 PROCEDURE — 1111F DSCHRG MED/CURRENT MED MERGE: CPT | Mod: CPTII,S$GLB,, | Performed by: HOSPITALIST

## 2024-06-21 PROCEDURE — 3078F DIAST BP <80 MM HG: CPT | Mod: CPTII,S$GLB,, | Performed by: HOSPITALIST

## 2024-06-21 PROCEDURE — 99205 OFFICE O/P NEW HI 60 MIN: CPT | Mod: S$GLB,,, | Performed by: HOSPITALIST

## 2024-06-21 PROCEDURE — 1101F PT FALLS ASSESS-DOCD LE1/YR: CPT | Mod: CPTII,S$GLB,, | Performed by: HOSPITALIST

## 2024-06-21 PROCEDURE — 3075F SYST BP GE 130 - 139MM HG: CPT | Mod: CPTII,S$GLB,, | Performed by: HOSPITALIST

## 2024-06-21 PROCEDURE — 99999 PR PBB SHADOW E&M-EST. PATIENT-LVL IV: CPT | Mod: PBBFAC,,, | Performed by: HOSPITALIST

## 2024-06-21 PROCEDURE — 3288F FALL RISK ASSESSMENT DOCD: CPT | Mod: CPTII,S$GLB,, | Performed by: HOSPITALIST

## 2024-06-21 PROCEDURE — 1159F MED LIST DOCD IN RCRD: CPT | Mod: CPTII,S$GLB,, | Performed by: HOSPITALIST

## 2024-06-21 PROCEDURE — 1125F AMNT PAIN NOTED PAIN PRSNT: CPT | Mod: CPTII,S$GLB,, | Performed by: HOSPITALIST

## 2024-06-21 NOTE — PROGRESS NOTES
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   4/2/2024 Imaging Significant Findings    CT C  - 2.1 x 1.5 cm ECHO medial pulmonary nodule, unchanged  - 1.6 x 1.3 cm ECHO ground-glass nodule, unchanged  - 0.8 x 0.5 cm RUL nodule, unchanged  - Bilateral noncalcified nodules, unchanged  - Diffuse bilateral centrilobular emphysema     4/16/2024 Procedure    Bronch w/ EBUS  ECHO, FNA  - Non-small cell carcinoma, favor adenocarcinoma (Positive: TTF-1; Negative: CDX2, GATA3, ER)    ECHO, TBBx  - Fragments of adenocarainoma    LN  - Negative: Station 7, 11L      4/29/2024 Imaging Significant Findings    PET CT  - 1.9 x 1.3 cm ECHO nodule, SUV 4  - 1.5 cm ECHO ground-glass nodule, background FDG uptake  - 0.8 cm RUL nodule, background uptake  - Small hypermetabolic focus in hepatic flexure of colon without CT correlate, SUV 5.1  - Simple appearing 4.3 cm left adnexal cyst     5/8/2024 Imaging Significant Findings    MRI Brain  - LORA     5/23/2024 Surgery    Left Upper Lobectomy  ECHO  -Two separate foci of adenocarcinoma: 2.4 cm solid nodule (2.2 cm invasive component) and 1.5 cm positive for adenocarcinoma  - VPI PL1 of the 2.2 cm nodule  - LVI negative  - All margins negative    Diaphragmatic Nodule  - Negative for malignancy, nodule forming hyalinized granulomas    Visceral Pleural Nodule  - Negative for malignancy    LLL Visceral Pleural Nodule  - Negative for malignancy, nodules forming hyalinized granuloma    Lingular Visceral Pleura  - Negative for malignancy, nodule forming hyalinized granuloma    LN  - Negative:  Station 9 (0/1), station 8 (0/5), station 7 (0/1), station 10 (0/3), station 11 (0/1), station 12 (0/1), station 11 (0/3), station 5 (0/1)    Path Staging: pT3, pN0    [facility] NGS/PD-L1, Smaller Lesion  - EGFR L858R  - RBM 10  - Negative: EGFR, ALK, BRAF,  "KRAS, ROS1, RET, MET, ERBB2  - PD-L1 1%    [facility] NGS/PD-L1, Larger Lesion  - SF3B1, TP53, BRAF G469A, ATRX  - Negative: EGFR, ALK, BRAF, KRAS, ROS1, RET, MET, ERBB2  - PD-L1 TPS <1%              HPI:     Melani Holloway is a 78 y.o. female with pmh significant for COPD, HTN, HR+ breast cancer on the R side (~dx'd 2008, s/p lumpectomy, adjuvant radiation, and 5 years of AI) and two separate adenocarcinomas of the ECHO as below who presents to establish care:    1) ECHO 2.4 cm adenocarcinoma (solid on imaging), Stage IA3 ([id number]), w/ multiple non-targetable mutations (SF3B1, TP53, BRAF G469A, ATRX) and PD-L1 <1%, s/p L upper division segmentectomy    2) ECHO 1.5 cm adenocarcinoma (GGO on imaging), Stage IA2 ([id number]), w/ an EGFR L858R mutation and a non-ationable RBM 10 mutation, and PD-L1 1%, s/p L upper division segmentectomy    Pt states that she feels "really good today". She feels that she tolerated the surgery well. She has been off supplemental O2 for around 1 week. She continues to use her incentive spirometer regularly, and says that she is up to 2000 on this. She notes that she was working in her yard yesterday (6/20/24). She is able to complete all ADLs and iADLs without significant issue. Her appetite is "pretty good". She says that she has lost a little weight since her surgery, but says that it has been stable recently.     Living Situation: She lives in [place] alone, but next door to her daughter. She does not have stairs in her house.     Tobacco Use: She smoked from age 16 until age 60, and she smoked 1 pack a day at her heaviest.     EtOH Use: "Occasionally", less than once every week.     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 She denies any known exposures.     Family Cancer History: Her mother had melanoma, her maternal grandfather had prostate cancer, her maternal grandmother had gastric cancer, a maternal aunt had both bladder and lung " cancer (smoker), a paternal aunt had some kind of cancer, her sister had breast cancer and lung cancer (smoker)      ROS:   As per HPI.       Physical Exam:       LMP  (LMP Unknown)                Physical Exam  Constitutional:       Appearance: Normal appearance.   HENT:      Head: Normocephalic and atraumatic.   Eyes:      Extraocular Movements: Extraocular movements intact.      Conjunctiva/sclera: Conjunctivae normal.      Pupils: Pupils are equal, round, and reactive to light.   Cardiovascular:      Rate and Rhythm: Normal rate and regular rhythm.      Heart sounds: No murmur heard.     No friction rub. No gallop.   Pulmonary:      Effort: Pulmonary effort is normal.      Breath sounds: No wheezing, rhonchi or rales.   Musculoskeletal:         General: Normal range of motion.      Right lower leg: No edema.      Left lower leg: No edema.   Skin:     General: Skin is warm and dry.   Neurological:      Mental Status: She is alert and oriented to person, place, and time.   Psychiatric:         Mood and Affect: Mood normal.         Thought Content: Thought content normal.         Judgment: Judgment normal.           Labs:   No results found for this or any previous visit (from the past 48 hour(s)).     Imaging:    See oncologic history above.     Path:  See oncologic history above.      Assessment and Plan:     Melani Holloway is a 78 y.o. female with pmh significant for COPD, HTN, HR+ breast cancer on the R side (~dx'd 2008, s/p lumpectomy, adjuvant radiation, and 5 years of AI) and two separate adenocarcinomas of the ECHO as below who presents to establish care:    1) ECHO 2.4 cm adenocarcinoma (solid on imaging), Stage IA3 ([id number]), w/ multiple non-targetable mutations (SF3B1, TP53, BRAF G469A, ATRX) and PD-L1 <1%, s/p L upper division segmentectomy    2) ECHO 1.5 cm adenocarcinoma (GGO on imaging), Stage IA2 (Y3nP7S6), w/ an EGFR L858R mutation and a non-ationable RBM 10 mutation, and PD-L1 1%, s/p L upper  division segmentectomy    ECHO Adenocarcinoma, 2 Synchronous Primaries  ECOG PS 0.  Patient presents today to establish care.  Oncologic history as above, however briefly, patient was initially found to have multiple ECHO nodules on imaging.  These nodules had different radiographic characteristics; the medial nodule appeared solid on CT and FDG avid on PET scan, while the more peripheral lesion appeared as a ground-glass opacity on CT without FDG uptake on PET-CT.  A left upper division segmentectomy on 5/23/24 revealed 2 adenocarcinomas.  Notably, however, NextGen sequencing revealed a host of different mutations in these separate lesions without overlap, indicating that they may represent synchronous primaries rather than a T3 disease.  I discussed the patient's diagnosis, reviewed her imaging, reviewed the next generation sequence testing, and discussed her stage with her and her daughter today.  We discussed that, as these 2 lesions appears to be separate primaries, they are both stage I cancers rather than a stage IIB single cancer and thus would not benefit from adjuvant systemic therapy.  Notably, imaging does also demonstrate a 0.8 cm RUL nodule which has been stable on multiple scans and did not demonstrate FDG uptake; recommend following this lesion was close interval imaging, perhaps more often than every 6 months as would otherwise be indicated the patient treated with surgical resection.    PLAN:   -- No plans for adjuvant systemic therapy  -- Message sent to surgical oncology  -- Recommend close interval imaging to monitor the RUL lesion  -- Return to med onc PRN      Hypermetabolic Hepatic Flexure Lesion  PET-CT with a small hypermetabolic focus in the hepatic flexure of the colon without CT correlate, SUV 5.1.  Patient states she has never received a colonoscopy or other colon cancer screening.  While her age would generally preclude surveillance colonoscopies, 1 undertaken for diagnostic benefit may  still be fruitful, especially given the patient's otherwise good performance status.  Patient states that she would be amenable to this.  -- Message sent to Dr. Greenfield (PCP)      The above information has been reviewed with the patient and all questions have been answered to their apparent satisfaction.  They understand that they can call the clinic with any questions.    Janak Alfred MD  Hematology/Oncology  [facility]      Route Chart for Scheduling    Med Onc Chart Routing      Follow up with physician . Follow up as needed.   Follow up with TOMMY    Infusion scheduling note    Injection scheduling note    Labs    Imaging    Pharmacy appointment    Other referrals                      Disclaimer: This note was prepared using voice recognition system and is likely to have sound alike errors.  Please call me with any questions.

## 2024-06-21 NOTE — Clinical Note
Good evening,   I saw this patient for her lung cancer, now s/p resection.   Her PET CT demonstrated a small hypermetabolic focus in the hepatic flexure w/o an underlying CT correlate. Sounds like she's never participated in colon cancer screening, but on discussing the imaging results, she is amenable. Wondering your thoughts? She's 78, but a very fit 78 at that.   Thanks! Jay

## 2024-06-21 NOTE — Clinical Note
Evening!  The next gen sequencing for the two lesions actually looks completely different (both lists of mutations are at the top of my note in the onc history, with no common overlapping mutations). Combined with the fact that both lesions looked different radiographically (one solid w/ FDG uptake, the other GGO w/o FDG avidity), I'm inclined to call these two synchronous primaries rather than a T3 disease. In that case, I would not offer adjuvant systemic therapy. That said, I don't usually use NGS like this - what do you think?   Thanks! Jay

## 2024-06-22 NOTE — PHYSICIAN QUERY
Due to conflicting clinical picture, please clarify the procedure associated with the clinical findings:  Patient underwent an anatomical segmentectomy

## 2024-06-24 DIAGNOSIS — C34.92 NSCLC OF LEFT LUNG: Primary | ICD-10-CM

## 2024-06-26 ENCOUNTER — TELEPHONE (OUTPATIENT)
Dept: HEMATOLOGY/ONCOLOGY | Facility: CLINIC | Age: 79
End: 2024-06-26
Payer: MEDICARE

## 2024-06-26 LAB
FINAL PATHOLOGIC DIAGNOSIS: NORMAL
FROZEN SECTION DIAGNOSIS: NORMAL
GROSS: NORMAL
Lab: NORMAL
SUPPLEMENTAL DIAGNOSIS: NORMAL

## 2024-07-02 ENCOUNTER — OFFICE VISIT (OUTPATIENT)
Dept: PULMONOLOGY | Facility: CLINIC | Age: 79
End: 2024-07-02
Payer: MEDICARE

## 2024-07-02 VITALS
HEIGHT: 63 IN | WEIGHT: 183.44 LBS | OXYGEN SATURATION: 96 % | HEART RATE: 64 BPM | DIASTOLIC BLOOD PRESSURE: 70 MMHG | BODY MASS INDEX: 32.5 KG/M2 | SYSTOLIC BLOOD PRESSURE: 136 MMHG

## 2024-07-02 DIAGNOSIS — J98.01 BRONCHOSPASM: ICD-10-CM

## 2024-07-02 DIAGNOSIS — C34.12 ADENOCARCINOMA OF UPPER LOBE OF LEFT LUNG: ICD-10-CM

## 2024-07-02 DIAGNOSIS — J31.0 CHRONIC RHINITIS: ICD-10-CM

## 2024-07-02 DIAGNOSIS — J43.9 PULMONARY EMPHYSEMA, UNSPECIFIED EMPHYSEMA TYPE: Primary | ICD-10-CM

## 2024-07-02 PROCEDURE — 3288F FALL RISK ASSESSMENT DOCD: CPT | Mod: CPTII,S$GLB,, | Performed by: INTERNAL MEDICINE

## 2024-07-02 PROCEDURE — 1160F RVW MEDS BY RX/DR IN RCRD: CPT | Mod: CPTII,S$GLB,, | Performed by: INTERNAL MEDICINE

## 2024-07-02 PROCEDURE — 3075F SYST BP GE 130 - 139MM HG: CPT | Mod: CPTII,S$GLB,, | Performed by: INTERNAL MEDICINE

## 2024-07-02 PROCEDURE — 1159F MED LIST DOCD IN RCRD: CPT | Mod: CPTII,S$GLB,, | Performed by: INTERNAL MEDICINE

## 2024-07-02 PROCEDURE — 1101F PT FALLS ASSESS-DOCD LE1/YR: CPT | Mod: CPTII,S$GLB,, | Performed by: INTERNAL MEDICINE

## 2024-07-02 PROCEDURE — 99215 OFFICE O/P EST HI 40 MIN: CPT | Mod: S$GLB,,, | Performed by: INTERNAL MEDICINE

## 2024-07-02 PROCEDURE — 99999 PR PBB SHADOW E&M-EST. PATIENT-LVL III: CPT | Mod: PBBFAC,,, | Performed by: INTERNAL MEDICINE

## 2024-07-02 PROCEDURE — 3078F DIAST BP <80 MM HG: CPT | Mod: CPTII,S$GLB,, | Performed by: INTERNAL MEDICINE

## 2024-07-02 RX ORDER — ALBUTEROL SULFATE 90 UG/1
2 AEROSOL, METERED RESPIRATORY (INHALATION) EVERY 6 HOURS PRN
Qty: 18 G | Refills: 6 | Status: SHIPPED | OUTPATIENT
Start: 2024-07-02

## 2024-07-02 NOTE — ASSESSMENT & PLAN NOTE
Currently thought to be in remission following resection.  The patient feels great following her lung resection.  She has pending a repeat CT thorax in September.  -continue to follow up with Hematology Oncology.

## 2024-07-02 NOTE — ASSESSMENT & PLAN NOTE
With previous smoking history and emphysema on CT scan.  She is on a regimen of Breo and p.r.n. albuterol.  She states her breathing is very well at this time and she is curious about stopping inhaler therapy.  -we talked about stopping Breo and keeping an albuterol rescue inhaler on her person at all times during this trial.  If she notes no difference in her breathing and is not noticing an increase in albuterol use than it is safe to continue this plan.  -she is no longer using her supplemental oxygen.  She has questions on whether or not it is safe to discontinue this service.  I advised her to complete a 6 minute walk test and repeat pulmonary function studies before we do this.
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
## 2024-07-07 ENCOUNTER — PATIENT MESSAGE (OUTPATIENT)
Dept: PULMONOLOGY | Facility: CLINIC | Age: 79
End: 2024-07-07
Payer: MEDICARE

## 2024-07-09 RX ORDER — FLUTICASONE FUROATE AND VILANTEROL TRIFENATATE 50; 25 UG/1; UG/1
2 POWDER RESPIRATORY (INHALATION) DAILY
Qty: 60 EACH | Refills: 6 | Status: SHIPPED | OUTPATIENT
Start: 2024-07-09 | End: 2025-07-09

## 2024-08-26 ENCOUNTER — TELEPHONE (OUTPATIENT)
Dept: HEMATOLOGY/ONCOLOGY | Facility: CLINIC | Age: 79
End: 2024-08-26
Payer: MEDICARE

## 2024-08-26 NOTE — TELEPHONE ENCOUNTER
I called patient to review [employer] financial assistance, offered to assist with FA or sent mychart, patient stated not needed at this time. Her insurance has covered everything. Appreciative of call.

## 2024-09-15 NOTE — PROGRESS NOTES
"Subjective     Patient ID: Melani Holloway is a 79 y.o. female.    Chief Complaint: No chief complaint on file.    Diagnosis:  NSCLC    Pre-operative therapy: none    Procedure(s) and date(s): 05/23/24 - robotic left upper lobectomy with MLND    Pathology:  2.4 cm acinar and 1.5 cm moderately differentiated adenocarcinoma. VPI present in 2.2cm nodule ). DILEEP present. LVI negative. Margins negative (closest 1.5 cm; parenchymal). LN stations 5,7,8,9,10,11,12 negative. Two synchronous primaries - T1b and T1c adenocarcinoma      [facility] NGS/PD-L1, Smaller Lesion  - EGFR L858R  - RBM 10  - Negative: EGFR, ALK, BRAF, KRAS, ROS1, RET, MET, ERBB2  - PD-L1 1%     [facility] NGS/PD-L1, Larger Lesion  - SF3B1, TP53, BRAF G469A, ATRX  - Negative: EGFR, ALK, BRAF, KRAS, ROS1, RET, MET, ERBB2  - PD-L1 TPS <1%    Post-operative therapy: none    HPI  79 y.o.  female who presents to clinic for 3 month follow up s/p robotic left upper lobectomy with MLND. Tolerated procedure well; however, air leak seen immediately post-operation and chest tube (24Fr sorin) placed to suction. Subsequently, CXR in PACU showed pneumothorax and patient HDS, VSS, afebrile, SpO2 >92% on 3L NC. Ultimately, she was stepped up to the SICU overnight for closer observation and stepped back down to the floor on POD 1 with a continuous air leak noted. Went into rate controlled afib on POD 2 and had worsening subcutaneous emphysema secondary to a clogged chest tube. Issues resolved by POD 3, and she underwent successful clamp trial with subsequent chest tube removal on POD 4. Discharged home on POD 5.  Discussed at Thoracic Tumor Board 06/05/24 which recommended "Refer to oncology for discussion of adjuvant therapy although will weight risk benefits given age. Recommend close first interval scan given right sided nodules."  Rediscused at tumor board 6/26/24 with recommendation "No indication for adjuvant therapy with tumors less than 4cm. Close surveillance given " "EGFR and high risk features". Today, she reports she is doing well. She states had a previous URI, still experiencing a nonproductive cough and postnasal drip. She denies fevers. She no longer is using home oxygen. She endorses occasional neuropathic pain, not currently taking gabapentin.    Review of Systems   Constitutional:  Negative for chills, diaphoresis, fatigue, fever and unexpected weight change.   HENT:  Positive for postnasal drip and rhinorrhea.    Respiratory:  Positive for cough (productive). Negative for shortness of breath, wheezing and stridor.    Cardiovascular: Negative.  Negative for chest pain, palpitations, leg swelling and claudication.   Neurological: Negative.  Negative for dizziness, syncope, weakness and light-headedness.   Psychiatric/Behavioral: Negative.          Objective     Vitals:    09/19/24 1138   BP: (!) 153/69   Pulse: (!) 58   SpO2: (!) 94%   Weight: 81.6 kg (179 lb 14.3 oz)   Height: 5' 3" (1.6 m)   PainSc: 0-No pain     Physical Exam  Constitutional:       Appearance: Normal appearance. She is obese.   Eyes:      Extraocular Movements: Extraocular movements intact.   Cardiovascular:      Rate and Rhythm: Normal rate and regular rhythm.   Pulmonary:      Effort: Pulmonary effort is normal. No respiratory distress.      Breath sounds: Normal breath sounds.   Abdominal:      General: Abdomen is flat.      Palpations: Abdomen is soft.   Skin:     General: Skin is warm and dry.      Comments: Incisions healing well. C/D/I   Neurological:      General: No focal deficit present.      Mental Status: She is alert and oriented to person, place, and time. Mental status is at baseline.   Psychiatric:         Mood and Affect: Mood normal.         Behavior: Behavior normal.         Thought Content: Thought content normal.     Diagnostics:    CXR 06/10/24: images reviewed.     CT chest 9/19/24: images reviewed  Slightly enlarged paratracheal node?     Assessment and Plan   79 y.o.  female " with Stage IA3 (cT1c, cN0, cM0), with two separate stage 1 tumors with different genetic makeup identified in the same lobe.  She has several high risk features and presents today for 3 month follow up s/p robotic left upper lobectomy with MLND. Doing well post-op however there is concern for possible enlargement of a paratracheal lymph node. No other evidence of new/recurrent disease.        Plan:  Discuss in tumor board next week  RTC in 3 months with CT chest

## 2024-09-19 ENCOUNTER — HOSPITAL ENCOUNTER (OUTPATIENT)
Dept: RADIOLOGY | Facility: HOSPITAL | Age: 79
Discharge: HOME | End: 2024-09-19
Attending: PHYSICIAN ASSISTANT
Payer: MEDICARE

## 2024-09-19 ENCOUNTER — OFFICE VISIT (OUTPATIENT)
Dept: CARDIOTHORACIC SURGERY | Facility: CLINIC | Age: 79
End: 2024-09-19
Payer: MEDICARE

## 2024-09-19 ENCOUNTER — TELEPHONE (OUTPATIENT)
Dept: CARDIOTHORACIC SURGERY | Facility: CLINIC | Age: 79
End: 2024-09-19
Payer: MEDICARE

## 2024-09-19 VITALS
WEIGHT: 179.88 LBS | HEIGHT: 63 IN | SYSTOLIC BLOOD PRESSURE: 153 MMHG | BODY MASS INDEX: 31.87 KG/M2 | HEART RATE: 58 BPM | OXYGEN SATURATION: 94 % | DIASTOLIC BLOOD PRESSURE: 69 MMHG

## 2024-09-19 DIAGNOSIS — C34.92 NSCLC OF LEFT LUNG: ICD-10-CM

## 2024-09-19 DIAGNOSIS — C34.92 NSCLC OF LEFT LUNG: Primary | ICD-10-CM

## 2024-09-19 PROCEDURE — 3077F SYST BP >= 140 MM HG: CPT | Mod: CPTII,S$GLB,, | Performed by: STUDENT IN AN ORGANIZED HEALTH CARE EDUCATION/TRAINING PROGRAM

## 2024-09-19 PROCEDURE — 71250 CT THORAX DX C-: CPT | Mod: TC

## 2024-09-19 PROCEDURE — 1126F AMNT PAIN NOTED NONE PRSNT: CPT | Mod: CPTII,S$GLB,, | Performed by: STUDENT IN AN ORGANIZED HEALTH CARE EDUCATION/TRAINING PROGRAM

## 2024-09-19 PROCEDURE — 3078F DIAST BP <80 MM HG: CPT | Mod: CPTII,S$GLB,, | Performed by: STUDENT IN AN ORGANIZED HEALTH CARE EDUCATION/TRAINING PROGRAM

## 2024-09-19 PROCEDURE — 99999 PR PBB SHADOW E&M-EST. PATIENT-LVL III: CPT | Mod: PBBFAC,,, | Performed by: STUDENT IN AN ORGANIZED HEALTH CARE EDUCATION/TRAINING PROGRAM

## 2024-09-19 PROCEDURE — 1101F PT FALLS ASSESS-DOCD LE1/YR: CPT | Mod: CPTII,S$GLB,, | Performed by: STUDENT IN AN ORGANIZED HEALTH CARE EDUCATION/TRAINING PROGRAM

## 2024-09-19 PROCEDURE — 99214 OFFICE O/P EST MOD 30 MIN: CPT | Mod: S$GLB,,, | Performed by: STUDENT IN AN ORGANIZED HEALTH CARE EDUCATION/TRAINING PROGRAM

## 2024-09-19 PROCEDURE — 1159F MED LIST DOCD IN RCRD: CPT | Mod: CPTII,S$GLB,, | Performed by: STUDENT IN AN ORGANIZED HEALTH CARE EDUCATION/TRAINING PROGRAM

## 2024-09-19 PROCEDURE — 3288F FALL RISK ASSESSMENT DOCD: CPT | Mod: CPTII,S$GLB,, | Performed by: STUDENT IN AN ORGANIZED HEALTH CARE EDUCATION/TRAINING PROGRAM

## 2024-09-19 NOTE — TELEPHONE ENCOUNTER
Attempted to contact patient to inquire if she is attending today's appointment. No answer. Left a voice message with return call number.     Thanks,  MINDI Mas, MSN, RN, CCM  RN Navigator   Thoracic Surgery

## 2024-10-18 ENCOUNTER — HOSPITAL ENCOUNTER (OUTPATIENT)
Dept: PULMONOLOGY | Facility: CLINIC | Age: 79
Discharge: HOME | End: 2024-10-18
Payer: MEDICARE

## 2024-10-18 ENCOUNTER — OFFICE VISIT (OUTPATIENT)
Dept: PULMONOLOGY | Facility: CLINIC | Age: 79
End: 2024-10-18
Payer: MEDICARE

## 2024-10-18 VITALS — WEIGHT: 181.88 LBS | HEIGHT: 63 IN | BODY MASS INDEX: 32.23 KG/M2

## 2024-10-18 VITALS
OXYGEN SATURATION: 98 % | HEIGHT: 63 IN | WEIGHT: 181 LBS | DIASTOLIC BLOOD PRESSURE: 65 MMHG | SYSTOLIC BLOOD PRESSURE: 145 MMHG | BODY MASS INDEX: 32.07 KG/M2 | HEART RATE: 65 BPM

## 2024-10-18 DIAGNOSIS — J31.0 CHRONIC RHINITIS: ICD-10-CM

## 2024-10-18 DIAGNOSIS — J44.1 COPD WITH ACUTE EXACERBATION: Primary | ICD-10-CM

## 2024-10-18 DIAGNOSIS — C34.12 ADENOCARCINOMA OF UPPER LOBE OF LEFT LUNG: ICD-10-CM

## 2024-10-18 DIAGNOSIS — J98.01 BRONCHOSPASM: ICD-10-CM

## 2024-10-18 DIAGNOSIS — J43.2 CENTRILOBULAR EMPHYSEMA: ICD-10-CM

## 2024-10-18 LAB
DLCO SINGLE BREATH LLN: 13.32
DLCO SINGLE BREATH PRE REF: 50.8 %
DLCO SINGLE BREATH REF: 19.05
DLCOC SBVA LLN: 2.53
DLCOC SBVA REF: 3.99
DLCOC SINGLE BREATH LLN: 13.32
DLCOC SINGLE BREATH REF: 19.05
DLCOCSBVAULN: 5.45
DLCOCSINGLEBREATHULN: 24.79
DLCOSINGLEBREATHULN: 24.79
DLCOSINGLEBREATHZSCORE: -2.69
DLCOVA LLN: 2.53
DLCOVA PRE REF: 67.3 %
DLCOVA PRE: 2.69 ML/(MIN*MMHG*L) (ref 2.53–5.45)
DLCOVA REF: 3.99
DLCOVAULN: 5.45
ERVN2 LLN: -16449.53
ERVN2 PRE REF: 128.8 %
ERVN2 PRE: 0.61 L (ref -16449.53–16450.47)
ERVN2 REF: 0.47
ERVN2ULN: ABNORMAL
FEF 25 75 LLN: 0.77
FEF 25 75 PRE REF: 36.6 %
FEF 25 75 REF: 2.17
FET100 CHG: 12.7 %
FEV05 LLN: 0.64
FEV05 REF: 1.5
FEV1 CHG: 0.8 %
FEV1 FVC LLN: 62
FEV1 FVC PRE REF: 82.9 %
FEV1 FVC REF: 77
FEV1 LLN: 1.28
FEV1 PRE REF: 82.2 %
FEV1 REF: 1.84
FEV1 VOL CHG: 0.01
FEV1FVCZSCORE: -1.48
FEV1ZSCORE: -0.98
FRCN2 LLN: 1.84
FRCN2 PRE REF: 120.6 %
FRCN2 REF: 2.67
FRCN2ULN: 3.49
FVC CHG: 4.6 %
FVC LLN: 1.69
FVC PRE REF: 97.9 %
FVC REF: 2.42
FVC VOL CHG: 0.11
FVCZSCORE: -0.11
ICN2REF: 1.76
IVC PRE: 2.37 L (ref 1.69–3.2)
IVC SINGLE BREATH LLN: 1.69
IVC SINGLE BREATH PRE REF: 97.7 %
IVC SINGLE BREATH REF: 2.42
IVCSINGLEBREATHULN: 3.2
LLN IC N2: -16448.24
PEF LLN: 2.89
PEF PRE REF: 69.6 %
PEF REF: 4.54
PHYSICIAN COMMENT: ABNORMAL
POST FEF 25 75: 0.72 L/S (ref 0.77–3.56)
POST FET 100: 6.86 SEC
POST FEV1 FVC: 61.54 % (ref 62.19–89.9)
POST FEV1: 1.53 L (ref 1.28–2.37)
POST FEV5: 1.16 L (ref 0.64–2.36)
POST FVC: 2.48 L (ref 1.69–3.2)
POST PEF: 3.46 L/S (ref 2.89–6.2)
PRE DLCO: 9.69 ML/(MIN*MMHG) (ref 13.32–24.79)
PRE FEF 25 75: 0.79 L/S (ref 0.77–3.56)
PRE FET 100: 6.09 SEC
PRE FEV05 REF: 74.5 %
PRE FEV1 FVC: 63.84 % (ref 62.19–89.9)
PRE FEV1: 1.51 L (ref 1.28–2.37)
PRE FEV5: 1.12 L (ref 0.64–2.36)
PRE FRC N2: 3.22 L (ref 1.84–3.49)
PRE FVC: 2.37 L (ref 1.69–3.2)
PRE IC N2: 1.76 L (ref -16448.24–16451.76)
PRE PEF: 3.16 L/S (ref 2.89–6.2)
PRE REF IC N2: 100.2 %
RVN2 LLN: 1.62
RVN2 PRE REF: 118.9 %
RVN2 PRE: 2.61 L (ref 1.62–2.77)
RVN2 REF: 2.19
RVN2TLCN2 LLN: 36.91
RVN2TLCN2 PRE REF: 112.6 %
RVN2TLCN2 PRE: 52.35 % (ref 36.91–56.09)
RVN2TLCN2 REF: 46.5
RVN2TLCN2ULN: 56.09
RVN2ULN: 2.77
TLCN2 LLN: 3.78
TLCN2 PRE REF: 104.3 %
TLCN2 PRE: 4.98 L (ref 3.78–5.76)
TLCN2 REF: 4.77
TLCN2ULN: 5.76
TLCN2ZSCORE: 0.34
ULN IC N2: ABNORMAL
VA PRE: 3.6 L (ref 4.62–4.62)
VA SINGLE BREATH LLN: 4.62
VA SINGLE BREATH PRE REF: 78 %
VA SINGLE BREATH REF: 4.62
VASINGLEBREATHULN: 4.62
VCMAXN2 LLN: 1.69
VCMAXN2 PRE REF: 97.9 %
VCMAXN2 PRE: 2.37 L (ref 1.69–3.2)
VCMAXN2 REF: 2.42
VCMAXN2ULN: 3.2

## 2024-10-18 PROCEDURE — 94618 PULMONARY STRESS TESTING: CPT | Mod: S$GLB,,, | Performed by: INTERNAL MEDICINE

## 2024-10-18 PROCEDURE — 94729 DIFFUSING CAPACITY: CPT | Mod: S$GLB,,, | Performed by: INTERNAL MEDICINE

## 2024-10-18 PROCEDURE — 94727 GAS DIL/WSHOT DETER LNG VOL: CPT | Mod: S$GLB,,, | Performed by: INTERNAL MEDICINE

## 2024-10-18 PROCEDURE — 94060 EVALUATION OF WHEEZING: CPT | Mod: 59,S$GLB,, | Performed by: INTERNAL MEDICINE

## 2024-10-18 PROCEDURE — 99999 PR PBB SHADOW E&M-EST. PATIENT-LVL IV: CPT | Mod: PBBFAC,,, | Performed by: INTERNAL MEDICINE

## 2024-10-18 RX ORDER — FLUTICASONE FUROATE AND VILANTEROL TRIFENATATE 100; 25 UG/1; UG/1
1 POWDER RESPIRATORY (INHALATION) DAILY
Qty: 60 EACH | Refills: 11 | Status: SHIPPED | OUTPATIENT
Start: 2024-10-18 | End: 2024-12-17

## 2024-10-18 NOTE — PROCEDURES
Melani Holloway is a 79 y.o.   female patient, who presents for a 6 minute walk test ordered by MD Moi.  The diagnosis is COPD/Emphysema.  The patient's BMI is 32.2 kg/m2.  Predicted distance (lower limit of normal) is 204.84 meters.      Test Results:    The test was completed without stopping.  The total time walked was 360 seconds.  During walking, the patient reported:  No complaints.  The patient used no assistive devices during testing.     10/18/2024---------Distance: 365.76 meters (1200 feet)     O2 Sat % Supplemental Oxygen Heart Rate Blood Pressure Leonardo Scale   Pre-exercise  (Resting) 98 % Room Air 65 bpm 145/65 mmHg 0.5   During Exercise 95 % Room Air 92 bpm 164/76 mmHg 1   Post-exercise  (Recovery) 97 % Room Air  79 bpm 148/70 mmHg      Recovery Time: 90 seconds    Performing nurse/tech: CHRISTINA Borja      PREVIOUS STUDY:   04/02/2024---------Distance: 365.76 meters (1200 feet)       O2 Sat % Supplemental Oxygen Heart Rate Blood Pressure Leonardo Scale   Pre-exercise  (Resting) 94 % Room Air 58 bpm 168/77 mmHg 1   During Exercise 93 % Room Air 79 bpm 181/74 mmHg 7-8   Post-exercise  (Recovery) 97 % Room Air  71 bpm           CLINICAL INTERPRETATION:  Six minute walk distance is 365.76 meters (1200 feet) with very light dyspnea.  During exercise, there was no significant desaturation while breathing room air.  Both blood pressure and heart rate remained stable with walking.  The patient did not report non-pulmonary symptoms during exercise.  Since the previous study in April 2024, exercise capacity is unchanged.  Based upon age and body mass index, exercise capacity is normal.
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
## 2024-10-18 NOTE — ASSESSMENT & PLAN NOTE
Currently thought to be in remission following resection.  The patient feels great following her lung resection.  She has pending a repeat CT thorax in December.  There are some scattered nodular opacities on her repeat CT scan from September of 2024.  We will follow these closely on her repeat scans.    - Continue to follow with CT surgery  - continue to follow up with Hematology Oncology.

## 2024-10-18 NOTE — ASSESSMENT & PLAN NOTE
With previous smoking history and emphysema on CT scan.  She is on a regimen of Breo and p.r.n. albuterol.  She states her breathing is very well at this time.  -Breo refilled today.  She feels she derives benefit from this treatment so we will keep it on.  -she is no longer using her supplemental oxygen.  She has questions on whether or not it is safe to discontinue this service.  Today she completed a 6 minute walk test and did not desaturate.  I have discontinued her oxygen today.  - PCV 20 given today  - patient we will get her influenza vaccination about 2 weeks

## 2024-11-07 ENCOUNTER — LAB VISIT (OUTPATIENT)
Dept: LAB | Facility: HOSPITAL | Age: 79
End: 2024-11-07
Attending: FAMILY MEDICINE
Payer: MEDICARE

## 2024-11-07 DIAGNOSIS — D64.9 ANEMIA, UNSPECIFIED TYPE: ICD-10-CM

## 2024-11-07 DIAGNOSIS — R73.9 HYPERGLYCEMIA: ICD-10-CM

## 2024-11-07 LAB
ANION GAP SERPL CALC-SCNC: 8 MMOL/L (ref 8–16)
BASOPHILS # BLD AUTO: 0.02 K/UL (ref 0–0.2)
BASOPHILS NFR BLD: 0.4 % (ref 0–1.9)
BUN SERPL-MCNC: 15 MG/DL (ref 8–23)
CALCIUM SERPL-MCNC: 9.2 MG/DL (ref 8.7–10.5)
CHLORIDE SERPL-SCNC: 105 MMOL/L (ref 95–110)
CO2 SERPL-SCNC: 26 MMOL/L (ref 23–29)
CREAT SERPL-MCNC: 0.7 MG/DL (ref 0.5–1.4)
DIFFERENTIAL METHOD BLD: ABNORMAL
EOSINOPHIL # BLD AUTO: 0 K/UL (ref 0–0.5)
EOSINOPHIL NFR BLD: 0.9 % (ref 0–8)
ERYTHROCYTE [DISTWIDTH] IN BLOOD BY AUTOMATED COUNT: 14.2 % (ref 11.5–14.5)
EST. GFR  (NO RACE VARIABLE): >60 ML/MIN/1.73 M^2
ESTIMATED AVG GLUCOSE: 114 MG/DL (ref 68–131)
GLUCOSE SERPL-MCNC: 93 MG/DL (ref 70–110)
HBA1C MFR BLD: 5.6 % (ref 4–5.6)
HCT VFR BLD AUTO: 40.6 % (ref 37–48.5)
HGB BLD-MCNC: 13.1 G/DL (ref 12–16)
IMM GRANULOCYTES # BLD AUTO: 0.03 K/UL (ref 0–0.04)
IMM GRANULOCYTES NFR BLD AUTO: 0.6 % (ref 0–0.5)
LYMPHOCYTES # BLD AUTO: 1.8 K/UL (ref 1–4.8)
LYMPHOCYTES NFR BLD: 38.3 % (ref 18–48)
MCH RBC QN AUTO: 26.8 PG (ref 27–31)
MCHC RBC AUTO-ENTMCNC: 32.3 G/DL (ref 32–36)
MCV RBC AUTO: 83 FL (ref 82–98)
MONOCYTES # BLD AUTO: 0.6 K/UL (ref 0.3–1)
MONOCYTES NFR BLD: 13.5 % (ref 4–15)
NEUTROPHILS # BLD AUTO: 2.2 K/UL (ref 1.8–7.7)
NEUTROPHILS NFR BLD: 46.3 % (ref 38–73)
NRBC BLD-RTO: 0 /100 WBC
PLATELET # BLD AUTO: 223 K/UL (ref 150–450)
PMV BLD AUTO: 12.2 FL (ref 9.2–12.9)
POTASSIUM SERPL-SCNC: 3.8 MMOL/L (ref 3.5–5.1)
RBC # BLD AUTO: 4.88 M/UL (ref 4–5.4)
SODIUM SERPL-SCNC: 139 MMOL/L (ref 136–145)
WBC # BLD AUTO: 4.67 K/UL (ref 3.9–12.7)

## 2024-11-07 PROCEDURE — 83036 HEMOGLOBIN GLYCOSYLATED A1C: CPT | Performed by: FAMILY MEDICINE

## 2024-11-07 PROCEDURE — 85025 COMPLETE CBC W/AUTO DIFF WBC: CPT | Performed by: FAMILY MEDICINE

## 2024-11-07 PROCEDURE — 80048 BASIC METABOLIC PNL TOTAL CA: CPT | Performed by: FAMILY MEDICINE

## 2024-12-04 ENCOUNTER — TELEPHONE (OUTPATIENT)
Dept: ENDOSCOPY | Facility: HOSPITAL | Age: 79
End: 2024-12-04

## 2024-12-04 ENCOUNTER — CLINICAL SUPPORT (OUTPATIENT)
Dept: ENDOSCOPY | Facility: HOSPITAL | Age: 79
End: 2024-12-04
Payer: MEDICARE

## 2024-12-04 VITALS — BODY MASS INDEX: 32.07 KG/M2 | HEIGHT: 63 IN | WEIGHT: 181 LBS

## 2024-12-04 DIAGNOSIS — R94.8 ABNORMAL PET SCAN OF COLON: ICD-10-CM

## 2024-12-04 DIAGNOSIS — C34.12 MALIGNANT NEOPLASM OF UPPER LOBE OF LEFT LUNG: ICD-10-CM

## 2024-12-04 NOTE — TELEPHONE ENCOUNTER
Referral for procedure from PAT appointment      Spoke to pt to schedule procedure(s) Colonoscopy       Physician to perform procedure(s) Dr. JASKARAN Willingham  Date of Procedure (s) 1/9/25  Arrival Time 6:00 AM  Time of Procedure(s) 7:00 AM   Location of Procedure(s) [facility] [address]  Type of Rx Prep sent to patient: Miralax  Instructions provided to patient via [facility]    Patient was informed on the following information and verbalized understanding. Screening questionnaire reviewed with patient and complete. If procedure requires anesthesia, a responsible adult needs to be present to accompany the patient home, patient cannot drive after receiving anesthesia. Appointment details are tentative, especially check-in time. Patient will receive a prep-op call 7 days prior to confirm check-in time for procedure. If applicable the patient should contact their pharmacy to verify Rx for procedure prep is ready for pick-up. Patient was advised to call the scheduling department at [phone] if pharmacy states no Rx is available. Patient was advised to call the endoscopy scheduling department if any questions or concerns arise.      [facility] Scheduling Department

## 2024-12-05 ENCOUNTER — PATIENT MESSAGE (OUTPATIENT)
Dept: ENDOSCOPY | Facility: HOSPITAL | Age: 79
End: 2024-12-05
Payer: MEDICARE

## 2024-12-10 ENCOUNTER — TELEPHONE (OUTPATIENT)
Dept: ENDOSCOPY | Facility: HOSPITAL | Age: 79
End: 2024-12-10
Payer: MEDICARE

## 2024-12-10 NOTE — TELEPHONE ENCOUNTER
----- Message from Isha sent at 2024 10:45 AM CST -----  Regardin/9 CL  The patient is currently under an internal pulmonologist, efren Healy. Is patient medically optimized by Pulmonology  for their upcoming scheduled Colonoscopy on 25.       Saw pulm in October, recommendation 3 mos f/u, Lobectomy in May

## 2024-12-10 NOTE — TELEPHONE ENCOUNTER
Dear Dr Prater,    Patient has a scheduled procedure Colonoscopy 1/9/25 and in order to ensure patient safety, we would like to confirm if he/she is medically optimized for the procedure.      Thank you for your prompt reply.    [facility] Endoscopy Scheduling

## 2024-12-11 NOTE — PROGRESS NOTES
"Subjective     Patient ID: Melani Holloway is a 79 y.o. female.    Chief Complaint: Follow-up    Diagnosis:  NSCLC    Pre-operative therapy: none    Procedure(s) and date(s): 05/23/24 - robotic left upper lobectomy with MLND    Pathology:  2.4 cm acinar and 1.5 cm moderately differentiated adenocarcinoma. VPI present in 2.2cm nodule ). DILEEP present. LVI negative. Margins negative (closest 1.5 cm; parenchymal). LN stations 5,7,8,9,10,11,12 negative. Two synchronous primaries - T1b and T1c adenocarcinoma      [facility] NGS/PD-L1, Smaller Lesion  - EGFR [id number]  - RBM 10  - Negative: EGFR, ALK, BRAF, KRAS, ROS1, RET, MET, ERBB2  - PD-L1 1%     [facility] NGS/PD-L1, Larger Lesion  - SF3B1, TP53, BRAF [id number], ATRX  - Negative: EGFR, ALK, BRAF, KRAS, ROS1, RET, MET, ERBB2  - PD-L1 TPS <1%    Post-operative therapy: none    HPI  79 y.o.  female who presents to clinic for 6 month follow up s/p robotic left upper lobectomy with MLND. Tolerated procedure well; however, she was stepped up to the SICU for closer observation and stepped back down to the floor on POD 1 with a continuous air leak noted. Post op also complicated by afib. Discharged home on POD 5.  Discussed at Thoracic Tumor Board 06/05/24 which recommended "Refer to oncology for discussion of adjuvant therapy although will weight risk benefits given age. Recommend close first interval scan given right sided nodules."  Rediscused at tumor board 6/26/24 with recommendation "No indication for adjuvant therapy with tumors less than 4cm. Close surveillance given EGFR and high risk features". Today, she reports she is doing well. She does report occasional chest tightness and "twinges" of pain. She has not been taking gabapentin     Review of Systems     Objective     Vitals:    12/19/24 0947   BP: (!) 150/82   Pulse: (!) 56   SpO2: 95%   Weight: 83.2 kg (183 lb 6.8 oz)   Height: 5' 3" (1.6 m)   PainSc: 0-No pain       Physical Exam  Constitutional:       Appearance: Normal " appearance. She is obese.   Eyes:      Extraocular Movements: Extraocular movements intact.   Cardiovascular:      Rate and Rhythm: Normal rate and regular rhythm.   Pulmonary:      Effort: Pulmonary effort is normal. No respiratory distress.      Breath sounds: Normal breath sounds.   Abdominal:      General: Abdomen is flat.      Palpations: Abdomen is soft.   Skin:     General: Skin is warm and dry.      Comments: Incisions healing well. C/D/I   Neurological:      General: No focal deficit present.      Mental Status: She is alert and oriented to person, place, and time. Mental status is at baseline.   Psychiatric:         Mood and Affect: Mood normal.         Behavior: Behavior normal.         Thought Content: Thought content normal.     Diagnostics:    CXR 06/10/24: images reviewed.     CT chest 9/19/24: images reviewed  Postoperative changes left apical segmentectomy and mediastinal lymph node dissection.  No findings suspicious for recurrent disease.  Interval left apical pleural effusion, nodular soft tissue thickening along the oblique fissure and bandlike opacities in the right upper lobe, these are favored to represent post resection changes.  Few prominent mediastinal lymph nodes are similar to prior. Allowing for the limitation without IV contrast.  New bibasal peripheral ground-glass airspace opacities with interlobular wall thickening associated with clusters of pulmonary micro nodules in tree-in-bud configuration and bronchial wall thickening most prominent in the right lower lobe.  These findings are suspicious for distal airway disease like bronchiectasis with possible superimposed aspiration or infection.  Scattered bilateral pulmonary nodules with mild increased size of left lower lobe solid pulmonary nodule.    Chest CT 12/19/24:   Impression:     1. Stable observation since previous examination.  For details see above.  2. Upper abdominal abnormalities are poorly characterized in this exam.  CT  abdomen with IV contrast is recommended if clinically indicated.    Assessment and Plan     79 y.o.  female with Stage IA3 (cT1c, cN0, cM0), with two separate stage 1 tumors with different genetic makeup identified in the same lobe.  She has several high risk features and presents today for 6 month follow up s/p robotic left upper lobectomy with MLND. Doing well post-op however there is concern for possible enlargement of a paratracheal lymph node. No other evidence of new/recurrent disease.        Plan:    RTC in 6 months with CT chest.   Send Rx for gabapentin 300mg qHS.

## 2024-12-19 ENCOUNTER — OFFICE VISIT (OUTPATIENT)
Dept: CARDIOTHORACIC SURGERY | Facility: CLINIC | Age: 79
End: 2024-12-19
Payer: MEDICARE

## 2024-12-19 ENCOUNTER — HOSPITAL ENCOUNTER (OUTPATIENT)
Dept: RADIOLOGY | Facility: HOSPITAL | Age: 79
Discharge: HOME | End: 2024-12-19
Attending: STUDENT IN AN ORGANIZED HEALTH CARE EDUCATION/TRAINING PROGRAM
Payer: MEDICARE

## 2024-12-19 VITALS
SYSTOLIC BLOOD PRESSURE: 150 MMHG | WEIGHT: 183.44 LBS | DIASTOLIC BLOOD PRESSURE: 82 MMHG | HEART RATE: 56 BPM | BODY MASS INDEX: 32.5 KG/M2 | HEIGHT: 63 IN | OXYGEN SATURATION: 95 %

## 2024-12-19 DIAGNOSIS — C34.92 NSCLC OF LEFT LUNG: Primary | ICD-10-CM

## 2024-12-19 DIAGNOSIS — C34.92 NSCLC OF LEFT LUNG: ICD-10-CM

## 2024-12-19 PROCEDURE — 3079F DIAST BP 80-89 MM HG: CPT | Mod: CPTII,S$GLB,, | Performed by: STUDENT IN AN ORGANIZED HEALTH CARE EDUCATION/TRAINING PROGRAM

## 2024-12-19 PROCEDURE — 99999 PR PBB SHADOW E&M-EST. PATIENT-LVL III: CPT | Mod: PBBFAC,,, | Performed by: STUDENT IN AN ORGANIZED HEALTH CARE EDUCATION/TRAINING PROGRAM

## 2024-12-19 PROCEDURE — 1159F MED LIST DOCD IN RCRD: CPT | Mod: CPTII,S$GLB,, | Performed by: STUDENT IN AN ORGANIZED HEALTH CARE EDUCATION/TRAINING PROGRAM

## 2024-12-19 PROCEDURE — 3288F FALL RISK ASSESSMENT DOCD: CPT | Mod: CPTII,S$GLB,, | Performed by: STUDENT IN AN ORGANIZED HEALTH CARE EDUCATION/TRAINING PROGRAM

## 2024-12-19 PROCEDURE — 1101F PT FALLS ASSESS-DOCD LE1/YR: CPT | Mod: CPTII,S$GLB,, | Performed by: STUDENT IN AN ORGANIZED HEALTH CARE EDUCATION/TRAINING PROGRAM

## 2024-12-19 PROCEDURE — 1126F AMNT PAIN NOTED NONE PRSNT: CPT | Mod: CPTII,S$GLB,, | Performed by: STUDENT IN AN ORGANIZED HEALTH CARE EDUCATION/TRAINING PROGRAM

## 2024-12-19 PROCEDURE — 71250 CT THORAX DX C-: CPT | Mod: 26,,, | Performed by: RADIOLOGY

## 2024-12-19 PROCEDURE — 3077F SYST BP >= 140 MM HG: CPT | Mod: CPTII,S$GLB,, | Performed by: STUDENT IN AN ORGANIZED HEALTH CARE EDUCATION/TRAINING PROGRAM

## 2024-12-19 PROCEDURE — 99214 OFFICE O/P EST MOD 30 MIN: CPT | Mod: S$GLB,,, | Performed by: STUDENT IN AN ORGANIZED HEALTH CARE EDUCATION/TRAINING PROGRAM

## 2024-12-19 PROCEDURE — 71250 CT THORAX DX C-: CPT | Mod: TC

## 2024-12-19 RX ORDER — GABAPENTIN 300 MG/1
300 CAPSULE ORAL NIGHTLY
Qty: 30 CAPSULE | Refills: 5 | Status: SHIPPED | OUTPATIENT
Start: 2024-12-19 | End: 2025-06-17

## 2025-01-03 ENCOUNTER — TELEPHONE (OUTPATIENT)
Dept: ENDOSCOPY | Facility: HOSPITAL | Age: 80
End: 2025-01-03
Payer: MEDICARE

## 2025-01-03 NOTE — TELEPHONE ENCOUNTER
Spoke to patient for pre-call to confirm scheduled Colonoscopy and patient verbalized understanding of the following:       Date of Procedure (s)  verified 1/9/25  Arrival Time 6:00 AM verified.  Location of Procedure(s) [address] Floor verified.  NPO status reinforced. Ok to continue clear liquids up until 2 hours prior to the Endoscopy procedure.     Pt confirmed receipt of prep instructions and Rx prep (if applicable).  Instructions provided to patient via [facility]  Pt confirmed ride home after procedure if procedure requires anesthesia.   Pre-call screening questionnaire reviewed and completed with patient.   Appointment details are tentative, including check-in time.  If the patient begins taking any blood thinning medications, injectable weight loss/diabetes medications (other than insulin), or Adipex (phentermine) patient was instructed to contact the endoscopy scheduling department as soon as possible.  Patient was advised to call the endoscopy scheduling department if any questions or concerns arise.       [facility] Scheduling Department

## 2025-01-05 ENCOUNTER — HOSPITAL ENCOUNTER (INPATIENT)
Facility: HOSPITAL | Age: 80
LOS: 2 days | Discharge: HOME | DRG: 189 | End: 2025-01-07
Attending: STUDENT IN AN ORGANIZED HEALTH CARE EDUCATION/TRAINING PROGRAM | Admitting: HOSPITALIST
Payer: MEDICARE

## 2025-01-05 DIAGNOSIS — Z13.6 SCREENING FOR CARDIOVASCULAR CONDITION: ICD-10-CM

## 2025-01-05 DIAGNOSIS — R06.02 SOB (SHORTNESS OF BREATH): ICD-10-CM

## 2025-01-05 DIAGNOSIS — J44.9 CHRONIC OBSTRUCTIVE PULMONARY DISEASE, UNSPECIFIED COPD TYPE: ICD-10-CM

## 2025-01-05 DIAGNOSIS — J18.9 MULTIFOCAL PNEUMONIA: Primary | ICD-10-CM

## 2025-01-05 PROBLEM — J44.1 COPD EXACERBATION: Status: ACTIVE | Noted: 2025-01-05

## 2025-01-05 PROBLEM — J96.01 ACUTE HYPOXEMIC RESPIRATORY FAILURE: Status: ACTIVE | Noted: 2025-01-05

## 2025-01-05 LAB
ALBUMIN SERPL BCP-MCNC: 3.5 G/DL (ref 3.5–5.2)
ALLENS TEST: NORMAL
ALP SERPL-CCNC: 89 U/L (ref 40–150)
ALT SERPL W/O P-5'-P-CCNC: 23 U/L (ref 10–44)
ANION GAP SERPL CALC-SCNC: 12 MMOL/L (ref 8–16)
AST SERPL-CCNC: 15 U/L (ref 10–40)
BASOPHILS # BLD AUTO: 0.06 K/UL (ref 0–0.2)
BASOPHILS NFR BLD: 0.6 % (ref 0–1.9)
BILIRUB SERPL-MCNC: 0.5 MG/DL (ref 0.1–1)
BNP SERPL-MCNC: 84 PG/ML (ref 0–99)
BUN SERPL-MCNC: 13 MG/DL (ref 8–23)
CALCIUM SERPL-MCNC: 9 MG/DL (ref 8.7–10.5)
CHLORIDE SERPL-SCNC: 104 MMOL/L (ref 95–110)
CO2 SERPL-SCNC: 22 MMOL/L (ref 23–29)
CREAT SERPL-MCNC: 0.7 MG/DL (ref 0.5–1.4)
DELSYS: NORMAL
DIFFERENTIAL METHOD BLD: ABNORMAL
EOSINOPHIL # BLD AUTO: 0 K/UL (ref 0–0.5)
EOSINOPHIL NFR BLD: 0 % (ref 0–8)
ERYTHROCYTE [DISTWIDTH] IN BLOOD BY AUTOMATED COUNT: 14.6 % (ref 11.5–14.5)
EST. GFR  (NO RACE VARIABLE): >60 ML/MIN/1.73 M^2
GLUCOSE SERPL-MCNC: 141 MG/DL (ref 70–110)
HCT VFR BLD AUTO: 36 % (ref 37–48.5)
HGB BLD-MCNC: 11.8 G/DL (ref 12–16)
IMM GRANULOCYTES # BLD AUTO: 0.32 K/UL (ref 0–0.04)
IMM GRANULOCYTES NFR BLD AUTO: 3 % (ref 0–0.5)
INFLUENZA A, MOLECULAR: NEGATIVE
INFLUENZA B, MOLECULAR: NEGATIVE
LDH SERPL L TO P-CCNC: 0.58 MMOL/L (ref 0.5–2.2)
LYMPHOCYTES # BLD AUTO: 0.9 K/UL (ref 1–4.8)
LYMPHOCYTES NFR BLD: 8.6 % (ref 18–48)
MCH RBC QN AUTO: 27.2 PG (ref 27–31)
MCHC RBC AUTO-ENTMCNC: 32.8 G/DL (ref 32–36)
MCV RBC AUTO: 83 FL (ref 82–98)
MONOCYTES # BLD AUTO: 1.1 K/UL (ref 0.3–1)
MONOCYTES NFR BLD: 10.3 % (ref 4–15)
NEUTROPHILS # BLD AUTO: 8.4 K/UL (ref 1.8–7.7)
NEUTROPHILS NFR BLD: 77.5 % (ref 38–73)
NRBC BLD-RTO: 0 /100 WBC
PLATELET # BLD AUTO: 214 K/UL (ref 150–450)
PMV BLD AUTO: 11.9 FL (ref 9.2–12.9)
POTASSIUM SERPL-SCNC: 3.3 MMOL/L (ref 3.5–5.1)
PROCALCITONIN SERPL IA-MCNC: 0.03 NG/ML
PROT SERPL-MCNC: 7.2 G/DL (ref 6–8.4)
RBC # BLD AUTO: 4.34 M/UL (ref 4–5.4)
SAMPLE: NORMAL
SARS-COV-2 RDRP RESP QL NAA+PROBE: NEGATIVE
SITE: NORMAL
SODIUM SERPL-SCNC: 138 MMOL/L (ref 136–145)
SPECIMEN SOURCE: NORMAL
TROPONIN I SERPL DL<=0.01 NG/ML-MCNC: 9 NG/L (ref 0–14)
WBC # BLD AUTO: 10.83 K/UL (ref 3.9–12.7)

## 2025-01-05 PROCEDURE — 84484 ASSAY OF TROPONIN QUANT: CPT | Performed by: PHYSICIAN ASSISTANT

## 2025-01-05 PROCEDURE — 93010 ELECTROCARDIOGRAM REPORT: CPT | Mod: ,,, | Performed by: STUDENT IN AN ORGANIZED HEALTH CARE EDUCATION/TRAINING PROGRAM

## 2025-01-05 PROCEDURE — 87502 INFLUENZA DNA AMP PROBE: CPT | Performed by: PHYSICIAN ASSISTANT

## 2025-01-05 PROCEDURE — 85025 COMPLETE CBC W/AUTO DIFF WBC: CPT | Performed by: PHYSICIAN ASSISTANT

## 2025-01-05 PROCEDURE — 94640 AIRWAY INHALATION TREATMENT: CPT

## 2025-01-05 PROCEDURE — 96366 THER/PROPH/DIAG IV INF ADDON: CPT

## 2025-01-05 PROCEDURE — 80053 COMPREHEN METABOLIC PANEL: CPT | Performed by: PHYSICIAN ASSISTANT

## 2025-01-05 PROCEDURE — 87635 SARS-COV-2 COVID-19 AMP PRB: CPT | Performed by: PHYSICIAN ASSISTANT

## 2025-01-05 PROCEDURE — 25500020 PHARM REV CODE 255: Performed by: STUDENT IN AN ORGANIZED HEALTH CARE EDUCATION/TRAINING PROGRAM

## 2025-01-05 PROCEDURE — 96376 TX/PRO/DX INJ SAME DRUG ADON: CPT

## 2025-01-05 PROCEDURE — 96361 HYDRATE IV INFUSION ADD-ON: CPT

## 2025-01-05 PROCEDURE — 63600175 PHARM REV CODE 636 W HCPCS: Performed by: PHYSICIAN ASSISTANT

## 2025-01-05 PROCEDURE — 87040 BLOOD CULTURE FOR BACTERIA: CPT | Performed by: PHYSICIAN ASSISTANT

## 2025-01-05 PROCEDURE — 63600175 PHARM REV CODE 636 W HCPCS: Performed by: HOSPITALIST

## 2025-01-05 PROCEDURE — 25000003 PHARM REV CODE 250: Performed by: HOSPITALIST

## 2025-01-05 PROCEDURE — 96365 THER/PROPH/DIAG IV INF INIT: CPT

## 2025-01-05 PROCEDURE — 93005 ELECTROCARDIOGRAM TRACING: CPT

## 2025-01-05 PROCEDURE — 12000002 HC ACUTE/MED SURGE SEMI-PRIVATE ROOM

## 2025-01-05 PROCEDURE — 83605 ASSAY OF LACTIC ACID: CPT

## 2025-01-05 PROCEDURE — 83880 ASSAY OF NATRIURETIC PEPTIDE: CPT | Performed by: PHYSICIAN ASSISTANT

## 2025-01-05 PROCEDURE — 96375 TX/PRO/DX INJ NEW DRUG ADDON: CPT

## 2025-01-05 PROCEDURE — 94761 N-INVAS EAR/PLS OXIMETRY MLT: CPT

## 2025-01-05 PROCEDURE — 27000221 HC OXYGEN, UP TO 24 HOURS

## 2025-01-05 PROCEDURE — 84145 PROCALCITONIN (PCT): CPT | Performed by: PHYSICIAN ASSISTANT

## 2025-01-05 PROCEDURE — 25000242 PHARM REV CODE 250 ALT 637 W/ HCPCS: Performed by: PHYSICIAN ASSISTANT

## 2025-01-05 RX ORDER — CEFTRIAXONE 1 G/1
1 INJECTION, POWDER, FOR SOLUTION INTRAMUSCULAR; INTRAVENOUS
Status: DISCONTINUED | OUTPATIENT
Start: 2025-01-05 | End: 2025-01-07

## 2025-01-05 RX ORDER — PROCHLORPERAZINE EDISYLATE 5 MG/ML
5 INJECTION INTRAMUSCULAR; INTRAVENOUS EVERY 6 HOURS PRN
Status: DISCONTINUED | OUTPATIENT
Start: 2025-01-05 | End: 2025-01-07 | Stop reason: HOSPADM

## 2025-01-05 RX ORDER — IBUPROFEN 200 MG
16 TABLET ORAL
Status: DISCONTINUED | OUTPATIENT
Start: 2025-01-05 | End: 2025-01-07 | Stop reason: HOSPADM

## 2025-01-05 RX ORDER — ASPIRIN 81 MG/1
81 TABLET ORAL EVERY OTHER DAY
Status: DISCONTINUED | OUTPATIENT
Start: 2025-01-06 | End: 2025-01-07 | Stop reason: HOSPADM

## 2025-01-05 RX ORDER — ASCORBIC ACID 250 MG
250 TABLET ORAL DAILY
Status: DISCONTINUED | OUTPATIENT
Start: 2025-01-06 | End: 2025-01-07 | Stop reason: HOSPADM

## 2025-01-05 RX ORDER — PREDNISONE 20 MG/1
40 TABLET ORAL DAILY
Status: DISCONTINUED | OUTPATIENT
Start: 2025-01-05 | End: 2025-01-07

## 2025-01-05 RX ORDER — SODIUM CHLORIDE 0.9 % (FLUSH) 0.9 %
10 SYRINGE (ML) INJECTION
Status: DISCONTINUED | OUTPATIENT
Start: 2025-01-05 | End: 2025-01-07 | Stop reason: HOSPADM

## 2025-01-05 RX ORDER — CEFTRIAXONE 1 G/1
1 INJECTION, POWDER, FOR SOLUTION INTRAMUSCULAR; INTRAVENOUS
Status: DISCONTINUED | OUTPATIENT
Start: 2025-01-05 | End: 2025-01-05

## 2025-01-05 RX ORDER — IPRATROPIUM BROMIDE AND ALBUTEROL SULFATE 2.5; .5 MG/3ML; MG/3ML
3 SOLUTION RESPIRATORY (INHALATION)
Status: DISCONTINUED | OUTPATIENT
Start: 2025-01-06 | End: 2025-01-07 | Stop reason: HOSPADM

## 2025-01-05 RX ORDER — ACETAMINOPHEN 325 MG/1
650 TABLET ORAL EVERY 4 HOURS PRN
Status: DISCONTINUED | OUTPATIENT
Start: 2025-01-05 | End: 2025-01-07 | Stop reason: HOSPADM

## 2025-01-05 RX ORDER — ONDANSETRON HYDROCHLORIDE 2 MG/ML
4 INJECTION, SOLUTION INTRAVENOUS EVERY 8 HOURS PRN
Status: DISCONTINUED | OUTPATIENT
Start: 2025-01-05 | End: 2025-01-07 | Stop reason: HOSPADM

## 2025-01-05 RX ORDER — GLUCAGON 1 MG
1 KIT INJECTION
Status: DISCONTINUED | OUTPATIENT
Start: 2025-01-05 | End: 2025-01-07 | Stop reason: HOSPADM

## 2025-01-05 RX ORDER — AZITHROMYCIN 250 MG/1
500 TABLET, FILM COATED ORAL
Status: DISCONTINUED | OUTPATIENT
Start: 2025-01-05 | End: 2025-01-05

## 2025-01-05 RX ORDER — TALC
6 POWDER (GRAM) TOPICAL NIGHTLY PRN
Status: DISCONTINUED | OUTPATIENT
Start: 2025-01-05 | End: 2025-01-07 | Stop reason: HOSPADM

## 2025-01-05 RX ORDER — IBUPROFEN 200 MG
24 TABLET ORAL
Status: DISCONTINUED | OUTPATIENT
Start: 2025-01-05 | End: 2025-01-07 | Stop reason: HOSPADM

## 2025-01-05 RX ORDER — POTASSIUM CHLORIDE 20 MEQ/1
40 TABLET, EXTENDED RELEASE ORAL ONCE
Status: COMPLETED | OUTPATIENT
Start: 2025-01-06 | End: 2025-01-05

## 2025-01-05 RX ORDER — AMLODIPINE BESYLATE 10 MG/1
10 TABLET ORAL DAILY
Status: DISCONTINUED | OUTPATIENT
Start: 2025-01-06 | End: 2025-01-07 | Stop reason: HOSPADM

## 2025-01-05 RX ORDER — NALOXONE HCL 0.4 MG/ML
0.02 VIAL (ML) INJECTION
Status: DISCONTINUED | OUTPATIENT
Start: 2025-01-05 | End: 2025-01-07 | Stop reason: HOSPADM

## 2025-01-05 RX ORDER — HYDROCHLOROTHIAZIDE 25 MG/1
25 TABLET ORAL DAILY
Status: DISCONTINUED | OUTPATIENT
Start: 2025-01-06 | End: 2025-01-07 | Stop reason: HOSPADM

## 2025-01-05 RX ORDER — IPRATROPIUM BROMIDE AND ALBUTEROL SULFATE 2.5; .5 MG/3ML; MG/3ML
3 SOLUTION RESPIRATORY (INHALATION)
Status: COMPLETED | OUTPATIENT
Start: 2025-01-05 | End: 2025-01-05

## 2025-01-05 RX ORDER — ENOXAPARIN SODIUM 100 MG/ML
40 INJECTION SUBCUTANEOUS EVERY 24 HOURS
Status: DISCONTINUED | OUTPATIENT
Start: 2025-01-05 | End: 2025-01-07 | Stop reason: HOSPADM

## 2025-01-05 RX ORDER — CHOLECALCIFEROL (VITAMIN D3) 25 MCG
1000 TABLET ORAL DAILY
Status: DISCONTINUED | OUTPATIENT
Start: 2025-01-06 | End: 2025-01-07 | Stop reason: HOSPADM

## 2025-01-05 RX ORDER — LISINOPRIL 20 MG/1
40 TABLET ORAL DAILY
Status: DISCONTINUED | OUTPATIENT
Start: 2025-01-06 | End: 2025-01-07 | Stop reason: HOSPADM

## 2025-01-05 RX ADMIN — AZITHROMYCIN MONOHYDRATE 500 MG: 500 INJECTION, POWDER, LYOPHILIZED, FOR SOLUTION INTRAVENOUS at 11:01

## 2025-01-05 RX ADMIN — CEFTRIAXONE 1 G: 1 INJECTION, POWDER, FOR SOLUTION INTRAMUSCULAR; INTRAVENOUS at 10:01

## 2025-01-05 RX ADMIN — POTASSIUM CHLORIDE 40 MEQ: 1500 TABLET, EXTENDED RELEASE ORAL at 11:01

## 2025-01-05 RX ADMIN — IOHEXOL 75 ML: 350 INJECTION, SOLUTION INTRAVENOUS at 07:01

## 2025-01-05 RX ADMIN — IPRATROPIUM BROMIDE AND ALBUTEROL SULFATE 3 ML: 2.5; .5 SOLUTION RESPIRATORY (INHALATION) at 05:01

## 2025-01-05 RX ADMIN — SODIUM CHLORIDE, POTASSIUM CHLORIDE, SODIUM LACTATE AND CALCIUM CHLORIDE 1000 ML: 600; 310; 30; 20 INJECTION, SOLUTION INTRAVENOUS at 06:01

## 2025-01-05 RX ADMIN — PREDNISONE 40 MG: 20 TABLET ORAL at 10:01

## 2025-01-05 RX ADMIN — GUAIFENESIN AND DEXTROMETHORPHAN HYDROBROMIDE 1 TABLET: 600; 30 TABLET, EXTENDED RELEASE ORAL at 10:01

## 2025-01-05 RX ADMIN — ENOXAPARIN SODIUM 40 MG: 40 INJECTION SUBCUTANEOUS at 11:01

## 2025-01-05 NOTE — ED PROVIDER NOTES
Encounter Date: 1/5/2025       History     Chief Complaint   Patient presents with    Shortness of Breath     Placed on  2l nc in triage    Cough     79-year-old female with COPD, DCIS of the right breast, history of adenocarcinoma of the left upper lobe of the lung s/p RATS with lobectomy, hypertension, emphysema presents for shortness of breath and fever.  She started to feel poorly a few days ago with some fatigue and cough, however started to feel better yesterday.  When she woke up this morning she felt very short of breath with minimal exertion with a fever up to 102° F.  She reports associated chest pain, cough which is productive of clear sputum fatigue.  She denies myalgias, vomiting, abdominal pain, diarrhea, urinary symptoms or leg swelling.  Her grandchildren have URI symptoms but were tested negative for flu and COVID.      Review of patient's allergies indicates:  No Known Allergies  Past Medical History:   Diagnosis Date    COPD (chronic obstructive pulmonary disease)     Ductal carcinoma in situ (DCIS) of right breast 09/19/2017    Hypertension      Past Surgical History:   Procedure Laterality Date    BREAST BIOPSY Right 2010    BREAST LUMPECTOMY Right 2010    BRONCHOSCOPY Left 5/23/2024    Procedure: BRONCHOSCOPY;  Surgeon: Riaz Fay MD;  Location: [facility] OR [address] [place] [address];  Service: Cardiothoracic;  Laterality: Left;    cataract surgery      INJECTION OF ANESTHETIC AGENT AROUND MULTIPLE INTERCOSTAL NERVES Left 5/23/2024    Procedure: BLOCK, NERVE, INTERCOSTAL, 2 OR MORE;  Surgeon: Riaz Fay MD;  Location: [facility] OR [address] [place] [address];  Service: Cardiothoracic;  Laterality: Left;    LYMPHADENECTOMY N/A 5/23/2024    Procedure: LYMPHADENECTOMY;  Surgeon: Riaz Fay MD;  Location: [facility] OR [address] [place] [address];  Service: Cardiothoracic;  Laterality: N/A;    ROBOTIC BRONCHOSCOPY N/A 4/16/2024    Procedure: ROBOTIC BRONCHOSCOPY;  Surgeon: Uzma Larsen MD;  Location: [facility] OR [address] [place] [address];  Service: Pulmonary;   Laterality: N/A;    XI ROBOTIC RATS,WITH LOBECTOMY,LUNG Left 2024    Procedure: XI ROBOTIC LEFT UPPER LOBECTOMY;  Surgeon: Riaz Fay MD;  Location: [facility] OR [address] [place] [address];  Service: Cardiothoracic;  Laterality: Left;     Family History   Problem Relation Name Age of Onset    No Known Problems Mother      Heart attack Father      Breast cancer Paternal Aunt       Social History     Tobacco Use    Smoking status: Former     Current packs/day: 0.00     Average packs/day: 1 pack/day for 38.0 years (38.0 ttl pk-yrs)     Types: Cigarettes     Start date:      Quit date:      Years since quittin.0    Smokeless tobacco: Never   Substance Use Topics    Alcohol use: Yes     Comment: Occasionally    Drug use: No     Review of Systems    Physical Exam     Initial Vitals [25 1634]   BP Pulse Resp Temp SpO2   (!) 151/69 92 (!) 24 99.4 °F (37.4 °C) (!) 90 %      MAP       --         Physical Exam    Nursing note and vitals reviewed.  Constitutional: She appears well-developed and well-nourished. She is not diaphoretic. No distress.   HENT:   Head: Normocephalic and atraumatic.   Cardiovascular:  Normal rate, regular rhythm, normal heart sounds and intact distal pulses.     Exam reveals no gallop and no friction rub.       No murmur heard.  No lower extremity edema   Pulmonary/Chest: Breath sounds normal. No respiratory distress. She has no wheezes. She has no rhonchi. She has no rales. She exhibits no tenderness.   Abdominal: Abdomen is soft. Bowel sounds are normal. She exhibits no distension and no mass. There is no abdominal tenderness. There is no rebound and no guarding.   Musculoskeletal:         General: Normal range of motion.     Neurological: She is alert and oriented to person, place, and time.   Skin: Skin is warm and dry.   Psychiatric: She has a normal mood and affect.         ED Course   Procedures  Labs Reviewed   CBC W/ AUTO DIFFERENTIAL - Abnormal       Result Value    WBC 10.83      RBC  4.34      Hemoglobin 11.8 (*)     Hematocrit 36.0 (*)     MCV 83      MCH 27.2      MCHC 32.8      RDW 14.6 (*)     Platelets 214      MPV 11.9      Immature Granulocytes 3.0 (*)     Gran # (ANC) 8.4 (*)     Immature Grans (Abs) 0.32 (*)     Lymph # 0.9 (*)     Mono # 1.1 (*)     Eos # 0.0      Baso # 0.06      nRBC 0      Gran % 77.5 (*)     Lymph % 8.6 (*)     Mono % 10.3      Eosinophil % 0.0      Basophil % 0.6      Differential Method Automated     COMPREHENSIVE METABOLIC PANEL - Abnormal    Sodium 138      Potassium 3.3 (*)     Chloride 104      CO2 22 (*)     Glucose 141 (*)     BUN 13      Creatinine 0.7      Calcium 9.0      Total Protein 7.2      Albumin 3.5      Total Bilirubin 0.5      Alkaline Phosphatase 89      AST 15      ALT 23      eGFR >60.0      Anion Gap 12     INFLUENZA A & B BY MOLECULAR    Influenza A, Molecular Negative      Influenza B, Molecular Negative      Flu A & B Source Nasal swab     CULTURE, BLOOD   CULTURE, BLOOD   CULTURE, RESPIRATORY   B-TYPE NATRIURETIC PEPTIDE    BNP 84     PROCALCITONIN    Procalcitonin 0.03     TROPONIN I HIGH SENSITIVITY    Troponin I High Sensitivity 9     SARS-COV-2 RNA AMPLIFICATION, QUAL    SARS-CoV-2 RNA, Amplification, Qual Negative     URINALYSIS, REFLEX TO URINE CULTURE   LEGIONELLA ANTIGEN, URINE RANDOM   CULTURE, LEGIONELLA   STREP PNEUMO AG URINE   ISTAT LACTATE    POC Lactate 0.58      Sample VENOUS      Site Other      Allens Test N/A      DelSys Room Air       EKG Readings: (Independently Interpreted)   Initial Reading: No STEMI. Rhythm: Normal Sinus Rhythm. Heart Rate: 89. ST Segments: Non-Specific ST Segment Depression. T Waves: Normal. Clinical Impression: Normal Sinus Rhythm       Imaging Results              CTA Chest Non-Coronary (PE Studies) (Final result)  Result time 01/05/25 20:41:08      Final result by Dayton Boucher DO (01/05/25 20:41:08)                   Impression:      1. No pulmonary embolism to the segmental level.  2.  Scattered airspace and ground-glass opacities in the lower lobes and the right middle lobe, suspicious for multifocal pneumonia.  3. Stable solid nodule measuring 8 mm in the right upper lobe.  4. Postoperative changes of left upper lobectomy.      Electronically signed by: Dayton Boucher  Date:    01/05/2025  Time:    20:41               Narrative:    EXAMINATION:  CTA CHEST NON CORONARY (PE STUDIES)    CLINICAL HISTORY:  Pulmonary embolism (PE) suspected, high prob;    TECHNIQUE:  Low dose axial images, sagittal and coronal reformations were obtained from the thoracic inlet to the lung bases following the IV administration of 75 mL of Omnipaque 350.  Contrast timing was optimized to evaluate the pulmonary arteries.  Maximum intensity projection images were provided for review.    COMPARISON:  CT chest from 12/19/2024.    FINDINGS:  Pulmonary vasculature: Satisfactory opacification of the pulmonary arterial system with no filling defect to the segmental level.    Aorta: Left-sided aortic arch.  No aneurysm and no significant atherosclerosis.    Base of Neck: No significant abnormality.    Thoracic soft tissues: Normal.    Heart: Normal size. No effusion.    Debi/Mediastinum: No pathologic williams enlargement.    Airways: The large airways are patent. No foci of endobronchial filling.    Lungs/Pleura: There are postoperative changes of left upper lobectomy.  There are moderate emphysematous changes.  There are scattered airspace and ground-glass opacities in the bilateral lower lobes and in the right middle lobe, suspicious for multifocal pneumonia.  Stable solid nodule in the right upper lobe, abutting the posterior pleura, measuring 8 mm (series 2, image 92).  The pleural spaces are clear, without effusion or pneumothorax.    Esophagus: Normal.    Upper Abdomen: There are gallstones.  There are simple cysts in the kidneys.  There is a right-sided nephrolith.    Bones: No acute fracture. No suspicious lytic or  sclerotic lesions.  There are multilevel degenerative changes.                                       X-Ray Chest AP Portable (Final result)  Result time 01/05/25 19:00:40      Final result by Freedom Long MD (01/05/25 19:00:40)                   Impression:      1. Stable right hydropneumothorax and fibrotic change/scarring involving the bilateral lower lobes.  No convincing focal consolidation.  Please see CT 12/19/2024.      Electronically signed by: Freedom Long MD  Date:    01/05/2025  Time:    19:00               Narrative:    EXAMINATION:  XR CHEST AP PORTABLE    CLINICAL HISTORY:  Sepsis;    TECHNIQUE:  Single frontal view of the chest was performed.    COMPARISON:  06/10/2024, CT chest 12/19/2024    FINDINGS:  The cardiomediastinal silhouette is not enlarged noting calcification of the aorta..  The trachea is midline.  There is chronic left pneumothorax noting the pleural space superior to the pneumothorax within the left upper lobe has filled with fluid since the previous exam.  There is coarse interstitial attenuation bilaterally, more focally projected over the left lower lung zone, similar in appearance to the previous examination noting surgical changes in the region.  No large focal consolidation seen.  The osseous structures are remarkable for degenerative change..                                       Medications   azithromycin (ZITHROMAX) 500 mg in 0.9% NaCl 250 mL IVPB (admixture device) (has no administration in time range)   cefTRIAXone injection 1 g (has no administration in time range)   albuterol-ipratropium 2.5 mg-0.5 mg/3 mL nebulizer solution 3 mL (3 mLs Nebulization Given 1/5/25 1714)   lactated ringers bolus 1,000 mL (0 mLs Intravenous Stopped 1/5/25 2056)   iohexoL (OMNIPAQUE 350) injection 75 mL (75 mLs Intravenous Given 1/5/25 1957)     Medical Decision Making  79-year-old female presenting for shortness of breath and fever.  Mildly hypoxic at 90% on room air, tachypneic and  mildly hypertensive.  Afebrile after antipyretics taken prior to arrival.  She appears ill but nontoxic.    Differential diagnosis:  Pneumonia   COPD exacerbation   COVID-19  Influenza   Pulmonary embolus  Sepsis   Low suspicion for cardiac cause    Will check labs, chest x-ray, give duo nebs, fluids and reassess.    Patient reports feeling better after therapy.  CTA without evidence of PE but does show findings since with multifocal pneumonia.  She will be admitted to Hospital Medicine for further management.  Patient and family comfortable with admission.  I discussed this patient with my supervising physician.    Amount and/or Complexity of Data Reviewed  Labs: ordered. Decision-making details documented in ED Course.  Radiology: ordered and independent interpretation performed.  ECG/medicine tests: ordered and independent interpretation performed. Decision-making details documented in ED Course.    Risk  Prescription drug management.  Decision regarding hospitalization.               ED Course as of 01/05/25 2159   Sun Jan 05, 2025   1742 WBC: 10.83 [CC]   1742 Hemoglobin(!): 11.8 [CC]   1806 BNP: 84 [CC]   1807 POC Lactate: 0.58 [CC]   1824 EKG 12-lead  Normal sinus rhythm.  No acute ischemic process.  Normal intervals. [AC]      ED Course User Index  [AC] Nehemias Rogers, DO  [CC] Carissa Smith PA-C                           Clinical Impression:  Final diagnoses:  [R06.02] SOB (shortness of breath)  [Z13.6] Screening for cardiovascular condition  [J18.9] Multifocal pneumonia (Primary)          ED Disposition Condition    Admit Stable                Carissa Smith PA-C  01/05/25 2159

## 2025-01-06 PROBLEM — J44.1 COPD EXACERBATION: Status: RESOLVED | Noted: 2025-01-05 | Resolved: 2025-01-06

## 2025-01-06 LAB
ALBUMIN SERPL BCP-MCNC: 3.1 G/DL (ref 3.5–5.2)
ALP SERPL-CCNC: 89 U/L (ref 40–150)
ALT SERPL W/O P-5'-P-CCNC: 20 U/L (ref 10–44)
ANION GAP SERPL CALC-SCNC: 10 MMOL/L (ref 8–16)
AST SERPL-CCNC: 16 U/L (ref 10–40)
BACTERIA #/AREA URNS AUTO: ABNORMAL /HPF
BASOPHILS NFR BLD: 0 % (ref 0–1.9)
BILIRUB SERPL-MCNC: 0.5 MG/DL (ref 0.1–1)
BILIRUB UR QL STRIP: NEGATIVE
BUN SERPL-MCNC: 11 MG/DL (ref 8–23)
CALCIUM SERPL-MCNC: 8.6 MG/DL (ref 8.7–10.5)
CHLORIDE SERPL-SCNC: 107 MMOL/L (ref 95–110)
CLARITY UR REFRACT.AUTO: CLEAR
CO2 SERPL-SCNC: 21 MMOL/L (ref 23–29)
COLOR UR AUTO: YELLOW
CREAT SERPL-MCNC: 0.7 MG/DL (ref 0.5–1.4)
DIFFERENTIAL METHOD BLD: ABNORMAL
EOSINOPHIL NFR BLD: 0 % (ref 0–8)
ERYTHROCYTE [DISTWIDTH] IN BLOOD BY AUTOMATED COUNT: 14.5 % (ref 11.5–14.5)
EST. GFR  (NO RACE VARIABLE): >60 ML/MIN/1.73 M^2
GLUCOSE SERPL-MCNC: 149 MG/DL (ref 70–110)
GLUCOSE UR QL STRIP: NEGATIVE
HCT VFR BLD AUTO: 33.9 % (ref 37–48.5)
HGB BLD-MCNC: 11.1 G/DL (ref 12–16)
HGB UR QL STRIP: ABNORMAL
HYALINE CASTS UR QL AUTO: 0 /LPF
IMM GRANULOCYTES # BLD AUTO: ABNORMAL K/UL (ref 0–0.04)
IMM GRANULOCYTES NFR BLD AUTO: ABNORMAL % (ref 0–0.5)
KETONES UR QL STRIP: ABNORMAL
LEUKOCYTE ESTERASE UR QL STRIP: NEGATIVE
LYMPHOCYTES NFR BLD: 4 % (ref 18–48)
MAGNESIUM SERPL-MCNC: 1.9 MG/DL (ref 1.6–2.6)
MCH RBC QN AUTO: 27.6 PG (ref 27–31)
MCHC RBC AUTO-ENTMCNC: 32.7 G/DL (ref 32–36)
MCV RBC AUTO: 84 FL (ref 82–98)
MICROSCOPIC COMMENT: ABNORMAL
MONOCYTES NFR BLD: 4 % (ref 4–15)
NEUTROPHILS NFR BLD: 90 % (ref 38–73)
NEUTS BAND NFR BLD MANUAL: 2 %
NITRITE UR QL STRIP: NEGATIVE
NRBC BLD-RTO: 0 /100 WBC
OHS QRS DURATION: 80 MS
OHS QTC CALCULATION: 469 MS
PH UR STRIP: 6 [PH] (ref 5–8)
PLATELET # BLD AUTO: 208 K/UL (ref 150–450)
PLATELET BLD QL SMEAR: ABNORMAL
PMV BLD AUTO: 12.5 FL (ref 9.2–12.9)
POTASSIUM SERPL-SCNC: 3.7 MMOL/L (ref 3.5–5.1)
PROT SERPL-MCNC: 6.7 G/DL (ref 6–8.4)
PROT UR QL STRIP: ABNORMAL
RBC # BLD AUTO: 4.02 M/UL (ref 4–5.4)
RBC #/AREA URNS AUTO: 5 /HPF (ref 0–4)
SODIUM SERPL-SCNC: 138 MMOL/L (ref 136–145)
SP GR UR STRIP: >1.03 (ref 1–1.03)
SQUAMOUS #/AREA URNS AUTO: 0 /HPF
URN SPEC COLLECT METH UR: ABNORMAL
WBC # BLD AUTO: 13.7 K/UL (ref 3.9–12.7)
WBC #/AREA URNS AUTO: 1 /HPF (ref 0–5)

## 2025-01-06 PROCEDURE — 87899 AGENT NOS ASSAY W/OPTIC: CPT | Performed by: HOSPITALIST

## 2025-01-06 PROCEDURE — 94761 N-INVAS EAR/PLS OXIMETRY MLT: CPT

## 2025-01-06 PROCEDURE — 87081 CULTURE SCREEN ONLY: CPT | Performed by: HOSPITALIST

## 2025-01-06 PROCEDURE — 12000002 HC ACUTE/MED SURGE SEMI-PRIVATE ROOM

## 2025-01-06 PROCEDURE — 25000242 PHARM REV CODE 250 ALT 637 W/ HCPCS: Performed by: HOSPITALIST

## 2025-01-06 PROCEDURE — 99900035 HC TECH TIME PER 15 MIN (STAT)

## 2025-01-06 PROCEDURE — 94640 AIRWAY INHALATION TREATMENT: CPT

## 2025-01-06 PROCEDURE — 85007 BL SMEAR W/DIFF WBC COUNT: CPT | Performed by: HOSPITALIST

## 2025-01-06 PROCEDURE — 27000221 HC OXYGEN, UP TO 24 HOURS

## 2025-01-06 PROCEDURE — 81001 URINALYSIS AUTO W/SCOPE: CPT | Performed by: PHYSICIAN ASSISTANT

## 2025-01-06 PROCEDURE — 25000003 PHARM REV CODE 250: Performed by: HOSPITALIST

## 2025-01-06 PROCEDURE — 87449 NOS EACH ORGANISM AG IA: CPT | Performed by: HOSPITALIST

## 2025-01-06 PROCEDURE — 87070 CULTURE OTHR SPECIMN AEROBIC: CPT | Performed by: HOSPITALIST

## 2025-01-06 PROCEDURE — 83735 ASSAY OF MAGNESIUM: CPT | Performed by: HOSPITALIST

## 2025-01-06 PROCEDURE — 63600175 PHARM REV CODE 636 W HCPCS: Performed by: HOSPITALIST

## 2025-01-06 PROCEDURE — 85027 COMPLETE CBC AUTOMATED: CPT | Performed by: HOSPITALIST

## 2025-01-06 PROCEDURE — 87205 SMEAR GRAM STAIN: CPT | Performed by: HOSPITALIST

## 2025-01-06 PROCEDURE — 80053 COMPREHEN METABOLIC PANEL: CPT | Performed by: HOSPITALIST

## 2025-01-06 RX ADMIN — IPRATROPIUM BROMIDE AND ALBUTEROL SULFATE 3 ML: 2.5; .5 SOLUTION RESPIRATORY (INHALATION) at 01:01

## 2025-01-06 RX ADMIN — Medication 250 MG: at 08:01

## 2025-01-06 RX ADMIN — ASPIRIN 81 MG: 81 TABLET, COATED ORAL at 08:01

## 2025-01-06 RX ADMIN — PREDNISONE 40 MG: 20 TABLET ORAL at 08:01

## 2025-01-06 RX ADMIN — GUAIFENESIN AND DEXTROMETHORPHAN HYDROBROMIDE 1 TABLET: 600; 30 TABLET, EXTENDED RELEASE ORAL at 08:01

## 2025-01-06 RX ADMIN — ENOXAPARIN SODIUM 40 MG: 40 INJECTION SUBCUTANEOUS at 05:01

## 2025-01-06 RX ADMIN — CHOLECALCIFEROL TAB 25 MCG (1000 UNIT) 1000 UNITS: 25 TAB at 08:01

## 2025-01-06 RX ADMIN — CEFTRIAXONE 1 G: 1 INJECTION, POWDER, FOR SOLUTION INTRAMUSCULAR; INTRAVENOUS at 09:01

## 2025-01-06 RX ADMIN — AMLODIPINE BESYLATE 10 MG: 10 TABLET ORAL at 08:01

## 2025-01-06 RX ADMIN — AZITHROMYCIN MONOHYDRATE 500 MG: 500 INJECTION, POWDER, LYOPHILIZED, FOR SOLUTION INTRAVENOUS at 09:01

## 2025-01-06 RX ADMIN — HYDROCHLOROTHIAZIDE 25 MG: 25 TABLET ORAL at 08:01

## 2025-01-06 RX ADMIN — IPRATROPIUM BROMIDE AND ALBUTEROL SULFATE 3 ML: 2.5; .5 SOLUTION RESPIRATORY (INHALATION) at 08:01

## 2025-01-06 RX ADMIN — LISINOPRIL 40 MG: 20 TABLET ORAL at 08:01

## 2025-01-06 NOTE — HPI
78 yo F with PMHx ECHO lung adenocarcinoma s/p robotic LULobectomy 5/2024, COPD, and HTN who presents to the ED complaining of cough, congestion, SOB, and chills x a few days. Pt. Reports developing nasal congestion and fatigue about 2-3 days ago. On Firday she developed associated cough, with some increased clear sputum production. Her symptoms initally improved, but she reports waking up from a nap today with chills and worsening SOB which concerned her. She took her temp, noted it to be 102, and she decided to come to the ED. In ED, SpO2 noted to be <90% on RA, improved with 2L NC. Pt. Denies any nausea/vomiting or dysuria. [facility] who have been having cold symptoms the last several days.

## 2025-01-06 NOTE — SUBJECTIVE & OBJECTIVE
Past Medical History:   Diagnosis Date    COPD (chronic obstructive pulmonary disease)     Ductal carcinoma in situ (DCIS) of right breast 09/19/2017    Hypertension        Past Surgical History:   Procedure Laterality Date    BREAST BIOPSY Right 2010    BREAST LUMPECTOMY Right 2010    BRONCHOSCOPY Left 5/23/2024    Procedure: BRONCHOSCOPY;  Surgeon: Riaz Fay MD;  Location: [facility] OR [facility];  Service: Cardiothoracic;  Laterality: Left;    cataract surgery      INJECTION OF ANESTHETIC AGENT AROUND MULTIPLE INTERCOSTAL NERVES Left 5/23/2024    Procedure: BLOCK, NERVE, INTERCOSTAL, 2 OR MORE;  Surgeon: Riaz Fay MD;  Location: [facility] OR [facility];  Service: Cardiothoracic;  Laterality: Left;    LYMPHADENECTOMY N/A 5/23/2024    Procedure: LYMPHADENECTOMY;  Surgeon: Riaz Fay MD;  [facility];  Service: Cardiothoracic;  Laterality: N/A;    ROBOTIC BRONCHOSCOPY N/A 4/16/2024    Procedure: ROBOTIC BRONCHOSCOPY;  Surgeon: Uzma Larsen MD;  [facility];  Service: Pulmonary;  Laterality: N/A;    XI ROBOTIC RATS,WITH LOBECTOMY,LUNG Left 5/23/2024    Procedure: XI ROBOTIC LEFT UPPER LOBECTOMY;  Surgeon: Riaz Fay MD;  [facility];  Service: Cardiothoracic;  Laterality: Left;       Review of patient's allergies indicates:  No Known Allergies    No current facility-administered medications on file prior to encounter.     Current Outpatient Medications on File Prior to Encounter   Medication Sig    acetaminophen (TYLENOL) 500 MG tablet Take 2 tablets (1,000 mg total) by mouth every 8 (eight) hours as needed for Pain.    albuterol (PROAIR HFA) 90 mcg/actuation inhaler Inhale 2 puffs into the lungs every 6 (six) hours as needed for Wheezing. Rescue    amLODIPine (NORVASC) 10 MG tablet TAKE 1 TABLET BY MOUTH EVERY DAY    aspirin (ECOTRIN) 81 MG EC tablet Take 81 mg by mouth every other day.     calcium carbonate (OS-JONAS) 600 mg calcium (1,500 mg) Tab Take  600 mg by mouth once.    fluticasone furoate-vilanteroL (BREO ELLIPTA) 100-25 mcg/dose diskus inhaler Inhale 1 puff into the lungs once daily. Controller    fluticasone propionate (FLONASE) 50 mcg/actuation nasal spray 1 spray (50 mcg total) by Each Nostril route once daily.    gabapentin (NEURONTIN) 300 MG capsule Take 1 capsule (300 mg total) by mouth every evening.    hydroCHLOROthiazide (HYDRODIURIL) 25 MG tablet TAKE 1 TABLET BY MOUTH EVERY DAY IN THE MORNING    lisinopriL (PRINIVIL,ZESTRIL) 40 MG tablet TAKE 1 TABLET BY MOUTH EVERY DAY    vit A/vit C/vit E/zinc/copper (ICAPS AREDS ORAL) Take 1 tablet by mouth once daily.    vitamin D 1000 units Tab Take 1,000 Units by mouth once daily.     Family History       Problem Relation (Age of Onset)    Breast cancer Paternal Aunt    Heart attack Father    No Known Problems Mother          Tobacco Use    Smoking status: Former     Current packs/day: 0.00     Average packs/day: 1 pack/day for 38.0 years (38.0 ttl pk-yrs)     Types: Cigarettes     Start date:      Quit date:      Years since quittin.0    Smokeless tobacco: Never   Substance and Sexual Activity    Alcohol use: Yes     Comment: Occasionally    Drug use: No    Sexual activity: Not Currently     Review of Systems   Constitutional:  Positive for chills. Negative for activity change, appetite change, fever and unexpected weight change.   HENT:  Negative for congestion and sore throat.    Respiratory:  Positive for cough, shortness of breath and wheezing.    Cardiovascular:  Negative for chest pain, palpitations and leg swelling.   Gastrointestinal:  Negative for abdominal distention, abdominal pain, blood in stool, constipation, diarrhea, nausea and vomiting.   Genitourinary:  Negative for difficulty urinating, dysuria and hematuria.   Musculoskeletal:  Positive for arthralgias. Negative for myalgias.   Skin:  Negative for color change and rash.   Neurological:  Negative for dizziness, tremors and  seizures.     Objective:     Vital Signs (Most Recent):  Temp: 97.9 °F (36.6 °C) (01/05/25 1934)  Pulse: 73 (01/05/25 1934)  Resp: 19 (01/05/25 1934)  BP: (!) 141/63 (01/05/25 1934)  SpO2: 96 % (01/05/25 1934) Vital Signs (24h Range):  Temp:  [97.9 °F (36.6 °C)-99.4 °F (37.4 °C)] 97.9 °F (36.6 °C)  Pulse:  [73-92] 73  Resp:  [19-24] 19  SpO2:  [90 %-96 %] 96 %  BP: (127-151)/(58-69) 141/63     Weight: 81.6 kg (180 lb)  Body mass index is 31.89 kg/m².     Physical Exam  Vitals reviewed.   Constitutional:       General: She is not in acute distress.     Appearance: She is well-developed.   HENT:      Head: Normocephalic and atraumatic.   Eyes:      Extraocular Movements: Extraocular movements intact.      Pupils: Pupils are equal, round, and reactive to light.   Neck:      Vascular: No JVD.      Trachea: No tracheal deviation.   Cardiovascular:      Rate and Rhythm: Normal rate and regular rhythm.      Heart sounds: No murmur heard.     No friction rub. No gallop.   Pulmonary:      Effort: No respiratory distress.      Breath sounds: Wheezing present. No rales.   Abdominal:      General: Bowel sounds are normal. There is no distension.      Palpations: Abdomen is soft. There is no mass.      Tenderness: There is no abdominal tenderness.   Musculoskeletal:         General: No deformity.      Cervical back: Neck supple.   Lymphadenopathy:      Cervical: No cervical adenopathy.   Skin:     General: Skin is warm and dry.      Findings: No rash.   Neurological:      Mental Status: She is alert and oriented to person, place, and time.              CRANIAL NERVES     CN III, IV, VI   Pupils are equal, round, and reactive to light.       Significant Labs: All pertinent labs within the past 24 hours have been reviewed.    Significant Imaging: I have reviewed all pertinent imaging results/findings within the past 24 hours.

## 2025-01-06 NOTE — ASSESSMENT & PLAN NOTE
"-Pt. With hx COPD/prior lobectomy presenting with SOB, wheezing, cough, chills. CT chest reveals "Scattered airspace and ground-glass opacities in the lower lobes and the right middle lobe" on review, findings are chronic but appear somewhat worse than prior  -Continue IV azithromycin/ceftriaxone for CAP coverage, prednisone/duonebs for supportive care.  -Monitor O2 levels with intermittent pulse ox and wean O2 as tolerated  "

## 2025-01-06 NOTE — ASSESSMENT & PLAN NOTE
"-Pt. With hx COPD/prior lobectomy presenting with SOB, wheezing, cough, chills. CT chest reveals "Scattered airspace and ground-glass opacities in the lower lobes and the right middle lobe" on review, findings are chronic but appear somewhat worse than prior  -Plan to treat with IV azithromycin/ceftriaxone for CAP coverage, will also treat COPD exacerbation given active wheezing with prednisone/duonebs. F/u sputum culture/further infectious work-up  -Monitor O2 levels with intermittent pulse ox and wean O2 as tolerated  "

## 2025-01-06 NOTE — PLAN OF CARE
[facility] - Emergency Dept  Initial Discharge Assessment       Primary Care Provider: Nathaniel Greenfield MD    Admission Diagnosis: Multifocal pneumonia [J18.9]    Admission Date: 1/5/2025  Expected Discharge Date:     Pt is independent with their ADL's and ambulation, does not require assistance.    Post acute was discussed with pt. Pt family to provide transportation home. Pt d/c home with no needs at the moment.     Transition of Care Barriers: (P) None    Payor: p[employer] MGD MCAKIERAN [facility] / Plan: [employer] CHOICES / Product Type: Medicare Advantage /     Extended Emergency Contact Information  Primary Emergency Contact: Donovan Bran  Mobile Phone: [phone]  Relation: Daughter  Preferred language: English   needed? No    Discharge Plan A: (P) Home  Discharge Plan B: (P) Home      [id number] - [place], [place] - [address] [facility]  [address] [facility] [address]  Phone: [phone] Fax: [phone]      Initial Assessment (most recent)       Adult Discharge Assessment - 01/06/25 1047          Discharge Assessment    Assessment Type Discharge Planning Assessment     Confirmed/corrected address, phone number and insurance Yes     Confirmed Demographics Correct on Facesheet     Source of Information patient     Does patient/caregiver understand observation status Yes     Reason For Admission Acute hypoxemic respiratory failure     People in Home alone     Do you expect to return to your current living situation? Yes (P)      Do you have help at home or someone to help you manage your care at home? No (P)      Prior to hospitilization cognitive status: Alert/Oriented (P)      Current cognitive status: Alert/Oriented (P)      Walking or Climbing Stairs Difficulty no (P)      Dressing/Bathing Difficulty no (P)      Home Accessibility stairs to enter home (P)      Number of Stairs, Main Entrance one (P)      Home Layout Able to live on 1st floor (P)       Equipment Currently Used at Home none (P)      Readmission within 30 days? No (P)      Patient currently being followed by outpatient case management? No (P)      Do you currently have service(s) that help you manage your care at home? No (P)      Do you take prescription medications? Yes (P)      Do you have prescription coverage? Yes (P)      Coverage [facility] (P)      Do you have any problems affording any of your prescribed medications? No (P)      Is the patient taking medications as prescribed? yes (P)      Who is going to help you get home at discharge? family/friend (P)      How do you get to doctors appointments? car, drives self (P)      Are you on dialysis? No (P)      Do you take coumadin? No (P)      Discharge Plan A Home (P)      Discharge Plan B Home (P)      DME Needed Upon Discharge  none (P)      Discharge Plan discussed with: Patient (P)      Transition of Care Barriers None (P)         Physical Activity    On average, how many days per week do you engage in moderate to strenuous exercise (like a brisk walk)? 0 days (P)      On average, how many minutes do you engage in exercise at this level? 0 min (P)         Financial Resource Strain    How hard is it for you to pay for the very basics like food, housing, medical care, and heating? Not very hard (P)         Housing Stability    In the last 12 months, was there a time when you were not able to pay the mortgage or rent on time? No (P)      At any time in the past 12 months, were you homeless or living in a shelter (including now)? No (P)         Transportation Needs    Has the lack of transportation kept you from medical appointments, meetings, work or from getting things needed for daily living? No (P)         Food Insecurity    Within the past 12 months, you worried that your food would run out before you got the money to buy more. Never true (P)      Within the past 12 months, the food you bought just didn't last and you didn't  have money to get more. Never true (P)         Stress    Do you feel stress - tense, restless, nervous, or anxious, or unable to sleep at night because your mind is troubled all the time - these days? Not at all (P)         Social Isolation    How often do you feel lonely or isolated from those around you?  Never (P)         Alcohol Use    Q1: How often do you have a drink containing alcohol? Never (P)      Q2: How many drinks containing alcohol do you have on a typical day when you are drinking? Patient does not drink (P)      Q3: How often do you have six or more drinks on one occasion? Never (P)         Utilities    In the past 12 months has the electric, gas, oil, or water company threatened to shut off services in your home? No (P)         Health Literacy    How often do you need to have someone help you when you read instructions, pamphlets, or other written material from your doctor or pharmacy? Rarely (P)                    JOVANNA Baires, CRISTOBAL.   Case Management  [facility]  Email: [email or web address]

## 2025-01-06 NOTE — HOSPITAL COURSE
Symptomatic improvement on CAP, duonebs, prednisone regimen and resolution of leukocytosis. Pt states that prednisone made her jittery, anxious, caused insomnia so they were Dc'd. Denied acute care at home assistance. Pt discharged with transition from IV to PO for remaining CAP coverage with Augmentin, duonebs for home use, and antitussives. Advised PCP follow up. Qualified for home O2 based on 6 minute walk test.

## 2025-01-06 NOTE — SUBJECTIVE & OBJECTIVE
Interval History: HIEN, feels improved today and looking forward to [place] when able. Suspects that her grandchildren who she recently spent time with may have passed a bug on to her.    Review of Systems  Objective:     Vital Signs (Most Recent):  Temp: 98 °F (36.7 °C) (01/06/25 1507)  Pulse: 70 (01/06/25 1507)  Resp: 16 (01/06/25 1507)  BP: (!) 121/56 (01/06/25 1507)  SpO2: 95 % (01/06/25 1507) Vital Signs (24h Range):  Temp:  [97.5 °F (36.4 °C)-100.2 °F (37.9 °C)] 98 °F (36.7 °C)  Pulse:  [63-92] 70  Resp:  [15-24] 16  SpO2:  [90 %-98 %] 95 %  BP: (108-151)/(56-69) 121/56     Weight: 81.6 kg (180 lb)  Body mass index is 31.89 kg/m².  No intake or output data in the 24 hours ending 01/06/25 1632      Physical Exam  Vitals reviewed.   Constitutional:       General: She is not in acute distress.     Appearance: She is well-developed.   HENT:      Head: Normocephalic and atraumatic.   Eyes:      Extraocular Movements: Extraocular movements intact.      Pupils: Pupils are equal, round, and reactive to light.   Neck:      Vascular: No JVD.      Trachea: No tracheal deviation.   Cardiovascular:      Rate and Rhythm: Normal rate and regular rhythm.      Heart sounds: No murmur heard.     No friction rub. No gallop.   Pulmonary:      Effort: No respiratory distress.      Breath sounds: Rales present. No wheezing.   Abdominal:      General: Bowel sounds are normal. There is no distension.      Palpations: Abdomen is soft. There is no mass.      Tenderness: There is no abdominal tenderness.   Musculoskeletal:         General: No deformity.      Cervical back: Neck supple.   Lymphadenopathy:      Cervical: No cervical adenopathy.   Skin:     General: Skin is warm and dry.      Findings: No rash.   Neurological:      Mental Status: She is alert and oriented to person, place, and time.             Significant Labs: All pertinent labs within the past 24 hours have been reviewed.    Significant Imaging: I have reviewed all  pertinent imaging results/findings within the past 24 hours.

## 2025-01-06 NOTE — PROGRESS NOTES
[facility] - Emergency Dept  [facility] Medicine  Progress Note    Patient Name: Melani Holloway  MRN: [id number]  Patient Class: IP- Inpatient   Admission Date: 1/5/2025  Length of Stay: 1 days  Attending Physician: Kevon Posada MD  Primary Care Provider: Nathaniel Greenfield MD        Subjective     Principal Problem:Acute hypoxemic respiratory failure        HPI:  80 yo F with PMHx ECHO lung adenocarcinoma s/p robotic LULobectomy 5/2024, COPD, and HTN who presents to the ED complaining of cough, congestion, SOB, and chills x a few days. Pt. Reports developing nasal congestion and fatigue about 2-3 days ago. On Firday she developed associated cough, with some increased clear sputum production. Her symptoms initally improved, but she reports waking up from a nap today with chills and worsening SOB which concerned her. She took her temp, noted it to be 102, and she decided to come to the ED. In ED, SpO2 noted to be <90% on RA, improved with 2L NC. Pt. Denies any nausea/vomiting or dysuria. She reports positive sick contacts in her [facility] who have been having cold symptoms the last several days.    Overview/Hospital Course:  Symptomatic improvement on CAP, duonebs, prednisone regimen.     Interval History: HIEN, feels improved today and looking forward to [place] when able. Suspects that her grandchildren who she recently spent time with may have passed a bug on to her.    Review of Systems  Objective:     Vital Signs (Most Recent):  Temp: 98 °F (36.7 °C) (01/06/25 1507)  Pulse: 70 (01/06/25 1507)  Resp: 16 (01/06/25 1507)  BP: (!) 121/56 (01/06/25 1507)  SpO2: 95 % (01/06/25 1507) Vital Signs (24h Range):  Temp:  [97.5 °F (36.4 °C)-100.2 °F (37.9 °C)] 98 °F (36.7 °C)  Pulse:  [63-92] 70  Resp:  [15-24] 16  SpO2:  [90 %-98 %] 95 %  BP: (108-151)/(56-69) 121/56     Weight: 81.6 kg (180 lb)  Body mass index is 31.89 kg/m².  No intake or output data in the 24 hours ending 01/06/25 1632      Physical Exam  Vitals  "reviewed.   Constitutional:       General: She is not in acute distress.     Appearance: She is well-developed.   HENT:      Head: Normocephalic and atraumatic.   Eyes:      Extraocular Movements: Extraocular movements intact.      Pupils: Pupils are equal, round, and reactive to light.   Neck:      Vascular: No JVD.      Trachea: No tracheal deviation.   Cardiovascular:      Rate and Rhythm: Normal rate and regular rhythm.      Heart sounds: No murmur heard.     No friction rub. No gallop.   Pulmonary:      Effort: No respiratory distress.      Breath sounds: Rales present. No wheezing.   Abdominal:      General: Bowel sounds are normal. There is no distension.      Palpations: Abdomen is soft. There is no mass.      Tenderness: There is no abdominal tenderness.   Musculoskeletal:         General: No deformity.      Cervical back: Neck supple.   Lymphadenopathy:      Cervical: No cervical adenopathy.   Skin:     General: Skin is warm and dry.      Findings: No rash.   Neurological:      Mental Status: She is alert and oriented to person, place, and time.             Significant Labs: All pertinent labs within the past 24 hours have been reviewed.    Significant Imaging: I have reviewed all pertinent imaging results/findings within the past 24 hours.    Assessment and Plan     * Acute hypoxemic respiratory failure  -Pt. With hx COPD/prior lobectomy presenting with SOB, wheezing, cough, chills. CT chest reveals "Scattered airspace and ground-glass opacities in the lower lobes and the right middle lobe" on review, findings are chronic but appear somewhat worse than prior  -Continue IV azithromycin/ceftriaxone for CAP coverage, prednisone/duonebs for supportive care.  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 S/p robotic LULobectomy 5/2024, no evidence of recurrence on imaging today, continue to f/u with pulm/onc as outpatient      CAP " "(community acquired pneumonia)  See "acute hypoxemic resp failure"        Essential hypertension  -Continue home HCTZ, lisinopril, and amlodipine      VTE Risk Mitigation (From admission, onward)           Ordered     enoxaparin injection 40 mg  Daily         01/05/25 2229     IP VTE HIGH RISK PATIENT  Once         01/05/25 2229     Place sequential compression device  Until discontinued         01/05/25 2229                    Discharge Planning   RODOLFO: 1/7/2025     Code Status: Full Code   Medical Readiness for Discharge Date:   Discharge Plan A: Home                        Kevon Posada MD  Department of Hospital Medicine   [facility] - Emergency Dept    "

## 2025-01-06 NOTE — H&P
[facility] - Emergency Dept  [facility] Medicine  History & Physical    Patient Name: Melani Holloway  MRN: [id number]  Patient Class: IP- Inpatient  Admission Date: 1/5/2025  Attending Physician: Sumit Cisneros MD   Primary Care Provider: Nathaniel Greenfield MD         Patient information was obtained from patient, past medical records, and ER records.     Subjective:     Principal Problem:Acute hypoxemic respiratory failure    Chief Complaint:   Chief Complaint   Patient presents with    Shortness of Breath     Placed on  2l nc in triage    Cough        HPI: 80 yo F with PMHx ECHO lung adenocarcinoma s/p robotic LULobectomy 5/2024, COPD, and HTN who presents to the ED complaining of cough, congestion, SOB, and chills x a few days. Pt. Reports developing nasal congestion and fatigue about 2-3 days ago. On Firday she developed associated cough, with some increased clear sputum production. Her symptoms initally improved, but she reports waking up from a nap today with chills and worsening SOB which concerned her. She took her temp, noted it to be 102, and she decided to come to the ED. In ED, SpO2 noted to be <90% on RA, improved with 2L NC. Pt. Denies any nausea/vomiting or dysuria. [facility] been having cold symptoms the last several days.    Past Medical History:   Diagnosis Date    COPD (chronic obstructive pulmonary disease)     Ductal carcinoma in situ (DCIS) of right breast 09/19/2017    Hypertension        Past Surgical History:   Procedure Laterality Date    BREAST BIOPSY Right 2010    BREAST LUMPECTOMY Right 2010    BRONCHOSCOPY Left 5/23/2024    Procedure: BRONCHOSCOPY;  Surgeon: Riaz Fay MD;  Location: [facility] OR [address] [place] [address];  Service: Cardiothoracic;  Laterality: Left;    cataract surgery      INJECTION OF ANESTHETIC AGENT AROUND MULTIPLE INTERCOSTAL NERVES Left 5/23/2024    Procedure: BLOCK, NERVE, INTERCOSTAL, 2 OR MORE;  Surgeon: Sumeet  Riaz REHMAN MD;  Location: [facility] OR 2ND FLR;  Service: Cardiothoracic;  Laterality: Left;    LYMPHADENECTOMY N/A 5/23/2024    Procedure: LYMPHADENECTOMY;  Surgeon: Riaz Fay MD;  Location: [facility] OR [facility] FLR;  Service: Cardiothoracic;  Laterality: N/A;    ROBOTIC BRONCHOSCOPY N/A 4/16/2024    Procedure: ROBOTIC BRONCHOSCOPY;  Surgeon: Uzma Larsen MD;  Location: [facility] OR [facility] FLR;  Service: Pulmonary;  Laterality: N/A;    XI ROBOTIC RATS,WITH LOBECTOMY,LUNG Left 5/23/2024    Procedure: XI ROBOTIC LEFT UPPER LOBECTOMY;  Surgeon: Riaz Fay MD;  [facility];  Service: Cardiothoracic;  Laterality: Left;       Review of patient's allergies indicates:  No Known Allergies    No current facility-administered medications on file prior to encounter.     Current Outpatient Medications on File Prior to Encounter   Medication Sig    acetaminophen (TYLENOL) 500 MG tablet Take 2 tablets (1,000 mg total) by mouth every 8 (eight) hours as needed for Pain.    albuterol (PROAIR HFA) 90 mcg/actuation inhaler Inhale 2 puffs into the lungs every 6 (six) hours as needed for Wheezing. Rescue    amLODIPine (NORVASC) 10 MG tablet TAKE 1 TABLET BY MOUTH EVERY DAY    aspirin (ECOTRIN) 81 MG EC tablet Take 81 mg by mouth every other day.     calcium carbonate (OS-JONAS) 600 mg calcium (1,500 mg) Tab Take 600 mg by mouth once.    fluticasone furoate-vilanteroL (BREO ELLIPTA) 100-25 mcg/dose diskus inhaler Inhale 1 puff into the lungs once daily. Controller    fluticasone propionate (FLONASE) 50 mcg/actuation nasal spray 1 spray (50 mcg total) by Each Nostril route once daily.    gabapentin (NEURONTIN) 300 MG capsule Take 1 capsule (300 mg total) by mouth every evening.    hydroCHLOROthiazide (HYDRODIURIL) 25 MG tablet TAKE 1 TABLET BY MOUTH EVERY DAY IN THE MORNING    lisinopriL (PRINIVIL,ZESTRIL) 40 MG tablet TAKE 1 TABLET BY MOUTH EVERY DAY    vit A/vit C/vit E/zinc/copper (ICAPS AREDS ORAL) Take 1 tablet by mouth  once daily.    vitamin D 1000 units Tab Take 1,000 Units by mouth once daily.     Family History       Problem Relation (Age of Onset)    Breast cancer Paternal Aunt    Heart attack Father    No Known Problems Mother          Tobacco Use    Smoking status: Former     Current packs/day: 0.00     Average packs/day: 1 pack/day for 38.0 years (38.0 ttl pk-yrs)     Types: Cigarettes     Start date:      Quit date:      Years since quittin.0    Smokeless tobacco: Never   Substance and Sexual Activity    Alcohol use: Yes     Comment: Occasionally    Drug use: No    Sexual activity: Not Currently     Review of Systems   Constitutional:  Positive for chills. Negative for activity change, appetite change, fever and unexpected weight change.   HENT:  Negative for congestion and sore throat.    Respiratory:  Positive for cough, shortness of breath and wheezing.    Cardiovascular:  Negative for chest pain, palpitations and leg swelling.   Gastrointestinal:  Negative for abdominal distention, abdominal pain, blood in stool, constipation, diarrhea, nausea and vomiting.   Genitourinary:  Negative for difficulty urinating, dysuria and hematuria.   Musculoskeletal:  Positive for arthralgias. Negative for myalgias.   Skin:  Negative for color change and rash.   Neurological:  Negative for dizziness, tremors and seizures.     Objective:     Vital Signs (Most Recent):  Temp: 97.9 °F (36.6 °C) (25)  Pulse: 73 (25)  Resp: 19 (25)  BP: (!) 141/63 (25)  SpO2: 96 % (25) Vital Signs (24h Range):  Temp:  [97.9 °F (36.6 °C)-99.4 °F (37.4 °C)] 97.9 °F (36.6 °C)  Pulse:  [73-92] 73  Resp:  [19-24] 19  SpO2:  [90 %-96 %] 96 %  BP: (127-151)/(58-69) 141/63     Weight: 81.6 kg (180 lb)  Body mass index is 31.89 kg/m².     Physical Exam  Vitals reviewed.   Constitutional:       General: She is not in acute distress.     Appearance: She is well-developed.   HENT:      Head:  "Normocephalic and atraumatic.   Eyes:      Extraocular Movements: Extraocular movements intact.      Pupils: Pupils are equal, round, and reactive to light.   Neck:      Vascular: No JVD.      Trachea: No tracheal deviation.   Cardiovascular:      Rate and Rhythm: Normal rate and regular rhythm.      Heart sounds: No murmur heard.     No friction rub. No gallop.   Pulmonary:      Effort: No respiratory distress.      Breath sounds: Wheezing present. No rales.   Abdominal:      General: Bowel sounds are normal. There is no distension.      Palpations: Abdomen is soft. There is no mass.      Tenderness: There is no abdominal tenderness.   Musculoskeletal:         General: No deformity.      Cervical back: Neck supple.   Lymphadenopathy:      Cervical: No cervical adenopathy.   Skin:     General: Skin is warm and dry.      Findings: No rash.   Neurological:      Mental Status: She is alert and oriented to person, place, and time.              CRANIAL NERVES     CN III, IV, VI   Pupils are equal, round, and reactive to light.       Significant Labs: All pertinent labs within the past 24 hours have been reviewed.    Significant Imaging: I have reviewed all pertinent imaging results/findings within the past 24 hours.  Assessment/Plan:     * Acute hypoxemic respiratory failure  -Pt. With hx COPD/prior lobectomy presenting with SOB, wheezing, cough, chills. CT chest reveals "Scattered airspace and ground-glass opacities in the lower lobes and the right middle lobe" on review, findings are chronic but appear somewhat worse than prior  -Plan to treat with IV azithromycin/ceftriaxone for CAP coverage, will also treat COPD exacerbation given active wheezing with prednisone/duonebs. 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 S/p robotic " "LULobectomy 5/2024, no evidence of recurrence on imaging today, continue to f/u with pulm/onc as outpatient      CAP (community acquired pneumonia)  See "acute hypoxemic resp failure"        Essential hypertension  -Continue home HCTZ, lisinopril, and amlodipine      VTE Risk Mitigation (From admission, onward)           Ordered     enoxaparin injection 40 mg  Daily         01/05/25 2229     IP VTE HIGH RISK PATIENT  Once         01/05/25 2229     Place sequential compression device  Until discontinued         01/05/25 2229                                    Sumit Cisneros MD  Department of Hospital Medicine  [facility] - Emergency Dept          "

## 2025-01-06 NOTE — ASSESSMENT & PLAN NOTE
-Pt. S/p robotic LULobectomy 5/2024, no evidence of recurrence on imaging today, continue to f/u with pulm/onc as outpatient

## 2025-01-07 VITALS
OXYGEN SATURATION: 95 % | TEMPERATURE: 98 F | RESPIRATION RATE: 19 BRPM | WEIGHT: 180 LBS | HEIGHT: 63 IN | BODY MASS INDEX: 31.89 KG/M2 | SYSTOLIC BLOOD PRESSURE: 166 MMHG | DIASTOLIC BLOOD PRESSURE: 72 MMHG | HEART RATE: 63 BPM

## 2025-01-07 LAB
ALBUMIN SERPL BCP-MCNC: 2.9 G/DL (ref 3.5–5.2)
ALP SERPL-CCNC: 76 U/L (ref 40–150)
ALT SERPL W/O P-5'-P-CCNC: 19 U/L (ref 10–44)
ANION GAP SERPL CALC-SCNC: 10 MMOL/L (ref 8–16)
AST SERPL-CCNC: 15 U/L (ref 10–40)
BASOPHILS # BLD AUTO: 0.03 K/UL (ref 0–0.2)
BASOPHILS NFR BLD: 0.3 % (ref 0–1.9)
BILIRUB SERPL-MCNC: 0.2 MG/DL (ref 0.1–1)
BUN SERPL-MCNC: 22 MG/DL (ref 8–23)
CALCIUM SERPL-MCNC: 8.7 MG/DL (ref 8.7–10.5)
CHLORIDE SERPL-SCNC: 110 MMOL/L (ref 95–110)
CO2 SERPL-SCNC: 20 MMOL/L (ref 23–29)
CREAT SERPL-MCNC: 0.7 MG/DL (ref 0.5–1.4)
DIFFERENTIAL METHOD BLD: ABNORMAL
EOSINOPHIL # BLD AUTO: 0 K/UL (ref 0–0.5)
EOSINOPHIL NFR BLD: 0 % (ref 0–8)
ERYTHROCYTE [DISTWIDTH] IN BLOOD BY AUTOMATED COUNT: 14.5 % (ref 11.5–14.5)
EST. GFR  (NO RACE VARIABLE): >60 ML/MIN/1.73 M^2
GLUCOSE SERPL-MCNC: 109 MG/DL (ref 70–110)
HCT VFR BLD AUTO: 32.6 % (ref 37–48.5)
HGB BLD-MCNC: 10.3 G/DL (ref 12–16)
IMM GRANULOCYTES # BLD AUTO: 0.28 K/UL (ref 0–0.04)
IMM GRANULOCYTES NFR BLD AUTO: 2.5 % (ref 0–0.5)
LYMPHOCYTES # BLD AUTO: 1.5 K/UL (ref 1–4.8)
LYMPHOCYTES NFR BLD: 13.5 % (ref 18–48)
MCH RBC QN AUTO: 27 PG (ref 27–31)
MCHC RBC AUTO-ENTMCNC: 31.6 G/DL (ref 32–36)
MCV RBC AUTO: 85 FL (ref 82–98)
MONOCYTES # BLD AUTO: 1.3 K/UL (ref 0.3–1)
MONOCYTES NFR BLD: 11.1 % (ref 4–15)
NEUTROPHILS # BLD AUTO: 8.2 K/UL (ref 1.8–7.7)
NEUTROPHILS NFR BLD: 72.6 % (ref 38–73)
NRBC BLD-RTO: 0 /100 WBC
PLATELET # BLD AUTO: 222 K/UL (ref 150–450)
PMV BLD AUTO: 12.5 FL (ref 9.2–12.9)
POTASSIUM SERPL-SCNC: 3.9 MMOL/L (ref 3.5–5.1)
PROT SERPL-MCNC: 6.5 G/DL (ref 6–8.4)
RBC # BLD AUTO: 3.82 M/UL (ref 4–5.4)
SODIUM SERPL-SCNC: 140 MMOL/L (ref 136–145)
WBC # BLD AUTO: 11.29 K/UL (ref 3.9–12.7)

## 2025-01-07 PROCEDURE — 85025 COMPLETE CBC W/AUTO DIFF WBC: CPT | Performed by: HOSPITALIST

## 2025-01-07 PROCEDURE — 25000003 PHARM REV CODE 250

## 2025-01-07 PROCEDURE — 25000003 PHARM REV CODE 250: Performed by: HOSPITALIST

## 2025-01-07 PROCEDURE — 25000242 PHARM REV CODE 250 ALT 637 W/ HCPCS: Performed by: HOSPITALIST

## 2025-01-07 PROCEDURE — 80053 COMPREHEN METABOLIC PANEL: CPT | Performed by: HOSPITALIST

## 2025-01-07 PROCEDURE — 63700000 PHARM REV CODE 250 ALT 637 W/O HCPCS

## 2025-01-07 PROCEDURE — 94640 AIRWAY INHALATION TREATMENT: CPT

## 2025-01-07 PROCEDURE — 99285 EMERGENCY DEPT VISIT HI MDM: CPT | Mod: 25

## 2025-01-07 PROCEDURE — 94761 N-INVAS EAR/PLS OXIMETRY MLT: CPT

## 2025-01-07 RX ORDER — AMOXICILLIN AND CLAVULANATE POTASSIUM 875; 125 MG/1; MG/1
1 TABLET, FILM COATED ORAL EVERY 12 HOURS
Status: DISCONTINUED | OUTPATIENT
Start: 2025-01-07 | End: 2025-01-07 | Stop reason: HOSPADM

## 2025-01-07 RX ORDER — IPRATROPIUM BROMIDE AND ALBUTEROL SULFATE 2.5; .5 MG/3ML; MG/3ML
3 SOLUTION RESPIRATORY (INHALATION)
Qty: 270 ML | Refills: 0 | Status: SHIPPED | OUTPATIENT
Start: 2025-01-07 | End: 2025-02-06

## 2025-01-07 RX ORDER — AZITHROMYCIN 250 MG/1
250 TABLET, FILM COATED ORAL DAILY
Status: DISCONTINUED | OUTPATIENT
Start: 2025-01-07 | End: 2025-01-07 | Stop reason: HOSPADM

## 2025-01-07 RX ORDER — AMOXICILLIN AND CLAVULANATE POTASSIUM 875; 125 MG/1; MG/1
1 TABLET, FILM COATED ORAL EVERY 12 HOURS
Qty: 4 TABLET | Refills: 0 | Status: SHIPPED | OUTPATIENT
Start: 2025-01-07 | End: 2025-01-09

## 2025-01-07 RX ADMIN — AZITHROMYCIN DIHYDRATE 250 MG: 250 TABLET ORAL at 09:01

## 2025-01-07 RX ADMIN — HYDROCHLOROTHIAZIDE 25 MG: 25 TABLET ORAL at 08:01

## 2025-01-07 RX ADMIN — Medication 250 MG: at 08:01

## 2025-01-07 RX ADMIN — IPRATROPIUM BROMIDE AND ALBUTEROL SULFATE 3 ML: 2.5; .5 SOLUTION RESPIRATORY (INHALATION) at 07:01

## 2025-01-07 RX ADMIN — IPRATROPIUM BROMIDE AND ALBUTEROL SULFATE 3 ML: 2.5; .5 SOLUTION RESPIRATORY (INHALATION) at 01:01

## 2025-01-07 RX ADMIN — CHOLECALCIFEROL TAB 25 MCG (1000 UNIT) 1000 UNITS: 25 TAB at 08:01

## 2025-01-07 RX ADMIN — GUAIFENESIN AND DEXTROMETHORPHAN HYDROBROMIDE 1 TABLET: 600; 30 TABLET, EXTENDED RELEASE ORAL at 08:01

## 2025-01-07 RX ADMIN — AMOXICILLIN AND CLAVULANATE POTASSIUM 1 TABLET: 875; 125 TABLET, FILM COATED ORAL at 09:01

## 2025-01-07 NOTE — SUBJECTIVE & OBJECTIVE
Interval History: Sleepless and anxious overnight 2/2 steroids. Feeling well to DC otherwise.    Review of Systems  Objective:     Vital Signs (Most Recent):  Temp: 98.7 °F (37.1 °C) (01/07/25 1319)  Pulse: 64 (01/07/25 1319)  Resp: 20 (01/07/25 1319)  BP: 137/67 (01/07/25 1319)  SpO2: 97 % (01/07/25 1319) Vital Signs (24h Range):  Temp:  [97.4 °F (36.3 °C)-98.7 °F (37.1 °C)] 98.7 °F (37.1 °C)  Pulse:  [58-79] 64  Resp:  [16-23] 20  SpO2:  [85 %-97 %] 97 %  BP: (118-171)/(61-73) 137/67     Weight: 81.6 kg (180 lb)  Body mass index is 31.89 kg/m².    Intake/Output Summary (Last 24 hours) at 1/7/2025 1554  Last data filed at 1/6/2025 2247  Gross per 24 hour   Intake 491.51 ml   Output --   Net 491.51 ml         Physical Exam  Vitals reviewed.   Constitutional:       General: She is not in acute distress.     Appearance: She is well-developed.   HENT:      Head: Normocephalic and atraumatic.   Eyes:      Extraocular Movements: Extraocular movements intact.      Pupils: Pupils are equal, round, and reactive to light.   Neck:      Vascular: No JVD.      Trachea: No tracheal deviation.   Cardiovascular:      Rate and Rhythm: Normal rate and regular rhythm.      Heart sounds: No murmur heard.     No friction rub. No gallop.   Pulmonary:      Effort: No respiratory distress.      Breath sounds: Rales present. No wheezing.   Abdominal:      General: Bowel sounds are normal. There is no distension.      Palpations: Abdomen is soft. There is no mass.      Tenderness: There is no abdominal tenderness.   Musculoskeletal:         General: No deformity.      Cervical back: Neck supple.   Lymphadenopathy:      Cervical: No cervical adenopathy.   Skin:     General: Skin is warm and dry.      Findings: No rash.   Neurological:      Mental Status: She is alert and oriented to person, place, and time.             Significant Labs: All pertinent labs within the past 24 hours have been reviewed.    Significant Imaging: I have reviewed  all pertinent imaging results/findings within the past 24 hours.

## 2025-01-07 NOTE — DISCHARGE SUMMARY
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 Pt. Reports developing nasal congestion and fatigue about 2-3 days ago. On Firday she developed associated cough, with some increased clear sputum production. Her symptoms initally improved, but she reports waking up from a nap today with chills and worsening SOB which concerned her. She took her temp, noted it to be 102, and she decided to come to the ED. In ED, SpO2 noted to be <90% on RA, improved with 2L NC. Pt. Denies any nausea/vomiting or dysuria. She reports positive sick contacts in her [facility] who have been having cold symptoms the last several days.    * No surgery found *      Hospital Course:   Symptomatic improvement on CAP, duonebs, prednisone regimen and resolution of leukocytosis. Pt states that prednisone made her jittery, anxious, caused insomnia so they were Dc'd. Denied acute care at home assistance. Pt discharged with transition from IV to PO for remaining CAP coverage with Augmentin, duonebs for home use, and antitussives. Advised PCP follow up. Qualified for home O2 based on 6 minute walk test.      Goals of Care Treatment Preferences:  Code Status: Full Code      SDOH Screening:  The patient was screened for utility difficulties, food insecurity, transport difficulties, housing insecurity, and interpersonal safety  and there were no concerns identified this admission.    Interval History: Sleepless and anxious overnight 2/2 steroids. Feeling well to DC otherwise.    Review of Systems  Objective:     Vital Signs (Most Recent):  Temp: 98.7 °F (37.1 °C) (01/07/25 1319)  Pulse: 64 (01/07/25 1319)  Resp: 20 (01/07/25 1319)  BP: 137/67 (01/07/25 1319)  SpO2: 97 % (01/07/25 1319) Vital Signs (24h Range):  Temp:  [97.4 °F (36.3 °C)-98.7 °F (37.1 °C)] 98.7 °F (37.1 °C)  Pulse:  [58-79] 64  Resp:  [16-23] 20  SpO2:  [85 %-97 %] 97 %  BP: (118-171)/(61-73) 137/67     Weight: 81.6 kg (180 lb)  Body mass index is 31.89 kg/m².    Intake/Output Summary (Last 24 hours) at 1/7/2025 1554  Last data filed at 1/6/2025 2247  Gross per 24 hour   Intake 491.51 ml   Output --   Net 491.51 ml         Physical Exam  Vitals reviewed.   Constitutional:       General: She is not in acute distress.     Appearance: She is well-developed.   HENT:      Head: Normocephalic and atraumatic.   Eyes:      Extraocular Movements: Extraocular movements intact.      Pupils: Pupils are equal, round, and reactive to light.   Neck:      Vascular: No JVD.      Trachea: No tracheal deviation.   Cardiovascular:      Rate and Rhythm: Normal rate and regular rhythm.      Heart sounds: No murmur heard.     No friction rub. No gallop.   Pulmonary:      Effort: No respiratory distress.      Breath sounds: Rales present. No wheezing.   Abdominal:      General: Bowel sounds are normal. There is no distension.      Palpations: Abdomen is soft. There is no mass.      Tenderness: There is no abdominal tenderness.   Musculoskeletal:         General: No deformity.      Cervical back: Neck supple.   Lymphadenopathy:      Cervical: No cervical adenopathy.   Skin:     General: Skin is warm and dry.      Findings: No rash.   Neurological:      Mental Status: She is alert and oriented to person, place, and time.             Significant Labs: All pertinent labs within the past 24 hours have  "been reviewed.    Significant Imaging: I have reviewed all pertinent imaging results/findings within the past 24 hours.     Consults:     * Acute hypoxemic respiratory failure  -Pt. With hx COPD/prior lobectomy presenting with SOB, wheezing, cough, chills. CT chest reveals "Scattered airspace and ground-glass opacities in the lower lobes and the right middle lobe" on review, findings are chronic but appear somewhat worse than prior  -Continue IV azithromycin/ceftriaxone for CAP coverage, prednisone/duonebs for supportive care.  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    Specialty: Family Medicine  Contact information:  [address]  [address]  [facility] [address] [phone]                           Patient Instructions:      OXYGEN FOR HOME USE     Order Specific Question Answer Comments   Liter Flow 2    Duration Continuous    Qualifying Test Performed at: Activity    Oxygen saturation at rest 91    Oxygen saturation with activity 85    Oxygen " "saturation with activity on oxygen 94    Portable mode: continuous    Route nasal cannula    Device: home concentrator with portable concentrator    Length of need (in months): 3 mos    Patient condition with qualifying saturation COPD    Height: 5' 3" (1.6 m)    Weight: 81.6 kg (180 lb)    Alternative treatment measures have been tried or considered and deemed clinically ineffective. Yes        Significant Diagnostic Studies: N/A    Pending Diagnostic Studies:       Procedure Component Value Units Date/Time    Legionella antigen, urine [[id number]] Collected: 01/06/25 0046    Order Status: Sent Lab Status: In process Updated: 01/06/25 0058    Specimen: Urine, Clean Catch     Strep Pneumo AG Urine [[id number]] Collected: 01/06/25 0046    Order Status: Sent Lab Status: In process Updated: 01/06/25 0058    Specimen: Urine, Clean Catch            Medications:  Reconciled Home Medications:      Medication List        START taking these medications      albuterol-ipratropium 2.5 mg-0.5 mg/3 mL nebulizer solution  Commonly known as: DUO-NEB  Take 3 mLs by nebulization every 6 (six) hours while awake. Rescue     amoxicillin-clavulanate 875-125mg 875-125 mg per tablet  Commonly known as: AUGMENTIN  Take 1 tablet by mouth every 12 (twelve) hours. for 2 days     dextromethorphan-guaiFENesin  mg  mg per 12 hr tablet  Commonly known as: MUCINEX DM  Take 1 tablet by mouth 2 (two) times daily. for 10 days            CONTINUE taking these medications      acetaminophen 500 MG tablet  Commonly known as: TYLENOL  Take 2 tablets (1,000 mg total) by mouth every 8 (eight) hours as needed for Pain.     albuterol 90 mcg/actuation inhaler  Commonly known as: PROAIR HFA  Inhale 2 puffs into the lungs every 6 (six) hours as needed for Wheezing. Rescue     amLODIPine 10 MG tablet  Commonly known as: NORVASC  TAKE 1 TABLET BY MOUTH EVERY DAY     aspirin 81 MG EC tablet  Commonly known as: ECOTRIN  Take 81 mg by mouth every other " day.     BREO ELLIPTA 100-25 mcg/dose diskus inhaler  Generic drug: fluticasone furoate-vilanteroL  Inhale 1 puff into the lungs once daily. Controller     calcium carbonate 600 mg calcium (1,500 mg) Tab  Commonly known as: OS-JONAS  Take 600 mg by mouth once.     fluticasone propionate 50 mcg/actuation nasal spray  Commonly known as: FLONASE  1 spray (50 mcg total) by Each Nostril route once daily.     gabapentin 300 MG capsule  Commonly known as: NEURONTIN  Take 1 capsule (300 mg total) by mouth every evening.     hydroCHLOROthiazide 25 MG tablet  Commonly known as: HYDRODIURIL  TAKE 1 TABLET BY MOUTH EVERY DAY IN THE MORNING     ICAPS AREDS ORAL  Take 1 tablet by mouth once daily.     lisinopriL 40 MG tablet  Commonly known as: PRINIVIL,ZESTRIL  TAKE 1 TABLET BY MOUTH EVERY DAY     vitamin D 1000 units Tab  Commonly known as: VITAMIN D3  Take 1,000 Units by mouth once daily.              Indwelling Lines/Drains at time of discharge:   Lines/Drains/Airways       None                   Time spent on the discharge of patient: 34 minutes         Kevon Posada MD  Department of Hospital Medicine  [facility] - Emergency Dept

## 2025-01-07 NOTE — PLAN OF CARE
Home Oxygen Evaluation     Date Performed: 25     1)         Patient's Home O2 Sat on room air, while at rest: 91%                               If O2 sats on room air at rest are 88% or below, patient qualifies. No additional testing needed. Document N/A in steps 2 and 3. If 89% or above, complete steps 2.        2)         Patient's O2 Sat on room air while exercisin%                               If O2 sats on room air while exercising remain 89% or above patient does not qualify, no further testing needed Document N/A in step 3. If O2 sats on room air while exercising are 88% or below, continue to step 3.        3)         Patient's O2 Sat while exercising on O2: 94% on 2L                                           (Must show improvement from #2 for patients to qualify)     If O2 sats improve on oxygen, patient qualifies for portable oxygen. If not, the patient does not qualify.

## 2025-01-08 ENCOUNTER — TELEPHONE (OUTPATIENT)
Dept: ENDOSCOPY | Facility: HOSPITAL | Age: 80
End: 2025-01-08
Payer: MEDICARE

## 2025-01-08 LAB — L PNEUMO AG UR QL IA: NEGATIVE

## 2025-01-08 NOTE — TELEPHONE ENCOUNTER
Referral for procedure from telephone call -r/s-jacquie      Spoke to patient to reschedule procedure(s) Colonoscopy       Physician to perform procedure(s) Dr. JASKARAN Willingham  Date of Procedure (s) 2/27/25  Arrival Time 06:00 AM  Time of Procedure(s) 07:00 AM   Location of Procedure(s) [facility]  Type of Rx Prep sent to patient: Miralax  Instructions provided to patient via [facility]    Patient was informed on the following information and verbalized understanding. Screening questionnaire reviewed with patient and complete. If procedure requires anesthesia, a responsible adult needs to be present to accompany the patient home, patient cannot drive after receiving anesthesia. Appointment details are tentative, especially check-in time. Patient will receive a prep-op call 7 days prior to confirm check-in time for procedure. If applicable the patient should contact their pharmacy to verify Rx for procedure prep is ready for pick-up. Patient was advised to call the scheduling department at [phone] if pharmacy states no Rx is available. Patient was advised to call the endoscopy scheduling department if any questions or concerns arise.       Endoscopy Scheduling Department      The patient is currently under an internal pulmonologist, efren Healy. Is patient medically optimized by Pulmonology  for their upcoming scheduled Colonoscopy on 2/27/25.         Note: Dx with pneumonia on 1/5/25.

## 2025-01-08 NOTE — PLAN OF CARE
[facility] Dept  Discharge Final Note    Primary Care Provider: Nathaniel Greenfield MD    Expected Discharge Date: 1/7/2025    Pt d/c home with no needs. Pt family/friend to provide transportation home.     Future Appointments   Date Time Provider Department Center   5/13/2025  9:00 AM Nathaniel Greenfield MD [facility]   6/19/2025  8:30 AM [facility] CT[facility] CT REBEKAH [facility]   6/19/2025  9:00 AM Riaz Fay MD [facility] TH[facility]AC [facility] [place]         Final Discharge Note (most recent)       Final Note - 01/08/25 0859          Final Note    Assessment Type Final Discharge Note (P)      Anticipated Discharge Disposition Home or Self Care (P)         Post-Acute Status    Post-Acute Authorization Other (P)      Other Status No Post-Acute Service Needs (P)      Discharge Delays None known at this time (P)                      Important Message from Medicare             Contact Info       Nathaniel Greenfield MD   Specialty: Family Medicine   Relationship: PCP - General    [address] [facility]  [address] [place] [id number]   Phone: [phone]       Next Steps: Follow up          JOVANNA Baires, CSW.   Case Management  [facility]  Email: [email or web address]

## 2025-01-09 LAB
BACTERIA SPEC AEROBE CULT: NORMAL
BACTERIA SPEC AEROBE CULT: NORMAL
GRAM STN SPEC: NORMAL

## 2025-01-10 LAB
BACTERIA BLD CULT: NORMAL
BACTERIA BLD CULT: NORMAL
S PNEUM AG UR QL: DETECTED

## 2025-01-11 ENCOUNTER — HOSPITAL ENCOUNTER (EMERGENCY)
Facility: HOSPITAL | Age: 80
Discharge: HOME | End: 2025-01-12
Attending: EMERGENCY MEDICINE
Payer: MEDICARE

## 2025-01-11 VITALS
BODY MASS INDEX: 31.89 KG/M2 | WEIGHT: 180 LBS | SYSTOLIC BLOOD PRESSURE: 181 MMHG | DIASTOLIC BLOOD PRESSURE: 75 MMHG | HEIGHT: 63 IN | HEART RATE: 66 BPM | OXYGEN SATURATION: 96 % | RESPIRATION RATE: 25 BRPM | TEMPERATURE: 98 F

## 2025-01-11 DIAGNOSIS — N13.2 URETERAL STONE WITH HYDRONEPHROSIS: Primary | ICD-10-CM

## 2025-01-11 DIAGNOSIS — K80.20 CALCULUS OF GALLBLADDER WITHOUT CHOLECYSTITIS WITHOUT OBSTRUCTION: ICD-10-CM

## 2025-01-11 DIAGNOSIS — R10.9 ACUTE RIGHT FLANK PAIN: ICD-10-CM

## 2025-01-11 LAB
ALBUMIN SERPL BCP-MCNC: 3.6 G/DL (ref 3.5–5.2)
ALP SERPL-CCNC: 83 U/L (ref 40–150)
ALT SERPL W/O P-5'-P-CCNC: 15 U/L (ref 10–44)
ANION GAP SERPL CALC-SCNC: 14 MMOL/L (ref 8–16)
AST SERPL-CCNC: 13 U/L (ref 10–40)
BASOPHILS # BLD AUTO: 0.07 K/UL (ref 0–0.2)
BASOPHILS NFR BLD: 0.7 % (ref 0–1.9)
BILIRUB SERPL-MCNC: 0.3 MG/DL (ref 0.1–1)
BILIRUB UR QL STRIP: NEGATIVE
BUN SERPL-MCNC: 20 MG/DL (ref 8–23)
CALCIUM SERPL-MCNC: 9.6 MG/DL (ref 8.7–10.5)
CHLORIDE SERPL-SCNC: 104 MMOL/L (ref 95–110)
CLARITY UR REFRACT.AUTO: CLEAR
CO2 SERPL-SCNC: 22 MMOL/L (ref 23–29)
COLOR UR AUTO: COLORLESS
CREAT SERPL-MCNC: 0.8 MG/DL (ref 0.5–1.4)
DIFFERENTIAL METHOD BLD: ABNORMAL
EOSINOPHIL # BLD AUTO: 0 K/UL (ref 0–0.5)
EOSINOPHIL NFR BLD: 0.4 % (ref 0–8)
ERYTHROCYTE [DISTWIDTH] IN BLOOD BY AUTOMATED COUNT: 14.1 % (ref 11.5–14.5)
EST. GFR  (NO RACE VARIABLE): >60 ML/MIN/1.73 M^2
GLUCOSE SERPL-MCNC: 164 MG/DL (ref 70–110)
GLUCOSE UR QL STRIP: NEGATIVE
HCT VFR BLD AUTO: 40.1 % (ref 37–48.5)
HGB BLD-MCNC: 12.8 G/DL (ref 12–16)
HGB UR QL STRIP: NEGATIVE
IMM GRANULOCYTES # BLD AUTO: 0.42 K/UL (ref 0–0.04)
IMM GRANULOCYTES NFR BLD AUTO: 4.3 % (ref 0–0.5)
KETONES UR QL STRIP: NEGATIVE
LEUKOCYTE ESTERASE UR QL STRIP: NEGATIVE
LYMPHOCYTES # BLD AUTO: 1.4 K/UL (ref 1–4.8)
LYMPHOCYTES NFR BLD: 14.3 % (ref 18–48)
MCH RBC QN AUTO: 26.6 PG (ref 27–31)
MCHC RBC AUTO-ENTMCNC: 31.9 G/DL (ref 32–36)
MCV RBC AUTO: 83 FL (ref 82–98)
MONOCYTES # BLD AUTO: 0.8 K/UL (ref 0.3–1)
MONOCYTES NFR BLD: 7.9 % (ref 4–15)
NEUTROPHILS # BLD AUTO: 7.1 K/UL (ref 1.8–7.7)
NEUTROPHILS NFR BLD: 72.4 % (ref 38–73)
NITRITE UR QL STRIP: NEGATIVE
NRBC BLD-RTO: 0 /100 WBC
PH UR STRIP: 7 [PH] (ref 5–8)
PLATELET # BLD AUTO: 368 K/UL (ref 150–450)
PMV BLD AUTO: 11.6 FL (ref 9.2–12.9)
POTASSIUM SERPL-SCNC: 4 MMOL/L (ref 3.5–5.1)
PROT SERPL-MCNC: 7.6 G/DL (ref 6–8.4)
PROT UR QL STRIP: NEGATIVE
RBC # BLD AUTO: 4.81 M/UL (ref 4–5.4)
SODIUM SERPL-SCNC: 140 MMOL/L (ref 136–145)
SP GR UR STRIP: 1.01 (ref 1–1.03)
URN SPEC COLLECT METH UR: ABNORMAL
WBC # BLD AUTO: 9.78 K/UL (ref 3.9–12.7)

## 2025-01-11 PROCEDURE — 99285 EMERGENCY DEPT VISIT HI MDM: CPT | Mod: 25

## 2025-01-11 PROCEDURE — 63600175 PHARM REV CODE 636 W HCPCS: Performed by: EMERGENCY MEDICINE

## 2025-01-11 PROCEDURE — 81003 URINALYSIS AUTO W/O SCOPE: CPT | Performed by: EMERGENCY MEDICINE

## 2025-01-11 PROCEDURE — 96375 TX/PRO/DX INJ NEW DRUG ADDON: CPT

## 2025-01-11 PROCEDURE — 85025 COMPLETE CBC W/AUTO DIFF WBC: CPT | Performed by: EMERGENCY MEDICINE

## 2025-01-11 PROCEDURE — 96374 THER/PROPH/DIAG INJ IV PUSH: CPT

## 2025-01-11 PROCEDURE — 80053 COMPREHEN METABOLIC PANEL: CPT | Performed by: EMERGENCY MEDICINE

## 2025-01-11 RX ORDER — KETOROLAC TROMETHAMINE 30 MG/ML
10 INJECTION, SOLUTION INTRAMUSCULAR; INTRAVENOUS
Status: COMPLETED | OUTPATIENT
Start: 2025-01-11 | End: 2025-01-11

## 2025-01-11 RX ORDER — OXYCODONE HYDROCHLORIDE 5 MG/1
5 TABLET ORAL EVERY 6 HOURS PRN
Qty: 8 TABLET | Refills: 0 | Status: SHIPPED | OUTPATIENT
Start: 2025-01-11

## 2025-01-11 RX ORDER — ONDANSETRON HYDROCHLORIDE 2 MG/ML
4 INJECTION, SOLUTION INTRAVENOUS
Status: COMPLETED | OUTPATIENT
Start: 2025-01-11 | End: 2025-01-11

## 2025-01-11 RX ORDER — TAMSULOSIN HYDROCHLORIDE 0.4 MG/1
0.4 CAPSULE ORAL DAILY
Qty: 7 CAPSULE | Refills: 0 | Status: SHIPPED | OUTPATIENT
Start: 2025-01-11 | End: 2025-01-19

## 2025-01-11 RX ORDER — TAMSULOSIN HYDROCHLORIDE 0.4 MG/1
0.4 CAPSULE ORAL DAILY
Qty: 7 CAPSULE | Refills: 0 | Status: SHIPPED | OUTPATIENT
Start: 2025-01-11 | End: 2025-01-11

## 2025-01-11 RX ORDER — ONDANSETRON 4 MG/1
4 TABLET, ORALLY DISINTEGRATING ORAL EVERY 8 HOURS PRN
Qty: 12 TABLET | Refills: 0 | Status: SHIPPED | OUTPATIENT
Start: 2025-01-11

## 2025-01-11 RX ORDER — MORPHINE SULFATE 4 MG/ML
4 INJECTION, SOLUTION INTRAMUSCULAR; INTRAVENOUS
Status: COMPLETED | OUTPATIENT
Start: 2025-01-11 | End: 2025-01-11

## 2025-01-11 RX ADMIN — KETOROLAC TROMETHAMINE 10 MG: 30 INJECTION, SOLUTION INTRAMUSCULAR; INTRAVENOUS at 11:01

## 2025-01-11 RX ADMIN — ONDANSETRON 4 MG: 2 INJECTION INTRAMUSCULAR; INTRAVENOUS at 07:01

## 2025-01-11 RX ADMIN — MORPHINE SULFATE 4 MG: 4 INJECTION INTRAVENOUS at 07:01

## 2025-01-12 NOTE — ED NOTES
Received report from Ifrah TAVAREZ. Assumed care of patient. Patient is alert and resting comfortably in bed in NAD. BP, cardiac, and O2 monitoring continued. Family member at bedside. Patient updated on plan of care, pt denies any needs or complaints at this time. Bed locked in lowest position, side rails up x2, call light within reach. VSS.

## 2025-01-12 NOTE — ED NOTES
Patient comes into the emergency department by POV with complaints of urinary retention. Patient states that she last urinated around noon, then began experiencing lower back pain 9/10 and nausea. Upon arrival to ED bed pt states she urinated in waiting room restroom, also began N/V in restroom. Pt recently d/c from here with pneumonia, abx done last dose last night, arrives on 2L via NC from home. Patient denies SOB, CP, HA, vision changes.    LOC: The patient is awake, alert and aware of environment with an appropriate affect, the patient is oriented x 3 and speaking appropriately.   APPEARANCE: Patient appears comfortable and in no acute distress, patient is clean and well groomed.  SKIN: The skin is warm and dry, color consistent with ethnicity, patient has normal skin turgor and moist mucus membranes, skin intact, no breakdown or bruising noted.   MUSCULOSKELETAL: Patient moving all extremities spontaneously, no swelling noted.  RESPIRATORY: Airway is open and patent, respirations are spontaneous, patient has a normal effort and rate, no accessory muscle. Arrives on 2L via NC from home, states SOB with exertion due to pneumonia diagnosis, SATs 98%.  CARDIAC: Pt placed on cardiac monitor. Patient has a normal rate and regular rhythm, no edema noted, capillary refill < 3 seconds. Denies CP.  GASTRO: Soft and non tender to palpation, no distention noted.  : Pt denies any pain, odor or hematuria with urination. Reports she feels like she has to urinate often, but nothing comes out.  NEURO: Pt opens eyes spontaneously, behavior appropriate to situation, follows commands, facial expression symmetrical, bilateral hand grasp equal and even, purposeful motor response noted, normal sensation in all extremities when touched with a finger.

## 2025-01-12 NOTE — PROVIDER PROGRESS NOTES - EMERGENCY DEPT.
Imaging Results               CT Renal Stone Study ABD Pelvis WO (Final result)  Result time 01/11/25 22:43:57      Final result by Franky Fay MD (01/11/25 22:43:57)                   Impression:      Distal right ureteral calculus at the level of the right ureterovesicular junction with associated mild right hydroureter, mild-to-moderate hydronephrosis, and prominent right perinephric stranding, as above.    Left adnexal cystic lesion measuring up to 4.7 cm, ultrasound follow-up is recommended.    Small pulmonary nodules are noted measuring up to 4.7 mm.  For multiple solid nodules all <6 mm, Fleischner Society 2017 guidelines recommend no routine follow up for a low risk patient, or follow up with non-contrast chest CT at 12 months after discovery in a high risk patient.    The lung bases also demonstrate appearance of atelectasis, and basilar infiltrates and mild peribronchial thickening.    Cholelithiasis with a prominent gallstone at the neck of the gallbladder, mild to moderate gallbladder distention, without pericholecystic inflammatory change.    Diverticula of the colon are noted, there is no evidence for acute diverticulitis.    Small to moderate fat density periumbilical hernia without bowel involvement.    Additional findings as above.    This report was flagged in Epic as abnormal.      Electronically signed by: Franky Fay  Date:    01/11/2025  Time:    22:43               Narrative:    EXAMINATION:  CT RENAL STONE STUDY ABD PELVIS WO    CLINICAL HISTORY:  Flank pain, kidney stone suspected;    TECHNIQUE:  Low dose axial images, sagittal and coronal reformations were obtained from the lung bases to the pubic symphysis.  Contrast was not administered.    COMPARISON:  None    FINDINGS:  As best seen on axial image 158 at the level of the distal right ureter, right ureterovesicular junction there is a calculus measuring 3 mm on axial imaging, measuring approximately 3.1 mm on coronal image 71.   There is associated mild right hydroureter and mild to moderate right hydronephrosis with prominent right perinephric stranding.    There is no evidence for left-sided ureteral calculus or obstructive uropathy.  The urinary bladder otherwise appears unremarkable.    There is a hypodense lesion at the midpole of the left kidney measuring 3.6 cm, 9 Hounsfield units and a lesion at the lower pole of the left kidney measuring 2.6 cm, 7 Hounsfield units, not optimally evaluated on this exam however likely representing cysts.    At the lung bases there is appearance of mitral valve calcification, and coronary arterial atherosclerotic calcification, and there is pleural calcification at the left lung base.  There are small pulmonary nodules measuring up to approximately 4.7 mm as seen at the peripheral left lower lobe image 15.  The lung bases also demonstrate atelectatic change, as well as basilar infiltrates, and appearance of mild peribronchial thickening.    There is a prominent gallstone at the expected location of the neck of the gallbladder measuring approximately 1.8 cm, there is mild to moderate gallbladder distention, there is no pericholecystic inflammatory change.    When accounting for limitations of the exam there is no evidence for acute process of the liver, pancreas, spleen or adrenal glands.  The arterial vascular structures including the aorta demonstrate atherosclerotic change.    There is a cystic lesion of the left adnexa measuring 4.3 cm on axial imaging and up to 4.7 cm on coronal reconstruction imaging.  Ultrasound follow-up is recommended.    There is a small to moderate periumbilical hernia of fat density without bowel involvement.  There is no evidence for small bowel obstructive process.  The appendix is identified, it does not appear inflamed.  Mild air and stool noted throughout the colon, diverticula of the colon are noted, there is no evidence for acute diverticulitis.  There is no  evidence for inflammatory or obstructive process of the colon.  There is no evidence for free intraperitoneal air.    The visualized osseous structures demonstrate chronic change.                                       Patient is signed out pending CT read.  CT does show a small stone at her UVJ, likely the cause of her pain.  She also has a prominent gallstone at the neck of her gallbladder.  Re-evaluation, patient's abdomen is soft, nondistended, nontender to palpation.  Doubt symptomatic cholelithiasis, cholecystitis, or choledocholithiasis.  Renal functions normal, no signs of urinary tract infection, she should be able to pass the stone without difficulty.  She was given a strainer, referred to Urology, will place on Flomax.  Given oxycodone for pain control, which she has tolerated in the past.  Strict return precautions were discussed with the patient and daughter.  She is stable for discharge home with outpatient follow up.

## 2025-01-12 NOTE — ED PROVIDER NOTES
Encounter Date: 1/11/2025       History     Chief Complaint   Patient presents with    Urinary Retention     Urinary Retention. Last urinated around 1200 today. Right lower back pain. Discharged home Tuesday after admission for pneumonia. Arrived on 2L O2.     HPI  80 y/o F with medical history of HTN, COPD and breast CA (DCIS s/p lumpectomy) as well as  lung CA (s/p lobectomy) presents with difficulty urinating and fight flank pain. Pt discharged from hospital on 1/7/25 after admission for pneumonia- completed antibiotics yesterday. Per pt and daughter pt has been doing well at home until this afternoon when she noted urinary urgency but not able to urinate. Also noted right flank pain, non-radiating. No n/v. No fevers.   On arrival to ED was able to urinate a small amount and notes some dysuria.    No other complaints.    Review of patient's allergies indicates:  No Known Allergies    Past Medical History:   Diagnosis Date    COPD (chronic obstructive pulmonary disease)     Ductal carcinoma in situ (DCIS) of right breast 09/19/2017    Hypertension      Past Surgical History:   Procedure Laterality Date    BREAST BIOPSY Right 2010    BREAST LUMPECTOMY Right 2010    BRONCHOSCOPY Left 5/23/2024    Procedure: BRONCHOSCOPY;  Surgeon: Riaz Fay MD;  Location: [facility] OR [address] [place] [address];  Service: Cardiothoracic;  Laterality: Left;    cataract surgery      INJECTION OF ANESTHETIC AGENT AROUND MULTIPLE INTERCOSTAL NERVES Left 5/23/2024    Procedure: BLOCK, NERVE, INTERCOSTAL, 2 OR MORE;  Surgeon: Riaz Fay MD;  Location: [facility] OR [address] [place] [address];  Service: Cardiothoracic;  Laterality: Left;    LYMPHADENECTOMY N/A 5/23/2024    Procedure: LYMPHADENECTOMY;  Surgeon: Riaz Fay MD;  Location: [facility] OR [address] [place] [address];  Service: Cardiothoracic;  Laterality: N/A;    ROBOTIC BRONCHOSCOPY N/A 4/16/2024    Procedure: ROBOTIC BRONCHOSCOPY;  Surgeon: Uzma Larsen MD;  Location: [facility] OR [address] [place] [address];  Service: Pulmonary;  Laterality: N/A;     XI ROBOTIC RATS,WITH LOBECTOMY,LUNG Left 2024    Procedure: XI ROBOTIC LEFT UPPER LOBECTOMY;  Surgeon: Riaz Fay MD;  Location: [facility] OR [address] [place] [address];  Service: Cardiothoracic;  Laterality: Left;     Family History   Problem Relation Name Age of Onset    No Known Problems Mother      Heart attack Father      Breast cancer Paternal Aunt       Social History     Tobacco Use    Smoking status: Former     Current packs/day: 0.00     Average packs/day: 1 pack/day for 38.0 years (38.0 ttl pk-yrs)     Types: Cigarettes     Start date:      Quit date:      Years since quittin.0    Smokeless tobacco: Never   Substance Use Topics    Alcohol use: Yes     Comment: Occasionally    Drug use: No       Physical Exam     Initial Vitals [25 1843]   BP Pulse Resp Temp SpO2   (!) 197/79 62 (!) 26 97.7 °F (36.5 °C) 98 %      MAP       --         Physical Exam    Nursing note and vitals reviewed.  Constitutional: She appears well-developed and well-nourished. She is not diaphoretic. No distress.   HENT:   Head: Normocephalic and atraumatic.   Eyes: Conjunctivae are normal.   Neck: Neck supple. No JVD present.   Cardiovascular:  Normal rate and regular rhythm.           Pulmonary/Chest: Breath sounds normal. No respiratory distress. She has no wheezes. She has no rales.   Abdominal: Abdomen is soft. She exhibits no distension. There is no abdominal tenderness.   RT CVAT, no rash   Musculoskeletal:         General: No tenderness or edema.      Cervical back: Neck supple.     Neurological: She is alert and oriented to person, place, and time.   Skin: Skin is warm and dry.         ED Course   Procedures  Labs Reviewed   CBC W/ AUTO DIFFERENTIAL - Abnormal       Result Value    WBC 9.78      RBC 4.81      Hemoglobin 12.8      Hematocrit 40.1      MCV 83      MCH 26.6 (*)     MCHC 31.9 (*)     RDW 14.1      Platelets 368      MPV 11.6      Immature Granulocytes 4.3 (*)     Gran # (ANC) 7.1      Immature Grans  (Abs) 0.42 (*)     Lymph # 1.4      Mono # 0.8      Eos # 0.0      Baso # 0.07      nRBC 0      Gran % 72.4      Lymph % 14.3 (*)     Mono % 7.9      Eosinophil % 0.4      Basophil % 0.7      Differential Method Automated     COMPREHENSIVE METABOLIC PANEL - Abnormal    Sodium 140      Potassium 4.0      Chloride 104      CO2 22 (*)     Glucose 164 (*)     BUN 20      Creatinine 0.8      Calcium 9.6      Total Protein 7.6      Albumin 3.6      Total Bilirubin 0.3      Alkaline Phosphatase 83      AST 13      ALT 15      eGFR >60.0      Anion Gap 14     URINALYSIS, REFLEX TO URINE CULTURE - Abnormal    Specimen UA Urine, Clean Catch      Color, UA Colorless (*)     Appearance, UA Clear      pH, UA 7.0      Specific Gravity, UA 1.010      Protein, UA Negative      Glucose, UA Negative      Ketones, UA Negative      Bilirubin (UA) Negative      Occult Blood UA Negative      Nitrite, UA Negative      Leukocytes, UA Negative      Narrative:     Specimen Source->Urine          Imaging Results              CT Renal Stone Study ABD Pelvis WO (In process)  Result time 01/11/25 22:27:23                     Medications   morphine injection 4 mg (4 mg Intravenous Given 1/11/25 1955)   ondansetron injection 4 mg (4 mg Intravenous Given 1/11/25 1955)     Medical Decision Making  Pt with difficulty urinating and rt flank pain. Elevated BP otherwise nl vital signs.   PVR 0ml. Ddx: UTI, pyelonephritis, renal colic, cystitis  Routine blood work and UA ordered as well as CT abd to rule out nephrolithiasis  Analgesia and reassess.    UA negative.  Unremarkable blood work including normal renal function. Improved pain after IV pain meds. BP still elevated but pt asymptomatic. Rreports having taken BP meds at home.  Pt signed out to on-coming staff at shift change. CT abd results pending.    Amount and/or Complexity of Data Reviewed  Labs: ordered. Decision-making details documented in ED Course.  Radiology: ordered.    Risk  Prescription  drug management.                                      Clinical Impression:  Final diagnoses:  [R10.9] Acute right flank pain (Primary)                 Padmini Palumbo MD  01/11/25 [id number]

## 2025-01-12 NOTE — DISCHARGE INSTRUCTIONS
Your imaging today shows a pulmonary and adrenal nodule.  Discuss this with your primary physician at follow up and consider repeat imaging.

## 2025-01-15 ENCOUNTER — TELEPHONE (OUTPATIENT)
Dept: ENDOSCOPY | Facility: HOSPITAL | Age: 80
End: 2025-01-15
Payer: MEDICARE

## 2025-01-15 ENCOUNTER — TELEPHONE (OUTPATIENT)
Dept: PULMONOLOGY | Facility: CLINIC | Age: 80
End: 2025-01-15
Payer: MEDICARE

## 2025-01-15 NOTE — TELEPHONE ENCOUNTER
I spoke with patient in regards to scheduling a follow up visit per Dr. Prater. Patient is scheduled with Dr. Prater first available appointment on 2/10/25 at 8:30 am. Appointment mailed. Patient confirmed and verbalized understanding.

## 2025-01-15 NOTE — TELEPHONE ENCOUNTER
----- Message from DAYSI Olmedo sent at 2025  9:53 AM CST -----  Regardin/27 CL  The patient is currently under an internal pulmonologist, efren Healy. Is patient medically optimized by Pulmonology  for their upcoming scheduled Colonoscopy on 25.         Note: Dx with pneumonia on 25.

## 2025-01-15 NOTE — TELEPHONE ENCOUNTER
Dear Dr Prater,    Patient has a scheduled procedure Colonoscopy 2/27/25.  Patient had a recent hospitalization for COPD exacerbation.  In order to ensure patient safety, we would like to confirm if he/she is medically optimized for the procedure.          Thank you for your prompt reply.    [facility] Endoscopy Scheduling

## 2025-01-16 LAB
LEGIONELLA CULTURE STATUS: NORMAL
LEGIONELLA SPEC CULT: NORMAL
SPECIMEN SOURCE: NORMAL

## 2025-01-29 ENCOUNTER — OFFICE VISIT (OUTPATIENT)
Dept: UROLOGY | Facility: CLINIC | Age: 80
End: 2025-01-29
Payer: MEDICARE

## 2025-01-29 VITALS
DIASTOLIC BLOOD PRESSURE: 71 MMHG | BODY MASS INDEX: 31.87 KG/M2 | HEART RATE: 60 BPM | HEIGHT: 63 IN | WEIGHT: 179.88 LBS | SYSTOLIC BLOOD PRESSURE: 165 MMHG

## 2025-01-29 DIAGNOSIS — N28.1 RENAL CYST: ICD-10-CM

## 2025-01-29 DIAGNOSIS — N20.0 KIDNEY STONES: Primary | ICD-10-CM

## 2025-01-29 DIAGNOSIS — I10 ESSENTIAL HYPERTENSION: ICD-10-CM

## 2025-01-29 DIAGNOSIS — N13.2 URETERAL STONE WITH HYDRONEPHROSIS: ICD-10-CM

## 2025-01-29 PROCEDURE — 1159F MED LIST DOCD IN RCRD: CPT | Mod: CPTII,S$GLB,, | Performed by: UROLOGY

## 2025-01-29 PROCEDURE — 1126F AMNT PAIN NOTED NONE PRSNT: CPT | Mod: CPTII,S$GLB,, | Performed by: UROLOGY

## 2025-01-29 PROCEDURE — 3077F SYST BP >= 140 MM HG: CPT | Mod: CPTII,S$GLB,, | Performed by: UROLOGY

## 2025-01-29 PROCEDURE — 1101F PT FALLS ASSESS-DOCD LE1/YR: CPT | Mod: CPTII,S$GLB,, | Performed by: UROLOGY

## 2025-01-29 PROCEDURE — 3078F DIAST BP <80 MM HG: CPT | Mod: CPTII,S$GLB,, | Performed by: UROLOGY

## 2025-01-29 PROCEDURE — 99204 OFFICE O/P NEW MOD 45 MIN: CPT | Mod: S$GLB,,, | Performed by: UROLOGY

## 2025-01-29 PROCEDURE — 99999 PR PBB SHADOW E&M-EST. PATIENT-LVL IV: CPT | Mod: PBBFAC,,, | Performed by: UROLOGY

## 2025-01-29 PROCEDURE — 1160F RVW MEDS BY RX/DR IN RCRD: CPT | Mod: CPTII,S$GLB,, | Performed by: UROLOGY

## 2025-01-29 PROCEDURE — 1111F DSCHRG MED/CURRENT MED MERGE: CPT | Mod: CPTII,S$GLB,, | Performed by: UROLOGY

## 2025-01-29 PROCEDURE — 3288F FALL RISK ASSESSMENT DOCD: CPT | Mod: CPTII,S$GLB,, | Performed by: UROLOGY

## 2025-01-29 NOTE — PROGRESS NOTES
Subjective:      Patient ID: Melani Holloway is a 79 y.o. female.    Chief Complaint: ureteral stone    Patient is a 79 y.o. female who is new to our clinic and referred by the ED, Dr. Quiñones for evaluation of kidney stones.     HPI    Urolithiasis  Patient complained of right flank pain with radiation to the abdomen. Onset of symptoms was abrupt starting 3 weeks ago with completely resolved course since that time. Patient describes the pain as sharp, continuous and rated as 10 / 10. The patient has had nausea and vomiting and no diaphoresis. There has been no fever or chills. The patient is not complaining of dysuria or frequency. Risk factors for urolithiasis: none.    Resolution of symptoms completely while she was in the ED.   She did not catch a stone however.  She has not had recurrence of symptoms since her ER visit on 1/11/25.  She did not require pain medications at all.     CT renal stone study from 1/11/25 was independently reviewed today and reveals left renal cysts (indeterminate), right perinephric stranding and hydronephrosis, 3mm right UVJ stone.           Review of Systems  All other systems reviewed and negative except pertinent positives noted in HPI.    Objective:     Physical Exam  Constitutional:       General: She is not in acute distress.     Appearance: She is well-developed.   HENT:      Head: Normocephalic and atraumatic.   Eyes:      General: No scleral icterus.  Neck:      Trachea: No tracheal deviation.   Pulmonary:      Effort: Pulmonary effort is normal. No respiratory distress.   Neurological:      Mental Status: She is alert and oriented to person, place, and time.   Psychiatric:         Behavior: Behavior normal.         Thought Content: Thought content normal.         Judgment: Judgment normal.       Assessment:     1. Kidney stones    2. Ureteral stone with hydronephrosis    3. Renal cyst    4. Essential hypertension      Plan:     1. Kidney stones    2. Ureteral stone with  hydronephrosis    3. Renal cyst    4. Essential hypertension        No orders of the defined types were placed in this encounter.    1. Kidney stones:  -General risk factors for kidney stones and the conservative measures to prevent kidney stones in the future were discussed with the patient in detail.  The patient was encouraged to drink 2-3 liters of water a day, limit iced tea and vanessa as well as foods high in oxalate.  They were cautioned to try to limit salt and red meat intake.  We also discussed adding citrate to the diet with the addition of alvina or lemon juice to their water or alternatively with crystal light.   --imaging reviewed as per HPI.   -Urine dip: completely negative.   -plan for CT abd/pelvis w/wo contrast to confirm passage of ureteral stone and further evaluate left renal cysts.     If repeat imaging confirms passage and no worrisome left renal lesion, can f/u prn.     2. Renal cysts:  -incompletely evaluated with non-contrast ct scan.     3. HTN:  -BP reviewed  -stable, continue meds and f/u with PCP

## 2025-02-04 ENCOUNTER — HOSPITAL ENCOUNTER (OUTPATIENT)
Dept: RADIOLOGY | Facility: HOSPITAL | Age: 80
Discharge: HOME | End: 2025-02-04
Attending: UROLOGY
Payer: MEDICARE

## 2025-02-04 DIAGNOSIS — N20.0 KIDNEY STONES: ICD-10-CM

## 2025-02-04 DIAGNOSIS — N28.1 RENAL CYST: ICD-10-CM

## 2025-02-04 PROCEDURE — 25500020 PHARM REV CODE 255: Performed by: UROLOGY

## 2025-02-04 PROCEDURE — 74177 CT ABD & PELVIS W/CONTRAST: CPT | Mod: 26,,, | Performed by: RADIOLOGY

## 2025-02-04 PROCEDURE — 74177 CT ABD & PELVIS W/CONTRAST: CPT | Mod: TC

## 2025-02-04 RX ADMIN — IOHEXOL 100 ML: 350 INJECTION, SOLUTION INTRAVENOUS at 07:02

## 2025-02-10 ENCOUNTER — OFFICE VISIT (OUTPATIENT)
Dept: PULMONOLOGY | Facility: CLINIC | Age: 80
End: 2025-02-10
Payer: MEDICARE

## 2025-02-10 VITALS
OXYGEN SATURATION: 98 % | BODY MASS INDEX: 31.68 KG/M2 | DIASTOLIC BLOOD PRESSURE: 76 MMHG | SYSTOLIC BLOOD PRESSURE: 142 MMHG | WEIGHT: 178.81 LBS | HEART RATE: 65 BPM | HEIGHT: 63 IN

## 2025-02-10 DIAGNOSIS — J31.0 CHRONIC RHINITIS: ICD-10-CM

## 2025-02-10 DIAGNOSIS — C34.12 ADENOCARCINOMA OF UPPER LOBE OF LEFT LUNG: ICD-10-CM

## 2025-02-10 DIAGNOSIS — J43.2 CENTRILOBULAR EMPHYSEMA: Primary | ICD-10-CM

## 2025-02-10 DIAGNOSIS — J18.9 COMMUNITY ACQUIRED PNEUMONIA, UNSPECIFIED LATERALITY: ICD-10-CM

## 2025-02-10 PROCEDURE — 1160F RVW MEDS BY RX/DR IN RCRD: CPT | Mod: CPTII,S$GLB,, | Performed by: INTERNAL MEDICINE

## 2025-02-10 PROCEDURE — 3078F DIAST BP <80 MM HG: CPT | Mod: CPTII,S$GLB,, | Performed by: INTERNAL MEDICINE

## 2025-02-10 PROCEDURE — 3288F FALL RISK ASSESSMENT DOCD: CPT | Mod: CPTII,S$GLB,, | Performed by: INTERNAL MEDICINE

## 2025-02-10 PROCEDURE — 1101F PT FALLS ASSESS-DOCD LE1/YR: CPT | Mod: CPTII,S$GLB,, | Performed by: INTERNAL MEDICINE

## 2025-02-10 PROCEDURE — 3077F SYST BP >= 140 MM HG: CPT | Mod: CPTII,S$GLB,, | Performed by: INTERNAL MEDICINE

## 2025-02-10 PROCEDURE — 99999 PR PBB SHADOW E&M-EST. PATIENT-LVL IV: CPT | Mod: PBBFAC,,, | Performed by: INTERNAL MEDICINE

## 2025-02-10 PROCEDURE — 1159F MED LIST DOCD IN RCRD: CPT | Mod: CPTII,S$GLB,, | Performed by: INTERNAL MEDICINE

## 2025-02-10 PROCEDURE — 99214 OFFICE O/P EST MOD 30 MIN: CPT | Mod: S$GLB,,, | Performed by: INTERNAL MEDICINE
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
## 2025-02-10 NOTE — ASSESSMENT & PLAN NOTE
It seems the bulk of her residual complaints are related to sinus and ear congestion.  Advised her to use sinus rinse followed by Flonase on a daily basis until these symptoms resolve.  Also advised her to utilize over-the-counter Claritin medication as needed.  She is not having fevers at this time so I do not feel that she requires further antibiotic therapy.  She states her symptoms are consistent with allergies that she always has a this time of year.

## 2025-02-10 NOTE — ASSESSMENT & PLAN NOTE
With previous smoking history and emphysema on CT scan.  She is on a regimen of Breo and p.r.n. albuterol.  Given her recent hospitalization she now meets criteria for triple therapy.  We discussed stopping her Breo and starting Trelegy.  I have sent a prescription in accordingly.  -Breo changed to Trelegy today.  Prescription sent to her preferred pharmacy   - PCV 20 given at last visit  - if mucous clearance becomes an issue I will add 3% saline nebulizers to her therapy.  -continue p.r.n. DuoNebs.    -continue p.r.n. albuterol HFA  - Today she is on room air.  She was able to walk from the parking garage with limitation.  She still has residual cough/congestion, but would be able to undergo colonoscopy providing her condition does not worsen again.

## 2025-02-10 NOTE — ASSESSMENT & PLAN NOTE
Status post robotic left upper lobectomy in 05/2024.  No evidence of recurrence on subsequent imaging thus far.  Following with CT surgery, Oncology, and myself.  Next scan due in 06/2025

## 2025-02-11 ENCOUNTER — TELEPHONE (OUTPATIENT)
Dept: ENDOSCOPY | Facility: HOSPITAL | Age: 80
End: 2025-02-11
Payer: MEDICARE

## 2025-02-11 NOTE — TELEPHONE ENCOUNTER
Please see my note from yesterday's appointment.  It contains my insight on this matter.  I feel she is optimized at this time for the procedure.    Thanks,  Brayden Prater MD  [facility]

## 2025-02-11 NOTE — TELEPHONE ENCOUNTER
Dear Dr Prater,    Patient has a scheduled procedure Colonoscopy 25 and in order to ensure patient safety, we would like to confirm if he/she is cleared for the procedure.      Thank you for your prompt reply.    [facility] Endoscopy Scheduling                 ----- Message from DAYSI Olmedo sent at 2025  9:53 AM CST -----  Regardin/27 CL  The patient is currently under an internal pulmonologist, efren Healy. Is patient medically optimized by Pulmonology  for their upcoming scheduled Colonoscopy on 25.         Note: Dx with pneumonia on 25.

## 2025-02-27 ENCOUNTER — ANESTHESIA (OUTPATIENT)
Dept: ENDOSCOPY | Facility: HOSPITAL | Age: 80
End: 2025-02-27
Payer: MEDICARE

## 2025-02-27 ENCOUNTER — HOSPITAL ENCOUNTER (OUTPATIENT)
Facility: HOSPITAL | Age: 80
Discharge: HOME | End: 2025-02-27
Attending: COLON & RECTAL SURGERY | Admitting: COLON & RECTAL SURGERY
Payer: MEDICARE

## 2025-02-27 ENCOUNTER — ANESTHESIA EVENT (OUTPATIENT)
Dept: ENDOSCOPY | Facility: HOSPITAL | Age: 80
End: 2025-02-27
Payer: MEDICARE

## 2025-02-27 VITALS
OXYGEN SATURATION: 96 % | SYSTOLIC BLOOD PRESSURE: 166 MMHG | HEIGHT: 63 IN | BODY MASS INDEX: 31.54 KG/M2 | HEART RATE: 57 BPM | WEIGHT: 178 LBS | DIASTOLIC BLOOD PRESSURE: 70 MMHG | TEMPERATURE: 98 F | RESPIRATION RATE: 19 BRPM

## 2025-02-27 DIAGNOSIS — Z12.11 SCREENING FOR COLON CANCER: ICD-10-CM

## 2025-02-27 PROCEDURE — 27200997: Performed by: COLON & RECTAL SURGERY

## 2025-02-27 PROCEDURE — 37000008 HC ANESTHESIA 1ST 15 MINUTES: Performed by: COLON & RECTAL SURGERY

## 2025-02-27 PROCEDURE — 88305 TISSUE EXAM BY PATHOLOGIST: CPT | Mod: 26,,, | Performed by: PATHOLOGY

## 2025-02-27 PROCEDURE — 45385 COLONOSCOPY W/LESION REMOVAL: CPT | Mod: ,,, | Performed by: COLON & RECTAL SURGERY

## 2025-02-27 PROCEDURE — 88305 TISSUE EXAM BY PATHOLOGIST: CPT | Performed by: PATHOLOGY

## 2025-02-27 PROCEDURE — 27202363 HC INJECTION AGENT, SUBMUCOSAL, ANY: Performed by: COLON & RECTAL SURGERY

## 2025-02-27 PROCEDURE — 27201028 HC NEEDLE, SCLERO: Performed by: COLON & RECTAL SURGERY

## 2025-02-27 PROCEDURE — 63600175 PHARM REV CODE 636 W HCPCS: Performed by: NURSE ANESTHETIST, CERTIFIED REGISTERED

## 2025-02-27 PROCEDURE — 27201089 HC SNARE, DISP (ANY): Performed by: COLON & RECTAL SURGERY

## 2025-02-27 PROCEDURE — 25000003 PHARM REV CODE 250: Performed by: COLON & RECTAL SURGERY

## 2025-02-27 PROCEDURE — 45381 COLONOSCOPY SUBMUCOUS NJX: CPT | Mod: 51,,, | Performed by: COLON & RECTAL SURGERY

## 2025-02-27 PROCEDURE — 27201012 HC FORCEPS, HOT/COLD, DISP: Performed by: COLON & RECTAL SURGERY

## 2025-02-27 PROCEDURE — 45385 COLONOSCOPY W/LESION REMOVAL: CPT | Performed by: COLON & RECTAL SURGERY

## 2025-02-27 PROCEDURE — 37000009 HC ANESTHESIA EA ADD 15 MINS: Performed by: COLON & RECTAL SURGERY

## 2025-02-27 PROCEDURE — 45381 COLONOSCOPY SUBMUCOUS NJX: CPT | Performed by: COLON & RECTAL SURGERY

## 2025-02-27 RX ORDER — DIPHENHYDRAMINE HYDROCHLORIDE 50 MG/ML
25 INJECTION INTRAMUSCULAR; INTRAVENOUS EVERY 6 HOURS PRN
Status: DISCONTINUED | OUTPATIENT
Start: 2025-02-27 | End: 2025-02-27 | Stop reason: HOSPADM

## 2025-02-27 RX ORDER — LIDOCAINE HYDROCHLORIDE 20 MG/ML
INJECTION INTRAVENOUS
Status: DISCONTINUED | OUTPATIENT
Start: 2025-02-27 | End: 2025-02-27

## 2025-02-27 RX ORDER — DIPHENHYDRAMINE HCL 25 MG
25 CAPSULE ORAL EVERY 6 HOURS PRN
COMMUNITY

## 2025-02-27 RX ORDER — GLUCAGON 1 MG
1 KIT INJECTION
Status: DISCONTINUED | OUTPATIENT
Start: 2025-02-27 | End: 2025-02-27 | Stop reason: HOSPADM

## 2025-02-27 RX ORDER — SODIUM CHLORIDE 0.9 % (FLUSH) 0.9 %
3 SYRINGE (ML) INJECTION
Status: DISCONTINUED | OUTPATIENT
Start: 2025-02-27 | End: 2025-02-27 | Stop reason: HOSPADM

## 2025-02-27 RX ORDER — HALOPERIDOL 5 MG/ML
0.5 INJECTION INTRAMUSCULAR EVERY 10 MIN PRN
Status: DISCONTINUED | OUTPATIENT
Start: 2025-02-27 | End: 2025-02-27 | Stop reason: HOSPADM

## 2025-02-27 RX ORDER — PROCHLORPERAZINE EDISYLATE 5 MG/ML
5 INJECTION INTRAMUSCULAR; INTRAVENOUS EVERY 30 MIN PRN
Status: DISCONTINUED | OUTPATIENT
Start: 2025-02-27 | End: 2025-02-27 | Stop reason: HOSPADM

## 2025-02-27 RX ORDER — PROPOFOL 10 MG/ML
VIAL (ML) INTRAVENOUS CONTINUOUS PRN
Status: DISCONTINUED | OUTPATIENT
Start: 2025-02-27 | End: 2025-02-27

## 2025-02-27 RX ORDER — SODIUM CHLORIDE 9 MG/ML
INJECTION, SOLUTION INTRAVENOUS CONTINUOUS
Status: DISCONTINUED | OUTPATIENT
Start: 2025-02-27 | End: 2025-02-27 | Stop reason: HOSPADM

## 2025-02-27 RX ORDER — FLUTICASONE FUROATE AND VILANTEROL 100; 25 UG/1; UG/1
1 POWDER RESPIRATORY (INHALATION) DAILY
COMMUNITY

## 2025-02-27 RX ORDER — PROPOFOL 10 MG/ML
VIAL (ML) INTRAVENOUS
Status: DISCONTINUED | OUTPATIENT
Start: 2025-02-27 | End: 2025-02-27

## 2025-02-27 RX ADMIN — LIDOCAINE HYDROCHLORIDE 50 MG: 20 INJECTION INTRAVENOUS at 07:02

## 2025-02-27 RX ADMIN — SODIUM CHLORIDE: 0.9 INJECTION, SOLUTION INTRAVENOUS at 06:02

## 2025-02-27 RX ADMIN — PROPOFOL 70 MG: 10 INJECTION, EMULSION INTRAVENOUS at 07:02

## 2025-02-27 RX ADMIN — PROPOFOL 150 MCG/KG/MIN: 10 INJECTION, EMULSION INTRAVENOUS at 07:02

## 2025-02-27 NOTE — H&P
Colonoscopy History and Physical      Procedure : Colonoscopy    Indications:  Screening exam for positive fecal occult test and PET scan which demonstrated hypermetabolic focus within hepatic flexure of colon, no corresponding CT abnormality, potentially a polyp    Family Hx of CRC: None    Last Colonoscopy:  never    Hx of sedation problems: none  FHX of sedation problems: none    Past Medical History:   Diagnosis Date    COPD (chronic obstructive pulmonary disease)     Ductal carcinoma in situ (DCIS) of right breast 2017    Hypertension        Family History   Problem Relation Name Age of Onset    No Known Problems Mother      Heart attack Father      Breast cancer Paternal Aunt         Social History[1]    Review of patient's allergies indicates:  No Known Allergies    Medications Ordered Prior to Encounter[2]    Review of Systems -   Respiratory ROS: negative  Cardiovascular ROS: negative  Gastrointestinal ROS: negative  Musculoskeletal ROS: negative  Neurological ROS: negative        Physical Exam:  General: no distress  Head: normocephalic  Lungs:  normal respiratory effort  Heart: regular rate  Abdomen: soft,  Non-tender  Extremities: warm and well perfused       Deep Sedation: Mallampati Score per anesthesia    ASA: II      Patient cleared for Anesthesia:  MAC    Anesthesia/Surgery risks, benefits, and alternative options discussed and understood by patient/family.         [1]   Social History  Socioeconomic History    Marital status:    Occupational History    Occupation: Retired   Tobacco Use    Smoking status: Former     Current packs/day: 0.00     Average packs/day: 1 pack/day for 38.0 years (38.0 ttl pk-yrs)     Types: Cigarettes     Start date:      Quit date:      Years since quittin.1    Smokeless tobacco: Never   Substance and Sexual Activity    Alcohol use: Yes     Comment: Occasionally    Drug use: No    Sexual activity: Not Currently     Social Drivers of Health      Financial Resource Strain: Low Risk  (1/6/2025)    Overall Financial Resource Strain (CARDIA)     Difficulty of Paying Living Expenses: Not very hard   Food Insecurity: No Food Insecurity (1/6/2025)    Hunger Vital Sign     Worried About Running Out of Food in the Last Year: Never true     Ran Out of Food in the Last Year: Never true   Transportation Needs: No Transportation Needs (1/6/2025)    TRANSPORTATION NEEDS     Transportation : No   Physical Activity: Inactive (1/6/2025)    Exercise Vital Sign     Days of Exercise per Week: 0 days     Minutes of Exercise per Session: 0 min   Stress: No Stress Concern Present (1/6/2025)    [employer] - Occupational Stress Questionnaire     Feeling of Stress : Not at all   Housing Stability: Unknown (1/6/2025)    Housing Stability Vital Sign     Unable to Pay for Housing in the Last Year: No     Homeless in the Last Year: No   [2]   No current facility-administered medications on file prior to encounter.     Current Outpatient Medications on File Prior to Encounter   Medication Sig Dispense Refill    acetaminophen (TYLENOL) 500 MG tablet Take 2 tablets (1,000 mg total) by mouth every 8 (eight) hours as needed for Pain.      albuterol (PROAIR HFA) 90 mcg/actuation inhaler Inhale 2 puffs into the lungs every 6 (six) hours as needed for Wheezing. Rescue 18 g 6    amLODIPine (NORVASC) 10 MG tablet TAKE 1 TABLET BY MOUTH EVERY DAY 90 tablet 3    aspirin (ECOTRIN) 81 MG EC tablet Take 81 mg by mouth every other day.       calcium carbonate (OS-JONAS) 600 mg calcium (1,500 mg) Tab Take 600 mg by mouth once.      fluticasone propionate (FLONASE) 50 mcg/actuation nasal spray 1 spray (50 mcg total) by Each Nostril route once daily. 1 Bottle 0    hydroCHLOROthiazide (HYDRODIURIL) 25 MG tablet TAKE 1 TABLET BY MOUTH EVERY DAY IN THE MORNING 90 tablet 3    lisinopriL (PRINIVIL,ZESTRIL) 40 MG tablet TAKE 1 TABLET BY MOUTH EVERY DAY 90 tablet 3    vit A/vit  C/vit E/zinc/copper (ICAPS AREDS ORAL) Take 1 tablet by mouth once daily.      vitamin D 1000 units Tab Take 1,000 Units by mouth once daily.

## 2025-02-27 NOTE — [employer]OVATION PATIENT INSTRUCTIONS
Discharge Summary/Instructions after an Endoscopic Procedure  Patient Name: Melani Holloway  Patient MRN: [id number]  Patient YOB: 1945 Thursday, February 27, 2025  Cleo Willingham MD  Dear patient,  As a result of recent federal legislation (The Federal Cures Act), you may   receive lab or pathology results from your procedure in your [facility]   account before your physician is able to contact you. Your physician or   their representative will relay the results to you with their   recommendations at their soonest availability.  Thank you,  RESTRICTIONS:  During your procedure today, you received medications for sedation.  These   medications may affect your judgment, balance and coordination.  Therefore,   for 24 hours, you have the following restrictions:   - DO NOT drive a car, operate machinery, make legal/financial decisions,   sign important papers or drink alcohol.    ACTIVITY:  Today: no heavy lifting, straining or running due to procedural   sedation/anesthesia.  The following day: return to full activity including work.  DIET:  Eat and drink normally unless instructed otherwise.     TREATMENT FOR COMMON SIDE EFFECTS:  - Mild abdominal pain, nausea, belching, bloating or excessive gas:  rest,   eat lightly and use a heating pad.  - Sore Throat: treat with throat lozenges and/or gargle with warm salt   water.  - Because air was used during the procedure, expelling large amounts of air   from your rectum or belching is normal.  - If a bowel prep was taken, you may not have a bowel movement for 1-3 days.    This is normal.  SYMPTOMS TO WATCH FOR AND REPORT TO YOUR PHYSICIAN:  1. Abdominal pain or bloating, other than gas cramps.  2. Chest pain.  3. Back pain.  4. Signs of infection such as: chills or fever occurring within 24 hours   after the procedure.  5. Rectal bleeding, which would show as bright red, maroon, or black stools.   (A tablespoon of blood from the rectum is not serious,  especially if   hemorrhoids are present.)  6. Vomiting.  7. Weakness or dizziness.  GO DIRECTLY TO THE NEAREST EMERGENCY ROOM IF YOU HAVE ANY OF THE FOLLOWING:      Difficulty breathing              Chills and/or fever over 101 F   Persistent vomiting and/or vomiting blood   Severe abdominal pain   Severe chest pain   Black, tarry stools   Bleeding- more than one tablespoon   Any other symptom or condition that you feel may need urgent attention  Your doctor recommends these additional instructions:  If any biopsies were taken, your doctors clinic will contact you in 1 to 2   weeks with any results.  - Discharge patient to home.   - Resume previous diet.   - Continue present medications.   - Await pathology results.   - Repeat colonoscopy date to be determined after pending pathology results   are reviewed for surveillance.   - Return to my office at appointment to be scheduled to discuss right   colectomy.   - Written discharge instructions were provided to the patient.   - The signs and symptoms of potential delayed complications were discussed   with the patient.   - Patient has a contact number available for emergencies.   - Return to normal activities tomorrow.  For questions, problems or results please call your physician - Cleo Willingham MD at Work:  [phone].  [facility], EMERGENCY ROOM PHONE NUMBER: [phone]  IF A COMPLICATION OR EMERGENCY SITUATION ARISES AND YOU ARE UNABLE TO REACH   YOUR PHYSICIAN - GO DIRECTLY TO THE EMERGENCY ROOM.  Cleo Willingham MD  2/27/2025 7:56:02 AM  This report has been verified and signed electronically.  Dear patient,  As a result of recent federal legislation (The Federal Cures Act), you may   receive lab or pathology results from your procedure in your [facility]   account before your physician is able to contact you. Your physician or   their representative will relay the results to you with their   recommendations at their soonest  availability.  Thank you,  [employer]ATION

## 2025-02-27 NOTE — PLAN OF CARE
Pt is being discharged to the care of her daughter. Pt is awake and alert w/ minimal complaints of pain. VSS on RA. Pt tolerating PO intake. KUB obtained prior to discharge. Discharge teaching done with patient and family (written and verbal), verbalized understanding. MD Tarsha came to bedside to speak with patient and family. Pt transported to car via wheelchair by PCT.

## 2025-02-27 NOTE — ANESTHESIA POSTPROCEDURE EVALUATION
Anesthesia Post Evaluation    Patient: Melani Holloway    Procedure(s) Performed: Procedure(s) (LRB):  COLONOSCOPY (N/A)    Final Anesthesia Type: general      Patient location during evaluation: PACU  Patient participation: Yes- Able to Participate  Level of consciousness: awake and alert  Post-procedure vital signs: reviewed and stable  Pain management: adequate  Airway patency: patent    PONV status at discharge: No PONV  Anesthetic complications: no      Cardiovascular status: blood pressure returned to baseline  Respiratory status: unassisted  Follow-up not needed.              Vitals Value Taken Time   /58 02/27/25 08:17   Temp 36.5 °C (97.7 °F) 02/27/25 07:56   Pulse 56 02/27/25 08:27   Resp 29 02/27/25 08:27   SpO2 97 % 02/27/25 08:27   Vitals shown include unfiled device data.      No case tracking events are documented in the log.      Pain/Tara Score: Tara Score: 9 (2/27/2025  8:15 AM)

## 2025-02-27 NOTE — TRANSFER OF CARE
"Anesthesia Transfer of Care Note    Patient: Melani Holloway    Procedure(s) Performed: Procedure(s) (LRB):  COLONOSCOPY (N/A)    Patient location: [facility]    Anesthesia Type: general    Transport from OR: Transported from OR on room air with adequate spontaneous ventilation    Post pain: adequate analgesia    Post assessment: no apparent anesthetic complications and tolerated procedure well    Post vital signs: stable    Level of consciousness: awake, alert and oriented    Nausea/Vomiting: no nausea/vomiting    Complications: none    Transfer of care protocol was followed    Last vitals: Visit Vitals  /62   Pulse (!) 58   Temp 36.6 °C (97.9 °F)   Resp 16   Ht 5' 3" (1.6 m)   Wt 80.7 kg (178 lb)   LMP  (LMP Unknown)   SpO2 99%   Breastfeeding No   BMI 31.53 kg/m²     "

## 2025-02-27 NOTE — ANESTHESIA PREPROCEDURE EVALUATION
02/27/2025  Pre-operative evaluation for Procedure(s) (LRB):  COLONOSCOPY (N/A)    Melani Holloway is a 79 y.o. female     Problem List[1]    Review of patient's allergies indicates:  No Known Allergies    Medications Ordered Prior to Encounter[2]    Past Surgical History:   Procedure Laterality Date    BREAST BIOPSY Right 2010    BREAST LUMPECTOMY Right 2010    BRONCHOSCOPY Left 5/23/2024    Procedure: BRONCHOSCOPY;  Surgeon: Riaz Fay MD;  [facility];  Service: Cardiothoracic;  Laterality: Left;    cataract surgery      INJECTION OF ANESTHETIC AGENT AROUND MULTIPLE INTERCOSTAL NERVES Left 5/23/2024    Procedure: BLOCK, NERVE, INTERCOSTAL, 2 OR MORE;  Surgeon: Riaz Fay MD;  Location: [facility] OR [facility];  Service: Cardiothoracic;  Laterality: Left;    LYMPHADENECTOMY N/A 5/23/2024    Procedure: LYMPHADENECTOMY;  Surgeon: Riaz Fay MD;  [facility];  Service: Cardiothoracic;  Laterality: N/A;    ROBOTIC BRONCHOSCOPY N/A 4/16/2024    Procedure: ROBOTIC BRONCHOSCOPY;  Surgeon: Uzma Larsen MD;  [facility];  Service: Pulmonary;  Laterality: N/A;    XI ROBOTIC RATS,WITH LOBECTOMY,LUNG Left 5/23/2024    Procedure: XI ROBOTIC LEFT UPPER LOBECTOMY;  Surgeon: Riaz Fay MD;  [facility];  Service: Cardiothoracic;  Laterality: Left;           Pre-op Assessment    I have reviewed the Patient Summary Reports.     I have reviewed the Nursing Notes. I have reviewed the NPO Status.      Review of Systems  Anesthesia Hx:  No problems with previous Anesthesia                Cardiovascular:     Hypertension                                          Pulmonary:   COPD                     Renal/:  Renal/ Normal                 Neurological:  Neurology Normal                                      Endocrine:  Endocrine Normal                 Physical Exam    Airway:  Mallampati: II   Mouth Opening: Normal  Tongue: Normal    Chest/Lungs:  Normal Respiratory Rate    Heart:  Rhythm: Regular Rhythm        Anesthesia Plan  Type of Anesthesia, risks & benefits discussed:    Anesthesia Type: Gen Natural Airway  Intra-op Monitoring Plan: Standard ASA Monitors  Induction:  IV  Informed Consent: Informed consent signed with the Patient and all parties understand the risks and agree with anesthesia plan.  All questions answered.   ASA Score: 3    Ready For Surgery From Anesthesia Perspective.     .           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     Current Outpatient Medications on File Prior to Encounter   Medication Sig Dispense Refill    acetaminophen (TYLENOL) 500 MG tablet Take 2 tablets (1,000 mg total) by mouth every 8 (eight) hours as needed for Pain.      albuterol (PROAIR HFA) 90 mcg/actuation inhaler Inhale 2 puffs into the lungs every 6 (six) hours as needed for Wheezing. Rescue 18 g 6    amLODIPine (NORVASC) 10 MG tablet TAKE 1 TABLET BY MOUTH EVERY DAY 90 tablet 3    aspirin (ECOTRIN) 81 MG EC tablet Take 81 mg by mouth every other day.       calcium carbonate (OS-JONAS) 600 mg calcium (1,500 mg) Tab Take 600 mg by mouth once.      fluticasone propionate (FLONASE) 50 mcg/actuation nasal spray 1 spray (50 mcg total) by Each Nostril route once daily. 1 Bottle 0    hydroCHLOROthiazide (HYDRODIURIL) 25 MG tablet TAKE 1 TABLET BY MOUTH EVERY DAY IN THE  MORNING 90 tablet 3    lisinopriL (PRINIVIL,ZESTRIL) 40 MG tablet TAKE 1 TABLET BY MOUTH EVERY DAY 90 tablet 3    vit A/vit C/vit E/zinc/copper (ICAPS AREDS ORAL) Take 1 tablet by mouth once daily.      vitamin D 1000 units Tab Take 1,000 Units by mouth once daily.

## 2025-03-05 ENCOUNTER — TELEPHONE (OUTPATIENT)
Dept: PULMONOLOGY | Facility: CLINIC | Age: 80
End: 2025-03-05
Payer: MEDICARE

## 2025-03-05 LAB
FINAL PATHOLOGIC DIAGNOSIS: NORMAL
GROSS: NORMAL
Lab: NORMAL

## 2025-03-05 NOTE — TELEPHONE ENCOUNTER
Attempted to call Cecilia w/[facility]. I was on hold then phone got disconnected. Will try calling again later. She can fax documents to [phone].

## 2025-03-05 NOTE — TELEPHONE ENCOUNTER
----- Message from Shaunna sent at 3/5/2025 11:04 AM CST -----  Regarding: OXYGEN Orders/CMN  Contact: Cecilia [facility] [phone]  Type: CMN for OxygenWho Called: Cecilia w/[facility] HMEWould the patient rather a call back or a response via [facility]? callBest Call Back Number: [phone] Information: CMN required for O2 that needs to be signed by doctor faxed to [phone] [id number]

## 2025-03-10 ENCOUNTER — RESULTS FOLLOW-UP (OUTPATIENT)
Dept: SURGERY | Facility: CLINIC | Age: 80
End: 2025-03-10

## 2025-03-14 ENCOUNTER — TELEPHONE (OUTPATIENT)
Dept: PULMONOLOGY | Facility: CLINIC | Age: 80
End: 2025-03-14
Payer: MEDICARE

## 2025-03-14 NOTE — TELEPHONE ENCOUNTER
----- Message from Shaunna sent at 3/14/2025 11:12 AM CDT -----  Regarding: Plan of Care/Oxygen  Contact: [phone]  Type:  Needs Medical AdviceWho Called: Caroleould the patient rather a call back or a response via [facility]? callBest Call Back Number: [phone] Information: no longer using oxygen

## 2025-03-14 NOTE — TELEPHONE ENCOUNTER
Call was returned to patient in regards to her oxygen. Patient states she does not use her oxygen anymore. I informed patient that I will send Dr. Prater a message. Patient verbalized understanding.

## 2025-03-19 ENCOUNTER — OFFICE VISIT (OUTPATIENT)
Dept: SURGERY | Facility: CLINIC | Age: 80
End: 2025-03-19
Attending: COLON & RECTAL SURGERY
Payer: MEDICARE

## 2025-03-19 VITALS
SYSTOLIC BLOOD PRESSURE: 147 MMHG | DIASTOLIC BLOOD PRESSURE: 66 MMHG | WEIGHT: 179 LBS | HEIGHT: 63 IN | RESPIRATION RATE: 18 BRPM | HEART RATE: 63 BPM | BODY MASS INDEX: 31.71 KG/M2

## 2025-03-19 DIAGNOSIS — K63.5 POLYP OF COLON, UNSPECIFIED PART OF COLON, UNSPECIFIED TYPE: Primary | ICD-10-CM

## 2025-03-19 PROCEDURE — 1159F MED LIST DOCD IN RCRD: CPT | Mod: CPTII,S$GLB,, | Performed by: COLON & RECTAL SURGERY

## 2025-03-19 PROCEDURE — 99204 OFFICE O/P NEW MOD 45 MIN: CPT | Mod: S$GLB,,, | Performed by: COLON & RECTAL SURGERY

## 2025-03-19 PROCEDURE — 1126F AMNT PAIN NOTED NONE PRSNT: CPT | Mod: CPTII,S$GLB,, | Performed by: COLON & RECTAL SURGERY

## 2025-03-19 PROCEDURE — 3078F DIAST BP <80 MM HG: CPT | Mod: CPTII,S$GLB,, | Performed by: COLON & RECTAL SURGERY

## 2025-03-19 PROCEDURE — 3077F SYST BP >= 140 MM HG: CPT | Mod: CPTII,S$GLB,, | Performed by: COLON & RECTAL SURGERY

## 2025-03-19 PROCEDURE — 99999 PR PBB SHADOW E&M-EST. PATIENT-LVL V: CPT | Mod: PBBFAC,,, | Performed by: COLON & RECTAL SURGERY

## 2025-03-19 PROCEDURE — 1101F PT FALLS ASSESS-DOCD LE1/YR: CPT | Mod: CPTII,S$GLB,, | Performed by: COLON & RECTAL SURGERY

## 2025-03-19 PROCEDURE — 3288F FALL RISK ASSESSMENT DOCD: CPT | Mod: CPTII,S$GLB,, | Performed by: COLON & RECTAL SURGERY

## 2025-03-19 PROCEDURE — 1160F RVW MEDS BY RX/DR IN RCRD: CPT | Mod: CPTII,S$GLB,, | Performed by: COLON & RECTAL SURGERY

## 2025-03-19 RX ORDER — ONDANSETRON 8 MG/1
TABLET, ORALLY DISINTEGRATING ORAL
Qty: 2 TABLET | Refills: 0 | Status: SHIPPED | OUTPATIENT
Start: 2025-03-19

## 2025-03-19 RX ORDER — CIPROFLOXACIN 500 MG/1
TABLET ORAL
Qty: 2 TABLET | Refills: 0 | Status: SHIPPED | OUTPATIENT
Start: 2025-03-19

## 2025-03-19 RX ORDER — METRONIDAZOLE 500 MG/1
TABLET ORAL
Qty: 2 TABLET | Refills: 0 | Status: SHIPPED | OUTPATIENT
Start: 2025-03-19

## 2025-03-19 NOTE — PROGRESS NOTES
CRS Office Visit History and Physical      SUBJECTIVE:     Chief Complaint: Colon polyps    History of Present Illness:  The patient is new patient to this practice.   Course is as follows:  Patient is a 79 y.o. female presents with colon polyps to discuss surgery.  Had + PET and follow-up scope that identified polyps.  No prior abdominal surgery. Prior lobectomy for lung cancer.  Active, lives next door to daughter.  Non-smoker.  Had 1-2 Bms/day.  Son with ulcerative colitis and ostomy.    Last Colonoscopy: 2/27/2025  - One 15 mm polyp in the ascending colon. Resection not attempted.   - One 30 mm polyp at the hepatic flexure. Resection not attempted. Biopsied,    - One 25 mm polyp at the hepatic flexure. Resection not attempted. Biopsied. Tattooed. Clip was placed. Clip : [employer].   - One 15 mm polyp in the transverse colon, removed using injection-lift and a hot snare. Resected and retrieved.   - The examination was otherwise normal on direct and retroflexion views.   Pathology:  1. COLON, PROXIMAL HEPATIC FLEXURE, POLYP, BIOPSY:   - Tubular adenoma   2. COLON, DISTAL HEPATIC FLEXURE, POLYP, BIOPSY:   - Tubular adenoma   3. COLON, TRANSVERSE, POLYP, BIOPSY:   - Tubular adenoma     Review of patient's allergies indicates:  No Known Allergies    Past Medical History:   Diagnosis Date    COPD (chronic obstructive pulmonary disease)     Ductal carcinoma in situ (DCIS) of right breast 09/19/2017    Hypertension      Past Surgical History:   Procedure Laterality Date    BREAST BIOPSY Right 2010    BREAST LUMPECTOMY Right 2010    BRONCHOSCOPY Left 5/23/2024    Procedure: BRONCHOSCOPY;  Surgeon: Riaz Fay MD;  Location: [facility] OR [address] [place] [address];  Service: Cardiothoracic;  Laterality: Left;    cataract surgery      COLONOSCOPY N/A 2/27/2025    Procedure: COLONOSCOPY;  Surgeon: Cleo Willingham MD;  Location: [facility] (2ND FLR);  Service: Endoscopy;  Laterality: N/A;  Pulm cl pending / 2nd:COPD,  Recent lobectomy / Ref: ALEXANDER Greenfield MD / miralax / inst portal / pt requested to have in Jan, sch first avail in Jan - LW   Please ensure she has duo neb treatments available in case she develops bronchospasm during her endoscopy-see note on 12    INJECTION OF ANESTHETIC AGENT AROUND MULTIPLE INTERCOSTAL NERVES Left 5/23/2024    Procedure: BLOCK, NERVE, INTERCOSTAL, 2 OR MORE;  Surgeon: Riaz Fay MD;  Location: [facility] OR 2ND FLR;  Service: Cardiothoracic;  Laterality: Left;    LYMPHADENECTOMY N/A 5/23/2024    Procedure: LYMPHADENECTOMY;  Surgeon: Riaz Fay MD;  Location: [facility] OR 2ND FLR;  Service: Cardiothoracic;  Laterality: N/A;    ROBOTIC BRONCHOSCOPY N/A 4/16/2024    Procedure: ROBOTIC BRONCHOSCOPY;  Surgeon: Uzma Larsen MD;  Location: [facility] OR [facility] FLR;  Service: Pulmonary;  Laterality: N/A;    XI ROBOTIC RATS,WITH LOBECTOMY,LUNG Left 5/23/2024    Procedure: XI ROBOTIC LEFT UPPER LOBECTOMY;  Surgeon: Riaz Fay MD;  Location: [facility] OR [facility] FLR;  Service: Cardiothoracic;  Laterality: Left;     Family History   Problem Relation Name Age of Onset    No Known Problems Mother      Heart attack Father      Breast cancer Paternal Aunt       Social History[1]     Review of Systems:  ROS    OBJECTIVE:     Vital Signs (Most Recent)  LMP  (LMP Unknown)     Physical Exam:  General: White female in no distress   Neuro: Alert and oriented x 4.  Moves all extremities.     HEENT: No icterus.  Trachea midline  Respiratory: Respirations are even and unlabored  Cardiac: Regular rate  Abdomen: Soft, non-distended  Extremities: Warm dry and intact  Skin: No rashes      Imaging:  PET (4/29/2024)  1.9 cm hypermetabolic lung nodule within the left upper lobe, concerning for primary malignancy.  No definite tracer avid metastasis.  Additional stable lung nodules with background uptake.  Hypermetabolic focus within hepatic flexure of colon, no corresponding CT abnormality.  Nonspecific, could represent prominent  physiologic uptake or underlying polyp.  Further evaluation with colonoscopy versus attention on follow-up as clinically warranted.  Simple appearing 4.3 cm left adnexal cyst.  Further evaluation/follow-up ultrasound as clinically warranted.    CT abd/pel (2/4/2025)  The stone at the right ureterovesicular junction is no longer visualized and the right-sided hydronephrosis as well as the right perinephric stranding of improved.  There is a punctate nonobstructive stone in the interpolar region of the right kidney.  No concerning renal lesion.  4.8 cm left ovarian cyst.  Further evaluation with pelvic ultrasound recommended.    CT chest (1/5/2025)  1. No pulmonary embolism to the segmental level.  2. Scattered airspace and ground-glass opacities in the lower lobes and the right middle lobe, suspicious for multifocal pneumonia.  3. Stable solid nodule measuring 8 mm in the right upper lobe.  4. Postoperative changes of left upper lobectomy.    ** I have reviewed these images **      ASSESSMENT/PLAN:     78yo F w/ multiple, large polyps in ascending colon and hepatic flexure, + on PET    We reviewed her colonoscopy results.  Discussed risk of cancer arising in polyps.  We reviewed the current TNM staging system, and discussed the various ways in which colon cancer can spread (local invasion, lymphatic, hematologic). I have recommended a laparoscopic right colectomy, which would include removal of the draining lymph nodes. We discussed that we will not know whether additional treatment would be beneficial until after surgery.    We reviewed the specific risks of the surgery.  We reviewed the risks associated with surgery including: anastomotic leak, abscess, surgical site infection, hernia, damage to other structures including bowel or ureter, need for return to the operating room, changes in bowel function, blood, clot, and death. We discussed the anticipated time for recovery after surgery, including time in hospital,  criteria for discharge, and time off of work.     Surgical consents were signed today in clinic, asked if any additional questions, none at this time. Will plan primary repair of umbilical hernia at the same time (no mesh).    Cleo Willingham MD  Staff Surgeon, Colon and Rectal Surgery  [facility]           [1]   Social History  Tobacco Use    Smoking status: Former     Current packs/day: 0.00     Average packs/day: 1 pack/day for 38.0 years (38.0 ttl pk-yrs)     Types: Cigarettes     Start date:      Quit date:      Years since quittin.2    Smokeless tobacco: Never   Substance Use Topics    Alcohol use: Yes     Comment: Occasionally    Drug use: No

## 2025-04-24 ENCOUNTER — LAB VISIT (OUTPATIENT)
Dept: LAB | Facility: HOSPITAL | Age: 80
End: 2025-04-24
Attending: FAMILY MEDICINE
Payer: MEDICARE

## 2025-04-24 DIAGNOSIS — R73.9 HYPERGLYCEMIA: ICD-10-CM

## 2025-04-24 DIAGNOSIS — E55.9 VITAMIN D DEFICIENCY: ICD-10-CM

## 2025-04-24 DIAGNOSIS — I10 ESSENTIAL HYPERTENSION: ICD-10-CM

## 2025-04-24 DIAGNOSIS — D64.9 ANEMIA, UNSPECIFIED TYPE: ICD-10-CM

## 2025-04-24 LAB
25(OH)D3+25(OH)D2 SERPL-MCNC: 35 NG/ML (ref 30–96)
ABSOLUTE EOSINOPHIL (OHS): 0.03 K/UL
ABSOLUTE MONOCYTE (OHS): 0.67 K/UL (ref 0.3–1)
ABSOLUTE NEUTROPHIL COUNT (OHS): 2.48 K/UL (ref 1.8–7.7)
ALBUMIN SERPL BCP-MCNC: 3.9 G/DL (ref 3.5–5.2)
ALP SERPL-CCNC: 77 UNIT/L (ref 40–150)
ALT SERPL W/O P-5'-P-CCNC: 14 UNIT/L (ref 10–44)
ANION GAP (OHS): 9 MMOL/L (ref 8–16)
AST SERPL-CCNC: 15 UNIT/L (ref 11–45)
BASOPHILS # BLD AUTO: 0.03 K/UL
BASOPHILS NFR BLD AUTO: 0.6 %
BILIRUB SERPL-MCNC: 0.3 MG/DL (ref 0.1–1)
BUN SERPL-MCNC: 19 MG/DL (ref 8–23)
CALCIUM SERPL-MCNC: 9.4 MG/DL (ref 8.7–10.5)
CHLORIDE SERPL-SCNC: 107 MMOL/L (ref 95–110)
CO2 SERPL-SCNC: 25 MMOL/L (ref 23–29)
CREAT SERPL-MCNC: 0.8 MG/DL (ref 0.5–1.4)
EAG (OHS): 114 MG/DL (ref 68–131)
ERYTHROCYTE [DISTWIDTH] IN BLOOD BY AUTOMATED COUNT: 14.3 % (ref 11.5–14.5)
GFR SERPLBLD CREATININE-BSD FMLA CKD-EPI: >60 ML/MIN/1.73/M2
GLUCOSE SERPL-MCNC: 105 MG/DL (ref 70–110)
HBA1C MFR BLD: 5.6 % (ref 4–5.6)
HCT VFR BLD AUTO: 38.4 % (ref 37–48.5)
HGB BLD-MCNC: 12.4 GM/DL (ref 12–16)
IMM GRANULOCYTES # BLD AUTO: 0.06 K/UL (ref 0–0.04)
IMM GRANULOCYTES NFR BLD AUTO: 1.2 % (ref 0–0.5)
LYMPHOCYTES # BLD AUTO: 1.8 K/UL (ref 1–4.8)
MCH RBC QN AUTO: 27.5 PG (ref 27–31)
MCHC RBC AUTO-ENTMCNC: 32.3 G/DL (ref 32–36)
MCV RBC AUTO: 85 FL (ref 82–98)
NUCLEATED RBC (/100WBC) (OHS): 0 /100 WBC
PLATELET # BLD AUTO: 208 K/UL (ref 150–450)
PMV BLD AUTO: 12.3 FL (ref 9.2–12.9)
POTASSIUM SERPL-SCNC: 4 MMOL/L (ref 3.5–5.1)
PROT SERPL-MCNC: 7.2 GM/DL (ref 6–8.4)
RBC # BLD AUTO: 4.51 M/UL (ref 4–5.4)
RELATIVE EOSINOPHIL (OHS): 0.6 %
RELATIVE LYMPHOCYTE (OHS): 35.5 % (ref 18–48)
RELATIVE MONOCYTE (OHS): 13.2 % (ref 4–15)
RELATIVE NEUTROPHIL (OHS): 48.9 % (ref 38–73)
SODIUM SERPL-SCNC: 141 MMOL/L (ref 136–145)
WBC # BLD AUTO: 5.07 K/UL (ref 3.9–12.7)

## 2025-04-24 PROCEDURE — 83036 HEMOGLOBIN GLYCOSYLATED A1C: CPT

## 2025-04-24 PROCEDURE — 85025 COMPLETE CBC W/AUTO DIFF WBC: CPT

## 2025-04-24 PROCEDURE — 36415 COLL VENOUS BLD VENIPUNCTURE: CPT

## 2025-04-24 PROCEDURE — 82306 VITAMIN D 25 HYDROXY: CPT

## 2025-04-24 PROCEDURE — 80053 COMPREHEN METABOLIC PANEL: CPT

## 2025-04-28 ENCOUNTER — ANESTHESIA EVENT (OUTPATIENT)
Dept: SURGERY | Facility: HOSPITAL | Age: 80
End: 2025-04-28
Payer: MEDICARE

## 2025-04-28 ENCOUNTER — TELEPHONE (OUTPATIENT)
Dept: SURGERY | Facility: CLINIC | Age: 80
End: 2025-04-28
Payer: MEDICARE

## 2025-04-29 ENCOUNTER — HOSPITAL ENCOUNTER (INPATIENT)
Facility: HOSPITAL | Age: 80
LOS: 2 days | Discharge: HOME | DRG: 331 | End: 2025-05-01
Attending: COLON & RECTAL SURGERY | Admitting: COLON & RECTAL SURGERY
Payer: MEDICARE

## 2025-04-29 ENCOUNTER — ANESTHESIA (OUTPATIENT)
Dept: SURGERY | Facility: HOSPITAL | Age: 80
End: 2025-04-29
Payer: MEDICARE

## 2025-04-29 DIAGNOSIS — K63.5 POLYP OF COLON, UNSPECIFIED PART OF COLON, UNSPECIFIED TYPE: Primary | ICD-10-CM

## 2025-04-29 DIAGNOSIS — K63.5 COLON POLYP: ICD-10-CM

## 2025-04-29 LAB
INDIRECT COOMBS: NORMAL
RH BLD: NORMAL
SPECIMEN OUTDATE: NORMAL

## 2025-04-29 PROCEDURE — 63600175 PHARM REV CODE 636 W HCPCS: Performed by: COLON & RECTAL SURGERY

## 2025-04-29 PROCEDURE — 25000242 PHARM REV CODE 250 ALT 637 W/ HCPCS: Performed by: STUDENT IN AN ORGANIZED HEALTH CARE EDUCATION/TRAINING PROGRAM

## 2025-04-29 PROCEDURE — 71000033 HC RECOVERY, INTIAL HOUR: Performed by: COLON & RECTAL SURGERY

## 2025-04-29 PROCEDURE — 36000710: Performed by: COLON & RECTAL SURGERY

## 2025-04-29 PROCEDURE — 94761 N-INVAS EAR/PLS OXIMETRY MLT: CPT

## 2025-04-29 PROCEDURE — 36000711: Performed by: COLON & RECTAL SURGERY

## 2025-04-29 PROCEDURE — 63600175 PHARM REV CODE 636 W HCPCS: Performed by: NURSE PRACTITIONER

## 2025-04-29 PROCEDURE — 99900031 HC PATIENT EDUCATION (STAT)

## 2025-04-29 PROCEDURE — 27201423 OPTIME MED/SURG SUP & DEVICES STERILE SUPPLY: Performed by: COLON & RECTAL SURGERY

## 2025-04-29 PROCEDURE — 0DTF4ZG RESECTION OF RIGHT LARGE INTESTINE, PERCUTANEOUS ENDOSCOPIC APPROACH, HAND-ASSISTED: ICD-10-PCS | Performed by: COLON & RECTAL SURGERY

## 2025-04-29 PROCEDURE — 86850 RBC ANTIBODY SCREEN: CPT | Performed by: NURSE PRACTITIONER

## 2025-04-29 PROCEDURE — 37000008 HC ANESTHESIA 1ST 15 MINUTES: Performed by: COLON & RECTAL SURGERY

## 2025-04-29 PROCEDURE — 99900035 HC TECH TIME PER 15 MIN (STAT)

## 2025-04-29 PROCEDURE — 25000003 PHARM REV CODE 250: Performed by: STUDENT IN AN ORGANIZED HEALTH CARE EDUCATION/TRAINING PROGRAM

## 2025-04-29 PROCEDURE — 71000015 HC POSTOP RECOV 1ST HR: Performed by: COLON & RECTAL SURGERY

## 2025-04-29 PROCEDURE — 0WQF4ZZ REPAIR ABDOMINAL WALL, PERCUTANEOUS ENDOSCOPIC APPROACH: ICD-10-PCS | Performed by: COLON & RECTAL SURGERY

## 2025-04-29 PROCEDURE — 94799 UNLISTED PULMONARY SVC/PX: CPT

## 2025-04-29 PROCEDURE — 94640 AIRWAY INHALATION TREATMENT: CPT

## 2025-04-29 PROCEDURE — 63600175 PHARM REV CODE 636 W HCPCS: Performed by: STUDENT IN AN ORGANIZED HEALTH CARE EDUCATION/TRAINING PROGRAM

## 2025-04-29 PROCEDURE — 27000221 HC OXYGEN, UP TO 24 HOURS

## 2025-04-29 PROCEDURE — 88302 TISSUE EXAM BY PATHOLOGIST: CPT | Mod: TC | Performed by: COLON & RECTAL SURGERY

## 2025-04-29 PROCEDURE — 25000003 PHARM REV CODE 250: Performed by: NURSE PRACTITIONER

## 2025-04-29 PROCEDURE — 25000003 PHARM REV CODE 250: Performed by: UROLOGY

## 2025-04-29 PROCEDURE — 63600175 PHARM REV CODE 636 W HCPCS: Performed by: UROLOGY

## 2025-04-29 PROCEDURE — 20600001 HC STEP DOWN PRIVATE ROOM

## 2025-04-29 PROCEDURE — 37000009 HC ANESTHESIA EA ADD 15 MINS: Performed by: COLON & RECTAL SURGERY

## 2025-04-29 RX ORDER — LIDOCAINE HYDROCHLORIDE 40 MG/ML
SOLUTION TOPICAL
Status: DISCONTINUED | OUTPATIENT
Start: 2025-04-29 | End: 2025-04-29

## 2025-04-29 RX ORDER — ACETAMINOPHEN 650 MG/20.3ML
975 LIQUID ORAL
Status: COMPLETED | OUTPATIENT
Start: 2025-04-29 | End: 2025-04-29

## 2025-04-29 RX ORDER — HEPARIN SODIUM 5000 [USP'U]/ML
5000 INJECTION, SOLUTION INTRAVENOUS; SUBCUTANEOUS ONCE
Status: COMPLETED | OUTPATIENT
Start: 2025-04-29 | End: 2025-04-29

## 2025-04-29 RX ORDER — GABAPENTIN 300 MG/1
300 CAPSULE ORAL
Status: COMPLETED | OUTPATIENT
Start: 2025-04-29 | End: 2025-04-29

## 2025-04-29 RX ORDER — HYDROMORPHONE HYDROCHLORIDE 2 MG/ML
INJECTION, SOLUTION INTRAMUSCULAR; INTRAVENOUS; SUBCUTANEOUS
Status: DISCONTINUED | OUTPATIENT
Start: 2025-04-29 | End: 2025-04-29

## 2025-04-29 RX ORDER — LIDOCAINE HYDROCHLORIDE 20 MG/ML
INJECTION INTRAVENOUS
Status: DISCONTINUED | OUTPATIENT
Start: 2025-04-29 | End: 2025-04-29

## 2025-04-29 RX ORDER — FLUTICASONE FUROATE AND VILANTEROL 200; 25 UG/1; UG/1
1 POWDER RESPIRATORY (INHALATION) DAILY
Status: DISCONTINUED | OUTPATIENT
Start: 2025-04-30 | End: 2025-05-01 | Stop reason: HOSPADM

## 2025-04-29 RX ORDER — PHENYLEPHRINE HYDROCHLORIDE 10 MG/ML
INJECTION INTRAVENOUS
Status: DISCONTINUED | OUTPATIENT
Start: 2025-04-29 | End: 2025-04-29

## 2025-04-29 RX ORDER — GABAPENTIN 300 MG/1
300 CAPSULE ORAL 3 TIMES DAILY
Status: DISCONTINUED | OUTPATIENT
Start: 2025-04-29 | End: 2025-04-29

## 2025-04-29 RX ORDER — ALVIMOPAN 12 MG/1
12 CAPSULE ORAL ONCE
Status: COMPLETED | OUTPATIENT
Start: 2025-04-29 | End: 2025-04-29

## 2025-04-29 RX ORDER — OXYCODONE HYDROCHLORIDE 5 MG/1
5 TABLET ORAL EVERY 4 HOURS PRN
Status: DISCONTINUED | OUTPATIENT
Start: 2025-04-29 | End: 2025-05-01 | Stop reason: HOSPADM

## 2025-04-29 RX ORDER — ONDANSETRON HYDROCHLORIDE 2 MG/ML
INJECTION, SOLUTION INTRAVENOUS
Status: DISCONTINUED | OUTPATIENT
Start: 2025-04-29 | End: 2025-04-29

## 2025-04-29 RX ORDER — ENOXAPARIN SODIUM 100 MG/ML
40 INJECTION SUBCUTANEOUS EVERY 24 HOURS
Status: DISCONTINUED | OUTPATIENT
Start: 2025-04-30 | End: 2025-05-01 | Stop reason: HOSPADM

## 2025-04-29 RX ORDER — IPRATROPIUM BROMIDE AND ALBUTEROL SULFATE 2.5; .5 MG/3ML; MG/3ML
3 SOLUTION RESPIRATORY (INHALATION)
Status: DISCONTINUED | OUTPATIENT
Start: 2025-04-29 | End: 2025-04-30

## 2025-04-29 RX ORDER — SODIUM CHLORIDE 0.9 % (FLUSH) 0.9 %
10 SYRINGE (ML) INJECTION
Status: DISCONTINUED | OUTPATIENT
Start: 2025-04-29 | End: 2025-05-01 | Stop reason: HOSPADM

## 2025-04-29 RX ORDER — ACETAMINOPHEN 10 MG/ML
1000 INJECTION, SOLUTION INTRAVENOUS EVERY 8 HOURS
Status: COMPLETED | OUTPATIENT
Start: 2025-04-29 | End: 2025-04-30

## 2025-04-29 RX ORDER — GLUCAGON 1 MG
1 KIT INJECTION
Status: DISCONTINUED | OUTPATIENT
Start: 2025-04-29 | End: 2025-04-29 | Stop reason: HOSPADM

## 2025-04-29 RX ORDER — OXYCODONE HYDROCHLORIDE 5 MG/1
5 TABLET ORAL
Status: DISCONTINUED | OUTPATIENT
Start: 2025-04-29 | End: 2025-04-29 | Stop reason: HOSPADM

## 2025-04-29 RX ORDER — DEXMEDETOMIDINE HYDROCHLORIDE 100 UG/ML
INJECTION, SOLUTION INTRAVENOUS
Status: DISCONTINUED | OUTPATIENT
Start: 2025-04-29 | End: 2025-04-29

## 2025-04-29 RX ORDER — HYDROMORPHONE HYDROCHLORIDE 1 MG/ML
0.2 INJECTION, SOLUTION INTRAMUSCULAR; INTRAVENOUS; SUBCUTANEOUS EVERY 5 MIN PRN
Status: DISCONTINUED | OUTPATIENT
Start: 2025-04-29 | End: 2025-04-29 | Stop reason: HOSPADM

## 2025-04-29 RX ORDER — LIDOCAINE HYDROCHLORIDE AND EPINEPHRINE 10; 10 UG/ML; MG/ML
INJECTION, SOLUTION INFILTRATION; PERINEURAL
Status: DISCONTINUED | OUTPATIENT
Start: 2025-04-29 | End: 2025-04-29 | Stop reason: HOSPADM

## 2025-04-29 RX ORDER — SODIUM CHLORIDE 9 MG/ML
INJECTION, SOLUTION INTRAVENOUS CONTINUOUS
Status: DISCONTINUED | OUTPATIENT
Start: 2025-04-29 | End: 2025-04-30

## 2025-04-29 RX ORDER — MUPIROCIN 20 MG/G
OINTMENT TOPICAL 2 TIMES DAILY
Status: DISCONTINUED | OUTPATIENT
Start: 2025-04-29 | End: 2025-05-01 | Stop reason: HOSPADM

## 2025-04-29 RX ORDER — PROCHLORPERAZINE EDISYLATE 5 MG/ML
5 INJECTION INTRAMUSCULAR; INTRAVENOUS EVERY 30 MIN PRN
Status: DISCONTINUED | OUTPATIENT
Start: 2025-04-29 | End: 2025-04-29 | Stop reason: HOSPADM

## 2025-04-29 RX ORDER — ASPIRIN 81 MG/1
81 TABLET ORAL EVERY OTHER DAY
Status: DISCONTINUED | OUTPATIENT
Start: 2025-04-30 | End: 2025-05-01 | Stop reason: HOSPADM

## 2025-04-29 RX ORDER — OXYCODONE HYDROCHLORIDE 10 MG/1
10 TABLET ORAL EVERY 4 HOURS PRN
Status: DISCONTINUED | OUTPATIENT
Start: 2025-04-29 | End: 2025-05-01 | Stop reason: HOSPADM

## 2025-04-29 RX ORDER — AMLODIPINE BESYLATE 10 MG/1
10 TABLET ORAL DAILY
Status: DISCONTINUED | OUTPATIENT
Start: 2025-04-30 | End: 2025-05-01 | Stop reason: HOSPADM

## 2025-04-29 RX ORDER — LIDOCAINE HYDROCHLORIDE 10 MG/ML
1 INJECTION, SOLUTION EPIDURAL; INFILTRATION; INTRACAUDAL; PERINEURAL
Status: COMPLETED | OUTPATIENT
Start: 2025-04-29 | End: 2025-04-29

## 2025-04-29 RX ORDER — SODIUM CHLORIDE 9 MG/ML
INJECTION, SOLUTION INTRAVENOUS CONTINUOUS PRN
Status: DISCONTINUED | OUTPATIENT
Start: 2025-04-29 | End: 2025-04-29

## 2025-04-29 RX ORDER — ACETAMINOPHEN 500 MG
1000 TABLET ORAL EVERY 8 HOURS
Status: DISCONTINUED | OUTPATIENT
Start: 2025-04-30 | End: 2025-05-01 | Stop reason: HOSPADM

## 2025-04-29 RX ORDER — IBUPROFEN 400 MG/1
800 TABLET ORAL EVERY 8 HOURS
Status: DISCONTINUED | OUTPATIENT
Start: 2025-04-30 | End: 2025-05-01 | Stop reason: HOSPADM

## 2025-04-29 RX ORDER — ALBUTEROL SULFATE 90 UG/1
2 INHALANT RESPIRATORY (INHALATION) EVERY 6 HOURS PRN
Status: DISCONTINUED | OUTPATIENT
Start: 2025-04-29 | End: 2025-05-01 | Stop reason: HOSPADM

## 2025-04-29 RX ORDER — BUPIVACAINE HYDROCHLORIDE 2.5 MG/ML
INJECTION, SOLUTION EPIDURAL; INFILTRATION; INTRACAUDAL; PERINEURAL
Status: DISCONTINUED | OUTPATIENT
Start: 2025-04-29 | End: 2025-04-29 | Stop reason: HOSPADM

## 2025-04-29 RX ORDER — GABAPENTIN 300 MG/1
300 CAPSULE ORAL 3 TIMES DAILY
Status: DISCONTINUED | OUTPATIENT
Start: 2025-04-29 | End: 2025-05-01 | Stop reason: HOSPADM

## 2025-04-29 RX ORDER — ALVIMOPAN 12 MG/1
12 CAPSULE ORAL 2 TIMES DAILY
Status: DISCONTINUED | OUTPATIENT
Start: 2025-04-30 | End: 2025-05-01 | Stop reason: HOSPADM

## 2025-04-29 RX ORDER — ROCURONIUM BROMIDE 10 MG/ML
INJECTION, SOLUTION INTRAVENOUS
Status: DISCONTINUED | OUTPATIENT
Start: 2025-04-29 | End: 2025-04-29

## 2025-04-29 RX ORDER — TRIPROLIDINE/PSEUDOEPHEDRINE 2.5MG-60MG
600 TABLET ORAL
Status: COMPLETED | OUTPATIENT
Start: 2025-04-29 | End: 2025-04-29

## 2025-04-29 RX ORDER — SODIUM CHLORIDE 9 MG/ML
INJECTION, SOLUTION INTRAVENOUS CONTINUOUS
Status: ACTIVE | OUTPATIENT
Start: 2025-04-29 | End: 2025-04-30

## 2025-04-29 RX ORDER — DEXAMETHASONE SODIUM PHOSPHATE 4 MG/ML
INJECTION, SOLUTION INTRA-ARTICULAR; INTRALESIONAL; INTRAMUSCULAR; INTRAVENOUS; SOFT TISSUE
Status: DISCONTINUED | OUTPATIENT
Start: 2025-04-29 | End: 2025-04-29

## 2025-04-29 RX ORDER — METRONIDAZOLE 500 MG/100ML
500 INJECTION, SOLUTION INTRAVENOUS
Status: COMPLETED | OUTPATIENT
Start: 2025-04-29 | End: 2025-04-29

## 2025-04-29 RX ORDER — CEFTRIAXONE 2 G/1
2 INJECTION, POWDER, FOR SOLUTION INTRAMUSCULAR; INTRAVENOUS
Status: COMPLETED | OUTPATIENT
Start: 2025-04-29 | End: 2025-04-29

## 2025-04-29 RX ORDER — MUPIROCIN 20 MG/G
1 OINTMENT TOPICAL
Status: COMPLETED | OUTPATIENT
Start: 2025-04-29 | End: 2025-04-29

## 2025-04-29 RX ORDER — PROPOFOL 10 MG/ML
VIAL (ML) INTRAVENOUS
Status: DISCONTINUED | OUTPATIENT
Start: 2025-04-29 | End: 2025-04-29

## 2025-04-29 RX ORDER — ONDANSETRON HYDROCHLORIDE 2 MG/ML
4 INJECTION, SOLUTION INTRAVENOUS EVERY 12 HOURS PRN
Status: DISCONTINUED | OUTPATIENT
Start: 2025-04-29 | End: 2025-05-01 | Stop reason: HOSPADM

## 2025-04-29 RX ORDER — SODIUM CHLORIDE 9 MG/ML
INJECTION, SOLUTION INTRAVENOUS
Status: DISCONTINUED | OUTPATIENT
Start: 2025-04-29 | End: 2025-04-29 | Stop reason: HOSPADM

## 2025-04-29 RX ADMIN — MUPIROCIN 1 G: 20 OINTMENT TOPICAL at 06:04

## 2025-04-29 RX ADMIN — GABAPENTIN 300 MG: 300 CAPSULE ORAL at 03:04

## 2025-04-29 RX ADMIN — IBUPROFEN 600 MG: 100 SUSPENSION ORAL at 06:04

## 2025-04-29 RX ADMIN — ACETAMINOPHEN 1000 MG: 10 INJECTION INTRAVENOUS at 09:04

## 2025-04-29 RX ADMIN — OXYCODONE 5 MG: 5 TABLET ORAL at 05:04

## 2025-04-29 RX ADMIN — IPRATROPIUM BROMIDE AND ALBUTEROL SULFATE 3 ML: 2.5; .5 SOLUTION RESPIRATORY (INHALATION) at 07:04

## 2025-04-29 RX ADMIN — SODIUM CHLORIDE: 9 INJECTION, SOLUTION INTRAVENOUS at 07:04

## 2025-04-29 RX ADMIN — CEFTRIAXONE SODIUM 2 G: 2 INJECTION, POWDER, FOR SOLUTION INTRAMUSCULAR; INTRAVENOUS at 07:04

## 2025-04-29 RX ADMIN — OXYCODONE HYDROCHLORIDE 10 MG: 10 TABLET ORAL at 11:04

## 2025-04-29 RX ADMIN — PHENYLEPHRINE HYDROCHLORIDE 200 MCG: 10 INJECTION INTRAVENOUS at 07:04

## 2025-04-29 RX ADMIN — LIDOCAINE HYDROCHLORIDE 100 MG: 20 INJECTION INTRAVENOUS at 07:04

## 2025-04-29 RX ADMIN — ONDANSETRON 4 MG: 2 INJECTION INTRAMUSCULAR; INTRAVENOUS at 08:04

## 2025-04-29 RX ADMIN — IBUPROFEN 800 MG: 800 INJECTION INTRAVENOUS at 09:04

## 2025-04-29 RX ADMIN — ROCURONIUM BROMIDE 80 MG: 10 INJECTION, SOLUTION INTRAVENOUS at 07:04

## 2025-04-29 RX ADMIN — METRONIDAZOLE 500 MG: 500 INJECTION, SOLUTION INTRAVENOUS at 07:04

## 2025-04-29 RX ADMIN — SODIUM CHLORIDE: 9 INJECTION, SOLUTION INTRAVENOUS at 09:04

## 2025-04-29 RX ADMIN — ROCURONIUM BROMIDE 20 MG: 10 INJECTION, SOLUTION INTRAVENOUS at 07:04

## 2025-04-29 RX ADMIN — GABAPENTIN 300 MG: 300 CAPSULE ORAL at 09:04

## 2025-04-29 RX ADMIN — PROPOFOL 100 MG: 10 INJECTION, EMULSION INTRAVENOUS at 07:04

## 2025-04-29 RX ADMIN — DEXAMETHASONE SODIUM PHOSPHATE 8 MG: 4 INJECTION, SOLUTION INTRAMUSCULAR; INTRAVENOUS at 07:04

## 2025-04-29 RX ADMIN — SUGAMMADEX 400 MG: 100 INJECTION, SOLUTION INTRAVENOUS at 09:04

## 2025-04-29 RX ADMIN — DEXMEDETOMIDINE 12 MCG: 100 INJECTION, SOLUTION, CONCENTRATE INTRAVENOUS at 07:04

## 2025-04-29 RX ADMIN — IBUPROFEN 800 MG: 800 INJECTION INTRAVENOUS at 03:04

## 2025-04-29 RX ADMIN — LIDOCAINE HYDROCHLORIDE 4 ML: 40 SOLUTION TOPICAL at 07:04

## 2025-04-29 RX ADMIN — ACETAMINOPHEN 1000 MG: 10 INJECTION INTRAVENOUS at 03:04

## 2025-04-29 RX ADMIN — ACETAMINOPHEN 976.6 MG: 650 SOLUTION ORAL at 06:04

## 2025-04-29 RX ADMIN — HYDROMORPHONE HYDROCHLORIDE 0.5 MG: 2 INJECTION INTRAMUSCULAR; INTRAVENOUS; SUBCUTANEOUS at 07:04

## 2025-04-29 RX ADMIN — ONDANSETRON 4 MG: 2 INJECTION INTRAMUSCULAR; INTRAVENOUS at 12:04

## 2025-04-29 RX ADMIN — ROCURONIUM BROMIDE 30 MG: 10 INJECTION, SOLUTION INTRAVENOUS at 08:04

## 2025-04-29 RX ADMIN — MUPIROCIN: 20 OINTMENT TOPICAL at 09:04

## 2025-04-29 RX ADMIN — IPRATROPIUM BROMIDE AND ALBUTEROL SULFATE 3 ML: 2.5; .5 SOLUTION RESPIRATORY (INHALATION) at 02:04

## 2025-04-29 RX ADMIN — HEPARIN SODIUM 5000 UNITS: 5000 INJECTION INTRAVENOUS; SUBCUTANEOUS at 06:04

## 2025-04-29 RX ADMIN — ALVIMOPAN 12 MG: 12 CAPSULE ORAL at 06:04

## 2025-04-29 RX ADMIN — HYDROMORPHONE HYDROCHLORIDE 0.3 MG: 2 INJECTION INTRAMUSCULAR; INTRAVENOUS; SUBCUTANEOUS at 08:04

## 2025-04-29 RX ADMIN — LIDOCAINE HYDROCHLORIDE 10 MG: 10 INJECTION, SOLUTION EPIDURAL; INFILTRATION; INTRACAUDAL; PERINEURAL at 06:04

## 2025-04-29 RX ADMIN — SODIUM CHLORIDE: 9 INJECTION, SOLUTION INTRAVENOUS at 11:04

## 2025-04-29 RX ADMIN — PHENYLEPHRINE HYDROCHLORIDE 100 MCG: 10 INJECTION INTRAVENOUS at 07:04

## 2025-04-29 RX ADMIN — GABAPENTIN 300 MG: 300 CAPSULE ORAL at 06:04

## 2025-04-29 RX ADMIN — HYDROMORPHONE HYDROCHLORIDE 0.2 MG: 2 INJECTION INTRAMUSCULAR; INTRAVENOUS; SUBCUTANEOUS at 08:04

## 2025-04-29 NOTE — OP NOTE
[facility]  Operative Note    Date: 04/29/2025    Name: Melani Holloway    MRN: [id number]    Pre-Op Diagnosis: Polyp of colon, unspecified part of colon, unspecified type [K63.5]    Post-Op Diagnosis: Same    Procedure(s) Performed:   Laparoscopic right colectomy  Bilateral TAP blocks  Umbilical hernia repair    Specimen(s): Right colon, hernia sac    Staff Surgeon: Cleo Willingham    Assistant Surgeon: Monty Ahn (fellow)    Anesthesia: General    Indications: Melani Holloway is a 79 y.o. female who was found to have multiple large right and proximal transverse colon polyps on recent colonoscopy, one of which was positive on staging PET for lung cancer. After a discussion of risks and benefits she agreed to proceed with a laparoscopic right colectomy.  Please see my clinic noted for further details.    Details of procedure:  The patient was taken to the operating room.  SCD boots were applied and IV antibiotics were administered.  They were placed under general endotracheal anesthesia.   A Vernon catheter was placed.  The abdomen was prepped and draped in the standard sterile fashion.  After an appropriate time-out a 5 mm incision was created at [place] point.  A Veress needle was used to enter the abdomen.  The abdomen was inflated and a 5 mm port was placed using an Optiview technique.  We performed a diagnostic laparoscopy to confirm that there was no injury from our entry.  The liver, peritoneum, omentum, small bowel, and stomach appeared normal.  We placed our additional 5mm ports using laparoscopic visualization.  We delivered a loop of omentum from the umbilical hernia sac. And a 12mm balloon port was placed. We performed bilateral TAP blocks. The patient was then placed into Trendelenburg position with a left tilt.  The small bowel was swept into the left upper quadrant.  We grasped and elevated the ileocolic pedicle.  We incised the peritoneum just below the vessel and began a medial to  lateral dissection, taking care to sweep down and preserve the duodenum.  We worked superiorly to the hepatic flexure and laterally to the sidewall.  Once we had this fully mobilized we performed a high ligation of the ileocolic vessel using the LigaSure.  We confirmed that we had good hemostasis here.  We began to take down the lateral attachments of the cecum and ascending colon.  We continued our mobilization up to the hepatic flexure.      We then placed the patient into reverse Trendelenburg.  The hepatic colic ligament was taking down with the LigaSure.  We were able to enter our previous medial to lateral plane, and perform additional mobilization of the proximal transverse colon over the duodenum.  We then  the omentum from the transverse colon to enter the lesser sac. Once this was done we felt that we had adequate mobilization to exterior rise and perform our anastomosis.  We placed a locking grasper on the appendix.  We then extended our umbilical incision. A wound protector was placed.  We then delivered the specimen into our field.      We cleared the transverse colon distal to the tattoo site. We identified an area on the distal ileum for transection.  We created mesenteric windows and divided the remaining mesentery using the LigaSure.  We created a colostomy and enterotomy.  We inserted and fired the blue load of the DEREJE 75 stapler to create a common channel.  The common enterotomy was divided with a blue load of the TA 90 stapler. The TA staple line was oversewn with PDS.     Once this was done we delivered the specimen back into the field.  We reinflated the abdomen and confirmed that there was no twisting of the mesentery and that we had good hemostasis.  We removed the laparoscopic ports.  The abdomen was deflated.  We changed our gowns and gloves and brought clean closing instruments onto the field.  We excised the hernia sac and cleaned off the fascia. The fascia was closed with  running PDS. The wound was irrigated with warm saline.  All skin incisions were closed with Monocryl followed by skin glue.  A pressure dressing was applied to the umbilical incision. The procedure was then terminated.  All sponge instrument counts were correct.  I was present and scrubbed for the entire procedure.    Estimated Blood Loss: 50mL    Drains/Implants: None    Wound Class: II    Cleo Willingham

## 2025-04-29 NOTE — ANESTHESIA POSTPROCEDURE EVALUATION
Anesthesia Post Evaluation    Patient: Melani Holloway    Procedure(s) Performed: Procedure(s) (LRB):  COLECTOMY, RIGHT, LAPAROSCOPIC (N/A)  REPAIR, HERNIA, UMBILICAL, LAPAROSCOPIC    Final Anesthesia Type: general      Patient location during evaluation: PACU  Patient participation: Yes- Able to Participate  Level of consciousness: awake and alert and oriented  Post-procedure vital signs: reviewed and stable  Pain management: adequate  Airway patency: patent    PONV status at discharge: No PONV  Anesthetic complications: no      Cardiovascular status: hemodynamically stable  Respiratory status: unassisted  Hydration status: euvolemic  Follow-up not needed.              Vitals Value Taken Time   /60 04/29/25 10:55   Temp 36.4 °C (97.5 °F) 04/29/25 10:55   Pulse 60 04/29/25 10:55   Resp 16 04/29/25 11:11   SpO2 91 % 04/29/25 12:00         Event Time   Out of Recovery 10:00:00         Pain/Tara Score: Pain Rating Prior to Med Admin: 7 (4/29/2025 11:11 AM)  Tara Score: 9 (4/29/2025 10:00 AM)

## 2025-04-29 NOTE — NURSING TRANSFER
Nursing Transfer Note      4/29/2025   10:11 AM    Nurse giving handoff:li rn   Nurse receiving handoff:10th floor rn    Reason patient is being transferred: post procedure    Transfer To: [facility]    Transfer via bed    Transfer with  to O2    Transported by transport                  Chart send with patient: Yes    Notified: son    Patient reassessed at: 4/29/25 @1035

## 2025-04-29 NOTE — PT/OT/SLP PROGRESS
Occupational Therapy      Patient Name:  Melani Holloway   MRN:  [id number]    OT orders received and acknowledged.  Patient not seen today secondary to pt politely declined to perform EOB/OOB mobility due to somnolence.  She said she didn't sleep well last night and had her surgery earlier today.  Will follow-up 4/30/2025.    4/29/2025

## 2025-04-29 NOTE — NURSING
Pt came to floor from PACU. Vitals stable. Aox4. NS running at 40cc/hr. Incisions and harris intact. Complaining 5/10 pain. No report of n/v. Call light within reach. POC continues.

## 2025-04-29 NOTE — H&P
History and Physical        SUBJECTIVE:      Chief Complaint: Colon polyps     History of Present Illness:  Patient is a 79 y.o. female presents with colon polyps to discuss surgery.  Had + PET and follow-up scope that identified polyps.  No prior abdominal surgery. Prior lobectomy for lung cancer.  Active, lives next door to daughter.  Non-smoker.  Had 1-2 Bms/day.  Son with ulcerative colitis and ostomy.     Last Colonoscopy: 2/27/2025  - One 15 mm polyp in the ascending colon. Resection not attempted.   - One 30 mm polyp at the hepatic flexure. Resection not attempted. Biopsied,    - One 25 mm polyp at the hepatic flexure. Resection not attempted. Biopsied. Tattooed. Clip was placed. Clip : [employer].   - One 15 mm polyp in the transverse colon, removed using injection-lift and a hot snare. Resected and retrieved.   - The examination was otherwise normal on direct and retroflexion views.   Pathology:  1. COLON, PROXIMAL HEPATIC FLEXURE, POLYP, BIOPSY:   - Tubular adenoma   2. COLON, DISTAL HEPATIC FLEXURE, POLYP, BIOPSY:   - Tubular adenoma   3. COLON, TRANSVERSE, POLYP, BIOPSY:   - Tubular adenoma      Review of patient's allergies indicates:  No Known Allergies          Past Medical History:   Diagnosis Date    COPD (chronic obstructive pulmonary disease)      Ductal carcinoma in situ (DCIS) of right breast 09/19/2017    Hypertension              Past Surgical History:   Procedure Laterality Date    BREAST BIOPSY Right 2010    BREAST LUMPECTOMY Right 2010    BRONCHOSCOPY Left 5/23/2024     Procedure: BRONCHOSCOPY;  Surgeon: Riaz Fay MD;  Location: [facility] OR [address] [place] [address];  Service: Cardiothoracic;  Laterality: Left;    cataract surgery        COLONOSCOPY N/A 2/27/2025     Procedure: COLONOSCOPY;  Surgeon: Cleo Willingham MD;  [facility]  Service: Endoscopy;  Laterality: N/A;  Pulm cl pending / 2nd:COPD, Recent lobectomy / Ref: ALEXANDER Greenfield MD / mirfrancisca / inst portal /  pt requested to have in  first avail in  -    Please ensure she has duo neb treatments available in case she develops bronchospasm during her endoscopy-see note on 12    INJECTION OF ANESTHETIC AGENT AROUND MULTIPLE INTERCOSTAL NERVES Left 2024     Procedure: BLOCK, NERVE, INTERCOSTAL, 2 OR MORE;  Surgeon: Riaz Fay MD;  Location: [facility] OR 2ND FLR;  Service: Cardiothoracic;  Laterality: Left;    LYMPHADENECTOMY N/A 2024     Procedure: LYMPHADENECTOMY;  Surgeon: Riaz Fay MD;  Location: [facility] OR 2ND FLR;  Service: Cardiothoracic;  Laterality: N/A;    ROBOTIC BRONCHOSCOPY N/A 2024     Procedure: ROBOTIC BRONCHOSCOPY;  Surgeon: Uzma Larsen MD;  Location: [facility] OR [facility] FLR;  Service: Pulmonary;  Laterality: N/A;    XI ROBOTIC RATS,WITH LOBECTOMY,LUNG Left 2024     Procedure: XI ROBOTIC LEFT UPPER LOBECTOMY;  Surgeon: Riaz Fay MD;  Location: [facility] OR [facility] FLR;  Service: Cardiothoracic;  Laterality: Left;             Family History   Problem Relation Name Age of Onset    No Known Problems Mother        Heart attack Father        Breast cancer Paternal Aunt          [Social History]     [Social History]        Tobacco Use    Smoking status: Former       Current packs/day: 0.00       Average packs/day: 1 pack/day for 38.0 years (38.0 ttl pk-yrs)       Types: Cigarettes       Start date:        Quit date:        Years since quittin.2    Smokeless tobacco: Never   Substance Use Topics    Alcohol use: Yes       Comment: Occasionally    Drug use: No        Review of Systems:  ROS     OBJECTIVE:      There were no vitals filed for this visit.       Physical Exam:  General: White female in no distress   Neuro: Alert and oriented x 4.  Moves all extremities.     HEENT: No icterus.  Trachea midline  Respiratory: Respirations are even and unlabored  Cardiac: Regular rate  Abdomen: Soft, non-distended  Extremities: Warm dry and intact  Skin: No rashes         Imaging:  PET (4/29/2024)  1.9 cm hypermetabolic lung nodule within the left upper lobe, concerning for primary malignancy.  No definite tracer avid metastasis.  Additional stable lung nodules with background uptake.  Hypermetabolic focus within hepatic flexure of colon, no corresponding CT abnormality.  Nonspecific, could represent prominent physiologic uptake or underlying polyp.  Further evaluation with colonoscopy versus attention on follow-up as clinically warranted.  Simple appearing 4.3 cm left adnexal cyst.  Further evaluation/follow-up ultrasound as clinically warranted.     CT abd/pel (2/4/2025)  The stone at the right ureterovesicular junction is no longer visualized and the right-sided hydronephrosis as well as the right perinephric stranding of improved.  There is a punctate nonobstructive stone in the interpolar region of the right kidney.  No concerning renal lesion.  4.8 cm left ovarian cyst.  Further evaluation with pelvic ultrasound recommended.     CT chest (1/5/2025)  1. No pulmonary embolism to the segmental level.  2. Scattered airspace and ground-glass opacities in the lower lobes and the right middle lobe, suspicious for multifocal pneumonia.  3. Stable solid nodule measuring 8 mm in the right upper lobe.  4. Postoperative changes of left upper lobectomy.     ** I have reviewed these images **        ASSESSMENT/PLAN:      78yo F w/ multiple, large polyps in ascending colon and hepatic flexure, + on PET     We reviewed her colonoscopy results.  Discussed risk of cancer arising in polyps.  We reviewed the current TNM staging system, and discussed the various ways in which colon cancer can spread (local invasion, lymphatic, hematologic). I have recommended a laparoscopic right colectomy, which would include removal of the draining lymph nodes. We discussed that we will not know whether additional treatment would be beneficial until after surgery.     We reviewed the specific risks of the  surgery.  We reviewed the risks associated with surgery including: anastomotic leak, abscess, surgical site infection, hernia, damage to other structures including bowel or ureter, need for return to the operating room, changes in bowel function, blood, clot, and death. We discussed the anticipated time for recovery after surgery, including time in hospital, criteria for discharge, and time off of work.      -to OR today for RHC and primary repair of umbilical hernia

## 2025-04-29 NOTE — BRIEF OP NOTE
[facility] - Surgery ([facility])  Brief Operative Note    SUMMARY     Surgery Date: 4/29/2025     Surgeons and Role:     * Cleo Willingham MD - Primary     * Kobe Ahn MD - Fellow    Assisting Surgeon: None    Pre-op Diagnosis:  Polyp of colon, unspecified part of colon, unspecified type [K63.5]    Post-op Diagnosis:  Post-Op Diagnosis Codes:     * Polyp of colon, unspecified part of colon, unspecified type [K63.5]    Procedure(s) (LRB):  COLECTOMY, RIGHT, LAPAROSCOPIC (N/A)  REPAIR, HERNIA, UMBILICAL, LAPAROSCOPIC    Anesthesia: General    Implants:  * No implants in log *    Operative Findings: Lap right colectomy, umbilical hernia repair     Estimated Blood Loss: * No values recorded between 4/29/2025  7:06 AM and 4/29/2025  9:18 AM *    Estimated Blood Loss has not been documented. EBL = 25cc.         Specimens:   Specimen (24h ago, onward)       Start     Ordered    04/29/25 0844  Specimen to Pathology  RELEASE UPON ORDERING        References:    Click here for ordering Quick Tip   Question:  Release to patient  Answer:  Immediate    04/29/25 0844                  ID Type Source Tests Collected by Time Destination   1 : Right colon - permanent Tissue Large intestine, Colon SPECIMEN TO PATHOLOGY Cleo Willingham MD 4/29/2025 0843    2 : Hernia sac - permanent Tissue Hernia sac SPECIMEN TO PATHOLOGY Cleo Willingham MD 4/29/2025 0902        [id number]

## 2025-04-29 NOTE — TRANSFER OF CARE
Anesthesia Transfer of Care Note    Patient: Melani Holloway    Procedure(s) Performed: Procedure(s) (LRB):  COLECTOMY, RIGHT, LAPAROSCOPIC (N/A)  REPAIR, HERNIA, UMBILICAL, LAPAROSCOPIC    Patient location: PACU    Anesthesia Type: general    Transport from OR: Transported from OR on 6-10 L/min O2 by face mask with adequate spontaneous ventilation    Post pain: adequate analgesia    Post assessment: tolerated procedure well and no apparent anesthetic complications    Post vital signs: stable    Level of consciousness: awake, alert and oriented    Nausea/Vomiting: no nausea/vomiting    Complications: none    Transfer of care protocol was followed      Last vitals: Visit Vitals  BP (!) 159/67 (BP Location: Left arm, Patient Position: Lying)   Pulse (!) 56   Temp 36.3 °C (97.3 °F)   Resp 18   LMP  (LMP Unknown)   SpO2 96%   Breastfeeding No

## 2025-04-29 NOTE — ANESTHESIA PREPROCEDURE EVALUATION
04/28/2025  Melani Holloway is a 79 y.o., female     Procedure: COLECTOMY, RIGHT, LAPAROSCOPIC (N/A)    Hx of anesthetics in past without complications  NPO>8 hours  No food or drug allergies  Unknown reaction to IV steroids in recent time  Amenable to proceed with scheduled procedure       Problem List[1]    Past Medical History:   Diagnosis Date    COPD (chronic obstructive pulmonary disease)     Ductal carcinoma in situ (DCIS) of right breast 09/19/2017    Hypertension        ECHO: No results found for this or any previous visit.      There is no height or weight on file to calculate BMI.    Tobacco Use: Medium Risk (4/28/2025)    Patient History     Smoking Tobacco Use: Former     Smokeless Tobacco Use: Never     Passive Exposure: Not on file       Social History     Substance and Sexual Activity   Drug Use No        Alcohol Use: Not At Risk (1/6/2025)    AUDIT-C     Frequency of Alcohol Consumption: Never     Average Number of Drinks: Patient does not drink     Frequency of Binge Drinking: Never       Review of patient's allergies indicates:  No Known Allergies      Airway:  No value filed.         Pre-op Assessment    I have reviewed the Patient Summary Reports.     I have reviewed the Nursing Notes. I have reviewed the NPO Status.   I have reviewed the Medications.     Review of Systems  Anesthesia Hx:  No problems with previous Anesthesia   History of prior surgery of interest to airway management or planning:          Denies Family Hx of Anesthesia complications.    Denies Personal Hx of Anesthesia complications.                    Hematology/Oncology:       -- Anemia:             Lung, Colorectal and Breast            Current/Recent Cancer.                Cardiovascular:     Hypertension          PVD hyperlipidemia     Patient on beta blockers                    Hypertension          Pulmonary:  Pneumonia COPD                         Physical Exam  General: Well nourished, Cooperative, Alert and Oriented    Airway:  Mallampati: II / I  TM Distance: Normal  Tongue: Normal  Neck ROM: Normal ROM    Dental:  Periodontal disease, Partial Dentures, Dentures  Top complete dentures  Bottom partial dentures      Anesthesia Plan  Type of Anesthesia, risks & benefits discussed:    Anesthesia Type: Gen ETT  Intra-op Monitoring Plan: Standard ASA Monitors  Post Op Pain Control Plan: multimodal analgesia and IV/PO Opioids PRN  Induction:  IV  Airway Plan: Direct, Post-Induction  Informed Consent: Informed consent signed with the Patient and all parties understand the risks and agree with anesthesia plan.  All questions answered. Patient consented to blood products? Yes  ASA Score: 3    Ready For Surgery From Anesthesia Perspective.     .           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

## 2025-04-29 NOTE — ANESTHESIA PROCEDURE NOTES
Intubation    Date/Time: 4/29/2025 7:15 AM    Performed by: Jose R Wall MD  Authorized by: Jose R Wall MD    Intubation:     Induction:  Intravenous    Intubated:  Postinduction    Mask Ventilation:  Easy with oral airway    Attempts:  1    Attempted By:  Staff anesthesiologist    Method of Intubation:  Direct    Blade:  Akbar 2    Laryngeal View Grade: Grade I - full view of cords      Difficult Airway Encountered?: No      Complications:  None    Airway Device:  Oral endotracheal tube    Airway Device Size:  7.5    Style/Cuff Inflation:  Cuffed    Inflation Amount (mL):  8    Tube secured:  23    Secured at:  The gums    Placement Verified By:  Capnometry, Colorimetric ETCO2 device and Revisualization with laryngoscopy    Complicating Factors:  None    Findings Post-Intubation:  BS equal bilateral

## 2025-04-30 PROBLEM — Z87.19 HX OF UMBILICAL HERNIA REPAIR: Status: ACTIVE | Noted: 2025-04-30

## 2025-04-30 PROBLEM — E87.6 HYPOKALEMIA: Status: ACTIVE | Noted: 2025-04-30

## 2025-04-30 PROBLEM — Z90.49 S/P RIGHT COLECTOMY: Status: ACTIVE | Noted: 2025-04-30

## 2025-04-30 PROBLEM — Z98.890 HX OF UMBILICAL HERNIA REPAIR: Status: ACTIVE | Noted: 2025-04-30

## 2025-04-30 LAB
ABSOLUTE EOSINOPHIL (OHS): 0 K/UL
ABSOLUTE MONOCYTE (OHS): 0.7 K/UL (ref 0.3–1)
ABSOLUTE NEUTROPHIL COUNT (OHS): 4.01 K/UL (ref 1.8–7.7)
ANION GAP (OHS): 8 MMOL/L (ref 8–16)
BASOPHILS # BLD AUTO: 0.03 K/UL
BASOPHILS NFR BLD AUTO: 0.5 %
BUN SERPL-MCNC: 10 MG/DL (ref 8–23)
CALCIUM SERPL-MCNC: 7.8 MG/DL (ref 8.7–10.5)
CHLORIDE SERPL-SCNC: 111 MMOL/L (ref 95–110)
CO2 SERPL-SCNC: 21 MMOL/L (ref 23–29)
CREAT SERPL-MCNC: 0.7 MG/DL (ref 0.5–1.4)
ERYTHROCYTE [DISTWIDTH] IN BLOOD BY AUTOMATED COUNT: 14.3 % (ref 11.5–14.5)
GFR SERPLBLD CREATININE-BSD FMLA CKD-EPI: >60 ML/MIN/1.73/M2
GLUCOSE SERPL-MCNC: 84 MG/DL (ref 70–110)
HCT VFR BLD AUTO: 32.4 % (ref 37–48.5)
HGB BLD-MCNC: 10.2 GM/DL (ref 12–16)
IMM GRANULOCYTES # BLD AUTO: 0.06 K/UL (ref 0–0.04)
IMM GRANULOCYTES NFR BLD AUTO: 1 % (ref 0–0.5)
LYMPHOCYTES # BLD AUTO: 0.99 K/UL (ref 1–4.8)
MAGNESIUM SERPL-MCNC: 2.1 MG/DL (ref 1.6–2.6)
MCH RBC QN AUTO: 27 PG (ref 27–31)
MCHC RBC AUTO-ENTMCNC: 31.5 G/DL (ref 32–36)
MCV RBC AUTO: 86 FL (ref 82–98)
NUCLEATED RBC (/100WBC) (OHS): 0 /100 WBC
PHOSPHATE SERPL-MCNC: 3 MG/DL (ref 2.7–4.5)
PLATELET # BLD AUTO: 178 K/UL (ref 150–450)
PMV BLD AUTO: 12.7 FL (ref 9.2–12.9)
POTASSIUM SERPL-SCNC: 3.3 MMOL/L (ref 3.5–5.1)
RBC # BLD AUTO: 3.78 M/UL (ref 4–5.4)
RELATIVE EOSINOPHIL (OHS): 0 %
RELATIVE LYMPHOCYTE (OHS): 17.1 % (ref 18–48)
RELATIVE MONOCYTE (OHS): 12.1 % (ref 4–15)
RELATIVE NEUTROPHIL (OHS): 69.3 % (ref 38–73)
SODIUM SERPL-SCNC: 140 MMOL/L (ref 136–145)
WBC # BLD AUTO: 5.79 K/UL (ref 3.9–12.7)

## 2025-04-30 PROCEDURE — 85025 COMPLETE CBC W/AUTO DIFF WBC: CPT | Performed by: STUDENT IN AN ORGANIZED HEALTH CARE EDUCATION/TRAINING PROGRAM

## 2025-04-30 PROCEDURE — 97165 OT EVAL LOW COMPLEX 30 MIN: CPT

## 2025-04-30 PROCEDURE — 20600001 HC STEP DOWN PRIVATE ROOM

## 2025-04-30 PROCEDURE — 63600175 PHARM REV CODE 636 W HCPCS: Performed by: STUDENT IN AN ORGANIZED HEALTH CARE EDUCATION/TRAINING PROGRAM

## 2025-04-30 PROCEDURE — 97161 PT EVAL LOW COMPLEX 20 MIN: CPT

## 2025-04-30 PROCEDURE — 25000242 PHARM REV CODE 250 ALT 637 W/ HCPCS: Performed by: STUDENT IN AN ORGANIZED HEALTH CARE EDUCATION/TRAINING PROGRAM

## 2025-04-30 PROCEDURE — 94640 AIRWAY INHALATION TREATMENT: CPT

## 2025-04-30 PROCEDURE — 82310 ASSAY OF CALCIUM: CPT | Performed by: STUDENT IN AN ORGANIZED HEALTH CARE EDUCATION/TRAINING PROGRAM

## 2025-04-30 PROCEDURE — 84100 ASSAY OF PHOSPHORUS: CPT | Performed by: STUDENT IN AN ORGANIZED HEALTH CARE EDUCATION/TRAINING PROGRAM

## 2025-04-30 PROCEDURE — 94799 UNLISTED PULMONARY SVC/PX: CPT

## 2025-04-30 PROCEDURE — 25000003 PHARM REV CODE 250: Performed by: STUDENT IN AN ORGANIZED HEALTH CARE EDUCATION/TRAINING PROGRAM

## 2025-04-30 PROCEDURE — 25000003 PHARM REV CODE 250

## 2025-04-30 PROCEDURE — 94761 N-INVAS EAR/PLS OXIMETRY MLT: CPT

## 2025-04-30 PROCEDURE — 36415 COLL VENOUS BLD VENIPUNCTURE: CPT | Performed by: STUDENT IN AN ORGANIZED HEALTH CARE EDUCATION/TRAINING PROGRAM

## 2025-04-30 PROCEDURE — 83735 ASSAY OF MAGNESIUM: CPT | Performed by: STUDENT IN AN ORGANIZED HEALTH CARE EDUCATION/TRAINING PROGRAM

## 2025-04-30 RX ADMIN — GABAPENTIN 300 MG: 300 CAPSULE ORAL at 09:04

## 2025-04-30 RX ADMIN — ASPIRIN 81 MG: 81 TABLET, COATED ORAL at 09:04

## 2025-04-30 RX ADMIN — IBUPROFEN 800 MG: 400 TABLET ORAL at 09:04

## 2025-04-30 RX ADMIN — ALVIMOPAN 12 MG: 12 CAPSULE ORAL at 09:04

## 2025-04-30 RX ADMIN — IBUPROFEN 800 MG: 400 TABLET ORAL at 01:04

## 2025-04-30 RX ADMIN — IBUPROFEN 800 MG: 800 INJECTION INTRAVENOUS at 05:04

## 2025-04-30 RX ADMIN — SODIUM CHLORIDE: 9 INJECTION, SOLUTION INTRAVENOUS at 06:04

## 2025-04-30 RX ADMIN — MUPIROCIN: 20 OINTMENT TOPICAL at 09:04

## 2025-04-30 RX ADMIN — FLUTICASONE FUROATE AND VILANTEROL TRIFENATATE 1 PUFF: 200; 25 POWDER RESPIRATORY (INHALATION) at 07:04

## 2025-04-30 RX ADMIN — AMLODIPINE BESYLATE 10 MG: 10 TABLET ORAL at 09:04

## 2025-04-30 RX ADMIN — ACETAMINOPHEN 1000 MG: 500 TABLET ORAL at 09:04

## 2025-04-30 RX ADMIN — ACETAMINOPHEN 1000 MG: 10 INJECTION INTRAVENOUS at 05:04

## 2025-04-30 RX ADMIN — GABAPENTIN 300 MG: 300 CAPSULE ORAL at 01:04

## 2025-04-30 RX ADMIN — ACETAMINOPHEN 1000 MG: 500 TABLET ORAL at 01:04

## 2025-04-30 RX ADMIN — POTASSIUM BICARBONATE 50 MEQ: 978 TABLET, EFFERVESCENT ORAL at 12:04

## 2025-04-30 RX ADMIN — TIOTROPIUM BROMIDE INHALATION SPRAY 2 PUFF: 3.12 SPRAY, METERED RESPIRATORY (INHALATION) at 07:04

## 2025-04-30 RX ADMIN — IPRATROPIUM BROMIDE AND ALBUTEROL SULFATE 3 ML: 2.5; .5 SOLUTION RESPIRATORY (INHALATION) at 07:04

## 2025-04-30 NOTE — ASSESSMENT & PLAN NOTE
Melani Holloway is a 79 y.o. female who was found to have multiple large right and proximal transverse colon polyps on recent colonoscopy, one of which was positive on staging PET for lung cancer. S/P laparoscopic right colectomy and umbilical hernia repair on 4/29/2025.    - adv to low residue diet  - D/C MIVF today  - multimodal pain meds  - replete lytes PRN  - lovenox ddvt ppx and SCDs  - OOB and ambulate, IS  - PT/OT following

## 2025-04-30 NOTE — PLAN OF CARE
[facility]  Initial Discharge Assessment       Primary Care Provider: Nathaniel Greenfield MD    Admission Diagnosis: Polyp of colon, unspecified part of colon, unspecified type [K63.5]  Colon polyp [K63.5]    Admission Date: 4/29/2025  Expected Discharge Date:     Transition of Care Barriers: None    Payor: [employer] [facility] / Plan: [employer] CHO[employer] / Product Type: Medicare Advantage /     Extended Emergency Contact Information  Primary Emergency Contact: Donovan Bran  Mobile Phone: [phone]  Relation: Daughter  Preferred language: English   needed? No    Discharge Plan A: Home with family  Discharge Plan B: Home Health      [id number] - [place], [place] - [address] [facility]  [address]  [facility] [address]  Phone: [phone] Fax: [phone]      Initial Assessment (most recent)       Adult Discharge Assessment - 04/30/25 1329          Discharge Assessment    Assessment Type Discharge Planning Assessment     Confirmed/corrected address, phone number and insurance Yes     Confirmed Demographics Correct on Facesheet     Source of Information patient     Communicated RODOLFO with patient/caregiver Yes     People in Home child(geeta), adult     Do you expect to return to your current living situation? Yes     Do you have help at home or someone to help you manage your care at home? Yes     Prior to hospitilization cognitive status: Alert/Oriented     Current cognitive status: Alert/Oriented     Home Layout Able to live on 1st floor     Do you take prescription medications? Yes     Do you have prescription coverage? Yes     Do you have any problems affording any of your prescribed medications? No     Is the patient taking medications as prescribed? yes     Who is going to help you get home at discharge? daughter     How do you get to doctors appointments? car, drives self     Are you on dialysis? No     Do you take coumadin? No     Discharge Plan  A Home with family     Discharge Plan B Home Health     Discharge Plan discussed with: Patient     Transition of Care Barriers None     SDOH --   none       Physical Activity    On average, how many days per week do you engage in moderate to strenuous exercise (like a brisk walk)? 0 days     On average, how many minutes do you engage in exercise at this level? 0 min        Financial Resource Strain    How hard is it for you to pay for the very basics like food, housing, medical care, and heating? Not hard at all        Housing Stability    In the last 12 months, was there a time when you were not able to pay the mortgage or rent on time? No     At any time in the past 12 months, were you homeless or living in a shelter (including now)? No        Transportation Needs    In the past 12 months, has lack of transportation kept you from medical appointments or from getting medications? No     In the past 12 months, has lack of transportation kept you from meetings, work, or from getting things needed for daily living? No        Food Insecurity    Within the past 12 months, you worried that your food would run out before you got the money to buy more. Never true     Within the past 12 months, the food you bought just didn't last and you didn't have money to get more. Never true        Stress    Do you feel stress - tense, restless, nervous, or anxious, or unable to sleep at night because your mind is troubled all the time - these days? Not at all        Social Isolation    How often do you feel lonely or isolated from those around you?  Never        Alcohol Use    Q1: How often do you have a drink containing alcohol? Never     Q2: How many drinks containing alcohol do you have on a typical day when you are drinking? Patient does not drink     Q3: How often do you have six or more drinks on one occasion? Never        Skinkers    In the past 12 months has the electric, gas, oil, or water company threatened to shut off  services in your home? No        Health Literacy    How often do you need to have someone help you when you read instructions, pamphlets, or other written material from your doctor or pharmacy? Never                   The CM met with the patient at bedside to complete the DPA.  The CM placed name and contact information on the blackboard in the patient's room.  Use preferred pharmacy / bedside delivery for any necessary  medications at the time of discharge.The patient is independent with all ADLs. The patient is not on Dialysis or Coumadin. The patient's daughter  will provide assistance to the patient upon discharge. The patient's daughter will provide transportation upon discharge. The CM will continue to follow for course of hospitalization.

## 2025-04-30 NOTE — HOSPITAL COURSE
Melani Holloway is a 79 y.o. female who was found to have multiple large right and proximal transverse colon polyps on recent colonoscopy, one of which was positive on staging PET for lung cancer. To OR for RHC and primary repair of umbilical hernia.   -4/29: s/p laparoscopic right colectomy, bilateral TAP blocks, umbilical hernia repair.   -4/30: +flatus, no BM yet. Tolerating PO clears. PT to ambulate patient today.   -5/1: patient passing flatus and had BM. Tolerating low res diet. Ambulating well. Voiding appropriately. Deemed okay for discharge with outpatient follow up.

## 2025-04-30 NOTE — PROGRESS NOTES
[facility]  Colorectal Surgery  Progress Note    Patient Name: Melani Holloway  MRN: [id number]  Admission Date: 4/29/2025  Hospital Length of Stay: 1 days  Attending Physician: Cleo Willingham MD    Subjective:     Interval History: Some nausea overnight relieved with PRNs, passing gas, no BM yet. Awaiting PT/OT.     Post-Op Info:  Procedure(s) (LRB):  COLECTOMY, RIGHT, LAPAROSCOPIC (N/A)  REPAIR, HERNIA, UMBILICAL, LAPAROSCOPIC   1 Day Post-Op      Medications:  Continuous Infusions:   0.9% NaCl   Intravenous Continuous 40 mL/hr at 04/29/25 0950 New Bag at 04/29/25 0950    0.9% NaCl   Intravenous Continuous 40 mL/hr at 04/30/25 0602 New Bag at 04/30/25 0602     Scheduled Meds:   acetaminophen  1,000 mg Oral Q8H    albuterol-ipratropium  3 mL Nebulization Q6H WAKE    alvimopan  12 mg Oral BID    amLODIPine  10 mg Oral Daily    aspirin  81 mg Oral Every other day    enoxparin  40 mg Subcutaneous Daily    fluticasone furoate-vilanteroL  1 puff Inhalation Daily    gabapentin  300 mg Oral TID    ibuprofen  800 mg Oral Q8H    mupirocin   Nasal BID    tiotropium bromide  2 puff Inhalation Daily     PRN Meds:   acetaminophen tablet 1,000 mg    albuterol-ipratropium 2.5 mg-0.5 mg/3 mL nebulizer solution 3 mL    alvimopan capsule 12 mg    amLODIPine tablet 10 mg    aspirin EC tablet 81 mg    enoxaparin injection 40 mg    fluticasone furoate-vilanteroL 200-25 mcg/dose diskus inhaler 1 puff    gabapentin capsule 300 mg    ibuprofen tablet 800 mg    mupirocin 2 % ointment    tiotropium bromide 2.5 mcg/actuation inhaler 2 puff        Objective:     Vital Signs (Most Recent):  Temp: 98.3 °F (36.8 °C) (04/30/25 0709)  Pulse: 61 (04/30/25 0735)  Resp: 17 (04/30/25 0735)  BP: 136/62 (04/30/25 0709)  SpO2: (!) 93 % (04/30/25 0735) Vital Signs (24h Range):  Temp:  [97.5 °F (36.4 °C)-98.3 °F (36.8 °C)] 98.3 °F (36.8 °C)  Pulse:  [57-73] 61  Resp:  [12-20] 17  SpO2:  [88 %-96 %] 93 %  BP: (125-136)/(60-64) 136/62  "    Intake/Output - Last 3 Shifts         04/28 0700  04/29 0659 04/29 0700  04/30 0659 04/30 0700  05/01 0659    P.O.  250     I.V.  1800     IV Piggyback  100     Total Intake  2150     Urine  2000     Emesis/NG output  10     Total Output  2010     Net  +140                     Physical Exam  Vitals reviewed.   Constitutional:       General: She is not in acute distress.  Pulmonary:      Effort: Pulmonary effort is normal. No respiratory distress.   Abdominal:      General: Bowel sounds are normal. There is no distension.      Tenderness: There is no abdominal tenderness.      Comments: Site with gauze packing CDI.    Skin:     General: Skin is warm and dry.   Neurological:      Mental Status: She is alert and oriented to person, place, and time.          Significant Labs:  BMP (Last 3 Results):   Recent Labs   Lab 04/24/25  0925 04/30/25  0542    84    140   K 4.0 3.3*    111*   CO2 25 21*   BUN 19 10   CREATININE 0.8 0.7   CALCIUM 9.4 7.8*   MG  --  2.1     CBC (Last 3 Results):   Recent Labs   Lab 04/24/25  0925 04/30/25  0542   WBC 5.07 5.79   RBC 4.51 3.78*   HGB 12.4 10.2*   HCT 38.4 32.4*    178   MCV 85 86   MCH 27.5 27.0   MCHC 32.3 31.5*     CRP (Last 3 Results): No results for input(s): "CRP" in the last 168 hours.    Significant Diagnostics:  None  Assessment/Plan:     S/P right colectomy  Melani Holloway is a 79 y.o. female who was found to have multiple large right and proximal transverse colon polyps on recent colonoscopy, one of which was positive on staging PET for lung cancer. S/P laparoscopic right colectomy and umbilical hernia repair on 4/29/2025.    - adv to low residue diet  - D/C MIVF today  - multimodal pain meds  - replete lytes PRN  - lovenox ddvt ppx and SCDs  - OOB and ambulate, IS  - PT/OT following          NELI Sebastian-C  Colorectal Surgery  [facility]        "

## 2025-04-30 NOTE — SUBJECTIVE & OBJECTIVE
Subjective:     Interval History: Some nausea overnight relieved with PRNs, passing gas, no BM yet. Awaiting PT/OT.     Post-Op Info:  Procedure(s) (LRB):  COLECTOMY, RIGHT, LAPAROSCOPIC (N/A)  REPAIR, HERNIA, UMBILICAL, LAPAROSCOPIC   1 Day Post-Op      Medications:  Continuous Infusions:   0.9% NaCl   Intravenous Continuous 40 mL/hr at 04/29/25 0950 New Bag at 04/29/25 0950    0.9% NaCl   Intravenous Continuous 40 mL/hr at 04/30/25 0602 New Bag at 04/30/25 0602     Scheduled Meds:   acetaminophen  1,000 mg Oral Q8H    albuterol-ipratropium  3 mL Nebulization Q6H WAKE    alvimopan  12 mg Oral BID    amLODIPine  10 mg Oral Daily    aspirin  81 mg Oral Every other day    enoxparin  40 mg Subcutaneous Daily    fluticasone furoate-vilanteroL  1 puff Inhalation Daily    gabapentin  300 mg Oral TID    ibuprofen  800 mg Oral Q8H    mupirocin   Nasal BID    tiotropium bromide  2 puff Inhalation Daily     PRN Meds:   acetaminophen tablet 1,000 mg    albuterol-ipratropium 2.5 mg-0.5 mg/3 mL nebulizer solution 3 mL    alvimopan capsule 12 mg    amLODIPine tablet 10 mg    aspirin EC tablet 81 mg    enoxaparin injection 40 mg    fluticasone furoate-vilanteroL 200-25 mcg/dose diskus inhaler 1 puff    gabapentin capsule 300 mg    ibuprofen tablet 800 mg    mupirocin 2 % ointment    tiotropium bromide 2.5 mcg/actuation inhaler 2 puff        Objective:     Vital Signs (Most Recent):  Temp: 98.3 °F (36.8 °C) (04/30/25 0709)  Pulse: 61 (04/30/25 0735)  Resp: 17 (04/30/25 0735)  BP: 136/62 (04/30/25 0709)  SpO2: (!) 93 % (04/30/25 0735) Vital Signs (24h Range):  Temp:  [97.5 °F (36.4 °C)-98.3 °F (36.8 °C)] 98.3 °F (36.8 °C)  Pulse:  [57-73] 61  Resp:  [12-20] 17  SpO2:  [88 %-96 %] 93 %  BP: (125-136)/(60-64) 136/62     Intake/Output - Last 3 Shifts         04/28 0700 04/29 0659 04/29 0700 04/30 0659 04/30 0700 05/01 0659    P.O.  250     I.V.  1800     IV Piggyback  100     Total Intake  2150     Urine  2000     Emesis/NG output  " 10     Total Output  2010     Net  +140                     Physical Exam  Vitals reviewed.   Constitutional:       General: She is not in acute distress.  Pulmonary:      Effort: Pulmonary effort is normal. No respiratory distress.   Abdominal:      General: Bowel sounds are normal. There is no distension.      Tenderness: There is no abdominal tenderness.      Comments: Site with gauze packing CDI.    Skin:     General: Skin is warm and dry.   Neurological:      Mental Status: She is alert and oriented to person, place, and time.          Significant Labs:  BMP (Last 3 Results):   Recent Labs   Lab 04/24/25  0925 04/30/25  0542    84    140   K 4.0 3.3*    111*   CO2 25 21*   BUN 19 10   CREATININE 0.8 0.7   CALCIUM 9.4 7.8*   MG  --  2.1     CBC (Last 3 Results):   Recent Labs   Lab 04/24/25  0925 04/30/25  0542   WBC 5.07 5.79   RBC 4.51 3.78*   HGB 12.4 10.2*   HCT 38.4 32.4*    178   MCV 85 86   MCH 27.5 27.0   MCHC 32.3 31.5*     CRP (Last 3 Results): No results for input(s): "CRP" in the last 168 hours.    Significant Diagnostics:  None  " Attending Attestation (For Attendings USE Only)...

## 2025-04-30 NOTE — CARE UPDATE
04/29/25 1920   Patient Assessment/Suction   Level of Consciousness (AVPU) alert   Respiratory Effort Normal;Unlabored   Expansion/Accessory Muscles/Retractions no retractions;no use of accessory muscles   All Lung Fields Breath Sounds Anterior:;diminished   Rhythm/Pattern, Respiratory pattern regular;unlabored;depth regular;no shortness of breath reported   Cough Frequency infrequent   Cough Type no productive sputum   PRE-TX-O2   Device (Oxygen Therapy) nasal cannula   $ Is the patient on Low Flow Oxygen? Yes   Flow (L/min) (Oxygen Therapy) 1   SpO2 (!) 91 %   Pulse Oximetry Type Intermittent   $ Pulse Oximetry - Multiple Charge Pulse Oximetry - Multiple   Pulse 66   Resp 20   Aerosol Therapy   $ Aerosol Therapy Charges Aerosol Treatment   Daily Review of Necessity (SVN) completed   Respiratory Treatment Status (SVN) given   Treatment Route (SVN) mask;oxygen   Patient Position HOB elevated   Post Treatment Assessment (SVN) breath sounds unchanged   Signs of Intolerance (SVN) none   Breath Sounds Post-Respiratory Treatment   Throughout All Fields Post-Treatment All Fields   Throughout All Fields Post-Treatment no change   Post-treatment Heart Rate (beats/min) 66   Post-treatment Resp Rate (breaths/min) 20   Incentive Spirometer   $ Incentive Spirometer Charges done with encouragement;breath hold utilized;proper technique demonstrated   Administration (IS) instruction provided, follow-up;mouthpiece utilized;proper technique demonstrated   Number of Repetitions (IS) 6   Level Incentive Spirometer (mL) 1050   Patient Tolerance (IS) fair;no adverse signs/symptoms present   General Safety Checklist   Safety Promotion/Fall Prevention side rails raised   Airway Safety   Is Ambu Bag and Mask with Patient? Yes, Adult Ambu Bag and Mask   Suction set is at the bedside? Yes

## 2025-04-30 NOTE — HPI
Patient is a 78 yo female with PMH COPD, HTN, DCIS right breast (2010), lung cancer s/p left upper lobectomy (5/2024) with multiple, large polyps in ascending colon and hepatic flexure, +PET presents today for RHC and primary repair of umbilical hernia.   Colonoscopy (2/27/2025)   - One 15 mm polyp in the ascending colon. Resection not attempted.   - One 30 mm polyp at the hepatic flexure. Resection not attempted. Biopsied,   - One 25 mm polyp at the hepatic flexure. Resection not attempted. Biopsied. Tattooed. Clip was placed. Clip : Bimbasket.   - One 15 mm polyp in the transverse colon, removed using injection-lift and a hot snare. Resected and retrieved.   - The examination was otherwise normal on direct and retroflexion views.   Pathology:  1. COLON, PROXIMAL HEPATIC FLEXURE, POLYP, BIOPSY:   - Tubular adenoma   2. COLON, DISTAL HEPATIC FLEXURE, POLYP, BIOPSY:   - Tubular adenoma   3. COLON, TRANSVERSE, POLYP, BIOPSY:   - Tubular adenoma   PET (4/29/2024)  1.9 cm hypermetabolic lung nodule within the left upper lobe, concerning for primary malignancy.  No definite tracer avid metastasis.  Additional stable lung nodules with background uptake.  Hypermetabolic focus within hepatic flexure of colon, no corresponding CT abnormality.  Nonspecific, could represent prominent physiologic uptake or underlying polyp.  Further evaluation with colonoscopy versus attention on follow-up as clinically warranted.  Simple appearing 4.3 cm left adnexal cyst.  Further evaluation/follow-up ultrasound as clinically warranted.

## 2025-04-30 NOTE — PT/OT/SLP EVAL
Occupational Therapy   Evaluation and Discharge Note    Name: Melani Holloway  MRN: [id number]  Admitting Diagnosis: Polyp of colon  Recent Surgery: Procedure(s) (LRB):  COLECTOMY, RIGHT, LAPAROSCOPIC (N/A)  REPAIR, HERNIA, UMBILICAL, LAPAROSCOPIC 1 Day Post-Op    Recommendations:     Discharge Recommendations: No Therapy Indicated  Discharge Equipment Recommendations: none  Barriers to discharge:  None    Assessment:     Melani Holloway is a 79 y.o. female with a medical diagnosis of Polyp of colon. At this time, patient is functioning at their prior level of function and does not require further acute OT services.     Plan:     During this hospitalization, patient does not require further acute OT services.  Please re-consult if situation changes.    Plan of Care Reviewed with: patient    Subjective     Chief Complaint: None  Patient/Family Comments/goals: return home    Occupational Profile:  Living Environment: Lives with alone in a [facility] with 0 JOSE ANTONIO and no hand rail, Tub/shower in bathroom   Previous level of function: Indp  Roles and Routines: Mother  Equipment Used at home: none  Assistance upon Discharge: Dtr    Pain/Comfort:  Pain Rating 1: 0/10    Patients cultural, spiritual, Restoration conflicts given the current situation: no    Objective:     Communicated with: Nsg prior to session.  Patient found up in chair with  (no active lines) upon OT entry to room.    General Precautions: Standard, fall  Orthopedic Precautions: N/A  Braces:    Respiratory Status: Room air     Occupational Performance:    Bed Mobility:    Pt OOB    Functional Mobility/Transfers:  Patient completed Sit <> Stand Transfer with supervision  with  no assistive device   Patient completed Toilet Transfer Step Transfer technique with supervision with  no AD and grab bars  Functional Mobility: Pt ambulated room level c/ SBA faded to Sup and no Ad.    Activities of Daily Living:  Grooming: supervision hand Hygiene  Upper Body Dressing:  supervision saba shah  Lower Body Dressing: stand by assistance adjust socks    Cognitive/Visual Perceptual:  A&O x4    Physical Exam:  BUE WNL  Balance: intact    AMPAC 6 Click ADL:  AMPAC Total Score: 24    Treatment & Education:  Pt educated on role and purpose of therapy  Pt educated on goal setting  Pt educated on benefits of OOB activity  Pt educated on self advocacy     Patient left up in chair with all lines intact, call button in reach, and nsg notified    GOALS:   Multidisciplinary Problems       Occupational Therapy Goals       Not on file                    DME Justifications:  No DME recommended requiring DME justifications    History:     Past Medical History:   Diagnosis Date    COPD (chronic obstructive pulmonary disease)     Ductal carcinoma in situ (DCIS) of right breast 09/19/2017    Hypertension          Past Surgical History:   Procedure Laterality Date    BREAST BIOPSY Right 2010    BREAST LUMPECTOMY Right 2010    BRONCHOSCOPY Left 5/23/2024    Procedure: BRONCHOSCOPY;  Surgeon: Riaz Fay MD;  Location: [facility] OR [address] [place] [address];  Service: Cardiothoracic;  Laterality: Left;    cataract surgery      COLONOSCOPY N/A 2/27/2025    Procedure: COLONOSCOPY;  Surgeon: Cleo Willingham MD;  Location: [facility] ([address] [place] [address]);  Service: Endoscopy;  Laterality: N/A;  Pulm cl pending / 2nd:COPD, Recent lobectomy / Ref: ALEXANDER Greenfield MD / miralax / inst portal / pt requested to have in Jan, sch first avail in Jan - LW   Please ensure she has duo neb treatments available in case she develops bronchospasm during her endoscopy-see note on 12    INJECTION OF ANESTHETIC AGENT AROUND MULTIPLE INTERCOSTAL NERVES Left 5/23/2024    Procedure: BLOCK, NERVE, INTERCOSTAL, 2 OR MORE;  Surgeon: Riaz Fay MD;  Location: [facility] OR [address] [place] [address];  Service: Cardiothoracic;  Laterality: Left;    LAPAROSCOPIC REPAIR OF UMBILICAL HERNIA  4/29/2025    Procedure: REPAIR, HERNIA, UMBILICAL, LAPAROSCOPIC;  Surgeon: Cleo Willingham  MD;  Location: [facility] OR 2ND FLR;  Service: Colon and Rectal;;    LAPAROSCOPIC RIGHT COLON RESECTION N/A 4/29/2025    Procedure: COLECTOMY, RIGHT, LAPAROSCOPIC;  Surgeon: Cleo Willingham MD;  Location: [facility] OR [facility] FLR;  Service: Colon and Rectal;  Laterality: N/A;    LYMPHADENECTOMY N/A 5/23/2024    Procedure: LYMPHADENECTOMY;  Surgeon: Riaz Fay MD;  [facility];  Service: Cardiothoracic;  Laterality: N/A;    ROBOTIC BRONCHOSCOPY N/A 4/16/2024    Procedure: ROBOTIC BRONCHOSCOPY;  Surgeon: Uzma Larsen MD;  [facility];  Service: Pulmonary;  Laterality: N/A;    XI ROBOTIC RATS,WITH LOBECTOMY,LUNG Left 5/23/2024    Procedure: XI ROBOTIC LEFT UPPER LOBECTOMY;  Surgeon: Riaz Fay MD;  [facility];  Service: Cardiothoracic;  Laterality: Left;       Time Tracking:     OT Date of Treatment: 04/30/25  OT Start Time: 1111  OT Stop Time: 1120  OT Total Time (min): 9 min    Billable Minutes:Evaluation 9    4/30/2025

## 2025-04-30 NOTE — PT/OT/SLP EVAL
Physical Therapy Evaluation and Discharge Note     Patient Name:  Melani Holloway  MRN: [id number]    Admit Date: 4/29/2025  Admitting Diagnosis:  Polyp of colon  Length of Stay: 1 days  Recent Surgery: Procedure(s) (LRB):  COLECTOMY, RIGHT, LAPAROSCOPIC (N/A)  REPAIR, HERNIA, UMBILICAL, LAPAROSCOPIC 1 Day Post-Op    Recommendations:     Discharge Recommendations: no therapy indicated  Equipment recommendations: none  Barriers to discharge: None Identified     Ambulate 3x/day with nsg per progressive mobility protocol - supervision    Assessment:     Melani Holloway is a 79 y.o. female admitted to [facility] on 4/29/2025 with medical diagnosis of Polyp of colon. Pt presents with pain.    Pt is agreeable to therapy evaluation and session. Pt able to perform mobility without assist, ambulating in hallway with no LOB or safety concerns. Pt with mild SOB but states it is as baseline. Pt returned to bed at end of session.     Melani Holloway does not require acute PT services at this time due to being at (I) PLOF and demonstrating safety with functional mobility, including transfers and ambulation. Once medically stable, recommending pt discharge to no therapy indicated.      Discharge from acute PT services.    Plan:   Plan of Care Expires:  04/30/25  Plan of Care reviewed with: patient    This plan of care has been discussed with the patient/caregiver, who was included in its development and is in agreement with the identified goals and treatment plan.     Subjective     Communicated with RN prior to session.  Patient found HOB elevated upon PT entry to room, agreeable to evaluation. Pt alone during session.    Chief Complaint: none stated    Patient/Family Comments/goals: none stated    Pain/Comfort:  Pain Rating 1: 4/10  Location 1: abdomen  Pain Addressed 1: Reposition, Distraction, Cessation of Activity    Patients cultural, spiritual, Hoahaoism conflicts given the current situation: None identified     Patient  History: information obtained from pt     Living Environment: Pt lives alone in single level home  with 0 JOSE ANTONIO.  Prior Level of Function: independent with mobility and ADLs  DME owned: rolling walker, shower chair, and transfer tub bench  Support Available/Caregiver Assistance:  family lives next door and 2 blocks away    Objective:     Patient found with:      Recent Surgery: Procedure(s) (LRB):  COLECTOMY, RIGHT, LAPAROSCOPIC (N/A)  REPAIR, HERNIA, UMBILICAL, LAPAROSCOPIC 1 Day Post-Op  General Precautions: Standard, fall   Orthopedic Precautions:    Braces:     Oxygen Device: room air      Exams:    Cognition:  Alert  Command following: Follows multistep verbal commands  Communication: clear/fluent    Sensation:   Light touch sensation: Intact BLEs    Gross Motor Coordination: No deficits noted during functional mobility tasks     Edema/Skin Integrity: None noted; Visible skin intact    Postural examination/scapula alignment: no deficits noted    Lower Extremity Range of Motion:  Right Lower Extremity: WFL  Left Lower Extremity: WFL    Lower Extremity Strength:  Right Lower Extremity: WFL  Left Lower Extremity: WFL    Functional Mobility:    Bed Mobility:  Supine > Sit with independence  Sit > Supine with independence    Transfers:   Sit <> Stand Transfer: supervision x 1 trials from eob with no AD              Gait:  Distance: ~300 ft  Assistance level: supervision  Assistive Device: none  Gait Assessment: mild SOB (pt states this is baseline)    Balance:  Dynamic Sitting: GOOD+: Maintains balance through MAXIMAL excursions of active trunk motion  Standing:  Static: GOOD+: Takes MAXIMAL challenges from all directions   Dynamic: GOOD+: Independent gait (with or without assistive device)    Outcome Measure: AM-PAC 6 CLICK MOBILITY  Total Score:24     Patient/Caregiver Education:     Therapist educated pt/caregiver regarding:   PT POC and goals for therapy   Safety with mobility and fall risk   Instruction on use of  call button and importance of calling nursing staff for assistance with mobility   Time provided for therapeutic counseling and discussion of current health disposition. All questions answered to satisfaction, within scope of PT practice     Patient/caregiver able to verbalize understanding and expressed no further questions this visit; will follow-up with pt/caregiver during current admit for additional questions/concerns within scope of practice.     White board updated.     Patient left supine with all lines intact and call button in reach.    GOALS:   Multidisciplinary Problems       Physical Therapy Goals          Problem: Physical Therapy    Goal Priority Disciplines Outcome Interventions   Physical Therapy Goal     PT, PT/OT                           History:     Past Medical History:   Diagnosis Date    COPD (chronic obstructive pulmonary disease)     Ductal carcinoma in situ (DCIS) of right breast 09/19/2017    Hypertension        Past Surgical History:   Procedure Laterality Date    BREAST BIOPSY Right 2010    BREAST LUMPECTOMY Right 2010    BRONCHOSCOPY Left 5/23/2024    Procedure: BRONCHOSCOPY;  Surgeon: Riaz Fay MD;  Location: [facility] OR [address] [place] [address];  Service: Cardiothoracic;  Laterality: Left;    cataract surgery      COLONOSCOPY N/A 2/27/2025    Procedure: COLONOSCOPY;  Surgeon: Cleo Willingham MD;  Location: [facility] ([address] [place] [address]);  Service: Endoscopy;  Laterality: N/A;  Pulm cl pending / 2nd:COPD, Recent lobectomy / Ref: ALEXANDER Greenfield MD / miralax / inst portal / pt requested to have in Jan, sch first avail in Jan - LW   Please ensure she has duo neb treatments available in case she develops bronchospasm during her endoscopy-see note on 12    INJECTION OF ANESTHETIC AGENT AROUND MULTIPLE INTERCOSTAL NERVES Left 5/23/2024    Procedure: BLOCK, NERVE, INTERCOSTAL, 2 OR MORE;  Surgeon: Riaz Fay MD;  Location: [facility] OR [address] [place] [address];  Service: Cardiothoracic;  Laterality: Left;    LAPAROSCOPIC REPAIR  OF UMBILICAL HERNIA  4/29/2025    Procedure: REPAIR, HERNIA, UMBILICAL, LAPAROSCOPIC;  Surgeon: Cleo Willingham MD;  [facility];  Service: Colon and Rectal;;    LAPAROSCOPIC RIGHT COLON RESECTION N/A 4/29/2025    Procedure: COLECTOMY, RIGHT, LAPAROSCOPIC;  Surgeon: Cleo Willingham MD;  [facility];  Service: Colon and Rectal;  Laterality: N/A;    LYMPHADENECTOMY N/A 5/23/2024    Procedure: LYMPHADENECTOMY;  Surgeon: Riaz Fay MD;  [facility];  Service: Cardiothoracic;  Laterality: N/A;    ROBOTIC BRONCHOSCOPY N/A 4/16/2024    Procedure: ROBOTIC BRONCHOSCOPY;  Surgeon: Uzma Larsen MD;  Location: [facility] OR [address] [place] [address];  Service: Pulmonary;  Laterality: N/A;    XI ROBOTIC RATS,WITH LOBECTOMY,LUNG Left 5/23/2024    Procedure: XI ROBOTIC LEFT UPPER LOBECTOMY;  Surgeon: Riaz Fay MD;  Location: [facility] OR [address] [place] [address];  Service: Cardiothoracic;  Laterality: Left;       Time Tracking:     PT Received On: 04/30/25  PT Start Time: 1324     PT Stop Time: 1332  PT Total Time (min): 8 min     Billable Minutes: Evaluation 8    04/30/2025

## 2025-04-30 NOTE — CARE UPDATE
I have reviewed the chart of Melani Holloway who is hospitalized for the following:    Active Hospital Problems    Diagnosis    *Polyp of colon    Hx of umbilical hernia repair    Hypokalemia     Monitor CMP      S/P right colectomy        BISHOP Bean  Unit Based TOMMY

## 2025-05-01 VITALS
RESPIRATION RATE: 16 BRPM | BODY MASS INDEX: 31.72 KG/M2 | HEIGHT: 63 IN | DIASTOLIC BLOOD PRESSURE: 64 MMHG | HEART RATE: 66 BPM | OXYGEN SATURATION: 94 % | SYSTOLIC BLOOD PRESSURE: 161 MMHG | TEMPERATURE: 98 F

## 2025-05-01 PROCEDURE — 25000003 PHARM REV CODE 250: Performed by: STUDENT IN AN ORGANIZED HEALTH CARE EDUCATION/TRAINING PROGRAM

## 2025-05-01 RX ORDER — ACETAMINOPHEN 500 MG
1000 TABLET ORAL EVERY 8 HOURS
COMMUNITY
Start: 2025-05-01

## 2025-05-01 RX ORDER — OXYCODONE HYDROCHLORIDE 5 MG/1
5 TABLET ORAL EVERY 4 HOURS PRN
Qty: 20 TABLET | Refills: 0 | Status: SHIPPED | OUTPATIENT
Start: 2025-05-01

## 2025-05-01 RX ADMIN — ACETAMINOPHEN 1000 MG: 500 TABLET ORAL at 05:05

## 2025-05-01 RX ADMIN — AMLODIPINE BESYLATE 10 MG: 10 TABLET ORAL at 09:05

## 2025-05-01 RX ADMIN — IBUPROFEN 800 MG: 400 TABLET ORAL at 05:05

## 2025-05-01 RX ADMIN — GABAPENTIN 300 MG: 300 CAPSULE ORAL at 09:05

## 2025-05-01 RX ADMIN — ALVIMOPAN 12 MG: 12 CAPSULE ORAL at 09:05

## 2025-05-01 RX ADMIN — TIOTROPIUM BROMIDE INHALATION SPRAY 2 PUFF: 3.12 SPRAY, METERED RESPIRATORY (INHALATION) at 09:05

## 2025-05-01 RX ADMIN — MUPIROCIN: 20 OINTMENT TOPICAL at 09:05

## 2025-05-01 RX ADMIN — FLUTICASONE FUROATE AND VILANTEROL TRIFENATATE 1 PUFF: 200; 25 POWDER RESPIRATORY (INHALATION) at 09:05

## 2025-05-01 NOTE — PLAN OF CARE
Pt discharged home with family.  Discharge instructions given with teach back. Meds delivered bedside.  PIVs removed.

## 2025-05-01 NOTE — DISCHARGE INSTRUCTIONS
Post-Operative Instructions    PAIN CONTROL  Surgery may cause different types of pain and discomfort including abdominal soreness and discomfort, incisional pain, muscle spasm, bloating or constipation.    Starting after surgery, take 650 mg of acetaminophen (Tylenol) every 6-8 hours.  Do not exceed 4000 mg per day.   Starting the day after your surgery, take 800 mg ibuprofen (Advil or Motrin) every 6-8 hours. Do not exceed 3200 mg per day.  Alternate between 650 mg acetaminophen and 800 mg ibuprofen every 3 hours for at least the first three days after surgery for pain control.  For example, take acetaminophen at 7 AM, then 3 hours later at 10 AM take ibuprofen, then 3 hours later at 1 PM take acetaminophen and 3 hours later at 4 PM take ibuprofen, etc...  Narcotics often cause stomach upset and constipation and should be used sparingly.  A printed script or limited dose of narcotic medication (oxycodone) may be provided.    INCISION CARE  Your incisions are covered with skin glue.  This will remain on until your follow up visit with your surgeon.  It is normal for the skin glue to peel at the edges or become sticky.  You may remove umbilical dressing tomorrow (5/2)  Some bruising and swelling around the incisions is normal, especially around larger incisions.  The bruising and swelling will gradually go away.  You can use ice or heat packs on your incisions if that helps with pain.  Use them for 30 minutes, then take them off for 30 minutes before using them again.    HOME MEDICATIONS  You may resume taking your normal medications, with the EXCEPTION of the following:  Wait 3 days after your surgery before taking the following medications unless otherwise instructed by your surgeon or internist/cardiologist: Aspirin or aspirin containing medications (i.e. Goody's, Excedrin), Apixaban (Eliquis), Clopidogrel (Plavix), Dabigatran (Pradaxa), Dipyridamole (Aggrenox), Rivaroxaban (Xarelto), Warfarin (Coumadin), or any  other type of blood thinner you may be taking.  Please wait 1 week after surgery to restart herbal medications, vitamins or minerals    ACTIVITIES  You may shower when you get home after surgery.  No tub bathing or swimming (do not submerge in water) until cleared by your surgeon.   You may gradually resume normal activities, depending on your energy level.    Please refrain from driving for at least 3 days, or until you can twist and look over your shoulder without hesitating.  Do not drive if you are taking narcotic pain medication.    No lifting or pulling more than 10 lbs for 6-8 weeks or until cleared by your surgeon.    DIET  Please follow a low fiber diet    Avoid constipation and use a stool softener or laxative to avoid straining and to keep your bowel movements regular.    QUESTIONS OR CONCERNS  If you have any questions or concerns during the daytime, please send a [employer] message to your surgeon or call the surgeon's office.    On nights and weekends, please call [phone] and ask for the General Surgery Resident on call.    For emergencies, difficulty breathing or shortness of breath, please call [phone], or report to the closest Emergency Department    Please notify your provider if you experience any of the following:  Bleeding, redness, warm to the touch, swelling, drainage from surgical incisions, bad smell from incision  Your pain level increases and does not improve with the pain medicines you have been given  Temperature greater than 101.5 F (38.1 C) degrees, chills  Inability to eat, drink fluids, or take medications  Nausea or vomiting  Dizziness or passing out  Develop a rash  Have pain or trouble urinating, or you pass blood in your urine  Develop a new cough  You are constipated or have diarrhea    UPCOMING APPOINTMENTS  Future Appointments   Date Time Provider Department Center   5/21/2025  9:15 AM Nathaniel Greenfield MD [facility]   5/22/2025  1:20 PM Cleo Willingham MD [facility]  COLON [place] Solisy   6/19/2025  8:30 AM [facility] REBEKAH [place]   6/19/2025  9:00 AM Riaz Fay MD [facility] [place]

## 2025-05-01 NOTE — DISCHARGE SUMMARY
[facility]  Colorectal Surgery  Discharge Summary      Patient Name: Melani Holloway  MRN: [id number]  Admission Date: 4/29/2025  Hospital Length of Stay: 2 days  Discharge Date and Time: 05/01/2025 8:10 AM  Attending Physician: Cleo Willingham MD   Discharging Provider: Vidhi Dsouza MD  Primary Care Provider: Nathaniel Greenfield MD     HPI:  Patient is a 78 yo female with PMH COPD, HTN, DCIS right breast (2010), lung cancer s/p left upper lobectomy (5/2024) with multiple, large polyps in ascending colon and hepatic flexure, +PET presents today for RHC and primary repair of umbilical hernia.   Colonoscopy (2/27/2025)   - One 15 mm polyp in the ascending colon. Resection not attempted.   - One 30 mm polyp at the hepatic flexure. Resection not attempted. Biopsied,   - One 25 mm polyp at the hepatic flexure. Resection not attempted. Biopsied. Tattooed. Clip was placed. Clip : [place]oolink.   - One 15 mm polyp in the transverse colon, removed using injection-lift and a hot snare. Resected and retrieved.   - The examination was otherwise normal on direct and retroflexion views.   Pathology:  1. COLON, PROXIMAL HEPATIC FLEXURE, POLYP, BIOPSY:   - Tubular adenoma   2. COLON, DISTAL HEPATIC FLEXURE, POLYP, BIOPSY:   - Tubular adenoma   3. COLON, TRANSVERSE, POLYP, BIOPSY:   - Tubular adenoma   PET (4/29/2024)  1.9 cm hypermetabolic lung nodule within the left upper lobe, concerning for primary malignancy.  No definite tracer avid metastasis.  Additional stable lung nodules with background uptake.  Hypermetabolic focus within hepatic flexure of colon, no corresponding CT abnormality.  Nonspecific, could represent prominent physiologic uptake or underlying polyp.  Further evaluation with colonoscopy versus attention on follow-up as clinically warranted.  Simple appearing 4.3 cm left adnexal cyst.  Further evaluation/follow-up ultrasound as clinically warranted.    Procedure(s)  (LRB):  COLECTOMY, RIGHT, LAPAROSCOPIC (N/A)  REPAIR, HERNIA, UMBILICAL, LAPAROSCOPIC     Hospital Course:  Melani Holloway is a 79 y.o. female who was found to have multiple large right and proximal transverse colon polyps on recent colonoscopy, one of which was positive on staging PET for lung cancer. To OR for RHC and primary repair of umbilical hernia.   -4/29: s/p laparoscopic right colectomy, bilateral TAP blocks, umbilical hernia repair.   -4/30: +flatus, no BM yet. Tolerating PO clears. PT to ambulate patient today.   -5/1: patient passing flatus and had BM. Tolerating low res diet. Ambulating well. Voiding appropriately. Deemed okay for discharge with outpatient follow up.     Goals of Care Treatment Preferences:  Code Status: Full Code          Significant Diagnostic Studies: N/A    Pending Diagnostic Studies:       None          Final Active Diagnoses:    Diagnosis Date Noted POA    PRINCIPAL PROBLEM:  Polyp of colon [K63.5] 04/29/2025 Yes    Hx of umbilical hernia repair [Z98.890, Z87.19] 04/30/2025 Not Applicable    Hypokalemia [E87.6] 04/30/2025 Yes    S/P right colectomy [Z90.49] 04/30/2025 Not Applicable      Problems Resolved During this Admission:      Discharged Condition: good    Disposition: Home or Self Care    Follow Up:   Follow-up Information       Cleo Willingham MD Follow up in 2 week(s).    Specialty: Colon and Rectal Surgery  Why: For wound re-check  Contact information:  [address] [place] [id number]  [phone]                           Patient Instructions:      Diet Adult Regular     Other restrictions (specify):   Order Comments: See patient instructions     Notify your health care provider if you experience any of the following:  increased confusion or weakness     Notify your health care provider if you experience any of the following:  persistent dizziness, light-headedness, or visual disturbances     Notify your health care provider if you experience any of  the following:  worsening rash     Notify your health care provider if you experience any of the following:  severe persistent headache     Notify your health care provider if you experience any of the following:  difficulty breathing or increased cough     Notify your health care provider if you experience any of the following:  redness, tenderness, or signs of infection (pain, swelling, redness, odor or green/yellow discharge around incision site)     Notify your health care provider if you experience any of the following:  severe uncontrolled pain     Notify your health care provider if you experience any of the following:  persistent nausea and vomiting or diarrhea     Notify your health care provider if you experience any of the following:  temperature >100.4     Remove dressing in 24 hours     Medications:  Reconciled Home Medications:      Medication List        START taking these medications      oxyCODONE 5 MG immediate release tablet  Commonly known as: ROXICODONE  Take 1 tablet (5 mg total) by mouth every 4 (four) hours as needed for Pain.            CHANGE how you take these medications      * acetaminophen 500 MG tablet  Commonly known as: TYLENOL  Take 2 tablets (1,000 mg total) by mouth every 8 (eight) hours as needed for Pain.  What changed: Another medication with the same name was added. Make sure you understand how and when to take each.     * acetaminophen 500 MG tablet  Commonly known as: TYLENOL  Take 2 tablets (1,000 mg total) by mouth every 8 (eight) hours.  What changed: You were already taking a medication with the same name, and this prescription was added. Make sure you understand how and when to take each.           * This list has 2 medication(s) that are the same as other medications prescribed for you. Read the directions carefully, and ask your doctor or other care provider to review them with you.                CONTINUE taking these medications      albuterol 90 mcg/actuation  inhaler  Commonly known as: PROAIR HFA  Inhale 2 puffs into the lungs every 6 (six) hours as needed for Wheezing. Rescue     albuterol-ipratropium 2.5 mg-0.5 mg/3 mL nebulizer solution  Commonly known as: DUO-NEB  Take 3 mLs by nebulization every 6 (six) hours while awake. Rescue     amLODIPine 10 MG tablet  Commonly known as: NORVASC  TAKE 1 TABLET BY MOUTH EVERY DAY     aspirin 81 MG EC tablet  Commonly known as: ECOTRIN  Take 81 mg by mouth every other day.     calcium carbonate 600 mg calcium (1,500 mg) Tab  Commonly known as: OS-JONAS  Take 600 mg by mouth once.     diphenhydrAMINE 25 mg capsule  Commonly known as: BENADRYL  Take 25 mg by mouth every 6 (six) hours as needed for Itching.     fluticasone-umeclidin-vilanter 200-62.5-25 mcg inhaler  Commonly known as: TRELEGY ELLIPTA  Inhale 1 puff into the lungs once daily.     gabapentin 300 MG capsule  Commonly known as: NEURONTIN  Take 1 capsule (300 mg total) by mouth every evening.     hydroCHLOROthiazide 25 MG tablet  Commonly known as: HYDRODIURIL  TAKE 1 TABLET BY MOUTH EVERY DAY IN THE MORNING     ICAPS AREDS ORAL  Take 1 tablet by mouth once daily.     lisinopriL 40 MG tablet  Commonly known as: PRINIVIL,ZESTRIL  TAKE 1 TABLET BY MOUTH EVERY DAY     * ondansetron 4 MG Tbdl  Commonly known as: ZOFRAN-ODT  Take 1 tablet (4 mg total) by mouth every 8 (eight) hours as needed (nausea, vomiting).     * ondansetron 8 MG Tbdl  Commonly known as: ZOFRAN-ODT  Take every 8 hours or as needed for nausea during procedural prep.     tamsulosin 0.4 mg Cap  Commonly known as: FLOMAX  Take 1 capsule (0.4 mg total) by mouth once daily. for 7 days     vitamin D 1000 units Tab  Commonly known as: VITAMIN D3  Take 1,000 Units by mouth once daily.           * This list has 2 medication(s) that are the same as other medications prescribed for you. Read the directions carefully, and ask your doctor or other care provider to review them with you.                  Vidhi Dsouza,  MD  Colorectal Surgery  [facility]

## 2025-05-01 NOTE — CARE UPDATE
Unit TOMMY Care Support Interaction      I have reviewed the chart of Melani Holloway who is hospitalized for Polyp of colon. The patient is currently located in the following unit: [facility]       I have seen and examined the patient and provided the following support:     Patient experience rounds - positive experience reported       Sandra Reyes, DEJAHP-C  Unit Based TOMMY

## 2025-05-01 NOTE — ASSESSMENT & PLAN NOTE
Melani Holloway is a 79 y.o. female who was found to have multiple large right and proximal transverse colon polyps on recent colonoscopy, one of which was positive on staging PET for lung cancer. S/P laparoscopic right colectomy and umbilical hernia repair on 4/29/2025.    - continue low residue diet  - multimodal pain meds  - replete lytes PRN  - lovenox ddvt ppx and SCDs  - OOB and ambulate, IS  - PT/OT following    Dispo: okay for discharge

## 2025-05-01 NOTE — SUBJECTIVE & OBJECTIVE
Subjective:     Interval History:   No acute events overnight.   Doing very well this AM.   Tolerating diet. No nausea.   Passing flatus and had BM.   Ambulating.   Voiding appropriately.     Post-Op Info:  Procedure(s) (LRB):  COLECTOMY, RIGHT, LAPAROSCOPIC (N/A)  REPAIR, HERNIA, UMBILICAL, LAPAROSCOPIC   2 Days Post-Op      Medications:  Continuous Infusions:      Scheduled Meds:   acetaminophen  1,000 mg Oral Q8H    alvimopan  12 mg Oral BID    amLODIPine  10 mg Oral Daily    aspirin  81 mg Oral Every other day    enoxparin  40 mg Subcutaneous Daily    fluticasone furoate-vilanteroL  1 puff Inhalation Daily    gabapentin  300 mg Oral TID    ibuprofen  800 mg Oral Q8H    mupirocin   Nasal BID    tiotropium bromide  2 puff Inhalation Daily     PRN Meds:   acetaminophen tablet 1,000 mg    alvimopan capsule 12 mg    amLODIPine tablet 10 mg    aspirin EC tablet 81 mg    enoxaparin injection 40 mg    fluticasone furoate-vilanteroL 200-25 mcg/dose diskus inhaler 1 puff    gabapentin capsule 300 mg    ibuprofen tablet 800 mg    mupirocin 2 % ointment    tiotropium bromide 2.5 mcg/actuation inhaler 2 puff        Objective:     Vital Signs (Most Recent):  Temp: 97.9 °F (36.6 °C) (05/01/25 0750)  Pulse: 61 (05/01/25 0750)  Resp: 14 (05/01/25 0329)  BP: (!) 161/64 (05/01/25 0750)  SpO2: (!) 94 % (05/01/25 0750) Vital Signs (24h Range):  Temp:  [97.7 °F (36.5 °C)-98.7 °F (37.1 °C)] 97.9 °F (36.6 °C)  Pulse:  [59-65] 61  Resp:  [14-18] 14  SpO2:  [91 %-94 %] 94 %  BP: (131-164)/(58-75) 161/64     Intake/Output - Last 3 Shifts         04/29 0700  04/30 0659 04/30 0700  05/01 0659 05/01 0700  05/02 0659    P.O. 250      I.V. 1800      IV Piggyback 100      Total Intake 2150      Urine 2000      Emesis/NG output 10      Total Output 2010      Net +140                      Physical Exam  Vitals reviewed.   Constitutional:       General: She is not in acute distress.  Pulmonary:      Effort: Pulmonary effort is normal. No  "respiratory distress.   Abdominal:      General: Bowel sounds are normal. There is no distension.      Tenderness: There is no abdominal tenderness.      Comments: Incisions CDI.    Skin:     General: Skin is warm and dry.   Neurological:      Mental Status: She is alert and oriented to person, place, and time.          Significant Labs:  BMP (Last 3 Results):   Recent Labs   Lab 04/24/25  0925 04/30/25  0542    84    140   K 4.0 3.3*    111*   CO2 25 21*   BUN 19 10   CREATININE 0.8 0.7   CALCIUM 9.4 7.8*   MG  --  2.1     CBC (Last 3 Results):   Recent Labs   Lab 04/24/25  0925 04/30/25  0542   WBC 5.07 5.79   RBC 4.51 3.78*   HGB 12.4 10.2*   HCT 38.4 32.4*    178   MCV 85 86   MCH 27.5 27.0   MCHC 32.3 31.5*     CRP (Last 3 Results): No results for input(s): "CRP" in the last 168 hours.    Significant Diagnostics:  None  "

## 2025-05-01 NOTE — PLAN OF CARE
[facility]  Discharge Final Note    Primary Care Provider: Nathaniel Greenfield MD  Expected Discharge Date: 5/1/2025    Patient medically ready for discharge to home.     Transportation by family.     Is family/patient aware of discharge: yes     Hospital follow up scheduled: yes       Final Discharge Note (most recent)       Final Note - 05/01/25 1654          Final Note    Assessment Type Final Discharge Note     Anticipated Discharge Disposition Home or Self Care     Hospital Resources/Appts/Education Provided Provided patient/caregiver with written discharge plan information                     Important Message from Medicare         Referral Info (most recent)       Referral Info    No documentation.                 Contact Info       Cleo Willingham MD   Specialty: Colon and Rectal Surgery    [address]  [facility] [address]   Phone: [phone]       Next Steps: Follow up in 2 week(s)    Instructions: For wound re-check          Future Appointments   Date Time Provider Department Center   5/21/2025  9:15 AM Nathaniel Greenfield MD [facility]   5/22/2025  1:20 PM Cleo Willingham MD [facility] COLON [facility]   6/19/2025  8:30 AM [facility] B[facility] CT1 [facility] CT REBEKAH Waggoner   6/19/2025  9:00 AM Riaz Fay MD [facility] THORAC Chester Parr, DAYSI  Case Management  Ext: [phone]  05/01/2025

## 2025-05-02 ENCOUNTER — RESULTS FOLLOW-UP (OUTPATIENT)
Dept: SURGERY | Facility: CLINIC | Age: 80
End: 2025-05-02

## 2025-05-02 LAB
ESTROGEN SERPL-MCNC: NORMAL PG/ML
INSULIN SERPL-ACNC: NORMAL U[IU]/ML
LAB AP CLINICAL INFORMATION: NORMAL
LAB AP GROSS DESCRIPTION: NORMAL
LAB AP PERFORMING LOCATION(S): NORMAL
LAB AP REPORT FOOTNOTES: NORMAL

## 2025-05-19 NOTE — PROGRESS NOTES
"CRS Office Visit Follow-up      SUBJECTIVE:     History of Present Illness:  Patient is a 79 y.o. female who presents following laparoscopic right colectomy for unresectable polyps on 4/29/2025. Their post-operative course was uncomplicated. There are no new complaints today.  Doing well. No pain.    Final pathology:  1. Right colon, right colectomy:  Tubular adenomas x6, measuring from 0.5 to 3.0 cm in greatest dimension.  Surgical margins are negative for dysplasia.  Seven lymph nodes, negative for carcinoma (0/7).  Uninvolved large and small bowel with no diagnostic abnormality.  Appendix with no diagnostic abnormality.     2. Hernia sac, excision:  Fibroadipose tissue, lined be reactive mesothelial cells.  Compatible with hernia sac.    Review of Systems:  Review of Systems   Constitutional:  Negative for fever.   Gastrointestinal:  Negative for abdominal pain, blood in stool, constipation, diarrhea, nausea and vomiting.   Genitourinary:  Negative for dysuria.       OBJECTIVE:     Vital Signs (Most Recent)  BP (!) 145/73 (BP Location: Right arm, Patient Position: Sitting)   Pulse 77   Ht 5' 3" (1.6 m)   Wt 80.6 kg (177 lb 11.1 oz)   LMP  (LMP Unknown)   BMI 31.48 kg/m²     Physical Exam:  General: White female in no distress   Neuro: Alert and oriented x 4.  Moves all extremities.     HEENT: No icterus.  Trachea midline  Respiratory: Respirations are even and unlabored  Cardiac: Regular rate  Abdomen: incisions healing nicely, clean and dry, nontender abdomen, soft  Extremities: Warm dry and intact  Skin: No rashes      ASSESSMENT/PLAN:     78yo F s/p  laparoscopic right colectomy for unresectable polyps on 4/29/2025, doing well    Colonoscopy Feb 2028  RTC prn    Cleo Willingham MD  Staff Surgeon, Colon and Rectal Surgery  [facility]    "

## 2025-05-21 ENCOUNTER — TELEPHONE (OUTPATIENT)
Dept: SURGERY | Facility: CLINIC | Age: 80
End: 2025-05-21
Payer: MEDICARE

## 2025-05-22 ENCOUNTER — OFFICE VISIT (OUTPATIENT)
Dept: SURGERY | Facility: CLINIC | Age: 80
End: 2025-05-22
Attending: COLON & RECTAL SURGERY
Payer: MEDICARE

## 2025-05-22 VITALS
SYSTOLIC BLOOD PRESSURE: 145 MMHG | WEIGHT: 177.69 LBS | HEART RATE: 77 BPM | HEIGHT: 63 IN | DIASTOLIC BLOOD PRESSURE: 73 MMHG | BODY MASS INDEX: 31.48 KG/M2

## 2025-05-22 DIAGNOSIS — Z98.890 POST-OPERATIVE STATE: Primary | ICD-10-CM

## 2025-05-22 PROCEDURE — 99999 PR PBB SHADOW E&M-EST. PATIENT-LVL III: CPT | Mod: PBBFAC,,, | Performed by: COLON & RECTAL SURGERY

## 2025-06-09 ENCOUNTER — LAB VISIT (OUTPATIENT)
Dept: LAB | Facility: HOSPITAL | Age: 80
End: 2025-06-09
Attending: FAMILY MEDICINE
Payer: MEDICARE

## 2025-06-09 DIAGNOSIS — E87.6 HYPOKALEMIA: ICD-10-CM

## 2025-06-09 DIAGNOSIS — E83.51 HYPOCALCEMIA: ICD-10-CM

## 2025-06-09 DIAGNOSIS — Z79.899 OTHER LONG TERM (CURRENT) DRUG THERAPY: ICD-10-CM

## 2025-06-09 DIAGNOSIS — D64.9 ANEMIA, UNSPECIFIED TYPE: ICD-10-CM

## 2025-06-09 LAB
ABSOLUTE EOSINOPHIL (OHS): 0.04 K/UL
ABSOLUTE MONOCYTE (OHS): 0.56 K/UL (ref 0.3–1)
ABSOLUTE NEUTROPHIL COUNT (OHS): 1.89 K/UL (ref 1.8–7.7)
ALBUMIN SERPL BCP-MCNC: 3.9 G/DL (ref 3.5–5.2)
ALP SERPL-CCNC: 71 UNIT/L (ref 40–150)
ALT SERPL W/O P-5'-P-CCNC: 14 UNIT/L (ref 10–44)
ANION GAP (OHS): 8 MMOL/L (ref 8–16)
AST SERPL-CCNC: 18 UNIT/L (ref 11–45)
BASOPHILS # BLD AUTO: 0.02 K/UL
BASOPHILS NFR BLD AUTO: 0.5 %
BILIRUB SERPL-MCNC: 0.4 MG/DL (ref 0.1–1)
BUN SERPL-MCNC: 18 MG/DL (ref 8–23)
CALCIUM SERPL-MCNC: 9.9 MG/DL (ref 8.7–10.5)
CHLORIDE SERPL-SCNC: 105 MMOL/L (ref 95–110)
CO2 SERPL-SCNC: 27 MMOL/L (ref 23–29)
CREAT SERPL-MCNC: 0.9 MG/DL (ref 0.5–1.4)
EAG (OHS): 117 MG/DL (ref 68–131)
ERYTHROCYTE [DISTWIDTH] IN BLOOD BY AUTOMATED COUNT: 15.1 % (ref 11.5–14.5)
GFR SERPLBLD CREATININE-BSD FMLA CKD-EPI: >60 ML/MIN/1.73/M2
GLUCOSE SERPL-MCNC: 87 MG/DL (ref 70–110)
HBA1C MFR BLD: 5.7 % (ref 4–5.6)
HCT VFR BLD AUTO: 37.4 % (ref 37–48.5)
HGB BLD-MCNC: 11.7 GM/DL (ref 12–16)
IMM GRANULOCYTES # BLD AUTO: 0.04 K/UL (ref 0–0.04)
IMM GRANULOCYTES NFR BLD AUTO: 1 % (ref 0–0.5)
LYMPHOCYTES # BLD AUTO: 1.58 K/UL (ref 1–4.8)
MCH RBC QN AUTO: 26.3 PG (ref 27–31)
MCHC RBC AUTO-ENTMCNC: 31.3 G/DL (ref 32–36)
MCV RBC AUTO: 84 FL (ref 82–98)
NUCLEATED RBC (/100WBC) (OHS): 0 /100 WBC
PLATELET # BLD AUTO: 194 K/UL (ref 150–450)
PMV BLD AUTO: 12.2 FL (ref 9.2–12.9)
POTASSIUM SERPL-SCNC: 3.6 MMOL/L (ref 3.5–5.1)
PROT SERPL-MCNC: 7.3 GM/DL (ref 6–8.4)
RBC # BLD AUTO: 4.45 M/UL (ref 4–5.4)
RELATIVE EOSINOPHIL (OHS): 1 %
RELATIVE LYMPHOCYTE (OHS): 38.3 % (ref 18–48)
RELATIVE MONOCYTE (OHS): 13.6 % (ref 4–15)
RELATIVE NEUTROPHIL (OHS): 45.6 % (ref 38–73)
SODIUM SERPL-SCNC: 140 MMOL/L (ref 136–145)
WBC # BLD AUTO: 4.13 K/UL (ref 3.9–12.7)

## 2025-06-09 PROCEDURE — 36415 COLL VENOUS BLD VENIPUNCTURE: CPT

## 2025-06-09 PROCEDURE — 85025 COMPLETE CBC W/AUTO DIFF WBC: CPT

## 2025-06-09 PROCEDURE — 83036 HEMOGLOBIN GLYCOSYLATED A1C: CPT

## 2025-06-09 PROCEDURE — 82040 ASSAY OF SERUM ALBUMIN: CPT

## 2025-06-13 NOTE — PROGRESS NOTES
"Subjective     Patient ID: Melani Holloway is a 79 y.o. female.    Chief Complaint: Follow-up    Diagnosis:  NSCLC    Pre-operative therapy: none    Procedure(s) and date(s): 05/23/24 - robotic left upper division with MLND    Pathology:  2.4 cm acinar and 1.5 cm moderately differentiated adenocarcinoma. VPI present in 2.2cm nodule ). DILEEP present. LVI negative. Margins negative (closest 1.5 cm; parenchymal). LN stations 5,7,8,9,10,11,12 negative. Two synchronous primaries - T1b and T1c adenocarcinoma      [facility] NGS/PD-L1, Smaller Lesion  - EGFR [id number]  - RBM 10  - Negative: EGFR, ALK, BRAF, KRAS, ROS1, RET, MET, ERBB2  - PD-L1 1%     [facility] NGS/PD-L1, Larger Lesion  - SF3B1, TP53, BRAF [id number], ATRX  - Negative: EGFR, ALK, BRAF, KRAS, ROS1, RET, MET, ERBB2  - PD-L1 TPS <1%    Post-operative therapy: none    HPI  79 y.o.  female who presents to clinic for 1 year follow up s/p robotic left upper lobectomy with MLND. Tolerated procedure well; however, she was stepped up to the SICU for closer observation and stepped back down to the floor on POD 1 with a continuous air leak noted. Post op also complicated by afib. Discharged home on POD 5.  Discussed at Thoracic Tumor Board 06/05/24 which recommended "Refer to oncology for discussion of adjuvant therapy although will weight risk benefits given age. Recommend close first interval scan given right sided nodules."  Rediscused at tumor board 6/26/24 with recommendation "No indication for adjuvant therapy with tumors less than 4cm. Close surveillance given EGFR and high risk features". Today, she reports she is doing well. She does report occasional chest tightness and "twinges" of pain. Started gabapentin, taking as needed. Recent right colectomy 4/29/25.    Review of Systems     Objective     Vitals:    06/19/25 0840   BP: (!) 146/71   Pulse: 62   SpO2: 96%   Weight: 81.3 kg (179 lb 3.7 oz)   Height: 5' 3" (1.6 m)   PainSc: 0-No pain         Physical " Exam  Constitutional:       Appearance: Normal appearance. She is obese.   Eyes:      Extraocular Movements: Extraocular movements intact.   Cardiovascular:      Rate and Rhythm: Normal rate and regular rhythm.   Pulmonary:      Effort: Pulmonary effort is normal. No respiratory distress.      Breath sounds: Normal breath sounds.   Abdominal:      General: Abdomen is flat.      Palpations: Abdomen is soft.   Skin:     General: Skin is warm and dry.      Comments: Incisions healing well. C/D/I   Neurological:      General: No focal deficit present.      Mental Status: She is alert and oriented to person, place, and time. Mental status is at baseline.   Psychiatric:         Mood and Affect: Mood normal.         Behavior: Behavior normal.         Thought Content: Thought content normal.     Diagnostics:    CXR 06/10/24: images reviewed.     CT chest 9/19/24: images reviewed  Postoperative changes left apical segmentectomy and mediastinal lymph node dissection.  No findings suspicious for recurrent disease.  Interval left apical pleural effusion, nodular soft tissue thickening along the oblique fissure and bandlike opacities in the right upper lobe, these are favored to represent post resection changes.  Few prominent mediastinal lymph nodes are similar to prior. Allowing for the limitation without IV contrast.  New bibasal peripheral ground-glass airspace opacities with interlobular wall thickening associated with clusters of pulmonary micro nodules in tree-in-bud configuration and bronchial wall thickening most prominent in the right lower lobe.  These findings are suspicious for distal airway disease like bronchiectasis with possible superimposed aspiration or infection.  Scattered bilateral pulmonary nodules with mild increased size of left lower lobe solid pulmonary nodule.    Chest CT 12/19/24:   Impression:     1. Stable observation since previous examination.  For details see above.  2. Upper abdominal  abnormalities are poorly characterized in this exam.  CT abdomen with IV contrast is recommended if clinically indicated.    CT chest 6/19/25: Images reviewed    Assessment and Plan     79 y.o.  female with Stage IA3 (cT1c, cN0, cM0), with two separate stage 1 tumors with different genetic makeup identified in the same lobe.  She has several high risk features and presents today for 1 year follow up s/p robotic left upper lobectomy with MLND. Doing well post-op however there is concern for possible enlargement of a paratracheal lymph node. No other evidence of new/recurrent disease.        Plan:  RTC in 6 months with CT chest.   Continue gabapentin 300mg qhs

## 2025-06-17 ENCOUNTER — HOSPITAL ENCOUNTER (OUTPATIENT)
Dept: RADIOLOGY | Facility: HOSPITAL | Age: 80
Discharge: HOME | End: 2025-06-17
Attending: FAMILY MEDICINE
Payer: MEDICARE

## 2025-06-17 DIAGNOSIS — Z12.31 ENCOUNTER FOR SCREENING MAMMOGRAM FOR BREAST CANCER: ICD-10-CM

## 2025-06-17 PROCEDURE — 77063 BREAST TOMOSYNTHESIS BI: CPT | Mod: TC

## 2025-06-17 PROCEDURE — 77063 BREAST TOMOSYNTHESIS BI: CPT | Mod: 26,,, | Performed by: RADIOLOGY

## 2025-06-17 PROCEDURE — 77067 SCR MAMMO BI INCL CAD: CPT | Mod: 26,,, | Performed by: RADIOLOGY

## 2025-06-19 ENCOUNTER — HOSPITAL ENCOUNTER (OUTPATIENT)
Dept: RADIOLOGY | Facility: HOSPITAL | Age: 80
Discharge: HOME | End: 2025-06-19
Attending: STUDENT IN AN ORGANIZED HEALTH CARE EDUCATION/TRAINING PROGRAM
Payer: MEDICARE

## 2025-06-19 ENCOUNTER — OFFICE VISIT (OUTPATIENT)
Dept: CARDIOTHORACIC SURGERY | Facility: CLINIC | Age: 80
End: 2025-06-19
Attending: STUDENT IN AN ORGANIZED HEALTH CARE EDUCATION/TRAINING PROGRAM
Payer: MEDICARE

## 2025-06-19 VITALS
DIASTOLIC BLOOD PRESSURE: 71 MMHG | WEIGHT: 179.25 LBS | SYSTOLIC BLOOD PRESSURE: 146 MMHG | BODY MASS INDEX: 31.76 KG/M2 | OXYGEN SATURATION: 96 % | HEIGHT: 63 IN | HEART RATE: 62 BPM

## 2025-06-19 DIAGNOSIS — C34.92 NSCLC OF LEFT LUNG: ICD-10-CM

## 2025-06-19 DIAGNOSIS — C34.92 NSCLC OF LEFT LUNG: Primary | ICD-10-CM

## 2025-06-19 PROCEDURE — 99999 PR PBB SHADOW E&M-EST. PATIENT-LVL III: CPT | Mod: PBBFAC,,, | Performed by: STUDENT IN AN ORGANIZED HEALTH CARE EDUCATION/TRAINING PROGRAM

## 2025-06-19 PROCEDURE — 71250 CT THORAX DX C-: CPT | Mod: TC

## 2025-06-19 RX ORDER — GABAPENTIN 300 MG/1
300 CAPSULE ORAL NIGHTLY
Qty: 30 CAPSULE | Refills: 3 | Status: SHIPPED | OUTPATIENT
Start: 2025-06-19 | End: 2025-10-17

## (undated) DEVICE — ADAPTER SWIVEL

## (undated) DEVICE — TOWEL OR DISP STRL BLUE 4/PK

## (undated) DEVICE — BAG ION VISION PROBE

## (undated) DEVICE — SUT 3/0 27IN PDS II VIO MO

## (undated) DEVICE — ADHESIVE DERMABOND ADVANCED

## (undated) DEVICE — NDL BOX COUNTER

## (undated) DEVICE — DRAPE ARM DAVINCI XI

## (undated) DEVICE — GOWN SMART IMP BREATHABLE XXLG

## (undated) DEVICE — STAPLER GIA HANDLE STD

## (undated) DEVICE — CANNULA REDUCER 12-8MM

## (undated) DEVICE — LOOP VESSEL BLUE MAXI

## (undated) DEVICE — TRAY SKIN SCRUB WET PREMIUM

## (undated) DEVICE — SOL WATER STRL IRR 1000ML

## (undated) DEVICE — CANNULA SEAL 12MM

## (undated) DEVICE — NDL VIZISHOT 2 FLEX 19G

## (undated) DEVICE — SYR ONLY LUER LOCK 20CC

## (undated) DEVICE — ELECTRODE REM PLYHSV RETURN 9

## (undated) DEVICE — PENCIL GOLF STERILE

## (undated) DEVICE — COVER LIGHT HANDLE

## (undated) DEVICE — SYR 10CC LUER LOCK

## (undated) DEVICE — TUBING SUC UNIV W/CONN 12FT

## (undated) DEVICE — TRAY GENERAL SURGERY OMC

## (undated) DEVICE — DRAPE SCOPE PILLOW WARMER

## (undated) DEVICE — GOWN POLY REINF BRTH SLV XL

## (undated) DEVICE — SUT 0 VICRYL / CT-1

## (undated) DEVICE — SEALER LIGASURE LAP 37CM 5MM

## (undated) DEVICE — BAG INZII TISS RETRV 12/15MM

## (undated) DEVICE — PENCIL ROCKER SWITCH 10FT CORD

## (undated) DEVICE — APPLICATOR CHLORAPREP ORN 26ML

## (undated) DEVICE — SPONGE COTTON TRAY 4X4IN

## (undated) DEVICE — TUBING HF INSUFFLATION W/ FLTR

## (undated) DEVICE — NDL VIZISHOT 2 FLEX 22G

## (undated) DEVICE — PAD PINK TRENDELENBURG POS XL

## (undated) DEVICE — CONTAINER SPECIMEN OR STER 4OZ

## (undated) DEVICE — TROCAR ENDOPATH XCEL 5MM 7.5CM

## (undated) DEVICE — PACK ECLIPSE SET-UP W/O DRAPE

## (undated) DEVICE — RELOAD SUREFORM 45 4.3 GRN 6R

## (undated) DEVICE — BOWL STERILE LARGE 32OZ

## (undated) DEVICE — CATH THORACIC 28FR ST

## (undated) DEVICE — OBTURATOR BLADELESS 8MM XI CLR

## (undated) DEVICE — PORT AIRSEAL 12/120MM LPI

## (undated) DEVICE — SUT MCRYL PLUS 4-0 PS2 27IN

## (undated) DEVICE — SET TRI-LUMEN FILTERED TUBE

## (undated) DEVICE — SUT 0 30IN ETHIBOND EXCEL G

## (undated) DEVICE — CUTTER PROXIMATE BLUE 75MM

## (undated) DEVICE — DRESSING TRANS 4X4 TEGADERM

## (undated) DEVICE — RELOAD SUREFORM 45 2.5 WHT 6R

## (undated) DEVICE — KIT ANTIFOG W/SPONG & FLUID

## (undated) DEVICE — DRESSING SURGICAL 1X3

## (undated) DEVICE — SYS LABEL CORRECT MED

## (undated) DEVICE — DRAPE THREE-QTR REINF 53X77IN

## (undated) DEVICE — DRAPE ABDOMINAL TIBURON 14X11

## (undated) DEVICE — DRESSING TRANS 2X2 TEGADERM

## (undated) DEVICE — SUT 2-0 VICRYL / CT-1

## (undated) DEVICE — Device

## (undated) DEVICE — HEMOSTAT SURGICEL NUKNIT 3X4IN

## (undated) DEVICE — TAPE SILK 3IN

## (undated) DEVICE — TRAY CATH 1-LYR URIMTR 16FR

## (undated) DEVICE — STAPER TA MED BLUE 90MM

## (undated) DEVICE — HEMOSTAT SURGICEL NU-KNIT 6X9

## (undated) DEVICE — LUBRICANT SURGILUBE 2 OZ

## (undated) DEVICE — CYTOSPIN COLLECTION FLUID BLT

## (undated) DEVICE — RELOAD SUREFORM 45 3.5 BLU 6R

## (undated) DEVICE — GLOVE BIOGEL ORTHOPEDIC 7.5

## (undated) DEVICE — ALCOHOL BLEND 95%

## (undated) DEVICE — SEAL UNIVERSAL 5MM-8MM XI

## (undated) DEVICE — ELECTRODE MEGADYNE RETURN DUAL

## (undated) DEVICE — NDL ASPIRATING VIZISHOT 20-40M

## (undated) DEVICE — SUT VICRYL PLUS 2-0 CT1 18

## (undated) DEVICE — FORCEP JAW RADIAL PULM 2X100CM

## (undated) DEVICE — TIP YANKAUERS BULB NO VENT

## (undated) DEVICE — STAPLER SUREFORM CRVD TIP 45

## (undated) DEVICE — DRAPE INCISE IOBAN 2 23X17IN

## (undated) DEVICE — SUT MONOCRYL 4-0 PS-2

## (undated) DEVICE — MARKER SKIN RULER STERILE

## (undated) DEVICE — SUT 1 36IN PDS II

## (undated) DEVICE — SYR SLIP TIP 20CC

## (undated) DEVICE — SUT SILK 0 STRANDS 30IN BLK

## (undated) DEVICE — SUT MONOCRYL UD 4-0 27 PS-1

## (undated) DEVICE — DRAPE CORETEMP FLD WRM 56X62IN

## (undated) DEVICE — COVER MAYO STND XL 30X57IN

## (undated) DEVICE — CATH BRONCHOSCOPE F/BF

## (undated) DEVICE — KIT VUETIP TROCAR SWAB

## (undated) DEVICE — DRAPE COLUMN DAVINCI XI

## (undated) DEVICE — RELOAD ENDO GIA TRISTAPLE 45MM

## (undated) DEVICE — COVER LIGHT HANDLE 80/CA

## (undated) DEVICE — CONNECTOR SWIVEL

## (undated) DEVICE — ELECTRODE BLADE INSULATED 1 IN

## (undated) DEVICE — ADAPTER VISION PROBE & SUCTION

## (undated) DEVICE — CLIPS LIGATING TITANIUM WDE SM

## (undated) DEVICE — NDL FLEXISION BIOPSY 21G

## (undated) DEVICE — NDL INSUF ULTRA VERESS 120MM

## (undated) DEVICE — TRAY MINOR GEN SURG OMC

## (undated) DEVICE — NDL SPINAL 18GX3.5 SPINOCAN

## (undated) DEVICE — GAUZE DRAIN N WVN 6PLY 4X4IN

## (undated) DEVICE — TROCAR ENDOPATH XCEL 5X75MM